# Patient Record
Sex: FEMALE | Race: WHITE | NOT HISPANIC OR LATINO | Employment: OTHER | URBAN - METROPOLITAN AREA
[De-identification: names, ages, dates, MRNs, and addresses within clinical notes are randomized per-mention and may not be internally consistent; named-entity substitution may affect disease eponyms.]

---

## 2017-04-22 DIAGNOSIS — K21.9 GASTROESOPHAGEAL REFLUX DISEASE WITHOUT ESOPHAGITIS: Primary | ICD-10-CM

## 2017-04-22 NOTE — TELEPHONE ENCOUNTER
Pending Prescriptions:                       Disp   Refills    omeprazole (PRILOSEC) 20 MG CR capsule    180 ca*3            Sig: Take 1 capsule (20 mg) by mouth 2 times daily          Last Written Prescription Date:  Historical  Last Fill Quantity: -,   # refills: -  Last Office Visit with [facility], [facility] or [facility] prescribing provider: 10/19/16 [facility]  Future Office visit:       Routing refill request to provider for review/approval because:  Medication is reported/historical    RT Valentin(R)

## 2017-05-30 DIAGNOSIS — I10 BENIGN ESSENTIAL HYPERTENSION: Primary | ICD-10-CM

## 2017-05-30 NOTE — TELEPHONE ENCOUNTER
Pending Prescriptions:                       Disp   Refills    diltiazem (TIAZAC) 180 MG 24 hr ER beaded*30 cap*             Sig: Take 1 capsule (180 mg) by mouth daily          Last Written Prescription Date:  Historical  Last Fill Quantity: -,   # refills: -  Last Office Visit with FMBRANDI, JOELLEP or [facility] prescribing provider: 10/19/16 [facility]  Future Office visit:       Routing refill request to provider for review/approval because:  Medication is reported/historical    RT Dee(R)

## 2017-05-31 NOTE — TELEPHONE ENCOUNTER
"Patient called from the pharmacy, they gave her a five day emergency supply to tide her over, patient is anxious to have this filled, states \"i cannot be without this medication.\"  "

## 2017-06-05 RX ORDER — DILTIAZEM HYDROCHLORIDE 180 MG/1
180 CAPSULE, EXTENDED RELEASE ORAL DAILY
Qty: 90 CAPSULE | Refills: 3 | Status: SHIPPED | OUTPATIENT
Start: 2017-06-05 | End: 2018-06-08

## 2017-06-13 ENCOUNTER — OFFICE VISIT (OUTPATIENT)
Dept: FAMILY MEDICINE | Facility: CLINIC | Age: 77
End: 2017-06-13
Payer: COMMERCIAL

## 2017-06-13 VITALS
BODY MASS INDEX: 19.49 KG/M2 | OXYGEN SATURATION: 93 % | HEIGHT: 62 IN | SYSTOLIC BLOOD PRESSURE: 117 MMHG | HEART RATE: 68 BPM | WEIGHT: 105.9 LBS | TEMPERATURE: 98.5 F | DIASTOLIC BLOOD PRESSURE: 66 MMHG

## 2017-06-13 DIAGNOSIS — E53.8 VITAMIN B12 DEFICIENCY (NON ANEMIC): ICD-10-CM

## 2017-06-13 DIAGNOSIS — K52.0 RADIATION COLITIS: ICD-10-CM

## 2017-06-13 DIAGNOSIS — M19.90 ARTHRITIS: ICD-10-CM

## 2017-06-13 DIAGNOSIS — K56.609 SBO (SMALL BOWEL OBSTRUCTION) (H): ICD-10-CM

## 2017-06-13 DIAGNOSIS — Z23 NEED FOR PROPHYLACTIC VACCINATION WITH TETANUS-DIPHTHERIA (TD): ICD-10-CM

## 2017-06-13 DIAGNOSIS — K56.7 ILEUS OF UNSPECIFIED TYPE (H): Primary | ICD-10-CM

## 2017-06-13 LAB
ERYTHROCYTE [DISTWIDTH] IN BLOOD BY AUTOMATED COUNT: 14.8 % (ref 10–15)
HCT VFR BLD AUTO: 38.3 % (ref 35–47)
HGB BLD-MCNC: 12.8 G/DL (ref 11.7–15.7)
MCH RBC QN AUTO: 31.3 PG (ref 26.5–33)
MCHC RBC AUTO-ENTMCNC: 33.4 G/DL (ref 31.5–36.5)
MCV RBC AUTO: 94 FL (ref 78–100)
PLATELET # BLD AUTO: 274 10E9/L (ref 150–450)
RBC # BLD AUTO: 4.09 10E12/L (ref 3.8–5.2)
WBC # BLD AUTO: 4.8 10E9/L (ref 4–11)

## 2017-06-13 PROCEDURE — 99214 OFFICE O/P EST MOD 30 MIN: CPT | Performed by: INTERNAL MEDICINE

## 2017-06-13 PROCEDURE — 36415 COLL VENOUS BLD VENIPUNCTURE: CPT | Performed by: INTERNAL MEDICINE

## 2017-06-13 PROCEDURE — 85027 COMPLETE CBC AUTOMATED: CPT | Performed by: INTERNAL MEDICINE

## 2017-06-13 RX ORDER — CALCIUM CARBONATE 500(1250)
1 TABLET ORAL 2 TIMES DAILY
COMMUNITY
End: 2022-09-07

## 2017-06-13 NOTE — MR AVS SNAPSHOT
After Visit Summary   6/13/2017    Georgette Alatorre    MRN: [id number]           Patient Information     Date Of Birth          1940        Visit Information        Provider Department      6/13/2017 5:30 PM Mario Valdes MD [facility]        Today's Diagnoses     Ileus of unspecified type (H)    -  1    SBO (small bowel obstruction) (H)        Radiation colitis        Vitamin B12 deficiency (non anemic)        Need for prophylactic vaccination with tetanus-diphtheria (TD)          Care Instructions    For arthritis, In aim of protecting further wear and tear damage to current affected joints, avoid over use.    You can take up to 4000 mg of Tylenol daily, which is 8 extra strength (500 mg) Tylenol. Take 2 tablets 500 mg four times daily and taper down to the lowest effective dose.    For colitis, begin 3 mL Peptobismol at night. If you do not find 3 mL effective, increase to 6 mL nightly. Keep a record of bowel movements to monitor improvement.           Follow-ups after your visit        Who to contact     If you have questions or need follow up information about today's clinic visit or your schedule please contact [facility] directly at [phone].  Normal or non-critical lab and imaging results will be communicated to you by [employer], letter or phone within 4 business days after the clinic has received the results. If you do not hear from us within 7 days, please contact the clinic through [employer] or phone. If you have a critical or abnormal lab result, we will notify you by phone as soon as possible.  Submit refill requests through [employer] or call your pharmacy and they will forward the refill request to us. Please allow 3 business days for your refill to be completed.          Additional Information About Your Visit        MyChart Information     [employer] gives you secure access to your electronic health record. If you see a primary care provider, you can also  "send messages to your care team and make appointments. If you have questions, please call your primary care clinic.  If you do not have a primary care provider, please call [phone] and they will assist you.        Care EveryWhere ID     This is your Care EveryWhere ID. This could be used by other organizations to access your [facility] medical records  [id number]        Your Vitals Were     Pulse Temperature Height Pulse Oximetry Breastfeeding? BMI (Body Mass Index)    68 98.5  F (36.9  C) (Oral) 5' 2\" (1.575 m) 93% No 19.37 kg/m2       Blood Pressure from Last 3 Encounters:   06/13/17 117/66   10/19/16 121/60   09/20/16 115/67    Weight from Last 3 Encounters:   06/13/17 105 lb 14.4 oz (48 kg)   10/19/16 102 lb 3.2 oz (46.4 kg)   09/20/16 102 lb (46.3 kg)              Today, you had the following     No orders found for display       Primary Care Provider Office Phone # Fax #    Mario Valdes [phone]       [facility] [address] [facility] [address]  [facility] [address]        Thank you!     Thank you for choosing [facility]  for your care. Our goal is always to provide you with excellent care. Hearing back from our patients is one way we can continue to improve our services. Please take a few minutes to complete the written survey that you may receive in the mail after your visit with us. Thank you!             Your Updated Medication List - Protect others around you: Learn how to safely use, store and throw away your medicines at www.disposemymeds.org.          This list is accurate as of: 6/13/17  6:18 PM.  Always use your most recent med list.                   Brand Name Dispense Instructions for use    calcium carbonate 1250 MG tablet    OS-ILYA 500 mg [place]. [place]     Take 1 tablet by mouth 2 times daily       cyanocobalamin 1000 MCG/ML injection    VITAMIN B12    1 mL    Inject 1 mL (1,000 mcg) into the muscle every 30 days       diltiazem 180 MG 24 hr ER beaded capsule "    TIAZAC    90 capsule    Take 1 capsule (180 mg) by mouth daily       omeprazole 20 MG CR capsule    priLOSEC    180 capsule    Take 1 capsule (20 mg) by mouth 2 times daily       potassium chloride SA 20 MEQ CR tablet    potassium chloride    90 tablet    Take 1 tablet (20 mEq) by mouth daily       VITAMIN C PO      Take 500 mg by mouth daily.       VITAMIN D3 PO      Take 2,000 Units by mouth daily.

## 2017-06-13 NOTE — PROGRESS NOTES
SUBJECTIVE:                                                    Georgette Alatorre is a 76 year old female who presents to clinic today for the following health issues:    Mrs. Alatorre presents to the clinic for follow up of arthritis. She notes constant soreness and pain with activity. She notes pain is most significant in the right 1st MCP, 2st finger PIP and 3rd PIP and left 1st MCP. Denies numbness and tingling of hands. Patient believes pain ws aggravated with gardening as she was digging with a shovel and lifting objects in the yard. Tylenol offers moderate help for this illness. Denies pain of the elbows shoulders knees hips and wrists.    Patient has colitis associated with radiation proctitis and notes continuation of diarrhea. Symptoms are improved Benefiber. She notes she limits travel and she will not eat prior to a social engagement due to this illness.     Patient also complains of loss of hearing bilaterally, left greater than right,  and wonders about impacted cerumen.    Chronic Pain Follow-Up       Type / Location of Pain: both hands  Analgesia/pain control:       Recent changes:  improved      Overall control: Tolerable with discomfort  Activity level/function:      Daily activities:  Able to do light housework, cooking and Able to do moderate activities    Work:  not applicable  Adverse effects:  No  Adherance    Taking medication as directed?  Yes    Participating in other treatments: no -   Risk Factors:    Sleep:  Fair    Mood/anxiety:  worsened    Recent family or social stressors:  none noted and Son    Other aggravating factors: prolonged sitting  No flowsheet data found.  No flowsheet data found.  Encounter-Level CSA:     There are no encounter-level csa.             Amount of exercise or physical activity: 2-3 days/week for an average of 30-45 minutes    Problems taking medications regularly: No    Medication side effects: none    Diet: regular (no restrictions)    Problem list and  "histories reviewed & adjusted, as indicated.  Additional history: as documented    Current Outpatient Prescriptions   Medication Sig Dispense Refill     calcium carbonate (OS-ILYA 500 MG [place]. [place]) 1250 MG tablet Take 1 tablet by mouth 2 times daily       diltiazem (TIAZAC) 180 MG 24 hr ER beaded capsule Take 1 capsule (180 mg) by mouth daily 90 capsule 3     omeprazole (PRILOSEC) 20 MG CR capsule Take 1 capsule (20 mg) by mouth 2 times daily 180 capsule 3     Cholecalciferol (VITAMIN D3 PO) Take 2,000 Units by mouth daily.       Ascorbic Acid (VITAMIN C PO) Take 500 mg by mouth daily.       cyanocobalamin (VITAMIN B12) 1000 MCG/ML injection Inject 1 mL (1,000 mcg) into the muscle every 30 days 1 mL 11     potassium chloride SA (POTASSIUM CHLORIDE) 20 MEQ tablet Take 1 tablet (20 mEq) by mouth daily (Patient not taking: Reported on 6/13/2017) 90 tablet 1     No Known Allergies    Reviewed and updated as needed this visit by clinical staff  Tobacco  Allergies  Meds  Soc Hx      Reviewed and updated as needed this visit by Provider       ROS:  Constitutional, HEENT, cardiovascular, pulmonary, gi and gu systems are negative, except as otherwise noted.    This document serves as a record of the services and decisions personally performed and made by Mario Valdes MD. It was created on his/her behalf by Darlene Lopez, a trained medical scribe. The creation of this document is based the provider's statements to the medical scribe.  Musa Lopez 5:49 PM, June 13, 2017     OBJECTIVE:                                                    /66 (BP Location: Right arm, Patient Position: Chair, Cuff Size: Adult Regular)  Pulse 68  Temp 98.5  F (36.9  C) (Oral)  Ht 1.575 m (5' 2\")  Wt 48 kg (105 lb 14.4 oz)  SpO2 93%  Breastfeeding? No  BMI 19.37 kg/m2  Body mass index is 19.37 kg/(m^2).   Right ear has excessive cerumen that was removed   Neck was supple without adenopathy or thyromegaly her carotids " were normal without bruits  Chest clear to auscultation and percussion  Cardiovascular S1 and S2 are physiologic without murmurs or gallops  Significant scoliosis of spine   Abdomen bowel sounds were normal.  There is no palpable mass or organomegaly  Extremities nontender without any edema  Joints: Wrist and elbows normal. Synovial thickening of her right 2nd and 3rd MCP. Significant Heberden's node of the 2nd, 3rd and 5th DIPs  Left hand has very minor Hebereden's node. MCPs tender without synovial thickening. So synovitis  Knees, ankle, and hips normal  Pulses pedal pulses are as described otherwise his pulses are bilaterally symmetrical throughout without bruits  Skin without significant abnormality      ASSESSMENT/PLAN:                                                    1. Ileus of unspecified type (H)    2. SBO (small bowel obstruction) (H)  - CBC with platelets    3. Radiation colitis  For colitis, begin 3 mL Peptobismol at night. If you do not find 3 mL effective, increase to 6 mL nightly. Keep a record of bowel movements to monitor improvement.     Inspite of previous discussions, the patient is not treating her chronic diarrhea. We encouraged her to try using peptobismol, avoiding Imodium. She is agreeable to trying something. Serial follow up will determine the effectivness.    4. Vitamin B12 deficiency (non anemic)      5. Need for prophylactic vaccination with tetanus-diphtheria (TD)  Further assessment at next visit.    6. Arthritis  For arthritis, in aim of protecting current affected joints from further wear and tear damage, avoid over use. Begin 2 tablets 500 mg four times daily and taper down to the lowest effective dose. Do not exceed 4000 mg of Tylenol daily, which is 8 extra strength (500 mg) Tylenol in 24 hours.  It is mostly likely that we are dealing with repetitive trauma with associated osteoarthritis. It is not likely that it is Lupus or rheumatoid arthritis or some other autoimmune  disorder.   - **ESR FUTURE anytime; Future  - JUST IN CASE    Follow up with la results     Mario Valdes MD  [facility]    The information in this document, created by the medical scribe for me, accurately reflects the services I personally performed and the decisions made by me. I have reviewed and approved this document for accuracy prior to leaving the patient care area.  Mario Valdes MD  5:48 PM, 06/13/17

## 2017-06-13 NOTE — NURSING NOTE
"Chief Complaint   Patient presents with     Follow Up For     Colitis & Arthritis       Initial /66 (BP Location: Right arm, Patient Position: Chair, Cuff Size: Adult Regular)  Pulse 68  Temp 98.5  F (36.9  C) (Oral)  Ht 5' 2\" (1.575 m)  Wt 105 lb 14.4 oz (48 kg)  SpO2 93%  Breastfeeding? No  BMI 19.37 kg/m2 Estimated body mass index is 19.37 kg/(m^2) as calculated from the following:    Height as of this encounter: 5' 2\" (1.575 m).    Weight as of this encounter: 105 lb 14.4 oz (48 kg).  Medication Reconciliation: complete     Kari Xavier MA   "

## 2017-06-13 NOTE — PATIENT INSTRUCTIONS
For arthritis, in aim of protecting current affected joint from further wear and tear damage, avoid over use.    Begin 2 tablets 500 mg four times daily and taper down to the lowest effective dose. Do not exceed 4000 mg of Tylenol daily, which is 8 extra strength (500 mg) Tylenol in 24 hours.    For colitis, begin 3 mL Peptobismol at night. If you do not find 3 mL effective, increase to 6 mL nightly. Keep a record of bowel movements to monitor improvement.

## 2017-07-10 ENCOUNTER — OFFICE VISIT (OUTPATIENT)
Dept: FAMILY MEDICINE | Facility: CLINIC | Age: 77
End: 2017-07-10
Payer: COMMERCIAL

## 2017-07-10 ENCOUNTER — TELEPHONE (OUTPATIENT)
Dept: FAMILY MEDICINE | Facility: CLINIC | Age: 77
End: 2017-07-10

## 2017-07-10 VITALS
WEIGHT: 103 LBS | OXYGEN SATURATION: 96 % | SYSTOLIC BLOOD PRESSURE: 120 MMHG | TEMPERATURE: 97.8 F | DIASTOLIC BLOOD PRESSURE: 71 MMHG | HEART RATE: 73 BPM | BODY MASS INDEX: 18.95 KG/M2 | HEIGHT: 62 IN

## 2017-07-10 DIAGNOSIS — K56.609 SBO (SMALL BOWEL OBSTRUCTION) (H): Primary | ICD-10-CM

## 2017-07-10 DIAGNOSIS — Z12.10 ENCOUNTER FOR SCREENING FOR MALIGNANT NEOPLASM OF INTESTINAL TRACT: ICD-10-CM

## 2017-07-10 DIAGNOSIS — E53.8 VITAMIN B12 DEFICIENCY (NON ANEMIC): ICD-10-CM

## 2017-07-10 DIAGNOSIS — K52.0 RADIATION COLITIS: ICD-10-CM

## 2017-07-10 LAB
ERYTHROCYTE [DISTWIDTH] IN BLOOD BY AUTOMATED COUNT: 14.3 % (ref 10–15)
HCT VFR BLD AUTO: 37.5 % (ref 35–47)
HGB BLD-MCNC: 12.5 G/DL (ref 11.7–15.7)
MCH RBC QN AUTO: 31.5 PG (ref 26.5–33)
MCHC RBC AUTO-ENTMCNC: 33.3 G/DL (ref 31.5–36.5)
MCV RBC AUTO: 95 FL (ref 78–100)
PLATELET # BLD AUTO: 287 10E9/L (ref 150–450)
RBC # BLD AUTO: 3.97 10E12/L (ref 3.8–5.2)
WBC # BLD AUTO: 4.9 10E9/L (ref 4–11)

## 2017-07-10 PROCEDURE — 36415 COLL VENOUS BLD VENIPUNCTURE: CPT | Performed by: INTERNAL MEDICINE

## 2017-07-10 PROCEDURE — 99214 OFFICE O/P EST MOD 30 MIN: CPT | Performed by: INTERNAL MEDICINE

## 2017-07-10 PROCEDURE — 82270 OCCULT BLOOD FECES: CPT | Performed by: INTERNAL MEDICINE

## 2017-07-10 PROCEDURE — 85027 COMPLETE CBC AUTOMATED: CPT | Performed by: INTERNAL MEDICINE

## 2017-07-10 RX ORDER — CYANOCOBALAMIN 1000 UG/ML
1 INJECTION, SOLUTION INTRAMUSCULAR; SUBCUTANEOUS
Qty: 1 ML | Refills: 3 | OUTPATIENT
Start: 2017-07-10 | End: 2018-01-24

## 2017-07-10 NOTE — NURSING NOTE
The following medication was given:     MEDICATION: Vitamin B12  1000mcg  ROUTE: IM  SITE: Deltoid - Left  DOSE: 1mL  LOT #: [id number]  :  [employer]  EXPIRATION DATE:  12/2018  NDC#: 4981-7903-36     Per orders of Dr. Valdes, injection of B12 given by Teresa Garsia. Patient instructed to remain in clinic for 15 minutes afterwards, and to report any adverse reaction to me immediately.    Prior to injection verified patient identity using patient's name and date of birth.

## 2017-07-10 NOTE — NURSING NOTE
"Chief Complaint   Patient presents with     Colitis       Initial /71  Pulse 73  Temp 97.8  F (36.6  C) (Oral)  Ht 5' 2\" (1.575 m)  Wt 103 lb (46.7 kg)  SpO2 96%  Breastfeeding? No  BMI 18.84 kg/m2 Estimated body mass index is 18.84 kg/(m^2) as calculated from the following:    Height as of this encounter: 5' 2\" (1.575 m).    Weight as of this encounter: 103 lb (46.7 kg).  Medication Reconciliation: complete   Tarsha LI CMA      "

## 2017-07-10 NOTE — MR AVS SNAPSHOT
"              After Visit Summary   7/10/2017    Georgette Alatorre    MRN: [id number]           Patient Information     Date Of Birth          1940        Visit Information        Provider Department      7/10/2017 2:30 PM Mario Valdes MD [facility]        Today's Diagnoses     SBO (small bowel obstruction) (H)    -  1    Radiation colitis        Vitamin B12 deficiency (non anemic)        Encounter for screening for malignant neoplasm of intestinal tract            Follow-ups after your visit        Who to contact     If you have questions or need follow up information about today's clinic visit or your schedule please contact [facility] directly at [phone].  Normal or non-critical lab and imaging results will be communicated to you by MyChart, letter or phone within 4 business days after the clinic has received the results. If you do not hear from us within 7 days, please contact the clinic through [employer] or phone. If you have a critical or abnormal lab result, we will notify you by phone as soon as possible.  Submit refill requests through [employer] or call your pharmacy and they will forward the refill request to us. Please allow 3 business days for your refill to be completed.          Additional Information About Your Visit        MyChart Information     [employer] gives you secure access to your electronic health record. If you see a primary care provider, you can also send messages to your care team and make appointments. If you have questions, please call your primary care clinic.  If you do not have a primary care provider, please call [phone] and they will assist you.        Care EveryWhere ID     This is your Care EveryWhere ID. This could be used by other organizations to access your [place] medical records  [id number]        Your Vitals Were     Pulse Temperature Height Pulse Oximetry Breastfeeding? BMI (Body Mass Index)    73 97.8  F (36.6  C) (Oral) 5' 2\" " (1.575 m) 96% No 18.84 kg/m2       Blood Pressure from Last 3 Encounters:   07/10/17 120/71   06/13/17 117/66   10/19/16 121/60    Weight from Last 3 Encounters:   07/10/17 103 lb (46.7 kg)   06/13/17 105 lb 14.4 oz (48 kg)   10/19/16 102 lb 3.2 oz (46.4 kg)              We Performed the Following     CBC with platelets     JUST IN CASE     Occult blood stool 1-3 spec     Occult blood stool          Today's Medication Changes          These changes are accurate as of: 7/10/17 11:59 PM.  If you have any questions, ask your nurse or doctor.               These medicines have changed or have updated prescriptions.        Dose/Directions    * cyanocobalamin 1000 MCG/ML injection   Commonly known as:  VITAMIN B12   This may have changed:  Another medication with the same name was added. Make sure you understand how and when to take each.        Dose:  1 mL   Inject 1 mL (1,000 mcg) into the muscle every 30 days   Quantity:  1 mL   Refills:  11       * cyanocobalamin 1000 MCG/ML injection   Commonly known as:  VITAMIN B12   This may have changed:  You were already taking a medication with the same name, and this prescription was added. Make sure you understand how and when to take each.   Used for:  Vitamin B12 deficiency (non anemic)   Changed by:  Mario Valdes MD        Dose:  1 mL   Inject 1 mL (1,000 mcg) into the muscle every 30 days   Quantity:  1 mL   Refills:  3       * Notice:  This list has 2 medication(s) that are the same as other medications prescribed for you. Read the directions carefully, and ask your doctor or other care provider to review them with you.         Where to get your medicines      Some of these will need a paper prescription and others can be bought over the counter.  Ask your nurse if you have questions.     You don't need a prescription for these medications     cyanocobalamin 1000 MCG/ML injection                Primary Care Provider Office Phone # Fax #    Mario Valdes MD  [phone]       [facility] [address] [facility] [address]  [facility] [address]        Equal Access to Services     JODI GONZALES : Hadii aad ku hadbarttree Betsyali, wacarolineda luqscooter, qaybta kaalmada jeff, chantel sandrain hayaaazul finneyvon lafleur maco john. So [facility] [phone].    ATENCIÓN: Si habla español, tiene a leon disposición servicios gratuitos de asistencia lingüística. Llame al [phone].    We comply with applicable federal civil rights laws and [place] laws. We do not discriminate on the basis of race, color, national origin, age, disability sex, sexual orientation or gender identity.            Thank you!     Thank you for choosing [facility]  for your care. Our goal is always to provide you with excellent care. Hearing back from our patients is one way we can continue to improve our services. Please take a few minutes to complete the written survey that you may receive in the mail after your visit with us. Thank you!             Your Updated Medication List - Protect others around you: Learn how to safely use, store and throw away your medicines at www.disposemymeds.org.          This list is accurate as of: 7/10/17 11:59 PM.  Always use your most recent med list.                   Brand Name Dispense Instructions for use Diagnosis    calcium carbonate 1250 MG tablet    OS-ILYA 500 mg [place]. Ca     Take 1 tablet by mouth 2 times daily        * cyanocobalamin 1000 MCG/ML injection    VITAMIN B12    1 mL    Inject 1 mL (1,000 mcg) into the muscle every 30 days        * cyanocobalamin 1000 MCG/ML injection    VITAMIN B12    1 mL    Inject 1 mL (1,000 mcg) into the muscle every 30 days    Vitamin B12 deficiency (non anemic)       diltiazem 180 MG 24 hr ER beaded capsule    TIAZAC    90 capsule    Take 1 capsule (180 mg) by mouth daily    Benign essential hypertension       omeprazole 20 MG CR capsule    priLOSEC    180 capsule    Take 1 capsule (20 mg) by mouth 2 times daily     Gastroesophageal reflux disease without esophagitis       potassium chloride SA 20 MEQ CR tablet    potassium chloride    90 tablet    Take 1 tablet (20 mEq) by mouth daily    Hypopotassemia       VITAMIN C PO      Take 500 mg by mouth daily.        VITAMIN D3 PO      Take 2,000 Units by mouth daily.        * Notice:  This list has 2 medication(s) that are the same as other medications prescribed for you. Read the directions carefully, and ask your doctor or other care provider to review them with you.

## 2017-07-10 NOTE — PROGRESS NOTES
SUBJECTIVE:                                                    Georgette Alatorre is a 76 year old female who presents to clinic today for the following health issues:    Was seen on June 13th and she states that she has been having increased diarrhea over the last six weeks. She states that she has lost 2 lbs and she is afraid to eat. Of interest, she states that things seemed to go down hill since she was eating corn on the cob regularly, she denies any fevers, chills, sweats, or any significant abdominal pain. She was having minor left lower quadrant discomfort with no other exacerbating issues.    She quit her vitamin B12 shots and wasn't taking any oral B12 supplementation. She gave no reason for discontinuing B12        Problem list and histories reviewed & adjusted, as indicated.  Additional history: as documented    Current Outpatient Prescriptions   Medication Sig Dispense Refill     calcium carbonate (OS-ILYA 500 MG [place]. [place]) 1250 MG tablet Take 1 tablet by mouth 2 times daily       diltiazem (TIAZAC) 180 MG 24 hr ER beaded capsule Take 1 capsule (180 mg) by mouth daily 90 capsule 3     Cholecalciferol (VITAMIN D3 PO) Take 2,000 Units by mouth daily.       Ascorbic Acid (VITAMIN C PO) Take 500 mg by mouth daily.       omeprazole (PRILOSEC) 20 MG CR capsule Take 1 capsule (20 mg) by mouth 2 times daily (Patient not taking: Reported on 7/10/2017) 180 capsule 3     cyanocobalamin (VITAMIN B12) 1000 MCG/ML injection Inject 1 mL (1,000 mcg) into the muscle every 30 days 1 mL 11     potassium chloride SA (POTASSIUM CHLORIDE) 20 MEQ tablet Take 1 tablet (20 mEq) by mouth daily (Patient not taking: Reported on 7/10/2017) 90 tablet 1     No Known Allergies    Reviewed and updated as needed this visit by clinical staff  Tobacco  Allergies  Meds  Problems  Med Hx  Surg Hx  Fam Hx  Soc Hx        Reviewed and updated as needed this visit by Provider         ROS:  Constitutional, HEENT, cardiovascular,  "pulmonary, gi and gu systems are negative, except as otherwise noted.    This document serves as a record of the services and decisions personally performed and made by Mario Valdes MD. It was created on his/her behalf by Moises Sanchez, a trained medical scribe. The creation of this document is based the provider's statements to the medical scribe.  Scribpricila Sanchez 3:21 PM, July 10, 2017    OBJECTIVE:     /71  Pulse 73  Temp 97.8  F (36.6  C) (Oral)  Ht 1.575 m (5' 2\")  Wt 46.7 kg (103 lb)  SpO2 96%  Breastfeeding? No  BMI 18.84 kg/m2  Body mass index is 18.84 kg/(m^2).    Neck was supple without adenopathy or thyromegaly her carotids were normal without bruits  Chest clear to auscultation and percussion  Cardiovascular S1 and S2 are physiologic without murmurs or gallops  Abdomen bowel sounds were normal, There is no palpable mass or organomegaly, no real abdominal discomfort  Anus was moderately erythematous, no hemorrhoids rectal exam showed no masses, stool was guaiac negative  Extremities nontender without any edema  Pulses pedal pulses are as described otherwise his pulses are bilaterally symmetrical throughout without bruits  Skin without significant abnormality    Diagnostic Test Results:  Results for orders placed or performed in visit on 07/10/17 (from the past 24 hour(s))   CBC with platelets   Result Value Ref Range    WBC 4.9 4.0 - 11.0 10e9/L    RBC Count 3.97 3.8 - 5.2 10e12/L    Hemoglobin 12.5 11.7 - 15.7 g/dL    Hematocrit 37.5 35.0 - 47.0 %    MCV 95 78 - 100 fl    MCH 31.5 26.5 - 33.0 pg    MCHC 33.3 31.5 - 36.5 g/dL    RDW 14.3 10.0 - 15.0 %    Platelet Count 287 150 - 450 10e9/L       ASSESSMENT/PLAN:     1. SBO (small bowel obstruction) (H)    2. Radiation colitis  - CBC with platelets  - JUST IN CASE  -stool guaiac  3. Vitamin B12 deficiency (non anemic)  - cyanocobalamin (VITAMIN B12) 1000 MCG/ML injection; Inject 1 mL (1,000 mcg) into the muscle every 30 days  " Dispense: 1 mL; Refill: 3    -Patient will be calling in a week with an update. She was put on a soft bland diet. Her Pepto bismol was increased to 60cc twice daily. She will be doing vitamin B12 weekly x 4 and is starting an oral supplement for B12 and will be rechecking in 3 months.    The information in this document, created by the medical scribe for me, accurately reflects the services I personally performed and the decisions made by me. I have reviewed and approved this document for accuracy prior to leaving the patient care area.  Mario Valdes MD  5:46 PM, 07/10/17    Mario Valdes MD  [facility]

## 2017-07-10 NOTE — TELEPHONE ENCOUNTER
Reason for Call:  Same Day apt FYI    Detailed comments: Pt is coming in today to see NP Thomas at 2:30  With issues with Colitis     Phone Number Patient can be reached at: Home number on file [phone] (home)    Best Time: anytime    Can we leave a detailed message on this number? YES    Call taken on 7/10/2017 at 7:38 AM by Conchita Leroy

## 2017-07-13 LAB
COLLECT DATE STL: NORMAL
HEMOCCULT SP1 STL QL: NEGATIVE

## 2017-07-17 NOTE — TELEPHONE ENCOUNTER
Reason for Call:  Other      Detailed comments:      Phone Number Patient can be reached at:    Best Time:      Can we leave a detailed message on this number?     Call taken on 7/17/2017 at 12:32 PM by Jessica Ramirez

## 2017-07-28 NOTE — TELEPHONE ENCOUNTER
The Patient called back and said she was Very upset because she has been waiting for Dr. Kidd call back   She called and asked if we had someone named Jessica answering the phones   She said she does not want to get anyone in trouble but it is un-acceptable to not have her message sent to the provider  She wants a call back to talk to Dr. Valdes about her Colitis  Patient was informed that Dr. Valdes is not in today so it would not be until next week

## 2017-07-28 NOTE — TELEPHONE ENCOUNTER
"Pt calling with ongoing colitis - what is next step?    Colitis symptoms still having flare ups \"I can have a day or two fine, but then I have episodes where I have to go immediately,, It's like pudding, it comes and goes\"  Pt taking pepto daily as instructed by PCP.  Last colonoscopy 2015.  Suggested GI referral as next option maybe?  Colitis going on >1 year, and pt thinks maybe GI specialist is next step to address this.    Would you like to refer Pt to [place] [facility] or other option?  If referral sent, pt requesting we send records we have to this point concerning this issue as well.    Pt very pleasant on phone and states understanding of below. Advised pt if ever not hearing back from clinic for triage, ok to call next day, and always call 911 with any emergent issues.  Mariposa Krishna RN  "

## 2017-08-02 ENCOUNTER — HOSPITAL ENCOUNTER (OUTPATIENT)
Dept: MAMMOGRAPHY | Facility: CLINIC | Age: 77
Discharge: HOME | End: 2017-08-02
Attending: OBSTETRICS & GYNECOLOGY | Admitting: OBSTETRICS & GYNECOLOGY
Payer: MEDICARE

## 2017-08-02 DIAGNOSIS — Z12.31 VISIT FOR SCREENING MAMMOGRAM: ICD-10-CM

## 2017-08-02 PROCEDURE — 77063 BREAST TOMOSYNTHESIS BI: CPT

## 2017-08-02 PROCEDURE — G0202 SCR MAMMO BI INCL CAD: HCPCS

## 2017-08-02 NOTE — TELEPHONE ENCOUNTER
Patient walked in today and asked if  could Please call her  (cell) [phone] (or) (Home) [phone]    Thank you

## 2017-08-03 NOTE — TELEPHONE ENCOUNTER
Printed screened this and set on Dr. Valdes's desk.   Patient would really like to speak with Dr. Valdes about possibly getting GI referral for her colitis.    Routing to PCP as well.    Lupis Loredo RN

## 2017-08-03 NOTE — TELEPHONE ENCOUNTER
Pt returning yadi's ph call. Pt is getting frustated with whole process of not getting a call back.

## 2017-10-02 ENCOUNTER — TRANSFERRED RECORDS (OUTPATIENT)
Dept: HEALTH INFORMATION MANAGEMENT | Facility: CLINIC | Age: 77
End: 2017-10-02

## 2017-10-06 ENCOUNTER — TELEPHONE (OUTPATIENT)
Dept: FAMILY MEDICINE | Facility: CLINIC | Age: 77
End: 2017-10-06

## 2017-10-06 NOTE — TELEPHONE ENCOUNTER
TO DOD:   Pt did a telephone visit with her OBGYN today for UTI because Dr. Valdes was not in clinic today   Rx was sent for Macrobid - pt did not do UA or UC    Reading side effects and was concerned - colitis is listed along with C. Diff under potential side effects, and pt has a history of colitis      Pt asking is it okay for her to still take Macrobid, even though she has colitis?     Please advise.     Key LI RN

## 2017-10-06 NOTE — TELEPHONE ENCOUNTER
Reason for Call:  call back    Detailed comments: pt wondering about medication from OB/GYN for a UTI (macrobid). Shes concerned about mixing this with her colitis condition. Please call to advise.     Phone Number Patient can be reached at: Home number on file [phone] (home)    Best Time: any    Can we leave a detailed message on this number? YES    Call taken on 10/6/2017 at 3:28 PM by Toya Wilder

## 2017-10-06 NOTE — TELEPHONE ENCOUNTER
Called Pt back at the number below. Line was busy after two attempts. Will need to recall.     Kari Gaxiola RN

## 2017-10-11 ENCOUNTER — TRANSFERRED RECORDS (OUTPATIENT)
Dept: HEALTH INFORMATION MANAGEMENT | Facility: CLINIC | Age: 77
End: 2017-10-11

## 2017-10-19 ENCOUNTER — TRANSFERRED RECORDS (OUTPATIENT)
Dept: HEALTH INFORMATION MANAGEMENT | Facility: CLINIC | Age: 77
End: 2017-10-19

## 2017-10-31 ENCOUNTER — TELEPHONE (OUTPATIENT)
Dept: FAMILY MEDICINE | Facility: CLINIC | Age: 77
End: 2017-10-31

## 2017-10-31 DIAGNOSIS — E53.8 VITAMIN B12 DEFICIENCY (NON ANEMIC): Primary | ICD-10-CM

## 2017-10-31 NOTE — TELEPHONE ENCOUNTER
I spoke to patient and she did not understand that she should be coming in for monthly injections of B12 per OV notes from 7/10/17.  She has been taking B12 1000 mcg daily Orally.  Do you want her to come in for labs again to see where her B12 is at, or do you want her to  the injections again?  Please advise.   Conchita Noriega MA

## 2017-10-31 NOTE — TELEPHONE ENCOUNTER
Reason for Call:  Other questions    Detailed comments: Pt is wondering if she is supposed to get a B12 shot  Or get B12 levels checked.    Phone Number Patient can be reached at:  Home number on file:  [phone]  If no answer, call cell number: [phone]    Best Time: Anytime    Can we leave a detailed message on this number? YES    Call taken on 10/31/2017 at 12:11 PM by Marie Lin

## 2017-10-31 NOTE — TELEPHONE ENCOUNTER
Also, please call pt to schedule lab if B12 needs to be checked. Pt has nurse only appointment scheduled for 11/2 for tetanus shot and B12 if B12 needed.

## 2017-11-02 ENCOUNTER — ALLIED HEALTH/NURSE VISIT (OUTPATIENT)
Dept: NURSING | Facility: CLINIC | Age: 77
End: 2017-11-02
Payer: COMMERCIAL

## 2017-11-02 DIAGNOSIS — M19.90 ARTHRITIS: ICD-10-CM

## 2017-11-02 DIAGNOSIS — E53.8 VITAMIN B12 DEFICIENCY (NON ANEMIC): ICD-10-CM

## 2017-11-02 DIAGNOSIS — Z23 NEED FOR VACCINATION: Primary | ICD-10-CM

## 2017-11-02 LAB
ERYTHROCYTE [SEDIMENTATION RATE] IN BLOOD BY WESTERGREN METHOD: 25 MM/H (ref 0–30)
VIT B12 SERPL-MCNC: 472 PG/ML (ref 193–986)

## 2017-11-02 PROCEDURE — 85652 RBC SED RATE AUTOMATED: CPT | Performed by: INTERNAL MEDICINE

## 2017-11-02 PROCEDURE — 36415 COLL VENOUS BLD VENIPUNCTURE: CPT | Performed by: INTERNAL MEDICINE

## 2017-11-02 PROCEDURE — 90715 TDAP VACCINE 7 YRS/> IM: CPT

## 2017-11-02 PROCEDURE — 82607 VITAMIN B-12: CPT | Performed by: INTERNAL MEDICINE

## 2017-11-02 PROCEDURE — 90471 IMMUNIZATION ADMIN: CPT

## 2017-11-02 NOTE — MR AVS SNAPSHOT
After Visit Summary   11/2/2017    Georgette Alatorre    MRN: [id number]           Patient Information     Date Of Birth          1940        Visit Information        Provider Department      11/2/2017 9:45 AM CS NURSE [place] Akhil Khoury        Today's Diagnoses     Need for vaccination    -  1       Follow-ups after your visit        Your next 10 appointments already scheduled     Nov 02, 2017 10:45 AM CDT   LAB with CS LAB   [facility] ([facility])    [address] [address] [phone]           Please do not eat 10-12 hours before your appointment if you are coming in fasting for labs on lipids, cholesterol, or glucose (sugar). This does not apply to pregnant women. Water, hot tea and black coffee (with nothing added) are okay. Do not drink other fluids, diet soda or chew gum.              Who to contact     If you have questions or need follow up information about today's clinic visit or your schedule please contact [facility] directly at [phone].  Normal or non-critical lab and imaging results will be communicated to you by [employer], letter or phone within 4 business days after the clinic has received the results. If you do not hear from us within 7 days, please contact the clinic through [employer] or phone. If you have a critical or abnormal lab result, we will notify you by phone as soon as possible.  Submit refill requests through [employer] or call your pharmacy and they will forward the refill request to us. Please allow 3 business days for your refill to be completed.          Additional Information About Your Visit        [employer] Information     [employer] gives you secure access to your electronic health record. If you see a primary care provider, you can also send messages to your care team and make appointments. If you have questions, please call your primary care clinic.  If you do not have a primary care  provider, please call [phone] and they will assist you.        Care EveryWhere ID     This is your Care EveryWhere ID. This could be used by other organizations to access your [place] medical records  [id number]         Blood Pressure from Last 3 Encounters:   07/10/17 120/71   06/13/17 117/66   10/19/16 121/60    Weight from Last 3 Encounters:   07/10/17 103 lb (46.7 kg)   06/13/17 105 lb 14.4 oz (48 kg)   10/19/16 102 lb 3.2 oz (46.4 kg)              We Performed the Following     TDAP VACCINE (ADACEL)        Primary Care Provider Office Phone # Fax #    Mario Valdes [phone] [address] [place] [address] [place] [address]        Equal Access to Services     [facility]: Hadii aad ku hadasho Soomaali, waaxda luqadaha, qaybta kaalmada adeegyada, chantel floresin hayscott dewey . So [facility] [phone].    ATENCIÓN: Si habla español, tiene a leon disposición servicios gratuitos de asistencia lingüística. Jerica al [phone].    We comply with applicable federal civil rights laws and [place] laws. We do not discriminate on the basis of race, color, national origin, age, disability, sex, sexual orientation, or gender identity.            Thank you!     Thank you for choosing [facility]  for your care. Our goal is always to provide you with excellent care. Hearing back from our patients is one way we can continue to improve our services. Please take a few minutes to complete the written survey that you may receive in the mail after your visit with us. Thank you!             Your Updated Medication List - Protect others around you: Learn how to safely use, store and throw away your medicines at www.disposemymeds.org.          This list is accurate as of: 11/2/17 10:09 AM.  Always use your most recent med list.                   Brand Name Dispense Instructions for use Diagnosis    calcium carbonate 1250 MG tablet    OS-ILYA 500 mg [place]. [place]     Take 1 tablet by  mouth 2 times daily        * cyanocobalamin 1000 MCG/ML injection    VITAMIN B12    1 mL    Inject 1 mL (1,000 mcg) into the muscle every 30 days        * cyanocobalamin 1000 MCG/ML injection    VITAMIN B12    1 mL    Inject 1 mL (1,000 mcg) into the muscle every 30 days    Vitamin B12 deficiency (non anemic)       diltiazem 180 MG 24 hr ER beaded capsule    TIAZAC    90 capsule    Take 1 capsule (180 mg) by mouth daily    Benign essential hypertension       omeprazole 20 MG CR capsule    priLOSEC    180 capsule    Take 1 capsule (20 mg) by mouth 2 times daily    Gastroesophageal reflux disease without esophagitis       potassium chloride SA 20 MEQ CR tablet    KLOR-CON    90 tablet    Take 1 tablet (20 mEq) by mouth daily    Hypopotassemia       VITAMIN C PO      Take 500 mg by mouth daily.        VITAMIN D3 PO      Take 2,000 Units by mouth daily.        * Notice:  This list has 2 medication(s) that are the same as other medications prescribed for you. Read the directions carefully, and ask your doctor or other care provider to review them with you.

## 2017-11-02 NOTE — NURSING NOTE

## 2017-11-27 ENCOUNTER — TRANSFERRED RECORDS (OUTPATIENT)
Dept: HEALTH INFORMATION MANAGEMENT | Facility: CLINIC | Age: 77
End: 2017-11-27

## 2017-12-11 ENCOUNTER — TRANSFERRED RECORDS (OUTPATIENT)
Dept: HEALTH INFORMATION MANAGEMENT | Facility: CLINIC | Age: 77
End: 2017-12-11

## 2018-01-24 ENCOUNTER — OFFICE VISIT (OUTPATIENT)
Dept: FAMILY MEDICINE | Facility: CLINIC | Age: 78
End: 2018-01-24
Payer: COMMERCIAL

## 2018-01-24 VITALS
BODY MASS INDEX: 17.48 KG/M2 | TEMPERATURE: 98.2 F | DIASTOLIC BLOOD PRESSURE: 67 MMHG | OXYGEN SATURATION: 97 % | HEART RATE: 68 BPM | SYSTOLIC BLOOD PRESSURE: 122 MMHG | HEIGHT: 62 IN | WEIGHT: 95 LBS

## 2018-01-24 DIAGNOSIS — G56.03 BILATERAL CARPAL TUNNEL SYNDROME: ICD-10-CM

## 2018-01-24 DIAGNOSIS — F43.9 STRESS: ICD-10-CM

## 2018-01-24 DIAGNOSIS — M25.532 PAIN IN BOTH WRISTS: Primary | ICD-10-CM

## 2018-01-24 DIAGNOSIS — B35.1 TOENAIL FUNGUS: ICD-10-CM

## 2018-01-24 DIAGNOSIS — M25.531 PAIN IN BOTH WRISTS: Primary | ICD-10-CM

## 2018-01-24 DIAGNOSIS — K52.9 CHRONIC DIARRHEA: ICD-10-CM

## 2018-01-24 PROCEDURE — 99214 OFFICE O/P EST MOD 30 MIN: CPT | Performed by: NURSE PRACTITIONER

## 2018-01-24 NOTE — MR AVS SNAPSHOT
After Visit Summary   1/24/2018    Georgette Alatorre    MRN: [id number]           Patient Information     Date Of Birth          1940        Visit Information        Provider Department      1/24/2018 11:30 AM Jerry Fontanez APRN [facility]        Today's Diagnoses     Pain in both wrists    -  1    Bilateral carpal tunnel syndrome        Chronic diarrhea        Stress           Follow-ups after your visit        Additional Services     LM PT, HAND, AND CHIROPRACTIC REFERRAL       **This order will print in the LM Scheduling Office**    Physical Therapy, Hand Therapy and Chiropractic Care are available through:    *[facility]  *[facility]  *[facility]    Call one number to schedule at any of the above locations: [phone].    Your provider has referred you to: Hand Therapy    Indication/Reason for Referral: Wrist Pain  Onset of Illness: 1 month  Therapy Orders: Evaluate and Treat  Special Programs: None  Special Request: None    Tyler Scott      Additional Comments for the Therapist or Chiropractor:     Please be aware that coverage of these services is subject to the terms and limitations of your health insurance plan.  Call member services at your health plan with any benefit or coverage questions.      Please bring the following to your appointment:    *Your personal calendar for scheduling future appointments  *Comfortable clothing            MENTAL HEALTH REFERRAL  - Adult; Outpatient Treatment; Individual/Couples/Family/Group Therapy/Health Psychology; G: [facility] [phone]; We will contact you to schedule the appointment or please call with any questions       All scheduling is subject to the client's specific insurance plan & benefits, provider/location availability, and provider clinical specialities.  Please arrive 15 minutes early for your first appointment and bring  your completed paperwork.    Please be aware that coverage of these services is subject to the terms and limitations of your health insurance plan.  Call member services at your health plan with any benefit or coverage questions.                      NUTRITION REFERRAL       Your provider has referred you to: AMAYA: [facility] Dario Khoury [phone]   [email or web address]    Please be aware that coverage of these services is subject to the terms and limitations of your health insurance plan.  Call member services at your health plan with any benefit or coverage questions.      Please bring the following with you to your appointment:    (1) This referral request  (2) Any documents given to you regarding this referral  (3) Any specific questions you have about diet and/or food choices                  Who to contact     If you have questions or need follow up information about today's clinic visit or your schedule please contact [facility] directly at [phone].  Normal or non-critical lab and imaging results will be communicated to you by MyChart, letter or phone within 4 business days after the clinic has received the results. If you do not hear from us within 7 days, please contact the clinic through [employer] or phone. If you have a critical or abnormal lab result, we will notify you by phone as soon as possible.  Submit refill requests through [employer] or call your pharmacy and they will forward the refill request to us. Please allow 3 business days for your refill to be completed.          Additional Information About Your Visit        [employer] Information     [employer] gives you secure access to your electronic health record. If you see a primary care provider, you can also send messages to your care team and make appointments. If you have questions, please call your primary care clinic.  If you do not have a primary care provider, please call [phone] and they will  "assist you.        Care EveryWhere ID     This is your Care EveryWhere ID. This could be used by other organizations to access your [place] medical records  [id number]        Your Vitals Were     Pulse Temperature Height Pulse Oximetry Breastfeeding? BMI (Body Mass Index)    68 98.2  F (36.8  C) (Oral) 5' 2\" (1.575 m) 97% No 17.38 kg/m2       Blood Pressure from Last 3 Encounters:   01/24/18 122/67   07/10/17 120/71   06/13/17 117/66    Weight from Last 3 Encounters:   01/24/18 95 lb (43.1 kg)   07/10/17 103 lb (46.7 kg)   06/13/17 105 lb 14.4 oz (48 kg)              We Performed the Following     LM PT, HAND, AND CHIROPRACTIC REFERRAL     MENTAL HEALTH REFERRAL  - Adult; Outpatient Treatment; Individual/Couples/Family/Group Therapy/Health Psychology; FMG: [facility] [phone]; We will contact you to schedule the appointment or please call with any questions     NUTRITION REFERRAL        Primary Care Provider Office Phone # Fax #    Mario Valdes [phone] [address] [address]        Equal Access to Services     JODI GONZALES : Hadii francisco ku hadasho Sobhumiali, waaxda luqadaha, qaybta kaalmada adeegyada, chantel dewey . So [facility] [place] [facility] [phone].    ATENCIÓN: Si habla español, tiene a leon disposición servicios gratuitos de asistencia lingüística. Llame al [phone].    We comply with applicable federal civil rights laws and [place] laws. We do not discriminate on the basis of race, color, national origin, age, disability, sex, sexual orientation, or gender identity.            Thank you!     Thank you for choosing [facility]  for your care. Our goal is always to provide you with excellent care. Hearing back from our patients is one way we can continue to improve our services. Please take a few minutes to complete the written survey that you may receive in the mail after your visit with us. Thank you!      "        Your Updated Medication List - Protect others around you: Learn how to safely use, store and throw away your medicines at www.disposemymeds.org.          This list is accurate as of 1/24/18 12:16 PM.  Always use your most recent med list.                   Brand Name Dispense Instructions for use Diagnosis    BENEFIBER PO      Take by mouth 2 times daily        calcium carbonate 1250 MG tablet    OS-ILYA 500 mg [place]. [place]     Take 1 tablet by mouth 2 times daily        cyanocobalamin 1000 MCG/ML injection    VITAMIN B12    1 mL    Inject 1 mL (1,000 mcg) into the muscle every 30 days        diltiazem 180 MG 24 hr ER beaded capsule    TIAZAC    90 capsule    Take 1 capsule (180 mg) by mouth daily    Benign essential hypertension       IMODIUM OR           omeprazole 20 MG CR capsule    priLOSEC    180 capsule    Take 1 capsule (20 mg) by mouth 2 times daily    Gastroesophageal reflux disease without esophagitis       potassium chloride SA 20 MEQ CR tablet    KLOR-CON    90 tablet    Take 1 tablet (20 mEq) by mouth daily    Hypopotassemia       probiotic Caps           TYLENOL PO      Take 500 mg by mouth every 4 hours as needed for mild pain        VITAMIN C PO      Take 500 mg by mouth daily.        VITAMIN D3 PO      Take 2,000 Units by mouth daily.

## 2018-01-24 NOTE — PROGRESS NOTES
HPI      SUBJECTIVE:   Georgette Alatorre is a 77 year old female who presents to clinic today for the following health issues:    Musculoskeletal problem/pain      Duration: 1 month    Description  Location: bilateral arm pain, left arm and wrist are worse. No known injury    Intensity:  8/10    Accompanying signs and symptoms: radiation of pain to left wrist, numbness, tingling and weakness of left hand and wrist. Also has had weight loss (unintentional) with chronic diarrhea. Has seen GI several times. Also has had occasional cold extremities.    History  Previous similar problem: no   Previous evaluation:  none    Precipitating or alleviating factors:  Trauma or overuse: no   Aggravating factors include: overuse and certain movements    Therapies tried and outcome: acetaminophen - effective temporarily.      Started with alternanting arm pain  Mainly left arm and wrist that is really sore   Wrist hurts more with movement   Fingers are often cold but never turn white   Tingling alternates hands   No CP, neck pain, fevers    This weekend had some HAs that come and go and are dull     Also has been struggling with chronic diarrhea and is interested in seeing dietitian. This has basically controlled her life and is unable to do things outside of the home. She has seen GI without many answers or improvement. Does deal with stress which could be playing a role in her diarrhea        Past Medical History:   Diagnosis Date     Arthritis      Endometrial ca (H)      Gastro-oesophageal reflux disease      History of blood transfusion     no adverse reactions     Hydronephrosis      Scoliosis      Small bowel obstruction      SVT (supraventricular tachycardia) (H)      Past Surgical History:   Procedure Laterality Date     APPENDECTOMY       CATARACT IOL, RT/LT  2009    Cataract IOL Bilateral     COLONOSCOPY  03/30/2006    Normal; repeat in 5 years     CYSTOSCOPY  11/14/2007    Cystoscopy, bilateral retrograde  pyelograms, dilatation of left ureteral stricture, left ureteroscopy and insertion of left double pigtail stent.      ENDOSCOPY  10/22/2008    Upper GI:  Normal     ENDOSCOPY  1/11/2007    Upper GI     EYE SURGERY  2010    retinal eye surgery     HYSTERECTOMY, PAP NO LONGER INDICATED  2000    due to endometrial cancer     LAPAROTOMY EXPLORATORY  08/22/2007    1.  Exploratory laparotomy. 2.  Extensive lysis of intra-abdominal adhesions. Performed by Dr Rad Pierce     LAPAROTOMY EXPLORATORY  04/20/2005    1.  Exploratory laparotomy.  2.  Lysis of adhesions.  Segmental small bowel resection Performed by Dr Rad Pierce.     Social History   Substance Use Topics     Smoking status: Former Smoker     Packs/day: 0.50     Years: 40.00     Smokeless tobacco: Never Used     Alcohol use 0.0 oz/week     0 Standard drinks or equivalent per week      Comment: 1 glass of wine with dinner     Current Outpatient Prescriptions   Medication Sig Dispense Refill     Acetaminophen (TYLENOL PO) Take 500 mg by mouth every 4 hours as needed for mild pain       Loperamide HCl (IMODIUM OR)        Wheat Dextrin (BENEFIBER PO) Take by mouth 2 times daily       probiotic CAPS        calcium carbonate (OS-ILYA 500 MG [place]. [place]) 1250 MG tablet Take 1 tablet by mouth 2 times daily       diltiazem (TIAZAC) 180 MG 24 hr ER beaded capsule Take 1 capsule (180 mg) by mouth daily 90 capsule 3     omeprazole (PRILOSEC) 20 MG CR capsule Take 1 capsule (20 mg) by mouth 2 times daily 180 capsule 3     cyanocobalamin (VITAMIN B12) 1000 MCG/ML injection Inject 1 mL (1,000 mcg) into the muscle every 30 days 1 mL 11     potassium chloride SA (POTASSIUM CHLORIDE) 20 MEQ tablet Take 1 tablet (20 mEq) by mouth daily 90 tablet 1     Cholecalciferol (VITAMIN D3 PO) Take 2,000 Units by mouth daily.       Ascorbic Acid (VITAMIN C PO) Take 500 mg by mouth daily.       No Known Allergies    Reviewed and updated as needed this visit by clinical staff and  "provider      ROS  Detailed as above       /67 (BP Location: Right arm, Cuff Size: Adult Small)  Pulse 68  Temp 98.2  F (36.8  C) (Oral)  Ht 5' 2\" (1.575 m)  Wt 95 lb (43.1 kg)  SpO2 97%  Breastfeeding? No  BMI 17.38 kg/m2    Physical Exam   Constitutional: She is well-developed, well-nourished, and in no distress.   HENT:   Head: Normocephalic.   Eyes: Conjunctivae are normal.   Cardiovascular: Intact distal pulses.    Pulmonary/Chest: Effort normal.   Musculoskeletal: Normal range of motion. She exhibits no edema or tenderness.   Neurological: She is alert.   Positive tinel's sign and phalen test   Skin: Skin is warm and dry. No erythema.   Mild toenail fungus right great toenail    Psychiatric: Mood and affect normal.   Vitals reviewed.        Assessment and Plan:       ICD-10-CM    1. Pain in both wrists M25.531 LM PT, HAND, AND CHIROPRACTIC REFERRAL    M25.532    2. Bilateral carpal tunnel syndrome G56.03 LM PT, HAND, AND CHIROPRACTIC REFERRAL   3. Chronic diarrhea K52.9 NUTRITION REFERRAL   4. Stress F43.9 MENTAL HEALTH REFERRAL  - Adult; Outpatient Treatment; Individual/Couples/Family/Group Therapy/Health Psychology; [facility] [place] [facility] [phone]; We will contact you to schedule the appointment or please call with any questions   5. Toenail fungus B35.1        Will trial hand therapy for suspected carpal tunnel     Chronic diarrhea: will send to dietitian as she has exacerbated most tests with GI.      Stressors are likely adding to the chronic diarrhea so recommended counseling to which she would consider     Recommend Vicks Vaporub for mild toenail fungus         Jerry Fontanez, APRN, CNP  [facility]    "

## 2018-01-24 NOTE — NURSING NOTE
"Chief Complaint   Patient presents with     Arm Pain     Wrist Pain       Initial /67 (BP Location: Right arm, Cuff Size: Adult Small)  Pulse 68  Temp 98.2  F (36.8  C) (Oral)  Ht 5' 2\" (1.575 m)  Wt 95 lb (43.1 kg)  SpO2 97%  Breastfeeding? No  BMI 17.38 kg/m2 Estimated body mass index is 17.38 kg/(m^2) as calculated from the following:    Height as of this encounter: 5' 2\" (1.575 m).    Weight as of this encounter: 95 lb (43.1 kg).  Medication Reconciliation: complete   Altagracia Acosta MA      "

## 2018-01-31 ENCOUNTER — THERAPY VISIT (OUTPATIENT)
Dept: OCCUPATIONAL THERAPY | Facility: CLINIC | Age: 78
End: 2018-01-31
Payer: MEDICARE

## 2018-01-31 DIAGNOSIS — M25.532 LEFT WRIST PAIN: Primary | ICD-10-CM

## 2018-01-31 PROCEDURE — G8984 CARRY CURRENT STATUS: HCPCS | Mod: GO | Performed by: OCCUPATIONAL THERAPIST

## 2018-01-31 PROCEDURE — 97165 OT EVAL LOW COMPLEX 30 MIN: CPT | Mod: GO | Performed by: OCCUPATIONAL THERAPIST

## 2018-01-31 PROCEDURE — 97110 THERAPEUTIC EXERCISES: CPT | Mod: GO | Performed by: OCCUPATIONAL THERAPIST

## 2018-01-31 PROCEDURE — G8985 CARRY GOAL STATUS: HCPCS | Mod: GO | Performed by: OCCUPATIONAL THERAPIST

## 2018-01-31 PROCEDURE — 97112 NEUROMUSCULAR REEDUCATION: CPT | Mod: GO | Performed by: OCCUPATIONAL THERAPIST

## 2018-01-31 NOTE — PROGRESS NOTES
Hand Therapy Initial Evaluation    Current Date:  1/31/2018    Diagnosis: B hand pain/CTS   DOI:  December 2017  Procedure:  none    Precautions: NA    Subjective:  Georgette Alatorre is a 77 year old right hand dominant female.    Patient reports symptoms of pain, stiffness/loss of motion, weakness/loss of strength, numbness and tingling  of the bilateral hands/wrist L>R which occurred due to not sure. Since onset symptoms are Gradually getting worse.  Special tests:  none.  Previous treatment: tylenol, OTC wrist splint on right.    General health as reported by patient is fair.  Pertinent medical history includes:osteoporosis, cancer, heart problems, unexplained weight loss, changes in bowel/bladder, numbness/tingling, pain at rest/night, weakness  Medical allergies:none.  Surgical history: cancer, other: small bowel obstructions.  Medication history: cardiac, tylenol, calcium, vitamin A &D, probiotic.    Occupational Profile Information:  Current occupation is retired  Currently retired  Job Tasks: other housekeeping  Prior functional level:  no limitations  Barriers include:none  Mobility: No difficulty  Transportation: drives  Leisure activities/hobbies: NA    Functional Outcome Measure:  Upper Extremity Functional Index Score:  SCORE:   Column Totals: /80: 65   (A lower score indicates greater disability.)    Objective:  Pain Level Report  VAS(0-10) 1/31/2018   At Rest: R: 0/10  L: 7/10   With Use: R:  5/10  L: 9/10     Report of Pain:  Location:      R:  Dorsal hand    L:  Ulnar wrist, radial wrist, dorsal hand, palmar hand  Pain Quality:  Aching, Burning, Throbbing and Tingling  Frequency: intermittent    Pain is worst:  daytime  Exacerbated by:  Movement, use, gripping  Relieved by:  otc medications and rest  Progression:  Getting worse  Edema:  MILD  Sensation: Pt. Reports numbness/tingling in dorsal forearm and elbow    ROM:  Pain Report:  - none    + mild    ++ moderate    +++ severe   Elbow 1/31/2018    AROM(PROM)    Extension R: 0   L: 0   Flexion R: 158  L: 156   Supination R: 80  L: 85   Pronation R: 85  L:  85++     Wrist 1/31/2018   AROM (PROM)    Extension R: 81+   L: 85+   Flexion R:  86+  L: 82++   RD R: 25  L: 18 +   UD R: 26  L: 30+     Special Tests:  Pain Report:  - none    + mild    ++ moderate    +++ severe   Date 1/31/2018   Side    Phalens R: +  L: +   Tinels at CT R: -  L: -   Tinels at Pronator R: -  L: -   Median Nerve ULTT R: 100%  L: 100%   Paresthesias R: -  L:  -     Resistance 1/31/2018   Elbow Ext R: -  L: 5/10   Elbow Flexion R: -  L: 6/10    Pronation R: -  L: 6/10   Supination R: -  L: 6/10   Elbow Ext, resisted wrist ext R: tightness  L:  pulling   Long Finger Test R: -  L:  6/10   Elbow ext, resisted sup R: -  L: achy     Strength:   (Measured in pounds)  Pain Report:  - none    + mild    ++ moderate    +++ severe     1/31/2018   Trials right left   1  2  3 29  26  32 26  25  30   Average: 29 27     Elbow Ext,   1/31/2018   Trials right left    28+ 25++     Assessment:  Patient presents with symptoms consistent with diagnosis of bilateral hand and wrist pain, with conservative intervention.     Patient's limitations or Problem List includes:  Pain, Decreased ROM/motion and Weakness of the bilateral wrists and hands, L>R which interferes with the patient's ability to perform Self Care Tasks (dressing, eating, bathing, hygiene/toileting), Work Tasks, Sleep Patterns, Recreational Activities, Household Chores and Driving  as compared to previous level of function.    Rehab Potential:  Good - Return to full activity, some limitations    Patient will benefit from skilled Occupational Therapy to increase ROM and overall strength and decrease pain and edema to return to previous activity level and resume normal daily tasks and to reach their rehab potential.    Barriers to Learning:  No barrier    Communication Issues:  Patient appears to be able to clearly communicate and understand  verbal and written communication and follow directions correctly.    Chart Review: Simple history review with patient    Identified Performance Deficits: bathing/showering, toileting, dressing, hygiene and grooming, health management and maintenance, home establishment and management, work, volunteer activities and leisure activities    Assessment of Occupational Performance:  5 or more Performance Deficits    Clinical Decision Making (Complexity): Low complexity    Treatment Explanation:  The following has been discussed with the patient:  RX ordered/plan of care  Anticipated outcomes  Possible risks and side effects    Plan:  Frequency:  1 X week, once daily  Duration:  for 6 weeks    Treatment Plan:    Modalities:  US, Fluidotherapy, Paraffin and Laser Light  Therapeutic Exercise:  AROM, AAROM, PROM, Tendon Gliding, Isotonics, Isometrics and Stabilization  Neuromuscular re-education:  Nerve Gliding and Kinesiotaping  Manual Techniques:  Myofascial release  Orthotic Fabrication:  Static orthosis  Self Care:  Self Care Tasks  Discharge Plan:  Achieve all LTG.  Independent in home treatment program.  Reach maximal therapeutic benefit.    Home Exercise Program:  Radial nerve gliding  t-towel exercise  Massage to extensors    Next Visit:  A/AA/PROM  MFR  Nerve gliding  Heat?

## 2018-01-31 NOTE — LETTER
DEPARTMENT OF HEALTH AND HUMAN SERVICES  CENTERS FOR MEDICARE & MEDICAID SERVICES    PLAN/UPDATED PLAN OF PROGRESS FOR OUTPATIENT REHABILITATION  PATIENTS NAME:  Georgette Alatorre   : 1940  PR[facility] NUMBER:  [id number]  [id number]  [facility]OVIDER NAME: [facility]  MEDICAL RECORD NUMBER: [id number]   START OF CARE DATE:    SOC Date: 18   TYPE:  OT  PRIMARY/TREATMENT DIAGNOSIS: (Pertinent Medical Diagnosis)  Left wrist pain  VISITS FROM START OF CARE:1   Rxs Used: 1   Hand Therapy Initial Evaluation  Current Date:  2018  Diagnosis: B hand pain/CTS   DOI:  2017  Procedure:  none  Precautions: NA  Subjective:  Georgette Alatorre is a 77 year old right hand dominant female.  Patient reports symptoms of pain, stiffness/loss of motion, weakness/loss of strength, numbness and tingling  of the bilateral hands/wrist L>R which occurred due to not sure. Since onset symptoms are Gradually getting worse.  Special tests:  none.  Previous treatment: tylenol, OTC wrist splint on right.    General health as reported by patient is fair.  Pertinent medical history includes:osteoporosis, cancer, heart problems, unexplained weight loss, changes in bowel/bladder, numbness/tingling, pain at rest/night, weakness  Medical allergies:none.  Surgical history: cancer, other: small bowel obstructions.  Medication history: cardiac, tylenol, calcium, vitamin A &D, probiotic.  Occupational Profile Information:  Current occupation is retired  Currently retired  Job Tasks: other housekeeping  Prior functional level:  no limitations  Barriers include:none  Mobility: No difficulty  Transportation: drives  Leisure activities/hobbies: NA  Functional Outcome Measure:  Upper Extremity Functional Index Score:  SCORE:   Column Totals: /80: 65   (A lower score indicates greater disability.)  Objective:  Pain Level Report  VAS(0-10) 2018   At Rest: R: 0/10  L: 10   With Use: R:  5/10  L: 10   Report of  Pain:    PATIENTS NAME:  Georgette Alatorre   : 1940    Location:      R:  Dorsal hand    L:  Ulnar wrist, radial wrist, dorsal hand, palmar hand  Pain Quality:  Aching, Burning, Throbbing and Tingling  Frequency: intermittent    Pain is worst:  daytime  Exacerbated by:  Movement, use, gripping  Relieved by:  otc medications and rest  Progression:  Getting worse  Edema:  MILD  Sensation: Pt. Reports numbness/tingling in dorsal forearm and elbow    ROM:  Pain Report:  - none    + mild    ++ moderate    +++ severe   Elbow 2018   AROM(PROM)    Extension R: 0   L: 0   Flexion R: 158  L: 156   Supination R: 80  L: 85   Pronation R: 85  L:  85++     Wrist 2018   AROM (PROM)    Extension R: 81+   L: 85+   Flexion R:  86+  L: 82++   RD R: 25  L: 18 +   UD R: 26  L: 30+   Special Tests:  Pain Report:  - none    + mild    ++ moderate    +++ severe   Date 2018   Side    Phalens R: +  L: +   Tinels at CT R: -  L: -   Tinels at Pronator R: -  L: -   Median Nerve ULTT R: 100%  L: 100%   Paresthesias R: -  L:  -     PATIENTS NAME:  Georgette Alatorre   : 1940    Resistance 2018   Elbow Ext R: -  L: 5/10   Elbow Flexion R: -  L: 6/10    Pronation R: -  L: 6/10   Supination R: -  L: 6/10   Elbow Ext, resisted wrist ext R: tightness  L:  pulling   Long Finger Test R: -  L:  6/10   Elbow ext, resisted sup R: -  L: achy     Strength:   (Measured in pounds)  Pain Report:  - none    + mild    ++ moderate    +++ severe     2018   Trials right left   1  2  3 29  26  32 26  25  30   Average: 29 27     Elbow Ext,   2018   Trials right left    28+ 25++   Assessment:  Patient presents with symptoms consistent with diagnosis of bilateral hand and wrist pain, with conservative intervention.   Patient's limitations or Problem List includes:  Pain, Decreased ROM/motion and Weakness of the bilateral wrists and hands, L>R which interferes with the patient's ability to perform Self Care Tasks  (dressing, eating, bathing, hygiene/toileting), Work Tasks, Sleep Patterns, Recreational Activities, Household Chores and Driving  as compared to previous level of function.  Rehab Potential:  Good - Return to full activity, some limitations  Patient will benefit from skilled Occupational Therapy to increase ROM and overall strength and decrease pain and edema to return to previous activity level and resume normal daily tasks and to reach their rehab potential.  Barriers to Learning:  No barrier  Communication Issues:  Patient appears to be able to clearly communicate and understand verbal and written communication and follow directions correctly.  Chart Review: Simple history review with patient  Identified Performance Deficits: bathing/showering, toileting, dressing, hygiene and grooming, health management and maintenance, home establishment and management, work, volunteer activities and leisure activities    Assessment of Occupational Performance:  5 or more Performance Deficits  Clinical Decision Making (Complexity): Low complexity  Treatment Explanation:  The following has been discussed with the patient:  RX   PATIENTS NAME:  Georgette Alatorre   : 1940    ordered/plan of care  Anticipated outcomes  Possible risks and side effects  Plan:  Frequency:  1 X week, once daily  Duration:  for 6 weeks  Treatment Plan:    Modalities:  US, Fluidotherapy, Paraffin and Laser Light  Therapeutic Exercise:  AROM, AAROM, PROM, Tendon Gliding, Isotonics, Isometrics and Stabilization  Neuromuscular re-education:  Nerve Gliding and Kinesiotaping  Manual Techniques:  Myofascial release  Orthotic Fabrication:  Static orthosis  Self Care:  Self Care Tasks  Discharge Plan:  Achieve all LTG.  Independent in home treatment program.  Reach maximal therapeutic benefit.  Home Exercise Program:  Radial nerve gliding  t-towel exercise  Massage to extensors  Next Visit:  A/AA/PROM  MFR  Nerve gliding  Heat?    Caregiver  "Signature/Credentials ______________________________ Date ________       Treating Provider: PAWAN Frost CHT    I have reviewed and certified the need for these services and plan of treatment while under my care.        PHYSICIAN'S SIGNATURE:   ______________________________________Date___________     Jerry Fontanez APRN CNP    Certification period: Beginning of Cert date period: 01/31/18 End of Cert period date: 04/30/18     Functional Level Progress Report: Please see attached \"Goal Flow sheet for Functional level.\"    ___X_____ Continue Services or       ________ DC Services                Service dates: SOC Date: 01/31/18  to present                                                                     "

## 2018-02-05 ENCOUNTER — THERAPY VISIT (OUTPATIENT)
Dept: OCCUPATIONAL THERAPY | Facility: CLINIC | Age: 78
End: 2018-02-05
Payer: MEDICARE

## 2018-02-05 DIAGNOSIS — M25.532 LEFT WRIST PAIN: ICD-10-CM

## 2018-02-05 PROCEDURE — 97110 THERAPEUTIC EXERCISES: CPT | Mod: GO | Performed by: OCCUPATIONAL THERAPIST

## 2018-02-05 PROCEDURE — 97140 MANUAL THERAPY 1/> REGIONS: CPT | Mod: GO | Performed by: OCCUPATIONAL THERAPIST

## 2018-02-12 ENCOUNTER — THERAPY VISIT (OUTPATIENT)
Dept: OCCUPATIONAL THERAPY | Facility: CLINIC | Age: 78
End: 2018-02-12
Payer: MEDICARE

## 2018-02-12 DIAGNOSIS — M25.532 LEFT WRIST PAIN: ICD-10-CM

## 2018-02-12 PROCEDURE — 97112 NEUROMUSCULAR REEDUCATION: CPT | Mod: GO | Performed by: OCCUPATIONAL THERAPIST

## 2018-02-12 PROCEDURE — 97140 MANUAL THERAPY 1/> REGIONS: CPT | Mod: GO | Performed by: OCCUPATIONAL THERAPIST

## 2018-02-12 PROCEDURE — 97760 ORTHOTIC MGMT&TRAING 1ST ENC: CPT | Mod: GO | Performed by: OCCUPATIONAL THERAPIST

## 2018-02-12 PROCEDURE — 97110 THERAPEUTIC EXERCISES: CPT | Mod: GO | Performed by: OCCUPATIONAL THERAPIST

## 2018-03-12 ENCOUNTER — THERAPY VISIT (OUTPATIENT)
Dept: OCCUPATIONAL THERAPY | Facility: CLINIC | Age: 78
End: 2018-03-12
Payer: MEDICARE

## 2018-03-12 DIAGNOSIS — M25.532 LEFT WRIST PAIN: ICD-10-CM

## 2018-03-12 PROCEDURE — 97110 THERAPEUTIC EXERCISES: CPT | Mod: GO | Performed by: OCCUPATIONAL THERAPIST

## 2018-03-12 PROCEDURE — 97140 MANUAL THERAPY 1/> REGIONS: CPT | Mod: GO | Performed by: OCCUPATIONAL THERAPIST

## 2018-03-12 NOTE — MR AVS SNAPSHOT
After Visit Summary   3/12/2018    Georgette Alatorre    MRN: [id number]           Patient Information     Date Of Birth          1940        Visit Information        Provider Department      3/12/2018 1:00 PM Eloisa Winter OT [facility]        Today's Diagnoses     Left wrist pain           Follow-ups after your visit        Your next 10 appointments already scheduled     Mar 26, 2018  2:30 PM CDT   LM Hand with Eloisa Winter OT   [facility] ([facility]    [address] [place] [id number] [phone]              Who to contact     If you have questions or need follow up information about today's clinic visit or your schedule please contact [facility] directly at [phone].  Normal or non-critical lab and imaging results will be communicated to you by BitMethodhart, letter or phone within 4 business days after the clinic has received the results. If you do not hear from us within 7 days, please contact the clinic through BitMethodhart or phone. If you have a critical or abnormal lab result, we will notify you by phone as soon as possible.  Submit refill requests through Testive or call your pharmacy and they will forward the refill request to us. Please allow 3 business days for your refill to be completed.          Additional Information About Your Visit        MyChart Information     Testive gives you secure access to your electronic health record. If you see a primary care provider, you can also send messages to your care team and make appointments. If you have questions, please call your primary care clinic.  If you do not have a primary care provider, please call [phone] and they will assist you.        Care EveryWhere ID     This is your Care EveryWhere ID. This could be used by other organizations to access your [facility] medical records  [id number]         Blood Pressure from Last 3 Encounters:   01/24/18 122/67    07/10/17 120/71   06/13/17 117/66    Weight from Last 3 Encounters:   01/24/18 43.1 kg (95 lb)   07/10/17 46.7 kg (103 lb)   06/13/17 48 kg (105 lb 14.4 oz)              We Performed the Following     MANUAL THER TECH,1+REGIONS,EA 15 MIN     THERAPEUTIC EXERCISES        Primary Care Provider Office Phone # Fax #    Mario Valdes [phone] [address] [facility] [address]  [facility] [address]        Equal Access to Services     [facility]: Hadii aad ku hadasho Soomaali, waaxda luqadaha, qaybta kaalmada adeegyafreya, chantel dewey . So [facility] [phone].    ATENCIÓN: Si habla español, tiene a leon disposición servicios gratuitos de asistencia lingüística. [facility] [phone].    We comply with applicable federal civil rights laws and [place] laws. We do not discriminate on the basis of race, color, national origin, age, disability, sex, sexual orientation, or gender identity.            Thank you!     Thank you for choosing [facility]  for your care. Our goal is always to provide you with excellent care. Hearing back from our patients is one way we can continue to improve our services. Please take a few minutes to complete the written survey that you may receive in the mail after your visit with us. Thank you!             Your Updated Medication List - Protect others around you: Learn how to safely use, store and throw away your medicines at www.disposemymeds.org.          This list is accurate as of 3/12/18 11:59 PM.  Always use your most recent med list.                   Brand Name Dispense Instructions for use Diagnosis    BENEFIBER PO      Take by mouth 2 times daily        calcium carbonate 1250 MG tablet    OS-ILYA 500 mg [place]. [place]     Take 1 tablet by mouth 2 times daily        cyanocobalamin 1000 MCG/ML injection    VITAMIN B12    1 mL    Inject 1 mL (1,000 mcg) into the muscle every 30 days        diltiazem 180 MG 24 hr ER beaded capsule    TIAZAC    90  capsule    Take 1 capsule (180 mg) by mouth daily    Benign essential hypertension       IMODIUM OR           omeprazole 20 MG CR capsule    priLOSEC    180 capsule    Take 1 capsule (20 mg) by mouth 2 times daily    Gastroesophageal reflux disease without esophagitis       potassium chloride SA 20 MEQ CR tablet    KLOR-CON    90 tablet    Take 1 tablet (20 mEq) by mouth daily    Hypopotassemia       probiotic Caps           TYLENOL PO      Take 500 mg by mouth every 4 hours as needed for mild pain        VITAMIN C PO      Take 500 mg by mouth daily.        VITAMIN D3 PO      Take 2,000 Units by mouth daily.

## 2018-03-12 NOTE — PROGRESS NOTES
"Hand Therapy Progress Note    Current Date:  3/12/2018    Diagnosis: B hand pain/CTS   DOI:  December 2017  Procedure:  none    Precautions: NA    Reporting period is 1/31/18 to 3/12/2018    Subjective:   Subjective changes noted by patient:  \"I feel like things are a little bit better, but it does still bother me.\"  Functional changes noted by patient:  Improvement in Self Care Tasks (dressing, eating, bathing, hygiene/toileting)  Patient has noted adverse reaction to:  None    Functional Outcome Measure:  Upper Extremity Functional Index Score:  SCORE:   Column Totals: /80: 66   (A lower score indicates greater disability.)    Objective:  Pain Level Report  VAS(0-10) 1/31/2018 3/12/18   At Rest: R: 0/10  L: 7/10 0/10  0/10   With Use: R:  5/10  L: 9/10 0/10  7/10     Report of Pain:  Location:  L:  Ulnar wrist/hand, dorsal hand  Pain Quality:  sharp  Frequency: intermittent    Pain is worst:  daytime  Exacerbated by:  Movement, use, gripping, twisting  Relieved by:  otc medications and rest  Progression:  improvement  Edema:  MILD  Sensation: WNL    ROM:  Pain Report:  - none    + mild    ++ moderate    +++ severe   Elbow 1/31/2018 3/12/18   AROM(PROM)     Extension R: 0   L: 0    Flexion R: 158  L: 156    Supination R: 80  L: 85    Pronation R: 85  L:  85++   -     Wrist 1/31/2018 3/12/18   AROM (PROM)     Extension R: 81+   L: 85+ Pulling  pulling   Flexion R:  86+  L: 82++ -  +   RD R: 25  L: 18 +   ++   UD R: 26  L: 30+   +     Special Tests:  Pain Report:  - none    + mild    ++ moderate    +++ severe   Date 1/31/2018   Side    Phalens R: +  L: +   Tinels at CT R: -  L: -   Tinels at Pronator R: -  L: -   Median Nerve ULTT R: 100%  L: 100%   Paresthesias R: -  L:  -     Resistance 1/31/2018 3/12/18   Elbow Ext R: -  L: 5/10   -   Elbow Flexion R: -  L: 6/10   -    Pronation R: -  L: 6/10   -   Supination R: -  L: 6/10   5/10   Elbow Ext, resisted wrist ext R: tightness  L:  pulling    Long Finger Test R: " -  L:  6/10   -   Elbow ext, resisted sup R: -  L: achy      Strength:   (Measured in pounds)  Pain Report:  - none    + mild    ++ moderate    +++ severe     1/31/2018 3/12/18    Trials right left R L   1  2  3 29  26  32 26  25  30 32  31  28 28+  31+  29   Average: 29 27 30 29     Elbow Ext,   1/31/2018 3/12/18   Trials right left     28+ 25++ R:32  L: 26++     Please refer to the daily flowsheet for treatment provided today.     Assessment:  Response to therapy has been improvement to:  Strength:   and pinch  Pain:  frequency is less, intensity of pain is decreased, duration of pain is decreased and less tender over affected area    Overall Assessment:  Patient's symptoms are resolving.  Patient is progressing well and is ready to decrease frequency of treatment in the clinic.  STG/LTG:  STGoals have been reviewed and progress or achievement has occurred;  see goal sheet for details and updates.    Plan:  Frequency/Duration:  Recommend continuing to see patient  2 X a month, once daily  for 1 months  Appropriateness of Rx I have re-evaluated this patient and find that the nature, scope, duration and intensity of the therapy is appropriate for the medical condition of the patient.  Recommendations for Continued Therapy  Continue with current POC.    Home Exercise Program:  Radial nerve gliding  t-towel exercise  Massage to extensors    Next Visit:  A/AA/PROM  MFR  Nerve gliding  Heat?

## 2018-03-26 ENCOUNTER — THERAPY VISIT (OUTPATIENT)
Dept: OCCUPATIONAL THERAPY | Facility: CLINIC | Age: 78
End: 2018-03-26
Payer: MEDICARE

## 2018-03-26 DIAGNOSIS — M25.532 LEFT WRIST PAIN: ICD-10-CM

## 2018-03-26 PROCEDURE — 97110 THERAPEUTIC EXERCISES: CPT | Mod: GO | Performed by: OCCUPATIONAL THERAPIST

## 2018-03-26 PROCEDURE — 97140 MANUAL THERAPY 1/> REGIONS: CPT | Mod: GO | Performed by: OCCUPATIONAL THERAPIST

## 2018-03-26 NOTE — PROGRESS NOTES
SOAP note objective information for 3/26/2018.  Wrist 1/31/2018 3/12/18 3/26/18   AROM (PROM)      Extension R: 81+   L: 85+ Pulling  pulling   86   Flexion R:  86+  L: 82++ -  +   85++   RD R: 25  L: 18 +   ++   27 pulling   UD R: 26  L: 30+   +   34 pulling   Please refer to the daily flowsheet for treatment today, total treatment time and time spent performing 1:1 timed codes.       Home Exercise Program:  Radial nerve gliding  t-towel exercise  Massage to extensors    Next Visit:  A/AA/PROM  MFR  Nerve gliding  Heat?

## 2018-04-18 ENCOUNTER — THERAPY VISIT (OUTPATIENT)
Dept: OCCUPATIONAL THERAPY | Facility: CLINIC | Age: 78
End: 2018-04-18
Payer: MEDICARE

## 2018-04-18 DIAGNOSIS — M25.532 LEFT WRIST PAIN: ICD-10-CM

## 2018-04-18 PROCEDURE — G8985 CARRY GOAL STATUS: HCPCS | Mod: GO | Performed by: OCCUPATIONAL THERAPIST

## 2018-04-18 PROCEDURE — 97110 THERAPEUTIC EXERCISES: CPT | Mod: GO | Performed by: OCCUPATIONAL THERAPIST

## 2018-04-18 PROCEDURE — G8986 CARRY D/C STATUS: HCPCS | Mod: GO | Performed by: OCCUPATIONAL THERAPIST

## 2018-04-18 PROCEDURE — 97140 MANUAL THERAPY 1/> REGIONS: CPT | Mod: GO | Performed by: OCCUPATIONAL THERAPIST

## 2018-04-18 NOTE — PROGRESS NOTES
"Hand Therapy Discharge Note    Current Date:  4/18/2018    Diagnosis: B hand pain/CTS   DOI:  December 2017  Procedure:  none    Precautions: NA    Reporting period is 3/12/18 to 4/18/2018    Subjective:   Subjective changes noted by patient:  \"I don't feel like there has been much of a change.  Sometimes it's better, but then I have to use it.\"  Functional changes noted by patient:  Improvement in Self Care Tasks (dressing, eating, bathing, hygiene/toileting)  Patient has noted adverse reaction to:  None    Functional Outcome Measure:  Upper Extremity Functional Index Score:  SCORE:   Column Totals: /80: 55   (A lower score indicates greater disability.)    Objective:  Pain Level Report  VAS(0-10) 1/31/2018 3/12/18 4/18/18   At Rest: R: 0/10  L: 7/10 0/10  0/10   0/10   With Use: R:  5/10  L: 9/10 0/10  7/10   5/10     Report of Pain:  Location:  L:  Ulnar wrist/hand, radial wrist  Pain Quality:  [place], sharp  Frequency: intermittent    Pain is worst:  daytime  Exacerbated by:  Movement, use, gripping, twisting  Relieved by:  Rest  Progression:  improvement  Edema:  MILD  Sensation: WNL    ROM:  Pain Report:  - none    + mild    ++ moderate    +++ severe   Elbow 1/31/2018   AROM(PROM)    Extension R: 0   L: 0   Flexion R: 158  L: 156   Supination R: 80  L: 85   Pronation R: 85  L:  85++     Wrist 1/31/2018 3/12/18 4/18/18   AROM (PROM)      Extension R: 81+   L: 85+ Pulling  pulling   -   Flexion R:  86+  L: 82++ -  +    RD R: 25  L: 18 +   ++   ++   UD R: 26  L: 30+   +   +     Special Tests:  Pain Report:  - none    + mild    ++ moderate    +++ severe   Date 1/31/2018 4/18/18   Side     Phalens R: +  L: + -  -   Tinels at CT R: -  L: -    Tinels at Pronator R: -  L: -    Median Nerve ULTT R: 100%  L: 100%    Paresthesias R: -  L:  -      Resistance 1/31/2018 3/12/18 4/18/18   Elbow Ext R: -  L: 5/10   -    Elbow Flexion R: -  L: 6/10   -     Pronation R: -  L: 6/10   -    Supination R: -  L: 6/10   5/10   - "   Elbow Ext, resisted wrist ext R: tightness  L:  pulling     Long Finger Test R: -  L:  6/10   -    Elbow ext, resisted sup R: -  L: achy       Strength:   (Measured in pounds)  Pain Report:  - none    + mild    ++ moderate    +++ severe     1/31/2018 3/12/18  4/18/18   Trials right left R L    1  2  3 29  26  32 26  25  30 32  31  28 28+  31+  29 21  24  22   Average: 29 27 30 29 22     Elbow Ext,   1/31/2018 3/12/18 4/18/18   Trials right left      28+ 25++ R:32  L: 26++   23 pulling     Please refer to the daily flowsheet for treatment provided today.     Assessment:  Response to therapy has been improvement to:  Pain:  frequency is less, intensity of pain is decreased, duration of pain is decreased and less tender over affected area  Response to therapy has been lack of progress in:  Strength:      Overall Assessment:  Patient's symptoms are resolving.  STG/LTG:  STGoals have been reviewed and progress or achievement has occurred;  see goal sheet for details and updates.    Plan:  Frequency/Duration:  DC to I HEP.  Appropriateness of Rx I have re-evaluated this patient and find that the nature, scope, duration and intensity of the therapy is appropriate for the medical condition of the patient.  Recommendations for Continued Therapy  DC to I HEP.    Home Exercise Program:  Radial nerve gliding  t-towel exercise  Massage to extensors

## 2018-04-30 ENCOUNTER — TRANSFERRED RECORDS (OUTPATIENT)
Dept: HEALTH INFORMATION MANAGEMENT | Facility: CLINIC | Age: 78
End: 2018-04-30

## 2018-05-03 ENCOUNTER — TRANSFERRED RECORDS (OUTPATIENT)
Dept: HEALTH INFORMATION MANAGEMENT | Facility: CLINIC | Age: 78
End: 2018-05-03

## 2018-05-31 ENCOUNTER — THERAPY VISIT (OUTPATIENT)
Dept: PHYSICAL THERAPY | Facility: CLINIC | Age: 78
End: 2018-05-31
Payer: MEDICARE

## 2018-05-31 DIAGNOSIS — M99.05 PELVIC SOMATIC DYSFUNCTION: Primary | ICD-10-CM

## 2018-05-31 DIAGNOSIS — R15.9 FECAL INCONTINENCE: ICD-10-CM

## 2018-05-31 PROCEDURE — 97161 PT EVAL LOW COMPLEX 20 MIN: CPT | Mod: GP | Performed by: PHYSICAL THERAPIST

## 2018-05-31 PROCEDURE — 97112 NEUROMUSCULAR REEDUCATION: CPT | Mod: GP | Performed by: PHYSICAL THERAPIST

## 2018-05-31 PROCEDURE — G8988 SELF CARE GOAL STATUS: HCPCS | Mod: GP | Performed by: PHYSICAL THERAPIST

## 2018-05-31 PROCEDURE — 97110 THERAPEUTIC EXERCISES: CPT | Mod: GP | Performed by: PHYSICAL THERAPIST

## 2018-05-31 PROCEDURE — G8987 SELF CARE CURRENT STATUS: HCPCS | Mod: GP | Performed by: PHYSICAL THERAPIST

## 2018-05-31 PROCEDURE — 97530 THERAPEUTIC ACTIVITIES: CPT | Mod: GP | Performed by: PHYSICAL THERAPIST

## 2018-05-31 NOTE — PROGRESS NOTES
[facility] Initial Evaluation  Subjective:  HPI  SUBJECTIVE:  Patient reports onset of symptoms of fecal incontinence last 3-4 years for unknown reasons.  Pt reports history of endometrial cancer in 2000 with hysterectomy and radiation.  Pt has also history of small bowel obstruction needing surgery in 2005, 2007 and several hospital stays due to obstruction but was able to resolve on its own.  Symptoms include fecal incontinence and sometimes incomplete emptying.  Worse when stools are loose.  Has tried immodium but does not want to keep taking medication.  She has started taking Benefiber 2 times daily.  She has had to quit [employer] practice due to fear of being incontinent and embarrassment of fecal incontinence.  She reports that she has tried to keep a journal to see if there are food triggers but unsure if she can pin point causes.  Has been to the pelvic floor center in Nov 2017.  Referred to PT with MD orders dated 5/7/2018.  Since onset symptoms have been getting better, worse or staying the same? Same to worse.  Pt does report unexplained weight loss in the last year down about 15-20 lbs.  She attributes it to having loose stools and frequency of BMs as she can not keep anything in her.  She was advised to bring this up with her primary care doctor and OB at her followup for cancer.   Urination:  Do you leak on the way to the bathroom or with a strong urge to void? sometimes    Do you leak with cough,sneeze, jumping, running?sometimes   Any other activities that cause leaking? no  Do you have triggers that make you feel you can't wait to go to the bathroom? Certain foods can cause Fecal incontinence.  Type of pad and number used per day? 1-2 kotex pantiliner or large pad  When you leak what is the amount? varies    How long can you delay the need to urinate? Not answered.   How many times do you get up to urinate at night? 0-2  Can you stop the flow of urine when on the toilet? Reports  that she can maybe slow it down  Is the volume of urine passed usually: medium. (8sec rule=  250ml with average bladder storing  400-600ml)    Do you strain to pass urine? No  Do you have a slow or hesitant urinary stream? No  Do you have difficulty initiating the urine stream? No    How many bladder infections have you had in last 12 months?0    Fluid intake(one glass is 8oz or one cup) reports drinks minimal water, intakes of juice and gatorade/day, 1caffinated glasses/day  1 alcohol glasses/day.    Bowel habits:  Frequency of bowel movements?5-6 times a day  Consistancy of stool? soft, soft formed, [place] Stool Scale Type 4,5,6,7  Do you ignore the urge to defecate? No  Do you strain to pass stool? No    Pelvic Pain:  When do you have pelvic pain? none  Given birth? Yes Any complications?tearing and had to have blood transfusions, # of vaginal delieveries?2,# of episiotomies?yes.  Are you sexually active?No  Have you ever been worried for your physical safety? No  Any abdominal or pelvic surgeries? Hysterectomy/small bowel resection,radiation  Are you having any regular exercise?not answered  Have you practiced the PF(kegel) exercises for 4 or more weeks?no    Objective:  System                                 Pelvic Dysfunction Evaluation:            Pelvic Clock Exam:  Pelvic clock exam: internal anal palpation pt denies pain to palpation.        Levator ANI:  +/-        External Assessment:    Skin Condition:  Normal  Scars:  Well healed      Introitus:  Normal  Muscle Contraction/Perineal Mobility:  Slight lift, no urogential triangle descent and substitution  Internal Assessment:  Internal assessment pelvic: P = 2/5, E = 5 sec.  EAS 3/5 strength.    Contraction/Grade:  Weak squeeze, 2 second hold (2)      Accessory Muscle use-Adductors:  Yes    SEMG Biofeedback:    Equipment:  MRRecroup electrode placement--Perianal:  Yes  Baseline EMG PM:  1.7 uV     Peak pelvic muscle contraction:  Average  endurance 5.9 uV, with max 17.5 uV, W-R 3.84 uV,  quick flicks 7.1 uV average, max 15.7 uV, W-R 2.8 uV    EMG interpretation to fatigue:  3-5 seconds  Position:  Supine                     General     ROS    Assessment/Plan:    Patient is a 77 year old female with pelvic complaints.    Patient has the following significant findings with corresponding treatment plan.                Diagnosis 1:  Fecal incontinence  Decreased strength - therapeutic exercise, therapeutic activities and home program  Impaired muscle performance - biofeedback, neuro re-education and home program  Decreased function - therapeutic activities and home program    Therapy Evaluation Codes:   1) History comprised of:   Personal factors that impact the plan of care:      Age and Social history/culture.    Comorbidity factors that impact the plan of care are:      Bowel/bladder changes, Cancer, Weakness and weight loss, osteoporosis.     Medications impacting care: None.  2) Examination of Body Systems comprised of:   Body structures and functions that impact the plan of care:      Pelvis.   Activity limitations that impact the plan of care are:      Fecal incontinence.  3) Clinical presentation characteristics are:   Stable/Uncomplicated.  4) Decision-Making    Low complexity using standardized patient assessment instrument and/or measureable assessment of functional outcome.  Cumulative Therapy Evaluation is: Low complexity.    Previous and current functional limitations:  (See Goal Flow Sheet for this information)    Short term and Long term goals: (See Goal Flow Sheet for this information)     Communication ability:  Patient appears to be able to clearly communicate and understand verbal and written communication and follow directions correctly.  Treatment Explanation - The following has been discussed with the patient:   RX ordered/plan of care  Anticipated outcomes  Possible risks and side effects  This patient would benefit from PT  intervention to resume normal activities.   Rehab potential is good.    Frequency:  1 X week, once daily  Duration:  for 4 weeks tapering to 2 X a month over 2 months  Discharge Plan:  Achieve all LTG.  Independent in home treatment program.  Reach maximal therapeutic benefit.    Please refer to the daily flowsheet for treatment today, total treatment time and time spent performing 1:1 timed codes.

## 2018-05-31 NOTE — LETTER
DEPARTMENT OF HEALTH AND HUMAN SERVICES  CENTERS FOR MEDICARE & MEDICAID SERVICES    PLAN/UPDATED PLAN OF PROGRESS FOR OUTPATIENT REHABILITATION  PATIENTS NAME:  Georgette Alatorre   : 1940  PROVIDER NUMBER:    [id number]  HICN:  [id number]  PROVIDER NAME: [facility]  MEDICAL RECORD NUMBER: [id number]   START OF CARE DATE:  SOC Date: 18   TYPE:  PT  PRIMARY/TREATMENT DIAGNOSIS: (Pertinent Medical Diagnosis)   Pelvic somatic dysfunction  Fecal incontinence  VISITS FROM START OF CARE: 1  Rxs Used: 1   [facility] Initial Evaluation  Subjective:  HPI  SUBJECTIVE:  Patient reports onset of symptoms of fecal incontinence last 3-4 years for unknown reasons.  Pt reports history of endometrial cancer in  with hysterectomy and radiation.  Md orders 2018. Pt has also history of small bowel obstruction needing surgery in ,  and several hospital stays due to obstruction but was able to resolve on its own.  Symptoms include fecal incontinence and sometimes incomplete emptying.  Worse when stools are loose.  Has tried immodium but does not want to keep taking medication.  She has started taking Benefiber 2 times daily.  She has had to quit [employer] practice due to fear of being incontinent and embarrassment of fecal incontinence.  She reports that she has tried to keep a journal to see if there are food triggers but unsure if she can pin point causes.  Has been to the pelvic floor center in 2017.  Referred to PT with MD orders dated 2018.  Since onset symptoms have been getting better, worse or staying the same? Same to worse.  Pt does report unexplained weight loss in the last year down about 15-20 lbs.  She attributes it to having loose stools and frequency of BMs as she can not keep anything in her.  She was advised to bring this up with her primary care doctor and OB at her followup for cancer.    Pertinent medical history  includes:  Osteoporosis and cancer.  Medical allergies: no.  Other surgeries include:  Cancer surgery and other.  Current medications:  Other.  Current occupation is Retired .    Barriers include:  None as reported by patient.  Red flags:  Changes in bowel/bladder and significant weakness.  Urination:  Do you leak on the way to the bathroom or with a strong urge to void? sometimes    Do you leak with cough,sneeze, jumping, running?sometimes   Any other activities that cause leaking? no  Do you have triggers that make you feel you can't wait to go to the bathroom? Certain foods can cause Fecal incontinence.  Type of pad and number used per day? 1-2 kotex pantiliner or large pad  When you leak what is the amount? varies  How long can you delay the need to urinate? Not answered.   How many times do you get up to urinate at night? 0-2  Can you stop the flow of urine when on the toilet? Reports that she can maybe slow it down    PATIENTS NAME:  CeliGeorgette   : 1940    Is the volume of urine passed usually: medium. (8sec rule=  250ml with average bladder storing  400-600ml)  Do you strain to pass urine? No  Do you have a slow or hesitant urinary stream? No  Do you have difficulty initiating the urine stream? No  How many bladder infections have you had in last 12 months?0  Fluid intake(one glass is 8oz or one cup) reports drinks minimal water, intakes of juice and gatorade/day, 1caffinated glasses/day  1 alcohol glasses/day.  Bowel habits:  Frequency of bowel movements?5-6 times a day  Consistancy of stool? soft, soft formed, [place] Stool Scale Type 4,5,6,7  Do you ignore the urge to defecate? No  Do you strain to pass stool? No  Pelvic Pain:  When do you have pelvic pain? none  Given birth? Yes Any complications?tearing and had to have blood transfusions, # of vaginal delieveries?2,# of episiotomies?yes.  Are you sexually active?No  Have you ever been worried for your physical safety? No  Any abdominal or  pelvic surgeries? Hysterectomy/small bowel resection,radiation  Are you having any regular exercise?not answered  Have you practiced the PF(kegel) exercises for 4 or more weeks?no  Objective:  System  Pelvic Dysfunction Evaluation:    Pelvic Clock Exam:  Pelvic clock exam: internal anal palpation pt denies pain to palpation.  Levator ANI:  +/-  External Assessment:    Skin Condition:  Normal  Scars:  Well healed  Introitus:  Normal  Muscle Contraction/Perineal Mobility:  Slight lift, no urogential triangle descent and substitution  Internal Assessment:  Internal assessment pelvic: P = 2/5, E = 5 sec.  EAS 3/5 strength.  Contraction/Grade:  Weak squeeze, 2 second hold (2)  Accessory Muscle use-Adductors:  Yes  SEMG Biofeedback:    Equipment:  MR20  Suraface electrode placement--Perianal:  Yes  Baseline EMG PM:  1.7 uV   Peak pelvic muscle contraction:  Average endurance 5.9 uV, with max 17.5 uV, W-R 3.84 uV,  quick flicks 7.1 uV average, max 15.7 uV, W-R 2.8 uV  EMG interpretation to fatigue:  3-5 seconds  Position:  Supine     Assessment/Plan:    Patient is a 77 year old female with pelvic complaints.    PATIENTS NAME:  Georgette Alatorre   : 1940    Patient has the following significant findings with corresponding treatment plan.                Diagnosis 1:  Fecal incontinence  Decreased strength - therapeutic exercise, therapeutic activities and home program  Impaired muscle performance - biofeedback, neuro re-education and home program  Decreased function - therapeutic activities and home program  Therapy Evaluation Codes:   1) History comprised of:   Personal factors that impact the plan of care:      Age and Social history/culture.    Comorbidity factors that impact the plan of care are:      Bowel/bladder changes, Cancer, Weakness and weight loss, osteoporosis.     Medications impacting care: None.  2) Examination of Body Systems comprised of:   Body structures and functions that impact the plan of  "care:      Pelvis.   Activity limitations that impact the plan of care are:      Fecal incontinence.  3) Clinical presentation characteristics are:   Stable/Uncomplicated.  4) Decision-Making    Low complexity using standardized patient assessment instrument and/or measureable assessment of functional outcome.  Cumulative Therapy Evaluation is: Low complexity.  Previous and current functional limitations:  (See Goal Flow Sheet for this information)    Short term and Long term goals: (See Goal Flow Sheet for this information)   Communication ability:  Patient appears to be able to clearly communicate and understand verbal and written communication and follow directions correctly.  Treatment Explanation - The following has been discussed with the patient:   RX ordered/plan of care  Anticipated outcomes  Possible risks and side effects  This patient would benefit from PT intervention to resume normal activities.   Rehab potential is good.  Frequency:  1 X week, once daily  Duration:  for 4 weeks tapering to 2 X a month over 2 months  Discharge Plan:  Achieve all LTG.  Independent in home treatment program.  Reach maximal therapeutic benefit.    Caregiver Signature/Credentials _____________________________ Date ________       Treating Provider: Fabián Frausto, PT   I have reviewed and certified the need for these services and plan of treatment while under my care.        PHYSICIAN'S SIGNATURE:   ____________________________________  Date___________    Johanna Euceda PA-C    Certification period:  Beginning of Cert date period: 18 to  End of Cert period date: 18   PATIENTS NAME:  Georgette Alatorre   : 1940    Functional Level Progress Report: Please see attached \"Goal Flow sheet for Functional level.\"    ____X____ Continue Services or       ________ DC Services                Service dates: From  SOC Date: 18 date to present                         "

## 2018-06-01 NOTE — PROGRESS NOTES
[facility] Initial Evaluation  Subjective:  Patient is a 77 year old female presenting with rehab left ankle/foot hpi.                                      Pertinent medical history includes:  Osteoporosis and cancer.  Medical allergies: no.  Other surgeries include:  Cancer surgery and other.  Current medications:  Other.  Current occupation is Retired .        Barriers include:  None as reported by patient.    Red flags:  Changes in bowel/bladder and significant weakness.                        Objective:  System    Physical Exam    General     ROS    Assessment/Plan:

## 2018-06-07 ENCOUNTER — THERAPY VISIT (OUTPATIENT)
Dept: PHYSICAL THERAPY | Facility: CLINIC | Age: 78
End: 2018-06-07
Payer: MEDICARE

## 2018-06-07 DIAGNOSIS — R15.9 FECAL INCONTINENCE: ICD-10-CM

## 2018-06-07 DIAGNOSIS — M99.05 PELVIC SOMATIC DYSFUNCTION: ICD-10-CM

## 2018-06-07 PROCEDURE — 97110 THERAPEUTIC EXERCISES: CPT | Mod: GP | Performed by: PHYSICAL THERAPIST

## 2018-06-07 PROCEDURE — 97530 THERAPEUTIC ACTIVITIES: CPT | Mod: GP | Performed by: PHYSICAL THERAPIST

## 2018-06-08 DIAGNOSIS — I10 BENIGN ESSENTIAL HYPERTENSION: ICD-10-CM

## 2018-06-08 NOTE — TELEPHONE ENCOUNTER
"diltiazem (TIAZAC) 180 MG 24 hr ER beaded capsule  Last Written Prescription Date:  6/5/17  Last Fill Quantity: 90,  # refills: 3   Last office visit: 7/10/17 with prescribing provider:  Lucio   Future Office Visit:    Requested Prescriptions   Pending Prescriptions Disp Refills     DILT- MG 24 hr capsule [Pharmacy Med Name: DILT-XR 180MG/24    CAP] 90 capsule 3     Sig: TAKE ONE CAPSULE BY MOUTH ONCE DAILY    Calcium Channel Blockers Protocol  Failed    6/8/2018  1:00 PM       Failed - Normal ALT in past 12 months    Recent Labs   Lab Test  10/27/16   0947   ALT  17            Failed - Normal serum creatinine on file in past 12 months    Recent Labs   Lab Test  10/27/16   0947   CR  0.86            Passed - Blood pressure under 140/90 in past 12 months    BP Readings from Last 3 Encounters:   01/24/18 122/67   07/10/17 120/71   06/13/17 117/66                Passed - Recent (12 mo) or future (30 days) visit within the authorizing provider's specialty    Patient had office visit in the last 12 months or has a visit in the next 30 days with authorizing provider or within the authorizing provider's specialty.  See \"Patient Info\" tab in inbasket, or \"Choose Columns\" in Meds & Orders section of the refill encounter.           Passed - Patient is age 18 or older       Passed - No active pregnancy on record       Passed - No positive pregnancy test in past 12 months          "

## 2018-06-08 NOTE — TELEPHONE ENCOUNTER
"Requested Prescriptions   Pending Prescriptions Disp Refills     DILT- MG 24 hr capsule [Pharmacy Med Name: DILT-XR 180MG/24    CAP] 90 capsule 3     Sig: TAKE ONE CAPSULE BY MOUTH ONCE DAILY    Calcium Channel Blockers Protocol  Failed    6/8/2018 11:24 AM       Failed - Normal ALT in past 12 months    Recent Labs   Lab Test  10/27/16   0947   ALT  17            Failed - Normal serum creatinine on file in past 12 months    Recent Labs   Lab Test  10/27/16   0947   CR  0.86            Passed - Blood pressure under 140/90 in past 12 months    BP Readings from Last 3 Encounters:   01/24/18 122/67   07/10/17 120/71   06/13/17 117/66                Passed - Recent (12 mo) or future (30 days) visit within the authorizing provider's specialty    Patient had office visit in the last 12 months or has a visit in the next 30 days with authorizing provider or within the authorizing provider's specialty.  See \"Patient Info\" tab in inbasket, or \"Choose Columns\" in Meds & Orders section of the refill encounter.           Passed - Patient is age 18 or older       Passed - No active pregnancy on record       Passed - No positive pregnancy test in past 12 months        Last Written Prescription Date:  06/05/17  Last Fill Quantity: 90,  # refills: 3   Last office visit: 1/24/2018 with prescribing provider:   01/24/18  Future Office Visit:        Conchita Noriega MA    "

## 2018-06-08 NOTE — TELEPHONE ENCOUNTER
Routing to TCs to contact patient to inform due for appointment. Please route back to refill pool once scheduled.   Key LI RN

## 2018-06-11 RX ORDER — DILTIAZEM HYDROCHLORIDE 180 MG/1
CAPSULE, EXTENDED RELEASE ORAL
Qty: 30 CAPSULE | Refills: 0 | Status: SHIPPED | OUTPATIENT
Start: 2018-06-11 | End: 2018-06-19

## 2018-06-11 NOTE — TELEPHONE ENCOUNTER
Medication is being filled for 1 time refill only due to:  Patient needs to be seen because it has been more than one year since last visit.  Key LI RN

## 2018-06-14 ENCOUNTER — THERAPY VISIT (OUTPATIENT)
Dept: PHYSICAL THERAPY | Facility: CLINIC | Age: 78
End: 2018-06-14
Payer: MEDICARE

## 2018-06-14 DIAGNOSIS — M99.05 PELVIC SOMATIC DYSFUNCTION: ICD-10-CM

## 2018-06-14 DIAGNOSIS — R15.9 FECAL INCONTINENCE: ICD-10-CM

## 2018-06-14 PROCEDURE — 97140 MANUAL THERAPY 1/> REGIONS: CPT | Mod: GP | Performed by: PHYSICAL THERAPIST

## 2018-06-14 PROCEDURE — 97530 THERAPEUTIC ACTIVITIES: CPT | Mod: GP | Performed by: PHYSICAL THERAPIST

## 2018-06-19 ENCOUNTER — OFFICE VISIT (OUTPATIENT)
Dept: FAMILY MEDICINE | Facility: CLINIC | Age: 78
End: 2018-06-19
Payer: COMMERCIAL

## 2018-06-19 VITALS
DIASTOLIC BLOOD PRESSURE: 65 MMHG | HEART RATE: 67 BPM | TEMPERATURE: 97.8 F | OXYGEN SATURATION: 99 % | HEIGHT: 62 IN | WEIGHT: 94 LBS | SYSTOLIC BLOOD PRESSURE: 123 MMHG | BODY MASS INDEX: 17.3 KG/M2

## 2018-06-19 DIAGNOSIS — E53.8 VITAMIN B12 DEFICIENCY (NON ANEMIC): Primary | ICD-10-CM

## 2018-06-19 DIAGNOSIS — M99.05 PELVIC SOMATIC DYSFUNCTION: ICD-10-CM

## 2018-06-19 DIAGNOSIS — R15.2 INCONTINENCE OF FECES WITH FECAL URGENCY: ICD-10-CM

## 2018-06-19 DIAGNOSIS — I10 BENIGN ESSENTIAL HYPERTENSION: ICD-10-CM

## 2018-06-19 DIAGNOSIS — R15.9 INCONTINENCE OF FECES WITH FECAL URGENCY: ICD-10-CM

## 2018-06-19 DIAGNOSIS — K56.609 SBO (SMALL BOWEL OBSTRUCTION) (H): ICD-10-CM

## 2018-06-19 DIAGNOSIS — I10 BENIGN HYPERTENSION: Primary | ICD-10-CM

## 2018-06-19 PROCEDURE — 99214 OFFICE O/P EST MOD 30 MIN: CPT | Performed by: INTERNAL MEDICINE

## 2018-06-19 RX ORDER — DILTIAZEM HYDROCHLORIDE 180 MG/1
CAPSULE, EXTENDED RELEASE ORAL
Qty: 90 CAPSULE | Refills: 3 | Status: SHIPPED | OUTPATIENT
Start: 2018-06-19 | End: 2019-06-19

## 2018-06-19 RX ORDER — DILTIAZEM HYDROCHLORIDE 180 MG/1
CAPSULE, EXTENDED RELEASE ORAL
Qty: 90 CAPSULE | Refills: 3 | Status: SHIPPED | OUTPATIENT
Start: 2018-06-19 | End: 2018-06-19

## 2018-06-19 NOTE — MR AVS SNAPSHOT
After Visit Summary   6/19/2018    Georgette Alatorre    MRN: [id number]           Patient Information     Date Of Birth          1940        Visit Information        Provider Department      6/19/2018 2:30 PM Mario Valdes MD [facility]        Today's Diagnoses     Vitamin B12 deficiency (non anemic)    -  1    Pelvic somatic dysfunction        Incontinence of feces with fecal urgency        SBO (small bowel obstruction)        Benign essential hypertension           Follow-ups after your visit        Your next 10 appointments already scheduled     Jun 22, 2018 12:05 PM CDT   [facility] with Fabián Can PT   [facility] ([facility]  )    [address]  [facility] [address] [phone]              Future tests that were ordered for you today     Open Future Orders        Priority Expected Expires Ordered    Albumin level Routine  6/19/2019 6/19/2018    **Basic metabolic panel FUTURE anytime Routine 6/19/2018 6/19/2019 6/19/2018            Who to contact     If you have questions or need follow up information about today's clinic visit or your schedule please contact [facility] directly at [phone].  Normal or non-critical lab and imaging results will be communicated to you by MyChart, letter or phone within 4 business days after the clinic has received the results. If you do not hear from us within 7 days, please contact the clinic through [facility]St[facility] or phone. If you have a critical or abnormal lab result, we will notify you by phone as soon as possible.  Submit refill requests through Deanslist or call your pharmacy and they will forward the refill request to us. Please allow 3 business days for your refill to be completed.          Additional Information About Your Visit        MyChart Information     Deanslist gives you secure access to your electronic health record. If you see a primary care  "provider, you can also send messages to your care team and make appointments. If you have questions, please call your primary care clinic.  If you do not have a primary care provider, please call [phone] and they will assist you.        Care EveryWhere ID     This is your Care EveryWhere ID. This could be used by other organizations to access your [facility] medical records  [id number]        Your Vitals Were     Pulse Temperature Height Pulse Oximetry Breastfeeding? BMI (Body Mass Index)    67 97.8  F (36.6  C) (Oral) 5' 2\" (1.575 m) 99% No 17.19 kg/m2       Blood Pressure from Last 3 Encounters:   06/19/18 123/65   01/24/18 122/67   07/10/17 120/71    Weight from Last 3 Encounters:   06/19/18 94 lb (42.6 kg)   01/24/18 95 lb (43.1 kg)   07/10/17 103 lb (46.7 kg)                 Today's Medication Changes          These changes are accurate as of 6/19/18 11:59 PM.  If you have any questions, ask your nurse or doctor.               Start taking these medicines.        Dose/Directions    diltiazem 180 MG 24 hr capsule   Commonly known as:  DILT-XR   Used for:  Benign essential hypertension   Started by:  Mario Valdes MD        TAKE ONE CAPSULE BY MOUTH ONCE DAILY   Quantity:  90 capsule   Refills:  3         Stop taking these medicines if you haven't already. Please contact your care team if you have questions.     cyanocobalamin 1000 MCG/ML injection   Commonly known as:  VITAMIN B12   Stopped by:  Mario Valdes MD           omeprazole 20 MG CR capsule   Commonly known as:  priLOSEC   Stopped by:  Mario Valdes MD           potassium chloride SA 20 MEQ CR tablet   Commonly known as:  KLOR-CON   Stopped by:  Mario Valdes MD                Where to get your medicines      These medications were sent to [facility] - [address]  [address] [place] [address]     Phone:  [phone]     diltiazem 180 MG 24 hr capsule                Primary Care " Provider Office Phone # Fax #    Mario Valdes [phone] [address] [facility] [address]  [facility] [address]        Equal Access to Services     JODI GONZALES : Hadderek francisco colindres joeo Sobhumiali, waaxda luqadaha, qaybta kaalmada adedewey, chantel lujan regvon lafleur laRadhascott john. So [facility] [phone].    ATENCIÓN: Si habla español, tiene a leon disposición servicios gratuitos de asistencia lingüística. Llame al [phone].    We comply with applicable federal civil rights laws and [place] laws. We do not discriminate on the basis of race, color, national origin, age, disability, sex, sexual orientation, or gender identity.            Thank you!     Thank you for choosing [facility]  for your care. Our goal is always to provide you with excellent care. Hearing back from our patients is one way we can continue to improve our services. Please take a few minutes to complete the written survey that you may receive in the mail after your visit with us. Thank you!             Your Updated Medication List - Protect others around you: Learn how to safely use, store and throw away your medicines at www.disposemymeds.org.          This list is accurate as of 6/19/18 11:59 PM.  Always use your most recent med list.                   Brand Name Dispense Instructions for use Diagnosis    BENEFIBER PO      Take by mouth 2 times daily        calcium carbonate 500 MG tablet    OS-ILYA 500 mg [place]. [place]     Take 1 tablet by mouth 2 times daily        diltiazem 180 MG 24 hr capsule    DILT-XR    90 capsule    TAKE ONE CAPSULE BY MOUTH ONCE DAILY    Benign essential hypertension       IMODIUM OR           probiotic Caps           TYLENOL PO      Take 500 mg by mouth every 4 hours as needed for mild pain        VITAMIN C PO      Take 500 mg by mouth daily.        VITAMIN D3 PO      Take 2,000 Units by mouth daily.

## 2018-06-19 NOTE — TELEPHONE ENCOUNTER
Script was sent to wrong pharmacy please resend. Correct pharmacy pended.   Angie Taylor CMA  Pending Prescriptions:                       Disp   Refills    diltiazem (DILT-XR) 180 MG 24 hr capsule  90 cap*3            Sig: TAKE ONE CAPSULE BY MOUTH ONCE DAILY  Last Written Prescription Date:  06/19/2018  Last Fill Quantity: 90,  # refills: 3   Last office visit: No previous visit found with prescribing provider:  06/19/2018   Future Office Visit:

## 2018-06-19 NOTE — PROGRESS NOTES
SUBJECTIVE:   Georgette Alatorre is a 77 year old female who presents to clinic today for the following health issues:    Medication Followup of Diltiazem    Taking Medication as prescribed: yes    Side Effects:  None    Medication Helping Symptoms:  yes     The patient has a history of pelvic somatic dysfunction with fecal incontinence. She has been evaluated at a pelvic floor clinic, colorectal surgery, and physical therapy. She is currently working on various Kegel exercises with PT. The patient notes that she continues to lose weight and has unintentionally lost 10 pounds in the past year. She notes that when she starts eating, her bowel become very active. She reports having partially digested food in her stool and occasionally pills. She notes having increased symptoms when eating certain foods such as high sugar foods. She is taking Benefiber twice daily. Her last small bowel obstruction was in 2007.      Problem list and histories reviewed & adjusted, as indicated.  Additional history: as documented    Current Outpatient Prescriptions   Medication Sig Dispense Refill     Ascorbic Acid (VITAMIN C PO) Take 500 mg by mouth daily.       calcium carbonate (OS-ILYA 500 MG [place]. [place]) 1250 MG tablet Take 1 tablet by mouth 2 times daily       Cholecalciferol (VITAMIN D3 PO) Take 2,000 Units by mouth daily.       DILT- MG 24 hr capsule TAKE ONE CAPSULE BY MOUTH ONCE DAILY 30 capsule 0     probiotic CAPS        Wheat Dextrin (BENEFIBER PO) Take by mouth 2 times daily       Acetaminophen (TYLENOL PO) Take 500 mg by mouth every 4 hours as needed for mild pain       Loperamide HCl (IMODIUM OR)          Reviewed and updated as needed this visit by clinical staff  Tobacco  Allergies  Soc Hx      Reviewed and updated as needed this visit by Provider         ROS:  Constitutional, HEENT, cardiovascular, pulmonary, GI, , musculoskeletal, neuro, skin, endocrine and psych systems are negative, except as otherwise  "noted.    This document serves as a record of the services and decisions personally performed and made by Mario Valdes MD. It was created on his behalf by Johanna Turner, a trained medical scribe. The creation of this document is based the provider's statements to the medical scribe. 6/19/2018  OBJECTIVE:   /65  Pulse 67  Temp 97.8  F (36.6  C) (Oral)  Ht 1.575 m (5' 2\")  Wt 42.6 kg (94 lb)  SpO2 99%  Breastfeeding? No  BMI 17.19 kg/m2  Body mass index is 17.19 kg/(m^2).  Neck was supple without adenopathy or thyromegaly her carotids were normal without bruits  Chest clear to auscultation and percussion  Cardiovascular S1 and S2 are physiologic without murmurs or gallops  Abdomen bowel sounds were normal.  There is no palpable mass or organomegaly  Extremities nontender without any edema  Pulses pedal pulses are as described otherwise his pulses are bilaterally symmetrical throughout without bruits  Skin without significant abnormality    Diagnostic Test Results:  none   ASSESSMENT/PLAN:   1. Vitamin B12 deficiency (non anemic)    2. Pelvic somatic dysfunction  Continue with PT and Kegel exercises.     3. Incontinence of feces with fecal urgency  Continue with PT and Kegel exercises.     4. SBO (small bowel obstruction)    5. Benign essential hypertension  Controlled. Continue medication.   - **Basic metabolic panel FUTURE anytime; Future  - Albumin level  - diltiazem (DILT-XR) 180 MG 24 hr capsule; TAKE ONE CAPSULE BY MOUTH ONCE DAILY  Dispense: 90 capsule; Refill: 3    Recommended that the pt begin a high protein diet with a goal of weight gain.       Followup in 1 month for wellness exam     Mario Valdes MD  [facility]    The information in this document, created by the medical scribe for me, accurately reflects the services I personally performed and the decisions made by me. I have reviewed and approved this document for accuracy prior to leaving the patient care area.  Mario " RUBEN Valdes MD  3:14 PM, 06/19/18

## 2018-06-19 NOTE — TELEPHONE ENCOUNTER
Reason for Call:  Medication or medication refill:    Do you use a [facility]?  Name of the pharmacy and phone number for the current request:     [facility]  [place], [place] - [address]      Name of the medication requested: Medications sent to the wrong pharmacy today.  She needs her medications sent to "[facility]" [facility]    Other request:     Can we leave a detailed message on this number? YES    Phone number patient can be reached at: Home number on file [phone] (home)    Best Time: any    Call taken on 6/19/2018 at 4:52 PM by Ashly Helm

## 2018-06-22 ENCOUNTER — THERAPY VISIT (OUTPATIENT)
Dept: PHYSICAL THERAPY | Facility: CLINIC | Age: 78
End: 2018-06-22
Payer: MEDICARE

## 2018-06-22 DIAGNOSIS — R15.9 INCONTINENCE OF FECES WITH FECAL URGENCY: ICD-10-CM

## 2018-06-22 DIAGNOSIS — R15.2 INCONTINENCE OF FECES WITH FECAL URGENCY: ICD-10-CM

## 2018-06-22 DIAGNOSIS — M99.05 PELVIC SOMATIC DYSFUNCTION: ICD-10-CM

## 2018-06-22 PROCEDURE — G8989 SELF CARE D/C STATUS: HCPCS | Mod: GP | Performed by: PHYSICAL THERAPIST

## 2018-06-22 PROCEDURE — G8987 SELF CARE CURRENT STATUS: HCPCS | Mod: GP | Performed by: PHYSICAL THERAPIST

## 2018-06-22 PROCEDURE — 97530 THERAPEUTIC ACTIVITIES: CPT | Mod: GP | Performed by: PHYSICAL THERAPIST

## 2018-06-22 PROCEDURE — G8988 SELF CARE GOAL STATUS: HCPCS | Mod: GP | Performed by: PHYSICAL THERAPIST

## 2018-06-22 NOTE — LETTER
[facility]  [address]  [facility] [address]  [phone]    2018    Re: Georgette Alatorre   :   1940  MRN:  [id number]   REFERRING PHYSICIAN:   Johanna Euceda    [facility] PH[facility]ICAL [facility]  Date of Initial Evaluation:  2018  Visits:  4 Rxs Used: 4  Reason for Referral:     Pelvic somatic dysfunction  Incontinence of feces with fecal urgency  DISCHARGE REPORT  Progress reporting period is from 2018 to 2018.     SUBJECTIVE  Subjective changes noted by patient:  .  Subjective: Pt has been seen x 4 visits.  Continues to work on pelvic floor strength exercises and working on food trigger.  Was suggested by MD to eat more protein, carnation for calories due to weight loss.  Pt continues to complain of needing to have frequent BM.  reports that after eating anything, it goes right through her.  Averages about 5-7 BMs per day with stool ranging from type 2 to type 5.      Current pain level is   .     Previous pain level was   Initial Pain level: 0/10.   Changes in function:  Yes (See Goal flowsheet attached for changes in current functional level)  Adverse reaction to treatment or activity: None  OBJECTIVE  Changes noted in objective findings:    Objective: spent 25 min review of self management with food/diet. Increase to small meals through out the day, try meals high in protein/snacks.  Given examples.  at this time, she has not made a lot of changes or progress with PT.  She will continue to manage on her own.  Will seek colorectal or gastroenterology for further examination. She will continue with pelvic floor strengthening at this time.  She would like to end PT.     ASSESSMENT/PLAN  Updated problem list and treatment plan: Diagnosis 1:  Fecal incontinence  Decreased strength - therapeutic exercise and therapeutic activities  Decreased function - therapeutic activities  STG/LTGs  have been met or progress has been made towards goals:  Yes (See Goal flow sheet completed today.)  Assessment of Progress: The patient's progress has plateaued.  The patient's condition is unchanged.  Self Management Plans:  Patient has been instructed in a home treatment program.  Patient  has been instructed in self management of symptoms.  I have re-evaluated this patient and find that the nature, scope, duration and intensity of the therapy is appropriate for the medical condition of the patient.  Georgette continues to require the following intervention to meet STG and LTG's:  PT intervention is no longer required to meet STG/LTG.    Re: Georgette Alatorre   :   1940    Recommendations:  This patient is ready to be discharged from therapy and continue their home treatment program.    Thank you for your referral.    INQUIRIES  Therapist: Fabián Frausto DPNISH  [facility]  [address] [place], [place] [id number]  [facility] [address]  Phone: [phone]  Fax: [phone]

## 2018-06-26 PROBLEM — M99.05 PELVIC SOMATIC DYSFUNCTION: Status: RESOLVED | Noted: 2018-05-31 | Resolved: 2018-06-26

## 2018-06-26 PROBLEM — R15.9 FECAL INCONTINENCE: Status: RESOLVED | Noted: 2018-05-31 | Resolved: 2018-06-26

## 2018-06-26 NOTE — PROGRESS NOTES
Subjective:  HPI                    Objective:  System    Physical Exam    General     ROS    Assessment/Plan:    DISCHARGE REPORT    Progress reporting period is from 5/31/2018 to 6/25/2018.       SUBJECTIVE  Subjective changes noted by patient:  .  Subjective: Pt has been seen x 4 visits.  Continues to work on pelvic floor strength exercises and working on food trigger.  Was suggested by MD to eat more protein, carnation for calories due to weight loss.  Pt continues to complain of needing to have frequent BM.  reports that after eating anything, it goes right through her.  Averages about 5-7 BMs per day with stool ranging from type 2 to type 5.      Current pain level is   .     Previous pain level was   Initial Pain level: 0/10.   Changes in function:  Yes (See Goal flowsheet attached for changes in current functional level)  Adverse reaction to treatment or activity: None    OBJECTIVE  Changes noted in objective findings:    Objective: spent 25 min review of self management with food/diet. Increase to small meals through out the day, try meals high in protein/snacks.  Given examples.  at this time, she has not made a lot of changes or progress with PT.  She will continue to manage on her own.  Will seek colorectal or gastroenterology for further examination. She will continue with pelvic floor strengthening at this time.  She would like to end PT.       ASSESSMENT/PLAN  Updated problem list and treatment plan: Diagnosis 1:  Fecal incontinence  Decreased strength - therapeutic exercise and therapeutic activities  Decreased function - therapeutic activities  STG/LTGs have been met or progress has been made towards goals:  Yes (See Goal flow sheet completed today.)  Assessment of Progress: The patient's progress has plateaued.  The patient's condition is unchanged.  Self Management Plans:  Patient has been instructed in a home treatment program.  Patient  has been instructed in self management of symptoms.  I have  re-evaluated this patient and find that the nature, scope, duration and intensity of the therapy is appropriate for the medical condition of the patient.  Georgette continues to require the following intervention to meet STG and LTG's:  PT intervention is no longer required to meet STG/LTG.    Recommendations:  This patient is ready to be discharged from therapy and continue their home treatment program.    Please refer to the daily flowsheet for treatment today, total treatment time and time spent performing 1:1 timed codes.

## 2018-07-13 NOTE — PROGRESS NOTES
"  SUBJECTIVE:   Georgette Alatorre is a 77 year old female who presents to clinic today for the following health issues:  {Provider please address medication reconciliation discrepancies--rooming staff please delete if no med/rec issues}    {Superlists:210101}    {additional problems for provider to add:868510}    Problem list and histories reviewed & adjusted, as indicated.  Additional history: {NONE - AS DOCUMENTED:647043::\"as documented\"}    {HIST REVIEW/ LINKS 2:986402}    Reviewed and updated as needed this visit by clinical staff       Reviewed and updated as needed this visit by Provider         {PROVIDER CHARTING PREFERENCE:151972}    "

## 2018-07-16 ENCOUNTER — OFFICE VISIT (OUTPATIENT)
Dept: FAMILY MEDICINE | Facility: CLINIC | Age: 78
End: 2018-07-16
Payer: COMMERCIAL

## 2018-07-16 VITALS
BODY MASS INDEX: 17.66 KG/M2 | SYSTOLIC BLOOD PRESSURE: 127 MMHG | TEMPERATURE: 97.7 F | WEIGHT: 96 LBS | OXYGEN SATURATION: 96 % | DIASTOLIC BLOOD PRESSURE: 71 MMHG | HEIGHT: 62 IN | HEART RATE: 70 BPM

## 2018-07-16 DIAGNOSIS — Z13.6 CARDIOVASCULAR SCREENING; LDL GOAL LESS THAN 130: ICD-10-CM

## 2018-07-16 DIAGNOSIS — K52.0 RADIATION COLITIS: Primary | ICD-10-CM

## 2018-07-16 DIAGNOSIS — I47.10 PAROXYSMAL SUPRAVENTRICULAR TACHYCARDIA (H): ICD-10-CM

## 2018-07-16 DIAGNOSIS — M81.0 AGE-RELATED OSTEOPOROSIS WITHOUT CURRENT PATHOLOGICAL FRACTURE: ICD-10-CM

## 2018-07-16 DIAGNOSIS — E53.8 VITAMIN B12 DEFICIENCY (NON ANEMIC): ICD-10-CM

## 2018-07-16 DIAGNOSIS — Z36.3 ENCOUNTER FOR ROUTINE SCREENING FOR MALFORMATION USING ULTRASONICS: ICD-10-CM

## 2018-07-16 DIAGNOSIS — R53.83 FATIGUE, UNSPECIFIED TYPE: ICD-10-CM

## 2018-07-16 DIAGNOSIS — E55.9 VITAMIN D DEFICIENCY: ICD-10-CM

## 2018-07-16 DIAGNOSIS — Z00.00 ROUTINE GENERAL MEDICAL EXAMINATION AT A HEALTH CARE FACILITY: ICD-10-CM

## 2018-07-16 DIAGNOSIS — Z12.31 ENCOUNTER FOR SCREENING MAMMOGRAM FOR BREAST CANCER: ICD-10-CM

## 2018-07-16 LAB
ALBUMIN SERPL-MCNC: 4 G/DL (ref 3.4–5)
ALBUMIN UR-MCNC: NEGATIVE MG/DL
ALP SERPL-CCNC: 78 U/L (ref 40–150)
ALT SERPL W P-5'-P-CCNC: 24 U/L (ref 0–50)
ANION GAP SERPL CALCULATED.3IONS-SCNC: 7 MMOL/L (ref 3–14)
APPEARANCE UR: CLEAR
AST SERPL W P-5'-P-CCNC: 24 U/L (ref 0–45)
BILIRUB SERPL-MCNC: 0.6 MG/DL (ref 0.2–1.3)
BILIRUB UR QL STRIP: NEGATIVE
BUN SERPL-MCNC: 20 MG/DL (ref 7–30)
CALCIUM SERPL-MCNC: 9.2 MG/DL (ref 8.5–10.1)
CHLORIDE SERPL-SCNC: 104 MMOL/L (ref 94–109)
CHOLEST SERPL-MCNC: 182 MG/DL
CO2 SERPL-SCNC: 31 MMOL/L (ref 20–32)
COLOR UR AUTO: YELLOW
CREAT SERPL-MCNC: 0.79 MG/DL (ref 0.52–1.04)
ERYTHROCYTE [DISTWIDTH] IN BLOOD BY AUTOMATED COUNT: 15 % (ref 10–15)
GFR SERPL CREATININE-BSD FRML MDRD: 70 ML/MIN/1.7M2
GLUCOSE SERPL-MCNC: 97 MG/DL (ref 70–99)
GLUCOSE UR STRIP-MCNC: NEGATIVE MG/DL
HCT VFR BLD AUTO: 38.2 % (ref 35–47)
HDLC SERPL-MCNC: 94 MG/DL
HGB BLD-MCNC: 12.9 G/DL (ref 11.7–15.7)
HGB UR QL STRIP: ABNORMAL
KETONES UR STRIP-MCNC: NEGATIVE MG/DL
LDLC SERPL CALC-MCNC: 75 MG/DL
LEUKOCYTE ESTERASE UR QL STRIP: ABNORMAL
MCH RBC QN AUTO: 32.3 PG (ref 26.5–33)
MCHC RBC AUTO-ENTMCNC: 33.8 G/DL (ref 31.5–36.5)
MCV RBC AUTO: 96 FL (ref 78–100)
NITRATE UR QL: NEGATIVE
NONHDLC SERPL-MCNC: 88 MG/DL
PH UR STRIP: 7 PH (ref 5–7)
PLATELET # BLD AUTO: 239 10E9/L (ref 150–450)
POTASSIUM SERPL-SCNC: 3.7 MMOL/L (ref 3.4–5.3)
PROT SERPL-MCNC: 7.9 G/DL (ref 6.8–8.8)
RBC # BLD AUTO: 3.99 10E12/L (ref 3.8–5.2)
RBC #/AREA URNS AUTO: NORMAL /HPF
SODIUM SERPL-SCNC: 142 MMOL/L (ref 133–144)
SOURCE: ABNORMAL
SP GR UR STRIP: 1.01 (ref 1–1.03)
TRIGL SERPL-MCNC: 65 MG/DL
TSH SERPL DL<=0.005 MIU/L-ACNC: 3.8 MU/L (ref 0.4–4)
UROBILINOGEN UR STRIP-ACNC: 0.2 EU/DL (ref 0.2–1)
VIT B12 SERPL-MCNC: 192 PG/ML (ref 193–986)
WBC # BLD AUTO: 4.3 10E9/L (ref 4–11)
WBC #/AREA URNS AUTO: NORMAL /HPF

## 2018-07-16 PROCEDURE — 84443 ASSAY THYROID STIM HORMONE: CPT | Performed by: INTERNAL MEDICINE

## 2018-07-16 PROCEDURE — 36415 COLL VENOUS BLD VENIPUNCTURE: CPT | Performed by: INTERNAL MEDICINE

## 2018-07-16 PROCEDURE — 81001 URINALYSIS AUTO W/SCOPE: CPT | Performed by: INTERNAL MEDICINE

## 2018-07-16 PROCEDURE — 82607 VITAMIN B-12: CPT | Performed by: INTERNAL MEDICINE

## 2018-07-16 PROCEDURE — 80053 COMPREHEN METABOLIC PANEL: CPT | Performed by: INTERNAL MEDICINE

## 2018-07-16 PROCEDURE — 85027 COMPLETE CBC AUTOMATED: CPT | Performed by: INTERNAL MEDICINE

## 2018-07-16 PROCEDURE — 80061 LIPID PANEL: CPT | Performed by: INTERNAL MEDICINE

## 2018-07-16 PROCEDURE — 82306 VITAMIN D 25 HYDROXY: CPT | Performed by: INTERNAL MEDICINE

## 2018-07-16 PROCEDURE — 99214 OFFICE O/P EST MOD 30 MIN: CPT | Performed by: INTERNAL MEDICINE

## 2018-07-16 NOTE — PROGRESS NOTES
SUBJECTIVE:   Georgette Alatorre is a 77 year old female who presents to clinic today for the following health issues:      Follow up:  Dorsal left foot pain without known trauma or swelling    -Ongoing fatigue, seemingly both physical and emotional related to chronic diarrhea and relative malnutrition    -Diarrhea  Reviewed ongoing treatment modalities and recent consults          Problem list and histories reviewed & adjusted, as indicated.  Additional history: as documented    Current Outpatient Prescriptions   Medication Sig Dispense Refill     Acetaminophen (TYLENOL PO) Take 500 mg by mouth every 4 hours as needed for mild pain       Ascorbic Acid (VITAMIN C PO) Take 500 mg by mouth daily.       calcium carbonate (OS-ILYA 500 MG [place]. [place]) 1250 MG tablet Take 1 tablet by mouth 2 times daily       cephALEXin (KEFLEX) 500 MG capsule Take 1 capsule (500 mg) by mouth 3 times daily (Patient not taking: Reported on 9/10/2018) 30 capsule 0     cephALEXin (KEFLEX) 500 MG capsule Take 1 capsule (500 mg) by mouth 3 times daily 30 capsule 0     Cholecalciferol (VITAMIN D3 PO) Take 2,000 Units by mouth daily.       denosumab (PROLIA) 60 MG/ML SOLN injection Inject 1 mL (60 mg) Subcutaneous every 6 months 1 mL 1     diltiazem (DILT-XR) 180 MG 24 hr capsule TAKE ONE CAPSULE BY MOUTH ONCE DAILY 90 capsule 3     HYDROcodone-acetaminophen (NORCO) 5-325 MG per tablet Take 1 tablet by mouth every 6 hours as needed for severe pain 10 tablet 0     ibuprofen (ADVIL/MOTRIN) 600 MG tablet Take 1 tablet (600 mg) by mouth every 8 hours as needed for moderate pain 30 tablet 0     Loperamide HCl (IMODIUM OR)        Nutritional Supplements (CARNATION INSTANT BREAKFAST PO)        probiotic CAPS        Protein POWD        valACYclovir (VALTREX) 500 MG tablet Take 1 tablet (500 mg) by mouth 2 times daily 6 tablet 3     Wheat Dextrin (BENEFIBER PO) Take by mouth 2 times daily         Reviewed and updated as needed this visit by clinical  "staff  Tobacco  Allergies  Meds  Problems  Med Hx  Surg Hx  Fam Hx  Soc Hx        Reviewed and updated as needed this visit by Provider         ROS:  CONSTITUTIONAL: NEGATIVE for fever, chills, change in weight  INTEGUMENTARY/SKIN: NEGATIVE for worrisome rashes, moles or lesions  EYES: NEGATIVE for vision changes or irritation  ENT/MOUTH: NEGATIVE for ear, mouth and throat problems  RESP: NEGATIVE for significant cough or SOB  BREAST: NEGATIVE for masses, tenderness or discharge  CV: NEGATIVE for chest pain, palpitations or peripheral edema  Having occasional palpitations with one episode lasting for 30 minutes without associated chest pain or shortness of breath  GI: NEGATIVE for nausea, abdominal pain, heartburn, or change in bowel habits  : NEGATIVE for frequency, dysuria, or hematuria  Occasional stress incontinence  MUSCULOSKELETAL: NEGATIVE for significant arthralgias or myalgia  Left wrist pain  NEURO: NEGATIVE for weakness, dizziness or paresthesias  Left lower back pain without radiculopathy  ENDOCRINE: NEGATIVE for temperature intolerance, skin/hair changes  HEME: NEGATIVE for bleeding problems  PSYCHIATRIC: NEGATIVE for changes in mood or affect    OBJECTIVE:     /71  Pulse 70  Temp 97.7  F (36.5  C) (Oral)  Ht 5' 2\" (1.575 m)  Wt 96 lb (43.5 kg)  SpO2 96%  Breastfeeding? No  BMI 17.56 kg/m2  Body mass index is 17.56 kg/(m^2).  GENERAL: healthy, alert and no distress  NECK: no adenopathy, no asymmetry, masses, or scars and thyroid normal to palpation  RESP: lungs clear to auscultation - no rales, rhonchi or wheezes  CV: regular rate and rhythm, normal S1 S2, no S3 or S4, no murmur, click or rub, no peripheral edema and peripheral pulses strong  ABDOMEN: soft, nontender, no hepatosplenomegaly, no masses and bowel sounds normal  MS: no gross musculoskeletal defects noted, no edema        ASSESSMENT/PLAN:             1. Radiation colitis    - Urine Microscopic    2. Vitamin B12 " deficiency (non anemic)    - Vitamin B12    3. Paroxysmal supraventricular tachycardia (H)      4. Vitamin D deficiency    - Vitamin D Deficiency    5. Fatigue, unspecified type    - TSH with free T4 reflex    6. CARDIOVASCULAR SCREENING; LDL GOAL LESS THAN 130    - Lipid panel reflex to direct LDL Fasting    7. Encounter for screening mammogram for breast cancer    - UA reflex to Microscopic and Culture    8. Encounter for routine screening for malformation using ultrasonics      9. Routine general medical examination at a health care facility    - UA reflex to Microscopic and Culture  - CBC with platelets  - Comprehensive metabolic panel    10. Age-related osteoporosis without current pathological fracture    - DX Hip/Pelvis/Spine; Future        Mario Valdes MD  [facility]

## 2018-07-16 NOTE — MR AVS SNAPSHOT
After Visit Summary   7/16/2018    Georgette Alatorre    MRN: [id number]           Patient Information     Date Of Birth          1940        Visit Information        Provider Department      7/16/2018 9:30 AM Mario Valdes MD [facility]        Today's Diagnoses     Radiation colitis    -  1    Vitamin B12 deficiency (non anemic)        Paroxysmal supraventricular tachycardia (H)        Vitamin D deficiency        Fatigue, unspecified type        CARDIOVASCULAR SCREENING; LDL GOAL LESS THAN 130        Encounter for screening mammogram for breast cancer        Encounter for routine screening for malformation using ultrasonics        Routine general medical examination at a health care facility        Age-related osteoporosis without current pathological fracture          Care Instructions      Preventive Health Recommendations    Female Ages 65 +    Yearly exam:     See your health care provider every year in order to  o Review health changes.   o Discuss preventive care.    o Review your medicines if your doctor has prescribed any.      You no longer need a yearly Pap test unless you've had an abnormal Pap test in the past 10 years. If you have vaginal symptoms, such as bleeding or discharge, be sure to talk with your provider about a Pap test.      Every 1 to 2 years, have a mammogram.  If you are over 69, talk with your health care provider about whether or not you want to continue having screening mammograms.      Every 10 years, have a colonoscopy. Or, have a yearly FIT test (stool test). These exams will check for colon cancer.       Have a cholesterol test every 5 years, or more often if your doctor advises it.       Have a diabetes test (fasting glucose) every three years. If you are at risk for diabetes, you should have this test more often.       At age 65, have a bone density scan (DEXA) to check for osteoporosis (brittle bone disease).    Shots:    Get a flu shot each  year.    Get a tetanus shot every 10 years.    Talk to your doctor about your pneumonia vaccines. There are now two you should receive - Pneumovax (PPSV 23) and Prevnar (PCV 13).    Talk to your pharmacist about the shingles vaccine.    Talk to your doctor about the hepatitis B vaccine.    Nutrition:     Eat at least 5 servings of fruits and vegetables each day.      Eat whole-grain bread, whole-wheat pasta and brown rice instead of white grains and rice.      Get adequate Calcium and Vitamin D.     Lifestyle    Exercise at least 150 minutes a week (30 minutes a day, 5 days a week). This will help you control your weight and prevent disease.      Limit alcohol to one drink per day.      No smoking.       Wear sunscreen to prevent skin cancer.       See your dentist twice a year for an exam and cleaning.      See your eye doctor every 1 to 2 years to screen for conditions such as glaucoma, macular degeneration and cataracts.          Follow-ups after your visit        Who to contact     If you have questions or need follow up information about today's clinic visit or your schedule please contact [facility] directly at [phone].  Normal or non-critical lab and imaging results will be communicated to you by Huddle[employer], letter or phone within 4 business days after the clinic has received the results. If you do not hear from us within 7 days, please contact the clinic through [employer] or phone. If you have a critical or abnormal lab result, we will notify you by phone as soon as possible.  Submit refill requests through [employer] or call your pharmacy and they will forward the refill request to us. Please allow 3 business days for your refill to be completed.          Additional Information About Your Visit        [employer] Information     [employer] gives you secure access to your electronic health record. If you see a primary care provider, you can also send messages to your care team and make appointments. If you  "have questions, please call your primary care clinic.  If you do not have a primary care provider, please call [phone] and they will assist you.        Care EveryWhere ID     This is your Care EveryWhere ID. This could be used by other organizations to access your [place] medical records  [id number]        Your Vitals Were     Pulse Temperature Height Pulse Oximetry Breastfeeding? BMI (Body Mass Index)    70 97.7  F (36.5  C) (Oral) 5' 2\" (1.575 m) 96% No 17.56 kg/m2       Blood Pressure from Last 3 Encounters:   09/10/18 114/58   09/04/18 118/64   08/22/18 127/68    Weight from Last 3 Encounters:   09/10/18 95 lb (43.1 kg)   09/04/18 97 lb 1.6 oz (44 kg)   08/22/18 95 lb (43.1 kg)              We Performed the Following     CBC with platelets     Comprehensive metabolic panel     Lipid panel reflex to direct LDL Fasting     TSH with free T4 reflex     UA reflex to Microscopic and Culture     Urine Microscopic     Vitamin B12     Vitamin D Deficiency        Primary Care Provider Office Phone # Fax #    Mario Valdes [phone] [address]  [facility] [address]        Equal Access to Services     JODI GONZALES : Hadii aad ku hadasho Soomaali, waaxda luqadaha, qaybta kaalmada adeegyada, waxay sandrain haylinon michael dewey . So [facility] [phone].    ATENCIÓN: Si habla español, tiene a leon disposición servicios gratuitos de asistencia lingüística. Llame al [phone].    We comply with applicable federal civil rights laws and [place] laws. We do not discriminate on the basis of race, color, national origin, age, disability, sex, sexual orientation, or gender identity.            Thank you!     Thank you for choosing [facility]  for your care. Our goal is always to provide you with excellent care. Hearing back from our patients is one way we can continue to improve our services. Please take a few minutes to complete the written survey that you may receive " in the mail after your visit with us. Thank you!             Your Updated Medication List - Protect others around you: Learn how to safely use, store and throw away your medicines at www.disposemymeds.org.          This list is accurate as of 7/16/18 11:59 PM.  Always use your most recent med list.                   Brand Name Dispense Instructions for use Diagnosis    BENEFIBER PO      Take by mouth 2 times daily        calcium carbonate 500 mg (elemental) 500 MG tablet    OS-ILYA     Take 1 tablet by mouth 2 times daily        diltiazem 180 MG 24 hr capsule    DILT-XR    90 capsule    TAKE ONE CAPSULE BY MOUTH ONCE DAILY    Benign essential hypertension       IMODIUM OR           probiotic Caps           TYLENOL PO      Take 500 mg by mouth every 4 hours as needed for mild pain        VITAMIN C PO      Take 500 mg by mouth daily.        VITAMIN D3 PO      Take 2,000 Units by mouth daily.

## 2018-07-16 NOTE — PROGRESS NOTES
"  SUBJECTIVE:   Georgette Alatorre is a 77 year old female who presents for Preventive Visit.  {PVP to remind patient that this is not necessarily a physical exam; physical exam may or may not be done:974079::\"click delete button to remove this line now\"}  {PVP to inform patient that additional E&M charge may apply, if additional problems addressed:011767::\"click delete button to remove this line now\"}  Are you in the first 12 months of your Medicare Part B coverage?  {No Yes:369702::\"No\"}    Healthy Habits:    Do you get at least three servings of calcium containing foods daily (dairy, green leafy vegetables, etc.)? {YES/NO, DAIRY INTAKE:537304::\"yes\"}    Amount of exercise or daily activities, outside of work: {AMOUNT EXERCISE:147969}    Problems taking medications regularly {Yes /No default:595847::\"No\"}    Medication side effects: {Yes /No default.:606085::\"No\"}    Have you had an eye exam in the past two years? {YESNOBLANK:386408}    Do you see a dentist twice per year? {YESNOBLANK:023047}    Do you have sleep apnea, excessive snoring or daytime drowsiness?{YESNOBLANK:510236}      Ability to successfully perform activities of daily living: {YES/NO (MEDICARE):191727::\"Yes, no assistance needed\"}    Home safety:  {IPPE SAFETY CONCERNS:437480::\"none identified\"}     Hearing impairment: {NO/YES:389398}    Fall risk:  {Document Fall Risk in the Assessments Section of the Navigator:064180}    {If any of the above assessments are answered yes, consider ordering appropriate referrals (Optional):649129::\"click delete button to remove this line now\"}    {AWV Cognitive Screenin}    {Outside tests to abstract? :062058}    {additional problems to add (Optional):971387}    Reviewed and updated as needed this visit by clinical staff         Reviewed and updated as needed this visit by Provider        Social History   Substance Use Topics     Smoking status: Former Smoker     Packs/day: 0.50     Years: 40.00     " "Smokeless tobacco: Never Used     Alcohol use 0.0 oz/week     0 Standard drinks or equivalent per week      Comment: 1 glass of wine with dinner       If you drink alcohol do you typically have >3 drinks per day or >7 drinks per week? {ETOH :490171}                        Today's PHQ-2 Score:   PHQ-2 (  [employer]) 2018 7/10/2017   Q1: Little interest or pleasure in doing things 0 0   Q2: Feeling down, depressed or hopeless 0 0   PHQ-2 Score 0 0     {PHQ-2 LOOK IN ASSESSMENTS (Optional) :830854}  Do you feel safe in your environment - {YES/NO/NA:684276}    Do you have a Health Care Directive?: {HEALTHCARE DIRECTIVE STATUS:488729}    Current providers sharing in care for this patient include:   Patient Care Team:  Mario Valdes MD as PCP - General (Internal Medicine)    The following health maintenance items are reviewed in Epic and correct as of today:  Health Maintenance   Topic Date Due     ADVANCE DIRECTIVE PLANNING Q5 YRS  1994     INFLUENZA VACCINE (1) 2018     FALL RISK ASSESSMENT  2019     PHQ-2 Q1 YR  2019     LIPID SCREEN Q5 YR FEMALE (SYSTEM ASSIGNED)  10/27/2021     TETANUS IMMUNIZATION (SYSTEM ASSIGNED)  2027     DEXA SCAN SCREENING (SYSTEM ASSIGNED)  Completed     PNEUMOCOCCAL  Completed     {Chronicprobdata (Optional):692515}    {Decision Support (Optional):909343}    ROS:  {ROS COMP:688545}    OBJECTIVE:   There were no vitals taken for this visit. Estimated body mass index is 17.19 kg/(m^2) as calculated from the following:    Height as of 18: 5' 2\" (1.575 m).    Weight as of 18: 94 lb (42.6 kg).  EXAM:   {Exam :135849}    {Diagnostic Test Results (Optional):847232::\"Diagnostic Test Results:\",\"none \"}    ASSESSMENT / PLAN:   {Diag Picklist:635380}    End of Life Planning:  Patient currently has an advanced directive: { :032634}    COUNSELING:  {Medicare Counselin}    BP Readings from Last 1 Encounters:   18 123/65     Estimated body mass " "index is 17.19 kg/(m^2) as calculated from the following:    Height as of 6/19/18: 5' 2\" (1.575 m).    Weight as of 6/19/18: 94 lb (42.6 kg).    {BP Counseling- Complete if BP >= 120/80  (Optional):466575}  {Weight Management Plan (ACO) Complete if BMI is abnormal-  Ages 18-64  BMI >24.9.  Age 65+ with BMI <23 or >30 (Optional):004759}     reports that she has quit smoking. She has a 20.00 pack-year smoking history. She has never used smokeless tobacco.  {Tobacco Cessation -- Complete if patient is a smoker (Optional):789239}    Appropriate preventive services were discussed with this patient, including applicable screening as appropriate for cardiovascular disease, diabetes, osteopenia/osteoporosis, and glaucoma.  As appropriate for age/gender, discussed screening for colorectal cancer, prostate cancer, breast cancer, and cervical cancer. Checklist reviewing preventive services available has been given to the patient.    Reviewed patients plan of care and provided an AVS. The {CarePlan:673804} for Georgette meets the Care Plan requirement. This Care Plan has been established and reviewed with the {PATIENT, FAMILY MEMBER, CAREGIVER:521243}.    Counseling Resources:  ATP IV Guidelines  Pooled Cohorts Equation Calculator  Breast Cancer Risk Calculator  FRAX Risk Assessment  ICSI Preventive Guidelines  Dietary Guidelines for Americans, 2010  USDA's MyPlate  ASA Prophylaxis  Lung CA Screening    Mario Valdes MD  [facility]  "

## 2018-07-17 LAB — DEPRECATED CALCIDIOL+CALCIFEROL SERPL-MC: 33 UG/L (ref 20–75)

## 2018-07-22 ENCOUNTER — APPOINTMENT (OUTPATIENT)
Dept: CT IMAGING | Facility: CLINIC | Age: 78
End: 2018-07-22
Attending: EMERGENCY MEDICINE
Payer: MEDICARE

## 2018-07-22 ENCOUNTER — HOSPITAL ENCOUNTER (EMERGENCY)
Facility: CLINIC | Age: 78
Discharge: HOME | End: 2018-07-22
Attending: EMERGENCY MEDICINE | Admitting: EMERGENCY MEDICINE
Payer: MEDICARE

## 2018-07-22 VITALS
HEART RATE: 76 BPM | BODY MASS INDEX: 17.55 KG/M2 | TEMPERATURE: 98.1 F | DIASTOLIC BLOOD PRESSURE: 70 MMHG | OXYGEN SATURATION: 94 % | WEIGHT: 95.4 LBS | SYSTOLIC BLOOD PRESSURE: 129 MMHG | HEIGHT: 62 IN | RESPIRATION RATE: 18 BRPM

## 2018-07-22 DIAGNOSIS — N13.30 BILATERAL HYDRONEPHROSIS: ICD-10-CM

## 2018-07-22 DIAGNOSIS — R10.9 LEFT FLANK PAIN: ICD-10-CM

## 2018-07-22 LAB
ALBUMIN SERPL-MCNC: 4.2 G/DL (ref 3.4–5)
ALBUMIN UR-MCNC: NEGATIVE MG/DL
ALP SERPL-CCNC: 82 U/L (ref 40–150)
ALT SERPL W P-5'-P-CCNC: 26 U/L (ref 0–50)
ANION GAP SERPL CALCULATED.3IONS-SCNC: 6 MMOL/L (ref 3–14)
APPEARANCE UR: CLEAR
AST SERPL W P-5'-P-CCNC: 29 U/L (ref 0–45)
BASOPHILS # BLD AUTO: 0 10E9/L (ref 0–0.2)
BASOPHILS NFR BLD AUTO: 0.1 %
BILIRUB SERPL-MCNC: 0.6 MG/DL (ref 0.2–1.3)
BILIRUB UR QL STRIP: NEGATIVE
BUN SERPL-MCNC: 22 MG/DL (ref 7–30)
CALCIUM SERPL-MCNC: 9.3 MG/DL (ref 8.5–10.1)
CHLORIDE SERPL-SCNC: 103 MMOL/L (ref 94–109)
CO2 SERPL-SCNC: 30 MMOL/L (ref 20–32)
COLOR UR AUTO: ABNORMAL
CREAT SERPL-MCNC: 0.81 MG/DL (ref 0.52–1.04)
DIFFERENTIAL METHOD BLD: NORMAL
EOSINOPHIL # BLD AUTO: 0 10E9/L (ref 0–0.7)
EOSINOPHIL NFR BLD AUTO: 0.1 %
ERYTHROCYTE [DISTWIDTH] IN BLOOD BY AUTOMATED COUNT: 14.7 % (ref 10–15)
GFR SERPL CREATININE-BSD FRML MDRD: 68 ML/MIN/1.7M2
GLUCOSE SERPL-MCNC: 116 MG/DL (ref 70–99)
GLUCOSE UR STRIP-MCNC: NEGATIVE MG/DL
HCT VFR BLD AUTO: 38.6 % (ref 35–47)
HGB BLD-MCNC: 13.1 G/DL (ref 11.7–15.7)
HGB UR QL STRIP: ABNORMAL
IMM GRANULOCYTES # BLD: 0 10E9/L (ref 0–0.4)
IMM GRANULOCYTES NFR BLD: 0.1 %
KETONES UR STRIP-MCNC: NEGATIVE MG/DL
LEUKOCYTE ESTERASE UR QL STRIP: ABNORMAL
LIPASE SERPL-CCNC: 200 U/L (ref 73–393)
LYMPHOCYTES # BLD AUTO: 1.2 10E9/L (ref 0.8–5.3)
LYMPHOCYTES NFR BLD AUTO: 16.5 %
MCH RBC QN AUTO: 32 PG (ref 26.5–33)
MCHC RBC AUTO-ENTMCNC: 33.9 G/DL (ref 31.5–36.5)
MCV RBC AUTO: 94 FL (ref 78–100)
MONOCYTES # BLD AUTO: 0.4 10E9/L (ref 0–1.3)
MONOCYTES NFR BLD AUTO: 5.2 %
MUCOUS THREADS #/AREA URNS LPF: PRESENT /LPF
NEUTROPHILS # BLD AUTO: 5.8 10E9/L (ref 1.6–8.3)
NEUTROPHILS NFR BLD AUTO: 78 %
NITRATE UR QL: NEGATIVE
NRBC # BLD AUTO: 0 10*3/UL
NRBC BLD AUTO-RTO: 0 /100
PH UR STRIP: 7 PH (ref 5–7)
PLATELET # BLD AUTO: 245 10E9/L (ref 150–450)
POTASSIUM SERPL-SCNC: 3.7 MMOL/L (ref 3.4–5.3)
PROT SERPL-MCNC: 8.7 G/DL (ref 6.8–8.8)
RBC # BLD AUTO: 4.09 10E12/L (ref 3.8–5.2)
RBC #/AREA URNS AUTO: 3 /HPF (ref 0–2)
SODIUM SERPL-SCNC: 139 MMOL/L (ref 133–144)
SOURCE: ABNORMAL
SP GR UR STRIP: 1.01 (ref 1–1.03)
UROBILINOGEN UR STRIP-MCNC: NORMAL MG/DL (ref 0–2)
WBC # BLD AUTO: 7.5 10E9/L (ref 4–11)
WBC #/AREA URNS AUTO: 1 /HPF (ref 0–5)

## 2018-07-22 PROCEDURE — 81001 URINALYSIS AUTO W/SCOPE: CPT | Performed by: EMERGENCY MEDICINE

## 2018-07-22 PROCEDURE — 51798 US URINE CAPACITY MEASURE: CPT

## 2018-07-22 PROCEDURE — 96361 HYDRATE IV INFUSION ADD-ON: CPT

## 2018-07-22 PROCEDURE — 83690 ASSAY OF LIPASE: CPT | Performed by: EMERGENCY MEDICINE

## 2018-07-22 PROCEDURE — 96375 TX/PRO/DX INJ NEW DRUG ADDON: CPT

## 2018-07-22 PROCEDURE — 25000128 H RX IP 250 OP 636: Performed by: EMERGENCY MEDICINE

## 2018-07-22 PROCEDURE — 85025 COMPLETE CBC W/AUTO DIFF WBC: CPT | Performed by: EMERGENCY MEDICINE

## 2018-07-22 PROCEDURE — 74177 CT ABD & PELVIS W/CONTRAST: CPT

## 2018-07-22 PROCEDURE — 25000125 ZZHC RX 250: Performed by: EMERGENCY MEDICINE

## 2018-07-22 PROCEDURE — 80053 COMPREHEN METABOLIC PANEL: CPT | Performed by: EMERGENCY MEDICINE

## 2018-07-22 PROCEDURE — 96374 THER/PROPH/DIAG INJ IV PUSH: CPT

## 2018-07-22 PROCEDURE — 99285 EMERGENCY DEPT VISIT HI MDM: CPT | Mod: 25

## 2018-07-22 RX ORDER — KETOROLAC TROMETHAMINE 15 MG/ML
15 INJECTION, SOLUTION INTRAMUSCULAR; INTRAVENOUS ONCE
Status: COMPLETED | OUTPATIENT
Start: 2018-07-22 | End: 2018-07-22

## 2018-07-22 RX ORDER — IBUPROFEN 600 MG/1
600 TABLET, FILM COATED ORAL EVERY 8 HOURS PRN
Qty: 30 TABLET | Refills: 0 | Status: SHIPPED | OUTPATIENT
Start: 2018-07-22 | End: 2018-12-13

## 2018-07-22 RX ORDER — HYDROCODONE BITARTRATE AND ACETAMINOPHEN 5; 325 MG/1; MG/1
1 TABLET ORAL EVERY 6 HOURS PRN
Qty: 10 TABLET | Refills: 0 | Status: SHIPPED | OUTPATIENT
Start: 2018-07-22 | End: 2018-12-13

## 2018-07-22 RX ORDER — ONDANSETRON 2 MG/ML
4 INJECTION INTRAMUSCULAR; INTRAVENOUS EVERY 30 MIN PRN
Status: DISCONTINUED | OUTPATIENT
Start: 2018-07-22 | End: 2018-07-23 | Stop reason: HOSPADM

## 2018-07-22 RX ORDER — IOPAMIDOL 755 MG/ML
49 INJECTION, SOLUTION INTRAVASCULAR ONCE
Status: COMPLETED | OUTPATIENT
Start: 2018-07-22 | End: 2018-07-22

## 2018-07-22 RX ORDER — MORPHINE SULFATE 4 MG/ML
4 INJECTION, SOLUTION INTRAMUSCULAR; INTRAVENOUS
Status: COMPLETED | OUTPATIENT
Start: 2018-07-22 | End: 2018-07-22

## 2018-07-22 RX ORDER — ONDANSETRON 4 MG/1
4 TABLET, ORALLY DISINTEGRATING ORAL EVERY 8 HOURS PRN
Qty: 15 TABLET | Refills: 0 | Status: SHIPPED | OUTPATIENT
Start: 2018-07-22 | End: 2018-07-25

## 2018-07-22 RX ADMIN — KETOROLAC TROMETHAMINE 15 MG: 15 INJECTION, SOLUTION INTRAMUSCULAR; INTRAVENOUS at 20:52

## 2018-07-22 RX ADMIN — SODIUM CHLORIDE 1000 ML: 9 INJECTION, SOLUTION INTRAVENOUS at 17:42

## 2018-07-22 RX ADMIN — IOPAMIDOL 49 ML: 755 INJECTION, SOLUTION INTRAVENOUS at 20:00

## 2018-07-22 RX ADMIN — MORPHINE SULFATE 4 MG: 4 INJECTION INTRAVENOUS at 19:43

## 2018-07-22 RX ADMIN — ONDANSETRON 4 MG: 2 INJECTION, SOLUTION INTRAMUSCULAR; INTRAVENOUS at 19:43

## 2018-07-22 RX ADMIN — SODIUM CHLORIDE 64 ML: 900 INJECTION, SOLUTION INTRAVENOUS at 20:00

## 2018-07-22 ASSESSMENT — ENCOUNTER SYMPTOMS
DYSURIA: 0
ABDOMINAL PAIN: 1
BLOOD IN STOOL: 0
VOMITING: 0
NAUSEA: 0

## 2018-07-22 NOTE — ED PROVIDER NOTES
"  History     Chief Complaint:    Abdominal Pain     HPI   Georgette Alatorre is a 77 year old female with a history of SVT, GERD, endometrial cancer, scoliosis, and small bowel obstruction, who presents to the ED today with abdominal pain. The patient states that she started to have abdominal pain for about six days now. The patient reports that the pain was off and on, but was worse today, which is why she decided to come in to the ED. She states that the pain is mostly localized to the left side of her abdomen, but also seems to radiate to her back. She denies any alleviating or exacerbating factors and denies any nausea, vomiting, or dysuria. She reports that her last bowel movement was this morning and that it was a little loose but did not see any blood in her stool. Of note, she states that she had a small bowel obstruction in 2015, and her pain today is similar to the pain she had back in 2015. She notes mild urinary incontinence but no difficulty emptying her bladder.     Allergies:  No known drug allergies.     Medications:    Calcium carbonate   Diltiazem   Imodium      Past Medical History:    Arthritis   Endometrial cancer   GERD   Hydronephrosis   Scoliosis   Small bowel obstruction   SVT     Past Surgical History:    Appendectomy   Cataract surgery   Eye surgery   Hysterectomy   Exploratory laparotomy     Family History:    History reviewed. No pertinent family history.     Social History:  Marital Status: single   Presents to the ED with friend   Tobacco Use: former smoker   Alcohol Use: yes   PCP: Mario Valdes     Review of Systems   Gastrointestinal: Positive for abdominal pain. Negative for blood in stool, nausea and vomiting.   Genitourinary: Negative for dysuria.   All other systems reviewed and are negative.      Physical Exam   First Vitals:  BP: 145/56  Pulse: 76  Heart Rate: 76  Temp: 98.1  F (36.7  C)  Resp: 18  Height: 157.5 cm (5' 2\")  Weight: 43.3 kg (95 lb 6.4 oz)  SpO2: 95 " %      Physical Exam  General: Well appearing, nontoxic.  Resting comfortably  Head:  Scalp, face, and head appear normal  Eyes:  Pupils are equal, round, and reactive to light    Conjunctivae non-injected and sclerae white  ENT:    The external nose is normal    Pinnae are normal    The oropharynx is normal, mucous membranes moist    Posterior pharynx clear without swelling, exudates or erythema     Uvula is in the midline  Neck:  Normal range of motion    There is no rigidity noted    Trachea is in the midline  CV:  Regular rate and rhythm     Normal S1/S2, no S3/S4    No murmur or rub  Resp:  Lungs are clear and equal bilaterally    There is no tachypnea    No increased work of breathing    No rales, wheezing, or rhonchi  GI:  Abdomen is soft, no rigidity or guarding    No distension, or mass    Diffuse tenderness greatest in LLQ and left flank. No rebound tenderness   MS:  Normal muscular tone    Symmetric motor strength    No lower extremity edema  Skin:  No rash or acute skin lesions noted  Neuro: Awake and alert    Speech is normal and fluent    Moves all extremities spontaneously  Psych:  Normal affect.  Appropriate interactions.     Emergency Department Course   Imaging:  CT abdomen pelvis w contrast   1. Development of bilateral hydronephrosis, moderate to advanced on  the right and moderate on the left. No ureteral dilatation or cause of  obstruction is seen.  2. Development of prominent consolidation or collapse of the  visualized posterior right lung base.  Report per radiology.   Radiographic findings were communicated with the patient who voiced understanding of the findings.    Laboratory:  CBC:  WBC 7.5, HGB 13.1, , otherwise WNL   CMP: glucose 116 (H), otherwise WNL (Creatinine 0.81)   Lipase: 200   UA: Clear straw colored urine, blood trace, leukocyte esterase trace, RBC 3 (H), mucous present, otherwise WNL     Interventions:  (1742) Normal Saline, 1 liter, IV bolus  (1943) Zofran 4 mg, IV    (1943) Morphine 4 mg, IV   (2052) Toradol 15 mg, IV      Emergency Department Course:  Nursing notes and vitals reviewed.  (1804) I performed an exam of the patient as documented above.    A peripheral IV was established. Blood was drawn from the patient. This was sent for laboratory testing, findings above.    Urine sample was obtained and sent for laboratory analysis, findings above.   The patient was sent for an abdominal/pelvic CT while in the emergency department, findings above.    (2101) I consulted with Dr. Lee of urology regarding the patient.   (2016) I rechecked on the patient and updated them on results.    Findings and plan explained to the patient. Patient discharged home with instructions regarding supportive care, medications, and reasons to return. The importance of close follow-up was reviewed. The patient was prescribed Norco, Ibuprofen, and Zofran.   Impression & Plan    Medical Decision Making:  Georgette Alatorre is a 77 year old female with a history of small bowel obstruction who presents today with abdominal pain which she is concerned may represent the same. On my examination the patient has predominantly left flank tenderness and mild left sided abdominal tenderness without evidence of acute surgical emergency. She has no vomiting and has continued to have bowel movements which makes bowel obstruction less likely. She denies any urinary symptoms and is clinically well appearing, afebrile, and no evidence of sepsis. Lab studies were otherwise unremarkable. CT scan of the abdomen and pelvis was obtained and revealed bilateral hydronephrosis though no definite source for the obstruction is seen. There is no kidney stone per radiology report and there do not appear to be any extrinsic compression. Given this, I discussed the case with the on call urologist who recommended a pre and post void residual be taken. This was done and there was no evidence to suggest bladder obstruction or  incomplete bladder emptying which may be causing reflux. Given zero post void residual, elaine catheter is not indicated. Patient will need close follow up with urology. Given her creatinine is normal there is no evidence of renal failure, infection, or hematuria and patient's symptoms are improved, the patient will be discharged with instructions to follow up closely with PCP and urology. Patient was agreeable with this plan and discharged home in stable condition. Return precautions were discussed with patient. The patient's questions were answered and the patient was agreeable with discharge.       Diagnosis:    ICD-10-CM    1. Bilateral hydronephrosis N13.30    2. Left flank pain R10.9        Disposition:  discharged to home    Discharge Medications:  New Prescriptions    HYDROCODONE-ACETAMINOPHEN (NORCO) 5-325 MG PER TABLET    Take 1 tablet by mouth every 6 hours as needed for severe pain    IBUPROFEN (ADVIL/MOTRIN) 600 MG TABLET    Take 1 tablet (600 mg) by mouth every 8 hours as needed for moderate pain    ONDANSETRON (ZOFRAN ODT) 4 MG ODT TAB    Take 1 tablet (4 mg) by mouth every 8 hours as needed for nausea         Magali BUSH, gm serving as a scribe on 7/22/2018 at 6:04 PM to personally document services performed by Dr. Matta based on my observations and the provider's statements to me.    7/22/2018    EMERGENCY DEPARTMENT       Kailash Matta MD  07/23/18 [id number]

## 2018-07-22 NOTE — ED AVS SNAPSHOT
Emergency Department    [address] [facility] [address]    Phone:  [phone]    Fax:  [phone]                                       Georgette Alatorre   MRN: [id number]    Department:   Emergency Department   Date of Visit:  7/22/2018           After Visit Summary Signature Page     I have received my discharge instructions, and my questions have been answered. I have discussed any challenges I see with this plan with the nurse or doctor.    ..........................................................................................................................................  Patient/Patient Representative Signature      ..........................................................................................................................................  Patient Representative Print Name and Relationship to Patient    ..................................................               ................................................  Date                                            Time    ..........................................................................................................................................  Reviewed by Signature/Title    ...................................................              ..............................................  Date                                                            Time

## 2018-07-22 NOTE — ED AVS SNAPSHOT
Emergency Department    [address] [facility] [address]    Phone:  [phone]    Fax:  [phone]                                       Georgette Alatorre   MRN: [id number]    Department:   Emergency Department   Date of Visit:  7/22/2018           Patient Information     Date Of Birth          1940        Your diagnoses for this visit were:     Bilateral hydronephrosis     Left flank pain        You were seen by Kailash Matta MD.      Follow-up Information     Follow up with Shabnam Lee MD. Schedule an appointment as soon as possible for a visit in 3 days.    Specialty:  Urology    Why:  For close follow up in 2-3 days.    Contact information:    [facility]  [address]  [facility] [address] [phone]          Go to  Emergency Department.    Specialty:  EMERGENCY MEDICINE    Why:  If symptoms worsen    Contact information:    [address] [facility] [address] [phone]        Discharge Instructions       Your CT scan shows hydronephrosis on both kidneys. This is when urine backs up into the kidney and the collecting tubes (renal pelvis and ureters) and distends them. This causes pain. It is not clear at this time why this is occurring. You did not have any residual urine in the bladder after peeing.     It is important to follow up closely with Urology in 2-3 days for further workup and evaluation of this. If the kidneys stay blocked for a long time this can cause kidney failure. Thankfully you have no sign of kidney failure today. There was no sign of bowel obstruction or infection either.    Your next 10 appointments already scheduled     Aug 08, 2018 10:45 AM CDT   DX HIP/PELVIS/SPINE with SHMADX1   [facility] ([facility])    [address]  [facility] [address] [phone]           Please do not take any of the following 24 hours prior to the day of your exam:  "vitamins, calcium tablets, antacids.  If possible, please wear clothes without metal (snaps, zippers). A sweatsuit works well.            Aug 08, 2018 11:15 AM CDT   MA SCREENING DIGITAL BILATERAL with SHBCMA2   [facility] ([facility])    [address] [place] [address], [address]  [facility] [id number] [phone]           Do not use any powder, lotion or deodorant under your arms or on your breast. If you do, we will ask you to remove it before your exam.  Wear comfortable, two-piece clothing.  If you have any allergies, tell your care team.  Bring any previous mammograms from other facilities or have them mailed to the breast center. Three-dimensional (3D) mammograms are available at [facility] locations in [facility], [facility], [facility], and [place]. [facility] locations include [place] and [facility] in [place]. Benefits of 3D mammograms include: - Improved rate of cancer detection - Decreases your chance of having to go back for more tests, which means fewer: - \"False-positive\" results (This means that there is an abnormal area but it isn't cancer.) - Invasive testing procedures, such as a biopsy or surgery - Can provide clearer images of the breast if you have dense breast tissue. 3D mammography is an optional exam that anyone can have with a 2D mammogram. It doesn't replace or take the place of a 2D mammogram. 2D mammograms remain an effective screening test for all women.  Not all insurance companies cover the cost of a 3D mammogram. Check with your insurance.              24 Hour Appointment Hotline       To make an appointment at any [facility] clinic, call [phone] ([phone]). If you don't have a family doctor or clinic, we will help you find one. [facility] clinics are conveniently located to serve the needs of you and your family.             Review of your medicines      START taking        Dose / " Directions Last dose taken    HYDROcodone-acetaminophen 5-325 MG per tablet   Commonly known as:  NORCO   Dose:  1 tablet   Quantity:  10 tablet        Take 1 tablet by mouth every 6 hours as needed for severe pain   Refills:  0        ibuprofen 600 MG tablet   Commonly known as:  ADVIL/MOTRIN   Dose:  600 mg   Quantity:  30 tablet        Take 1 tablet (600 mg) by mouth every 8 hours as needed for moderate pain   Refills:  0        ondansetron 4 MG ODT tab   Commonly known as:  ZOFRAN ODT   Dose:  4 mg   Quantity:  15 tablet        Take 1 tablet (4 mg) by mouth every 8 hours as needed for nausea   Refills:  0          Our records show that you are taking the medicines listed below. If these are incorrect, please call your family doctor or clinic.        Dose / Directions Last dose taken    BENEFIBER PO        Take by mouth 2 times daily   Refills:  0        calcium carbonate 500 MG tablet   Commonly known as:  OS-ILYA 500 mg [place]. Ca   Dose:  1 tablet        Take 1 tablet by mouth 2 times daily   Refills:  0        diltiazem 180 MG 24 hr capsule   Commonly known as:  DILT-XR   Quantity:  90 capsule        TAKE ONE CAPSULE BY MOUTH ONCE DAILY   Refills:  3        IMODIUM OR        Refills:  0        probiotic Caps        Refills:  0        TYLENOL PO   Dose:  500 mg   Indication:  Pain        Take 500 mg by mouth every 4 hours as needed for mild pain   Refills:  0        VITAMIN C PO   Dose:  500 mg        Take 500 mg by mouth daily.   Refills:  0        VITAMIN D3 PO   Dose:  2000 Units        Take 2,000 Units by mouth daily.   Refills:  0                Information about OPIOIDS     PRESCRIPTION OPIOIDS: WHAT YOU NEED TO KNOW   We gave you an opioid (narcotic) pain medicine. It is important to manage your pain, but opioids are not always the best choice. You should first try all the other options your care team gave you. Take this medicine for as short a time (and as few doses) as possible.     These medicines have  risks:    DO NOT drive when on new or higher doses of pain medicine. These medicines can affect your alertness and reaction times, and you could be arrested for driving under the influence (DUI). If you need to use opioids long-term, talk to your care team about driving.    DO NOT operate heave machinery    DO NOT do any other dangerous activities while taking these medicines.     DO NOT drink any alcohol while taking these medicines.      If the opioid prescribed includes acetaminophen, DO NOT take with any other medicines that contain acetaminophen. Read all labels carefully. Look for the word  acetaminophen  or  Tylenol.  Ask your pharmacist if you have questions or are unsure.    You can get addicted to pain medicines, especially if you have a history of addiction (chemical, alcohol or substance dependence). Talk to your care team about ways to reduce this risk.    Store your pills in a secure place, locked if possible. We will not replace any lost or stolen medicine. If you don t finish your medicine, please throw away (dispose) as directed by your pharmacist. The [place] [employer]ution [employer] has more information about safe disposal: https://www.pca.[facility].[place].us/living-green/managing-unwanted-medications.     All opioids tend to cause constipation. Drink plenty of water and eat foods that have a lot of fiber, such as fruits, vegetables, prune juice, apple juice and high-fiber cereal. Take a laxative (Miralax, milk of magnesia, Colace, Senna) if you don t move your bowels at least every other day.         Prescriptions were sent or printed at these locations (3 Prescriptions)                   Other Prescriptions                Printed at Department/Unit printer (3 of 3)         HYDROcodone-acetaminophen (NORCO) 5-325 MG per tablet               ibuprofen (ADVIL/MOTRIN) 600 MG tablet               ondansetron (ZOFRAN ODT) 4 MG ODT tab                Procedures and tests performed during your visit     CBC  with platelets + differential    CT Abdomen Pelvis w Contrast    Comprehensive metabolic panel    Lipase    Peripheral IV catheter    UA reflex to Microscopic and Culture      Orders Needing Specimen Collection     None      Pending Results     No orders found from 7/20/2018 to 7/23/2018.            Pending Culture Results     No orders found from 7/20/2018 to 7/23/2018.            Pending Results Instructions     If you had any lab results that were not finalized at the time of your Discharge, you can call the ED Lab Result RN at [phone]. You will be contacted by this team for any positive Lab results or changes in treatment. The nurses are available 7 days a week from 10A to 6:30P.  You can leave a message 24 hours per day and they will return your call.        Test Results From Your Hospital Stay        7/22/2018  5:58 PM      Component Results     Component Value Ref Range & Units Status    WBC 7.5 4.0 - 11.0 10e9/L Final    RBC Count 4.09 3.8 - 5.2 10e12/L Final    Hemoglobin 13.1 11.7 - 15.7 g/dL Final    Hematocrit 38.6 35.0 - 47.0 % Final    MCV 94 78 - 100 fl Final    MCH 32.0 26.5 - 33.0 pg Final    MCHC 33.9 31.5 - 36.5 g/dL Final    RDW 14.7 10.0 - 15.0 % Final    Platelet Count 245 150 - 450 10e9/L Final    Diff Method Automated Method  Final    % Neutrophils 78.0 % Final    % Lymphocytes 16.5 % Final    % Monocytes 5.2 % Final    % Eosinophils 0.1 % Final    % Basophils 0.1 % Final    % Immature Granulocytes 0.1 % Final    Nucleated RBCs 0 0 /100 Final    Absolute Neutrophil 5.8 1.6 - 8.3 10e9/L Final    Absolute Lymphocytes 1.2 0.8 - 5.3 10e9/L Final    Absolute Monocytes 0.4 0.0 - 1.3 10e9/L Final    Absolute Eosinophils 0.0 0.0 - 0.7 10e9/L Final    Absolute Basophils 0.0 0.0 - 0.2 10e9/L Final    Abs Immature Granulocytes 0.0 0 - 0.4 10e9/L Final    Absolute Nucleated RBC 0.0  Final         7/22/2018  6:19 PM      Component Results     Component Value Ref Range & Units Status    Sodium 139 133  - 144 mmol/L Final    Potassium 3.7 3.4 - 5.3 mmol/L Final    Chloride 103 94 - 109 mmol/L Final    Carbon Dioxide 30 20 - 32 mmol/L Final    Anion Gap 6 3 - 14 mmol/L Final    Glucose 116 (H) 70 - 99 mg/dL Final    Urea Nitrogen 22 7 - 30 mg/dL Final    Creatinine 0.81 0.52 - 1.04 mg/dL Final    GFR Estimate 68 >60 mL/min/1.7m2 Final    Non  GFR Calc    GFR Estimate If Black 83 >60 mL/min/1.7m2 Final    African American GFR Calc    Calcium 9.3 8.5 - 10.1 mg/dL Final    Bilirubin Total 0.6 0.2 - 1.3 mg/dL Final    Albumin 4.2 3.4 - 5.0 g/dL Final    Protein Total 8.7 6.8 - 8.8 g/dL Final    Alkaline Phosphatase 82 40 - 150 U/L Final    ALT 26 0 - 50 U/L Final    AST 29 0 - 45 U/L Final         7/22/2018  6:17 PM      Component Results     Component Value Ref Range & Units Status    Lipase 200 73 - 393 U/L Final         7/22/2018  6:59 PM      Component Results     Component Value Ref Range & Units Status    Color Urine Straw  Final    Appearance Urine Clear  Final    Glucose Urine Negative NEG^Negative mg/dL Final    Bilirubin Urine Negative NEG^Negative Final    Ketones Urine Negative NEG^Negative mg/dL Final    Specific Gravity Urine 1.006 1.003 - 1.035 Final    Blood Urine Trace (A) NEG^Negative Final    pH Urine 7.0 5.0 - 7.0 pH Final    Protein Albumin Urine Negative NEG^Negative mg/dL Final    Urobilinogen mg/dL Normal 0.0 - 2.0 mg/dL Final    Nitrite Urine Negative NEG^Negative Final    Leukocyte Esterase Urine Trace (A) NEG^Negative Final    Source Midstream Urine  Final    RBC Urine 3 (H) 0 - 2 /HPF Final    WBC Urine 1 0 - 5 /HPF Final    Mucous Urine Present (A) NEG^Negative /LPF Final         7/22/2018  8:37 PM      Narrative     CT ABDOMEN AND PELVIS WITH CONTRAST  7/22/2018 8:04 PM    HISTORY: Abdominal and left flank pain, history of SBO.     COMPARISON: A CT on 5/13/2013.    TECHNIQUE: Routine transverse CT imaging of the abdomen and pelvis was  performed following the uneventful  administration of intravenous  contrast. Radiation dose for this scan was reduced using automated  exposure control, adjustment of the mA and/or kV according to patient  size, or iterative reconstruction technique.    FINDINGS: There has been development of prominent consolidation or  collapse of the visualized posterior aspect of the right lung base.  The liver, spleen, and pancreas are normal. No gallbladder pathology  is demonstrated. There has been development of bilateral  hydronephrosis, moderate to advanced on the right and moderate on the  left. No significant ureteral dilatation is identified. A cause of  obstruction is not identified. There is a 6.7 cm simple appearing cyst  within the inferior pole of the left kidney, increased since the  previous 5.7 cm. No enlarged lymph node or other abnormal mass is  demonstrated. No free fluid is seen. No free intraperitoneal gas is  identified. The gastrointestinal tract is unremarkable. The appendix  is not identified. There is no additional evidence of appendicitis. No  vascular abnormality is seen. There are degenerative changes in the  spine. There appears to be prominent right convexity curvature of the  thoracic spine with a decrease in size of the right hemithorax. This  is not entirely included on this examination. There is a compensatory  left convexity curvature of the lumbar spine. There are degenerative  changes elsewhere in the spine with no other osseous abnormality  demonstrated. No abdominal or pelvic wall pathology is demonstrated.         Impression     IMPRESSION:   1. Development of bilateral hydronephrosis, moderate to advanced on  the right and moderate on the left. No ureteral dilatation or cause of  obstruction is seen.  2. Development of prominent consolidation or collapse of the  visualized posterior right lung base.    CASTILLO BREEN MD                Clinical Quality Measure: Blood Pressure Screening     Your blood pressure was  checked while you were in the emergency department today. The last reading we obtained was  BP: 129/70 . Please read the guidelines below about what these numbers mean and what you should do about them.  If your systolic blood pressure (the top number) is less than 120 and your diastolic blood pressure (the bottom number) is less than 80, then your blood pressure is normal. There is nothing more that you need to do about it.  If your systolic blood pressure (the top number) is 120-139 or your diastolic blood pressure (the bottom number) is 80-89, your blood pressure may be higher than it should be. You should have your blood pressure rechecked within a year by a primary care provider.  If your systolic blood pressure (the top number) is 140 or greater or your diastolic blood pressure (the bottom number) is 90 or greater, you may have high blood pressure. High blood pressure is treatable, but if left untreated over time it can put you at risk for heart attack, stroke, or kidney failure. You should have your blood pressure rechecked by a primary care provider within the next 4 weeks.  If your provider in the emergency department today gave you specific instructions to follow-up with your doctor or provider even sooner than that, you should follow that instruction and not wait for up to 4 weeks for your follow-up visit.        Thank you for choosing [facility]       Thank you for choosing [facility] for your care. Our goal is always to provide you with excellent care. Hearing back from our patients is one way we can continue to improve our services. Please take a few minutes to complete the written survey that you may receive in the mail after you visit with us. Thank you!        [employer] Information     [employer] gives you secure access to your electronic health record. If you see a primary care provider, you can also send messages to your care team and make appointments. If you have questions, please call your primary care  clinic.  If you do not have a primary care provider, please call [phone] and they will assist you.        Care EveryWhere ID     This is your Care EveryWhere ID. This could be used by other organizations to access your [facility] medical records  [id number]        Equal Access to Services     JODI GONZALES : Aida Guidry, warabia strauss, isiah perryalhouston aguilar, chantel john. So [facility] [phone].    ATENCIÓN: Si habla español, tiene a leon disposición servicios gratuitos de asistencia lingüística. Llame al [phone].    We comply with applicable federal civil rights laws and [place] laws. We do not discriminate on the basis of race, color, national origin, age, disability, sex, sexual orientation, or gender identity.            After Visit Summary       This is your record. Keep this with you and show to your community pharmacist(s) and doctor(s) at your next visit.

## 2018-07-23 NOTE — DISCHARGE INSTRUCTIONS
Your CT scan shows hydronephrosis on both kidneys. This is when urine backs up into the kidney and the collecting tubes (renal pelvis and ureters) and distends them. This causes pain. It is not clear at this time why this is occurring. You did not have any residual urine in the bladder after peeing.     It is important to follow up closely with Urology in 2-3 days for further workup and evaluation of this. If the kidneys stay blocked for a long time this can cause kidney failure. Thankfully you have no sign of kidney failure today. There was no sign of bowel obstruction or infection either.    TO DO AT HOME:  1. Take 600mg ibuprofen every 6 hours as needed for pain (take with food). This can be combined or alternated with 1000mg of Tylenol every 8 hours.   2. Call the urologist first thing in the morning and tell them you were seen in the Emergency Department and need close follow up of hydronephrosis in 2-3 days.

## 2018-07-23 NOTE — ED NOTES
Pt was bladder scanned and had 245ml of urine.  Pt then voided and was 0ml post void.  MD advised.

## 2018-07-23 NOTE — ED NOTES
Assumed care at this time.  Pt states that she is experiencing increased pain (8/10) and would like some pain medicine. Pt advised on wait time for CT.  B/P: 154/77, T: 98.1, P: 76, R: 18  Missy Singh RN,.......................................... 7/22/2018   7:31 PM

## 2018-07-24 ENCOUNTER — TELEPHONE (OUTPATIENT)
Dept: FAMILY MEDICINE | Facility: CLINIC | Age: 78
End: 2018-07-24

## 2018-07-25 ENCOUNTER — OFFICE VISIT (OUTPATIENT)
Dept: FAMILY MEDICINE | Facility: CLINIC | Age: 78
End: 2018-07-25
Payer: COMMERCIAL

## 2018-07-25 VITALS
SYSTOLIC BLOOD PRESSURE: 124 MMHG | TEMPERATURE: 98.8 F | HEART RATE: 68 BPM | BODY MASS INDEX: 17.72 KG/M2 | WEIGHT: 96.3 LBS | HEIGHT: 62 IN | DIASTOLIC BLOOD PRESSURE: 66 MMHG | OXYGEN SATURATION: 96 %

## 2018-07-25 DIAGNOSIS — K62.5 RECTAL BLEEDING: ICD-10-CM

## 2018-07-25 DIAGNOSIS — K52.9 COLITIS: ICD-10-CM

## 2018-07-25 DIAGNOSIS — E53.8 VITAMIN B12 DEFICIENCY (NON ANEMIC): Primary | ICD-10-CM

## 2018-07-25 PROCEDURE — 99214 OFFICE O/P EST MOD 30 MIN: CPT | Mod: 25 | Performed by: INTERNAL MEDICINE

## 2018-07-25 PROCEDURE — 96372 THER/PROPH/DIAG INJ SC/IM: CPT | Mod: 59 | Performed by: INTERNAL MEDICINE

## 2018-07-25 PROCEDURE — 46600 DIAGNOSTIC ANOSCOPY SPX: CPT | Performed by: INTERNAL MEDICINE

## 2018-07-25 RX ORDER — HYDROCORTISONE ACETATE 25 MG/1
25 SUPPOSITORY RECTAL 2 TIMES DAILY
Qty: 28 SUPPOSITORY | Refills: 1 | Status: SHIPPED | OUTPATIENT
Start: 2018-07-25 | End: 2018-09-04

## 2018-07-25 RX ORDER — CYANOCOBALAMIN 1000 UG/ML
1 INJECTION, SOLUTION INTRAMUSCULAR; SUBCUTANEOUS
Qty: 1 ML | Refills: 3 | COMMUNITY
Start: 2018-07-25 | End: 2018-09-04

## 2018-07-25 NOTE — MR AVS SNAPSHOT
After Visit Summary   7/25/2018    Georgette Alatorre    MRN: [id number]           Patient Information     Date Of Birth          1940        Visit Information        Provider Department      7/25/2018 3:30 PM Mario Valdes MD [facility]        Today's Diagnoses     Vitamin B12 deficiency (non anemic)    -  1    Colitis        Rectal bleeding           Follow-ups after your visit        Your next 10 appointments already scheduled     Jul 31, 2018 11:45 AM CDT   Nurse Only with CS NURSE   [facility]    [address] [facility] [address]   [phone]            Aug 06, 2018  9:45 AM CDT   Nurse Only with CS NURSE   [facility]    [address]  [place] [address]   [phone]            Aug 08, 2018 10:45 AM CDT   DX HIP/PELVIS/SPINE with SHMADX1   [facility] ([facility])    [address] [place] [address]  [address]  [facility] [address] [phone]           Please do not take any of the following 24 hours prior to the day of your exam: vitamins, calcium tablets, antacids.  If possible, please wear clothes without metal (snaps, zippers). A sweatsuit works well.            Aug 08, 2018 11:15 AM CDT   MA SCREENING DIGITAL BILATERAL with SHBCMA2   [facility] ([facility])    [address] [place] [address], [address]  [facility] [address] [phone]           Do not use any powder, lotion or deodorant under your arms or on your breast. If you do, we will ask you to remove it before your exam.  Wear comfortable, two-piece clothing.  If you have any allergies, tell your care team.  Bring any previous mammograms from other facilities or have them mailed to the breast center. Three-dimensional (3D) mammograms are available at [place] locations in [facility], [place], [place], [facility], [facility], and [place]. [facility]  "locations include [facility] in [place]. Benefits of 3D mammograms include: - Improved rate of cancer detection - Decreases your chance of having to go back for more tests, which means fewer: - \"False-positive\" results (This means that there is an abnormal area but it isn't cancer.) - Invasive testing procedures, such as a biopsy or surgery - Can provide clearer images of the breast if you have dense breast tissue. 3D mammography is an optional exam that anyone can have with a 2D mammogram. It doesn't replace or take the place of a 2D mammogram. 2D mammograms remain an effective screening test for all women.  Not all insurance companies cover the cost of a 3D mammogram. Check with your insurance.            Aug 15, 2018 11:45 AM CDT   Nurse Only with CS CHICO NURSE   [facility] ([facility]    [address]  [place] [address] [phone]              Who to contact     If you have questions or need follow up information about today's clinic visit or your schedule please contact [facility] directly at [phone].  Normal or non-critical lab and imaging results will be communicated to you by [employer], letter or phone within 4 business days after the clinic has received the results. If you do not hear from us within 7 days, please contact the clinic through [employer] or phone. If you have a critical or abnormal lab result, we will notify you by phone as soon as possible.  Submit refill requests through [employer] or call your pharmacy and they will forward the refill request to us. Please allow 3 business days for your refill to be completed.          Additional Information About Your Visit        [employer] Information     [employer] gives you secure access to your electronic health record. If you see a primary care provider, you can also send messages to your care team and make appointments. If you have questions, please call your primary care clinic. " " If you do not have a primary care provider, please call [phone] and they will assist you.        Care EveryWhere ID     This is your Care EveryWhere ID. This could be used by other organizations to access your [place] medical records  [id number]        Your Vitals Were     Pulse Temperature Height Pulse Oximetry BMI (Body Mass Index)       68 98.8  F (37.1  C) (Oral) 5' 2\" (1.575 m) 96% 17.61 kg/m2        Blood Pressure from Last 3 Encounters:   07/25/18 124/66   07/22/18 129/70   07/16/18 127/71    Weight from Last 3 Encounters:   07/25/18 96 lb 4.8 oz (43.7 kg)   07/22/18 95 lb 6.4 oz (43.3 kg)   07/16/18 96 lb (43.5 kg)              We Performed the Following     ANOSCOPY W/WO BRUSH/WASH     INJECTION INTRAMUSCULAR OR SUB-Q     VITAMIN B12 INJ /1000MCG          Today's Medication Changes          These changes are accurate as of 7/25/18 11:59 PM.  If you have any questions, ask your nurse or doctor.               Start taking these medicines.        Dose/Directions    hydrocortisone 25 MG Suppository   Commonly known as:  ANUSOL-HC   Used for:  Vitamin B12 deficiency (non anemic)   Started by:  Mario Valdes MD        Dose:  25 mg   Place 1 suppository (25 mg) rectally 2 times daily   Quantity:  28 suppository   Refills:  1            Where to get your medicines      These medications were sent to [facility] [facility] - [address]  [address] [place] [address]     Phone:  [phone]     hydrocortisone 25 MG Suppository                Primary Care Provider Office Phone # Fax #    Mario Valdes [phone] [address] [facility] [address]        Equal Access to Services     [facility] JANET : Aida bates Sostewart, waaxda luqadaha, qaybta kaalmada regegyafreya, chantel dewey . [facility] [phone].    ATENCIÓN: Si habla español, tiene a leon disposición servicios gratuitos de asistencia " lingüística. Jerica al [phone].    We comply with applicable federal civil rights laws and [place] laws. We do not discriminate on the basis of race, color, national origin, age, disability, sex, sexual orientation, or gender identity.            Thank you!     Thank you for choosing [facility]  for your care. Our goal is always to provide you with excellent care. Hearing back from our patients is one way we can continue to improve our services. Please take a few minutes to complete the written survey that you may receive in the mail after your visit with us. Thank you!             Your Updated Medication List - Protect others around you: Learn how to safely use, store and throw away your medicines at www.disposemymeds.org.          This list is accurate as of 7/25/18 11:59 PM.  Always use your most recent med list.                   Brand Name Dispense Instructions for use Diagnosis    BENEFIBER PO      Take by mouth 2 times daily        calcium carbonate 500 MG tablet    OS-ILYA 500 mg [place]. [place]     Take 1 tablet by mouth 2 times daily        CARNATION INSTANT BREAKFAST PO       Vitamin B12 deficiency (non anemic)       cyanocobalamin 1000 MCG/ML injection    VITAMIN B12    1 mL    Inject 1 mL (1,000 mcg) into the muscle every 7 days For total of 4 injections    Vitamin B12 deficiency (non anemic)       diltiazem 180 MG 24 hr capsule    DILT-XR    90 capsule    TAKE ONE CAPSULE BY MOUTH ONCE DAILY    Benign essential hypertension       HYDROcodone-acetaminophen 5-325 MG per tablet    NORCO    10 tablet    Take 1 tablet by mouth every 6 hours as needed for severe pain        hydrocortisone 25 MG Suppository    ANUSOL-HC    28 suppository    Place 1 suppository (25 mg) rectally 2 times daily    Vitamin B12 deficiency (non anemic)       ibuprofen 600 MG tablet    ADVIL/MOTRIN    30 tablet    Take 1 tablet (600 mg) by mouth every 8 hours as needed for moderate pain        IMODIUM OR            ondansetron 4 MG ODT tab    ZOFRAN ODT    15 tablet    Take 1 tablet (4 mg) by mouth every 8 hours as needed for nausea        probiotic Caps           Protein Powd       Vitamin B12 deficiency (non anemic)       TYLENOL PO      Take 500 mg by mouth every 4 hours as needed for mild pain        VITAMIN C PO      Take 500 mg by mouth daily.        VITAMIN D3 PO      Take 2,000 Units by mouth daily.

## 2018-07-25 NOTE — PROGRESS NOTES
"  SUBJECTIVE:   Georgette Alatorre is a 77 year old female who presents to clinic today for the following health issues:    States she mentioned blood in stool in the past   Was concerned this morning  Had colonoscopy in October - was okay  History of cancerous tumor - rectal area in 2000  Has been losing weight   Saw PCP - in waiting room for OV last week, side got really bad so left   Has history of small bowel obstruction and kidney stones - pain resolved but came back on Sunday Sunday was in ER - CT scan. Negative for stones/obstruction. Ultrasound also. Morphine for pain   Was not admitted overnight   ER provider told patient she should see urology within 2-3 days- saw urology yesterday (Dr. Lee)   Has procedure tomorrow - \"lasik venogram??\"   Has been feeling constipated which is abnormal for her, usually has loose stools   Last normal BM Sunday - small ones since   No tarry black stools   Denies lightheadedness/dizziness  Denies N/V or abdominal pain   Yesterday had another episode of side pain - took Ibuprofen - advised APAP instead   Saw blood on stool this morning - red blood   Did not fill bowl with blood, just in stool  At times has wiped and seen blood on tissue (is unsure if from hemorrhoids)   Last episode prior to this was July 13, also on June 28 - loose stool with blood     Abdominal pain  On 7/16/18 had extreme left side/abdominal pain when she decided to come into see Dr. Valdes. As she was waiting for her labs she experienced her first episode of acute back pain that caused her to attend the ER. Then on  7/22/18 she attended the ER due to the [place] extreme side and abdominal pain, where there preformed a CT abdomen pelvis with contrast to see if there was any blockages, there was not, or any let over urine in bladder, there was not. Kidney function tests were normal at hospital at well. The other day she began experiencing pain again and took Advil which relieved the pain.     Blood in " stool   The other day she experienced bright red blood in her stool that was clotted associated with blood streaking . She has had several BM's in the last few days, as well as today, and there has not been any additional blood.       Problem list and histories reviewed & adjusted, as indicated.  Additional history: as documented    Current Outpatient Prescriptions   Medication Sig Dispense Refill     Acetaminophen (TYLENOL PO) Take 500 mg by mouth every 4 hours as needed for mild pain       Ascorbic Acid (VITAMIN C PO) Take 500 mg by mouth daily.       calcium carbonate (OS-ILYA 500 MG [place]. [place]) 1250 MG tablet Take 1 tablet by mouth 2 times daily       Cholecalciferol (VITAMIN D3 PO) Take 2,000 Units by mouth daily.       diltiazem (DILT-XR) 180 MG 24 hr capsule TAKE ONE CAPSULE BY MOUTH ONCE DAILY 90 capsule 3     HYDROcodone-acetaminophen (NORCO) 5-325 MG per tablet Take 1 tablet by mouth every 6 hours as needed for severe pain 10 tablet 0     ibuprofen (ADVIL/MOTRIN) 600 MG tablet Take 1 tablet (600 mg) by mouth every 8 hours as needed for moderate pain 30 tablet 0     Loperamide HCl (IMODIUM OR)        Nutritional Supplements (CARNATION INSTANT BREAKFAST PO)        ondansetron (ZOFRAN ODT) 4 MG ODT tab Take 1 tablet (4 mg) by mouth every 8 hours as needed for nausea 15 tablet 0     probiotic CAPS        Protein POWD        Wheat Dextrin (BENEFIBER PO) Take by mouth 2 times daily       No Known Allergies    Reviewed and updated as needed this visit by clinical staff       Reviewed and updated as needed this visit by Provider         ROS:  CONSTITUTIONAL: NEGATIVE for fever, chills, change in weight  INTEGUMENTARY/SKIN: NEGATIVE for worrisome rashes, moles or lesions  RESP: NEGATIVE for significant cough or SOB  CV: NEGATIVE for chest pain, palpitations or peripheral edema  GI: NEGATIVE for nausea, abdominal pain, heartburn  : NEGATIVE for frequency, dysuria, or hematuria  MUSCULOSKELETAL: NEGATIVE for  "significant arthralgias or myalgia  NEURO: NEGATIVE for weakness, dizziness or paresthesias  HEME: POSITIVE for blood in stool  PSYCHIATRIC: NEGATIVE for changes in mood or affect    This document serves as a record of the services and decisions personally performed and made by Mario Valdes MD. It was created on her behalf by Terry Moreland, a trained medical scribe. The creation of this document is based on the provider's statements to the medical scribe.   Terry Moreland, 4:30 PM, June 18, 2018    OBJECTIVE:     /66 (BP Location: Left arm, Patient Position: Sitting, Cuff Size: Adult Regular)  Pulse 68  Temp 98.8  F (37.1  C) (Oral)  Ht 1.575 m (5' 2\")  Wt 43.7 kg (96 lb 4.8 oz)  SpO2 96%  BMI 17.61 kg/m2  Body mass index is 17.61 kg/(m^2).  GENERAL: healthy, alert and no distress  NECK: no adenopathy, no asymmetry, masses, or scars and thyroid normal to palpation  RESP: lungs clear to auscultation - no rales, rhonchi or wheezes  CV: regular rate and rhythm, normal S1 S2, no S3 or S4, no murmur, click or rub, no peripheral edema and peripheral pulses strong  ABDOMEN: soft, nontender, no hepatosplenomegaly, no masses and bowel sounds normal  GI: Patient brought in stool sample that had clotted as well as slightly streaky blood in it.   MS: no gross musculoskeletal defects noted, no edema  PSYCH: mentation appears normal, affect normal/bright      Procedure: anoscopy - anus was clean and dry, endoscope was inserted without difficulty. At 8:00 there was an area of friable that was oozing, and not no jayesh friable internal Hemorids or ulcerations or abnormalities     Diagnostic Test Results:  No results found for this or any previous visit (from the past 24 hour(s)).    ASSESSMENT/PLAN:   1. Vitamin B12 deficiency (non anemic)  With nutritional supplements will continue for nutrition stabilization. Gave anal HC suppository for anal tissue. Gave Vit B12 supplements, advised Vit B12 injections for 1 months and " then continue on Vid B12 therapy.   - Nutritional Supplements (CARNATION INSTANT BREAKFAST PO);   - Protein POWD;   - hydrocortisone (ANUSOL-HC) 25 MG Suppository; Place 1 suppository (25 mg) rectally 2 times daily  Dispense: 28 suppository; Refill: 1  - VITAMIN B12 INJ /1000MCG  - INJECTION INTRAMUSCULAR OR SUB-Q  - cyanocobalamin (VITAMIN B12) 1000 MCG/ML injection; Inject 1 mL (1,000 mcg) into the muscle every 7 days For total of 4 injections  Dispense: 1 mL; Refill: 3        The information in this document, created by a medical scribe for me, accurately reflects the services I personally performed and the decisions made by me. I have reviewed and approved this document for accuracy.  Dr. Mario Valdes, 4:50 PM, June 18, 2018

## 2018-07-25 NOTE — NURSING NOTE
b12 given, see med notes for details. Patient will get 3 more injections over the next 3 weeks.    Devon Almazan, CMA

## 2018-07-26 ENCOUNTER — TELEPHONE (OUTPATIENT)
Dept: FAMILY MEDICINE | Facility: CLINIC | Age: 78
End: 2018-07-26

## 2018-07-26 ENCOUNTER — TRANSFERRED RECORDS (OUTPATIENT)
Dept: HEALTH INFORMATION MANAGEMENT | Facility: CLINIC | Age: 78
End: 2018-07-26

## 2018-07-26 NOTE — TELEPHONE ENCOUNTER
hydrocortisone (ANUSOL-HC) 25 MG Suppository not covered by patient's insurance and is $228 out of pocket. Pharmacy looking for alternative.    Natali HAYES(R)

## 2018-07-26 NOTE — TELEPHONE ENCOUNTER
Pt is returning call from Dr. Valdes, himself.  Pt will be home all afternoon.  Please return call.    Georgette: [phone]  Ok to leave .

## 2018-07-31 ENCOUNTER — ALLIED HEALTH/NURSE VISIT (OUTPATIENT)
Dept: NURSING | Facility: CLINIC | Age: 78
End: 2018-07-31
Payer: COMMERCIAL

## 2018-07-31 DIAGNOSIS — E53.8 VITAMIN B12 DEFICIENCY (NON ANEMIC): Primary | ICD-10-CM

## 2018-07-31 PROCEDURE — 99207 ZZC NO CHARGE NURSE ONLY: CPT

## 2018-07-31 PROCEDURE — 96372 THER/PROPH/DIAG INJ SC/IM: CPT

## 2018-08-06 ENCOUNTER — ALLIED HEALTH/NURSE VISIT (OUTPATIENT)
Dept: NURSING | Facility: CLINIC | Age: 78
End: 2018-08-06
Payer: COMMERCIAL

## 2018-08-06 DIAGNOSIS — E53.8 VITAMIN B12 DEFICIENCY (NON ANEMIC): Primary | ICD-10-CM

## 2018-08-06 PROCEDURE — 96372 THER/PROPH/DIAG INJ SC/IM: CPT

## 2018-08-06 PROCEDURE — 99207 ZZC NO CHARGE NURSE ONLY: CPT

## 2018-08-06 NOTE — MR AVS SNAPSHOT
"              After Visit Summary   8/6/2018    Georgette Alatorre    MRN: [id number]           Patient Information     Date Of Birth          1940        Visit Information        Provider Department      8/6/2018 9:45 AM CS NURSE [facility]        Today's Diagnoses     Vitamin B12 deficiency (non anemic)    -  1       Follow-ups after your visit        Your next 10 appointments already scheduled     Aug 08, 2018 10:45 AM CDT   DX HIP/PELVIS/SPINE with SHMADX1   [facility] ([facility])    [address] [place] [address]  [address]  [facility] [address]   [phone]           Please do not take any of the following 24 hours prior to the day of your exam: vitamins, calcium tablets, antacids.  If possible, please wear clothes without metal (snaps, zippers). A sweatsuit works well.            Aug 08, 2018 11:15 AM CDT   MA SCREENING DIGITAL BILATERAL with SHBCMA2   [facility] ([facility])    [address] [place] [address], [address]  [facility] [address]   [phone]           Do not use any powder, lotion or deodorant under your arms or on your breast. If you do, we will ask you to remove it before your exam.  Wear comfortable, two-piece clothing.  If you have any allergies, tell your care team.  Bring any previous mammograms from other facilities or have them mailed to the breast center. Three-dimensional (3D) mammograms are available at [place] locations in [facility], [place], [employer], [facility], [place], [place], and [place]. -Health locations include [facility] in [place]. Benefits of 3D mammograms include: - Improved rate of cancer detection - Decreases your chance of having to go back for more tests, which means fewer: - \"False-positive\" results (This means that there is an abnormal area but it isn't cancer.) - Invasive testing procedures, such as a biopsy or surgery - Can provide " clearer images of the breast if you have dense breast tissue. 3D mammography is an optional exam that anyone can have with a 2D mammogram. It doesn't replace or take the place of a 2D mammogram. 2D mammograms remain an effective screening test for all women.  Not all insurance companies cover the cost of a 3D mammogram. Check with your insurance.            Aug 15, 2018 11:45 AM CDT   Nurse Only with CS CHICO NURSE   [facility]    [address]  [place] [address] [phone]              Who to contact     If you have questions or need follow up information about today's clinic visit or your schedule please contact [facility] directly at [phone].  Normal or non-critical lab and imaging results will be communicated to you by [employer], letter or phone within 4 business days after the clinic has received the results. If you do not hear from us within 7 days, please contact the clinic through [employer] or phone. If you have a critical or abnormal lab result, we will notify you by phone as soon as possible.  Submit refill requests through [employer] or call your pharmacy and they will forward the refill request to us. Please allow 3 business days for your refill to be completed.          Additional Information About Your Visit        [employer] Information     [employer] gives you secure access to your electronic health record. If you see a primary care provider, you can also send messages to your care team and make appointments. If you have questions, please call your primary care clinic.  If you do not have a primary care provider, please call [phone] and they will assist you.        Care EveryWhere ID     This is your Care EveryWhere ID. This could be used by other organizations to access your [place] medical records  [id number]         Blood Pressure from Last 3 Encounters:   07/25/18 124/66   07/22/18 129/70   07/16/18 127/71    Weight from Last 3 Encounters:    07/25/18 96 lb 4.8 oz (43.7 kg)   07/22/18 95 lb 6.4 oz (43.3 kg)   07/16/18 96 lb (43.5 kg)              We Performed the Following     B12 - 1000 MCG     INJECTION INTRAMUSCULAR OR SUB-Q        Primary Care Provider Office Phone # Fax #    Mario DEBBI MD Lucio [phone] [address]  [facility] [address]        Equal Access to Services     JODI GONZALES : Hadii aad ku hadasho Soomaali, waaxda luqadaha, qaybta kaalmada adeegyada, waxay idiin hayaan adeeg kharamaddie lakayla . So [facility] [phone].    ATENCIÓN: Si habla español, tiene a leon disposición servicios gratuitos de asistencia lingüística. [facility] [phone].    We comply with applicable federal civil rights laws and [place] laws. We do not discriminate on the basis of race, color, national origin, age, disability, sex, sexual orientation, or gender identity.            Thank you!     Thank you for choosing [facility]  for your care. Our goal is always to provide you with excellent care. Hearing back from our patients is one way we can continue to improve our services. Please take a few minutes to complete the written survey that you may receive in the mail after your visit with us. Thank you!             Your Updated Medication List - Protect others around you: Learn how to safely use, store and throw away your medicines at www.disposemymeds.org.          This list is accurate as of 8/6/18 10:14 AM.  Always use your most recent med list.                   Brand Name Dispense Instructions for use Diagnosis    BENEFIBER PO      Take by mouth 2 times daily        calcium carbonate 500 MG tablet    OS-ILYA 500 mg [place]. Ca     Take 1 tablet by mouth 2 times daily        CARNATION INSTANT BREAKFAST PO       Vitamin B12 deficiency (non anemic)       cyanocobalamin 1000 MCG/ML injection    VITAMIN B12    1 mL    Inject 1 mL (1,000 mcg) into the muscle every 7 days For total of 4 injections    Vitamin B12 deficiency (non  anemic)       diltiazem 180 MG 24 hr capsule    DILT-XR    90 capsule    TAKE ONE CAPSULE BY MOUTH ONCE DAILY    Benign essential hypertension       HYDROcodone-acetaminophen 5-325 MG per tablet    NORCO    10 tablet    Take 1 tablet by mouth every 6 hours as needed for severe pain        hydrocortisone 25 MG Suppository    ANUSOL-HC    28 suppository    Place 1 suppository (25 mg) rectally 2 times daily    Vitamin B12 deficiency (non anemic)       ibuprofen 600 MG tablet    ADVIL/MOTRIN    30 tablet    Take 1 tablet (600 mg) by mouth every 8 hours as needed for moderate pain        IMODIUM OR           probiotic Caps           Protein Powd       Vitamin B12 deficiency (non anemic)       TYLENOL PO      Take 500 mg by mouth every 4 hours as needed for mild pain        VITAMIN C PO      Take 500 mg by mouth daily.        VITAMIN D3 PO      Take 2,000 Units by mouth daily.

## 2018-08-06 NOTE — NURSING NOTE
The following medication was given:     MEDICATION: Vitamin B12  1000 mcg  ROUTE: IM  SITE: Deltoid - Left  DOSE: 1 mL  LOT #: [id number]  :  [employer]  EXPIRATION DATE:  03/2020  NDC#: 5500-7397-86     Prior to injection verified patient identity using patient's name and date of birth.  Due to injection administration, patient instructed to remain in clinic for 15 minutes  afterwards, and to report any adverse reaction to me immediately.

## 2018-08-08 ENCOUNTER — HOSPITAL ENCOUNTER (OUTPATIENT)
Dept: MAMMOGRAPHY | Facility: CLINIC | Age: 78
End: 2018-08-08
Attending: INTERNAL MEDICINE
Payer: MEDICARE

## 2018-08-08 ENCOUNTER — HOSPITAL ENCOUNTER (OUTPATIENT)
Dept: BONE DENSITY | Facility: CLINIC | Age: 78
Discharge: HOME | End: 2018-08-08
Attending: INTERNAL MEDICINE | Admitting: INTERNAL MEDICINE
Payer: MEDICARE

## 2018-08-08 DIAGNOSIS — M81.0 AGE-RELATED OSTEOPOROSIS WITHOUT CURRENT PATHOLOGICAL FRACTURE: ICD-10-CM

## 2018-08-08 DIAGNOSIS — Z12.31 ENCOUNTER FOR SCREENING MAMMOGRAM FOR BREAST CANCER: ICD-10-CM

## 2018-08-08 PROCEDURE — 77080 DXA BONE DENSITY AXIAL: CPT

## 2018-08-08 PROCEDURE — 77067 SCR MAMMO BI INCL CAD: CPT

## 2018-08-13 ENCOUNTER — ALLIED HEALTH/NURSE VISIT (OUTPATIENT)
Dept: NURSING | Facility: CLINIC | Age: 78
End: 2018-08-13
Payer: COMMERCIAL

## 2018-08-13 DIAGNOSIS — E53.8 VITAMIN B12 DEFICIENCY (NON ANEMIC): Primary | ICD-10-CM

## 2018-08-13 PROCEDURE — 96372 THER/PROPH/DIAG INJ SC/IM: CPT

## 2018-08-13 NOTE — NURSING NOTE
The following medication was given:     MEDICATION: Vitamin B12  1000 mcg  ROUTE: IM  SITE: Deltoid - Right  DOSE: 1 mL  LOT #: [id number]  :  [employer]  EXPIRATION DATE:  03/2020  NDC#: 61071-474-80

## 2018-08-13 NOTE — MR AVS SNAPSHOT
After Visit Summary   8/13/2018    Georgette Alatorre    MRN: [id number]           Patient Information     Date Of Birth          1940        Visit Information        Provider Department      8/13/2018 11:45 AM CS NURSE [facility]        Today's Diagnoses     Vitamin B12 deficiency (non anemic)    -  1       Follow-ups after your visit        Your next 10 appointments already scheduled     Aug 22, 2018  1:00 PM CDT   Office Visit with Mario Valdes MD   [facility] ([facility]    [address] [facility] [address] [phone]           Bring a current list of meds and any records pertaining to this visit. For Physicals, please bring immunization records and any forms needing to be filled out. Please arrive 10 minutes early to complete paperwork.              Who to contact     If you have questions or need follow up information about today's clinic visit or your schedule please contact [facility] directly at [phone].  Normal or non-critical lab and imaging results will be communicated to you by TellMihart, letter or phone within 4 business days after the clinic has received the results. If you do not hear from us within 7 days, please contact the clinic through Neoconix or phone. If you have a critical or abnormal lab result, we will notify you by phone as soon as possible.  Submit refill requests through Neoconix or call your pharmacy and they will forward the refill request to us. Please allow 3 business days for your refill to be completed.          Additional Information About Your Visit        TellMihart Information     [employer] gives you secure access to your electronic health record. If you see a primary care provider, you can also send messages to your care team and make appointments. If you have questions, please call your primary care clinic.  If you do not have a primary care provider, please call [phone] and they will  assist you.        Care EveryWhere ID     This is your Care EveryWhere ID. This could be used by other organizations to access your [facility] medical records  [id number]         Blood Pressure from Last 3 Encounters:   07/25/18 124/66   07/22/18 129/70   07/16/18 127/71    Weight from Last 3 Encounters:   07/25/18 96 lb 4.8 oz (43.7 kg)   07/22/18 95 lb 6.4 oz (43.3 kg)   07/16/18 96 lb (43.5 kg)              We Performed the Following     B12 - 1000 MCG     INJECTION INTRAMUSCULAR OR SUB-Q        Primary Care Provider Office Phone # Fax #    Mario Valdes [phone] [address] [address]        Equal Access to Services     [facility]: Hadii aad bernadine hadasho Sostewart, waaxda luqadaha, qaybta kaalmada adeegyada, chantel dewey . So [facility] [phone].    ATENCIÓN: Si habla español, tiene a leon disposición servicios gratuitos de asistencia lingüística. Jerica al [phone].    We comply with applicable federal civil rights laws and [place] laws. We do not discriminate on the basis of race, color, national origin, age, disability, sex, sexual orientation, or gender identity.            Thank you!     Thank you for choosing [facility]  for your care. Our goal is always to provide you with excellent care. Hearing back from our patients is one way we can continue to improve our services. Please take a few minutes to complete the written survey that you may receive in the mail after your visit with us. Thank you!             Your Updated Medication List - Protect others around you: Learn how to safely use, store and throw away your medicines at www.disposemymeds.org.          This list is accurate as of 8/13/18 11:59 PM.  Always use your most recent med list.                   Brand Name Dispense Instructions for use Diagnosis    BENEFIBER PO      Take by mouth 2 times daily        calcium carbonate 500 MG tablet    OS-ILYA 500 mg  [place]. [place]     Take 1 tablet by mouth 2 times daily        CARNATION INSTANT BREAKFAST PO       Vitamin B12 deficiency (non anemic)       cyanocobalamin 1000 MCG/ML injection    VITAMIN B12    1 mL    Inject 1 mL (1,000 mcg) into the muscle every 7 days For total of 4 injections    Vitamin B12 deficiency (non anemic)       diltiazem 180 MG 24 hr capsule    DILT-XR    90 capsule    TAKE ONE CAPSULE BY MOUTH ONCE DAILY    Benign essential hypertension       HYDROcodone-acetaminophen 5-325 MG per tablet    NORCO    10 tablet    Take 1 tablet by mouth every 6 hours as needed for severe pain        hydrocortisone 25 MG Suppository    ANUSOL-HC    28 suppository    Place 1 suppository (25 mg) rectally 2 times daily    Vitamin B12 deficiency (non anemic)       ibuprofen 600 MG tablet    ADVIL/MOTRIN    30 tablet    Take 1 tablet (600 mg) by mouth every 8 hours as needed for moderate pain        IMODIUM OR           probiotic Caps           Protein Powd       Vitamin B12 deficiency (non anemic)       TYLENOL PO      Take 500 mg by mouth every 4 hours as needed for mild pain        VITAMIN C PO      Take 500 mg by mouth daily.        VITAMIN D3 PO      Take 2,000 Units by mouth daily.

## 2018-08-22 ENCOUNTER — TRANSFERRED RECORDS (OUTPATIENT)
Dept: HEALTH INFORMATION MANAGEMENT | Facility: CLINIC | Age: 78
End: 2018-08-22

## 2018-08-22 ENCOUNTER — OFFICE VISIT (OUTPATIENT)
Dept: FAMILY MEDICINE | Facility: CLINIC | Age: 78
End: 2018-08-22
Payer: COMMERCIAL

## 2018-08-22 ENCOUNTER — TELEPHONE (OUTPATIENT)
Dept: FAMILY MEDICINE | Facility: CLINIC | Age: 78
End: 2018-08-22

## 2018-08-22 VITALS
TEMPERATURE: 97.9 F | DIASTOLIC BLOOD PRESSURE: 68 MMHG | SYSTOLIC BLOOD PRESSURE: 127 MMHG | WEIGHT: 95 LBS | OXYGEN SATURATION: 96 % | HEART RATE: 68 BPM | HEIGHT: 62 IN | BODY MASS INDEX: 17.48 KG/M2

## 2018-08-22 DIAGNOSIS — K52.0 RADIATION COLITIS: ICD-10-CM

## 2018-08-22 DIAGNOSIS — I47.10 PAROXYSMAL SUPRAVENTRICULAR TACHYCARDIA (H): ICD-10-CM

## 2018-08-22 DIAGNOSIS — M81.0 AGE-RELATED OSTEOPOROSIS WITHOUT CURRENT PATHOLOGICAL FRACTURE: ICD-10-CM

## 2018-08-22 DIAGNOSIS — E53.8 VITAMIN B12 DEFICIENCY (NON ANEMIC): Primary | ICD-10-CM

## 2018-08-22 PROBLEM — M80.00XA AGE-RELATED OSTEOPOROSIS WITH CURRENT PATHOLOGICAL FRACTURE, INITIAL ENCOUNTER: Status: ACTIVE | Noted: 2018-08-22

## 2018-08-22 PROBLEM — M80.00XA AGE-RELATED OSTEOPOROSIS WITH CURRENT PATHOLOGICAL FRACTURE, INITIAL ENCOUNTER: Status: RESOLVED | Noted: 2018-08-22 | Resolved: 2018-08-22

## 2018-08-22 PROCEDURE — 99214 OFFICE O/P EST MOD 30 MIN: CPT | Performed by: INTERNAL MEDICINE

## 2018-08-22 NOTE — TELEPHONE ENCOUNTER
TO MTM/prior auth:     PCP put in orders for Prolia today     Informed him we will first need to do a PA for this medication and we will call patient to schedule her after this is done    Routing encounter so PA can be done on this medication     Thank you,   Key LI RN

## 2018-08-22 NOTE — PATIENT INSTRUCTIONS
PROLIA  Risk Evaluation and Mitigation Strategy Best Practice Advisory    In May 2015, the FDA required an update to the PROLIA  (denosumab) REMS (Risk Evaluation and Mitigation Strategy) to inform both providers and patients about potential serious risks related to PROLIA .    These potential serious risks include:    Hypocalcemia    Osteonecrosis of the jaw    Atypical femoral fractures    Serious Infections    Dermatologic reactions    To ensure compliance with these requirements [facility] has developed a workflow for those patients who will receive PROLIA  at clinic.  This workflow includes insurance verification, patient scheduling, patient education, and documentation. Registered Nurses in the clinic should have completed eLearning related to the REMS and the clinic workflow.  Refer to them to initiate this process.  This workflow does not need to be executed if the patient is to receive PROLIA  injection at [facility].       The  has put together a Patient Education Toolkit.  Copies of these handouts may be available your clinic. Patients must receive the Patient Brochure and Medication Guide when receiving injection at clinic.  These will be attached to the injection ordered from [facility] [employer] to the clinic. Links to handouts:  Patient Counseling Chart for Healthcare Providers  Patient Brochure  Prescribing Information  Medication Guide    If you are having trouble accessing the above links, these documents can be found at: [email or web address]    The role of healthcare provider includes reviewing patient education materials with the patient, advising patients to seek medical attention if they have signs or symptoms of one of the serious risks, and provide patients a copy of the Patient Brochure and Medication Guide when receiving their injection.      Due to the risk of hypocalcemia the Prescribing  Information for PROLIA  recommends that calcium and mineral levels (magnesium and phosphorus) be checked within 14 days of injection for those predisposed to hypocalcemia.

## 2018-08-22 NOTE — PROGRESS NOTES
SUBJECTIVE:   Georgette Alatorre is a 78 year old female who presents to clinic today for the following health issues:    Reports regarding osteopetrosis reviewed. On fosamax for 10 years which has been discontinued. Bone density today shows 16 % progression of her osteoporosis since her last bone density. Bcause of her ongoing GI issues including ongoing heartburn and reflux complicated by her diarrhea, I recommend prolea       3 episodes of left flank pain in the last month and was in the ER with one of the acute episodes and was noted to have hydropedesis. Being evaluated by urologist for the application of stints.    Problem list and histories reviewed & adjusted, as indicated.  Additional history: as documented    Current Outpatient Prescriptions   Medication Sig Dispense Refill     Acetaminophen (TYLENOL PO) Take 500 mg by mouth every 4 hours as needed for mild pain       Ascorbic Acid (VITAMIN C PO) Take 500 mg by mouth daily.       calcium carbonate (OS-ILYA 500 MG [place]. [place]) 1250 MG tablet Take 1 tablet by mouth 2 times daily       Cholecalciferol (VITAMIN D3 PO) Take 2,000 Units by mouth daily.       cyanocobalamin (VITAMIN B12) 1000 MCG/ML injection Inject 1 mL (1,000 mcg) into the muscle every 7 days For total of 4 injections 1 mL 3     diltiazem (DILT-XR) 180 MG 24 hr capsule TAKE ONE CAPSULE BY MOUTH ONCE DAILY 90 capsule 3     HYDROcodone-acetaminophen (NORCO) 5-325 MG per tablet Take 1 tablet by mouth every 6 hours as needed for severe pain 10 tablet 0     hydrocortisone (ANUSOL-HC) 25 MG Suppository Place 1 suppository (25 mg) rectally 2 times daily 28 suppository 1     ibuprofen (ADVIL/MOTRIN) 600 MG tablet Take 1 tablet (600 mg) by mouth every 8 hours as needed for moderate pain 30 tablet 0     Loperamide HCl (IMODIUM OR)        Nutritional Supplements (CARNATION INSTANT BREAKFAST PO)        probiotic CAPS        Protein POWD        Wheat Dextrin (BENEFIBER PO) Take by mouth 2 times daily    "    No Known Allergies    Reviewed and updated as needed this visit by clinical staff  Tobacco  Allergies  Meds  Problems       Reviewed and updated as needed this visit by Provider         ROS:  CONSTITUTIONAL: NEGATIVE for fever, chills, change in weight  INTEGUMENTARY/SKIN: NEGATIVE for worrisome rashes, moles or lesions  EYES: NEGATIVE for vision changes or irritation  ENT/MOUTH: NEGATIVE for ear, mouth and throat problems  RESP: NEGATIVE for significant cough or SOB  CV: NEGATIVE for chest pain, palpitations or peripheral edema  GI: NEGATIVE for nausea, abdominal pain, heartburn, or change in bowel habits  : NEGATIVE for frequency, dysuria, or hematuria  MUSCULOSKELETAL: NEGATIVE for significant arthralgias or myalgia  NEURO: NEGATIVE for weakness, dizziness or paresthesias  ENDOCRINE: NEGATIVE for temperature intolerance, skin/hair changes  HEME: NEGATIVE for bleeding problems  PSYCHIATRIC: NEGATIVE for changes in mood or affect    This document serves as a record of the services and decisions personally performed and made by Mario Valdes MD. It was created on his behalf by Terry Moreland, a trained medical scribe.  The creation of this document is based on the scribe's personal observations and the provider's statements to the medical scribe.  Terry Moreland, August 22, 2018 1:18 PM  OBJECTIVE:     /68 (BP Location: Right arm, Cuff Size: Adult Regular)  Pulse 68  Temp 97.9  F (36.6  C) (Tympanic)  Ht 1.575 m (5' 2\")  Wt 43.1 kg (95 lb)  SpO2 96%  BMI 17.38 kg/m2  Body mass index is 17.38 kg/(m^2).  GENERAL: healthy, alert and no distress  EYES: Eyes grossly normal to inspection, PERRL and conjunctivae and sclerae normal  HENT: ear canals and TM's normal, nose and mouth without ulcers or lesions  NECK: no adenopathy, no asymmetry, masses, or scars and thyroid normal to palpation  RESP: lungs clear to auscultation - no rales, rhonchi or wheezes  BREAST: normal without masses, tenderness or nipple " discharge and no palpable axillary masses or adenopathy  CV: regular rate and rhythm, normal S1 S2, no S3 or S4, no murmur, click or rub, no peripheral edema and peripheral pulses strong  ABDOMEN: soft, nontender, no hepatosplenomegaly, no masses and bowel sounds normal  MS: no gross musculoskeletal defects noted, no edema  SKIN: no suspicious lesions or rashes  NEURO: Normal strength and tone, mentation intact and speech normal  PSYCH: mentation appears normal, affect normal/bright    Diagnostic Test Results:  No results found for this or any previous visit (from the past 24 hour(s)).    ASSESSMENT/PLAN:   1. Vitamin B12 deficiency (non anemic)  Continuing oral B12    2. Paroxysmal supraventricular tachycardia (H)  Quiescent     3. 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      The information in this document, created by a medical scribe for me, accurately reflects the services I personally performed and the decisions made by me. I have reviewed and approved this document for accuracy.  Dr. Mario Valdes, August 22, 2018 1:18 PM     Mario Valdes MD  [facility]

## 2018-08-22 NOTE — MR AVS SNAPSHOT
After Visit Summary   8/22/2018    Georgette Alatorre    MRN: [id number]           Patient Information     Date Of Birth          1940        Visit Information        Provider Department      8/22/2018 1:00 PM Mario Valdes MD [facility]        Today's Diagnoses     Vitamin B12 deficiency (non anemic)    -  1    Paroxysmal supraventricular tachycardia (H)        Age-related osteoporosis without current pathological fracture        Radiation colitis          Care Instructions      PROLIA  Risk Evaluation and Mitigation Strategy Best Practice Advisory    In May 2015, the FDA required an update to the PROLIA  (denosumab) REMS (Risk Evaluation and Mitigation Strategy) to inform both providers and patients about potential serious risks related to PROLIA .    These potential serious risks include:    Hypocalcemia    Osteonecrosis of the jaw    Atypical femoral fractures    Serious Infections    Dermatologic reactions    To ensure compliance with these requirements [facility] has developed a workflow for those patients who will receive PROLIA  at clinic.  This workflow includes insurance verification, patient scheduling, patient education, and documentation. Registered Nurses in the clinic should have completed eLearning related to the REMS and the clinic workflow.  Refer to them to initiate this process.  This workflow does not need to be executed if the patient is to receive PROLIA  injection at [facility].       The  has put together a Patient Education Toolkit.  Copies of these handouts may be available your clinic. Patients must receive the Patient Brochure and Medication Guide when receiving injection at clinic.  These will be attached to the injection ordered from [facility] [employer] to the clinic. Links to handouts:  Patient Counseling Chart for Healthcare Providers  Patient Brochure  Prescribing Information  Medication Guide    If you  are having trouble accessing the above links, these documents can be found at: [email or web address]    The role of healthcare provider includes reviewing patient education materials with the patient, advising patients to seek medical attention if they have signs or symptoms of one of the serious risks, and provide patients a copy of the Patient Brochure and Medication Guide when receiving their injection.      Due to the risk of hypocalcemia the Prescribing Information for PROLIA  recommends that calcium and mineral levels (magnesium and phosphorus) be checked within 14 days of injection for those predisposed to hypocalcemia.              Follow-ups after your visit        Future tests that were ordered for you today     Open Future Orders        Priority Expected Expires Ordered    Calcium Routine 9/5/2018 2/18/2019 8/22/2018    Magnesium Routine 9/5/2018 2/18/2019 8/22/2018    Phosphorus Routine 9/5/2018 2/18/2019 8/22/2018            Who to contact     If you have questions or need follow up information about today's clinic visit or your schedule please contact [facility] directly at [phone].  Normal or non-critical lab and imaging results will be communicated to you by Xenith Bankhart, letter or phone within 4 business days after the clinic has received the results. If you do not hear from us within 7 days, please contact the clinic through Xenith Bankhart or phone. If you have a critical or abnormal lab result, we will notify you by phone as soon as possible.  Submit refill requests through [facility] or call your pharmacy and they will forward the refill request to us. Please allow 3 business days for your refill to be completed.          Additional Information About Your Visit        Xenith BankharLotame Information     [facility] gives you secure access to your electronic health record. If you see a primary care provider, you can also send messages to your care team and make  "appointments. If you have questions, please call your primary care clinic.  If you do not have a primary care provider, please call [phone] and they will assist you.        Care EveryWhere ID     This is your Care EveryWhere ID. This could be used by other organizations to access your [facility] medical records  [id number]        Your Vitals Were     Pulse Temperature Height Pulse Oximetry BMI (Body Mass Index)       68 97.9  F (36.6  C) (Tympanic) 5' 2\" (1.575 m) 96% 17.38 kg/m2        Blood Pressure from Last 3 Encounters:   08/22/18 127/68   07/25/18 124/66   07/22/18 129/70    Weight from Last 3 Encounters:   08/22/18 95 lb (43.1 kg)   07/25/18 96 lb 4.8 oz (43.7 kg)   07/22/18 95 lb 6.4 oz (43.3 kg)                 Today's Medication Changes          These changes are accurate as of 8/22/18 11:59 PM.  If you have any questions, ask your nurse or doctor.               Start taking these medicines.        Dose/Directions    denosumab 60 MG/ML Soln injection   Commonly known as:  PROLIA   Used for:  Age-related osteoporosis without current pathological fracture   Started by:  Mario Valdes MD        Dose:  60 mg   Inject 1 mL (60 mg) Subcutaneous every 6 months   Quantity:  1 mL   Refills:  1            Where to get your medicines      Some of these will need a paper prescription and others can be bought over the counter.  Ask your nurse if you have questions.     You don't need a prescription for these medications     denosumab 60 MG/ML Soln injection                Primary Care Provider Office Phone # Fax #    Mario Valdes [phone] [address]  [facility] [address]        Equal Access to Services     JODI GONZALES AH: Aida Guidry, bubba strauss, isiah kaalmafreya aguilar, chantel john. So [facility] [phone].    ATENCIÓN: Si habla español, tiene a leon disposición servicios gratuitos de asistencia lingüística. Llame al " [phone].    We comply with applicable federal civil rights laws and [place] laws. We do not discriminate on the basis of race, color, national origin, age, disability, sex, sexual orientation, or gender identity.            Thank you!     Thank you for choosing [facility]  for your care. Our goal is always to provide you with excellent care. Hearing back from our patients is one way we can continue to improve our services. Please take a few minutes to complete the written survey that you may receive in the mail after your visit with us. Thank you!             Your Updated Medication List - Protect others around you: Learn how to safely use, store and throw away your medicines at www.disposemymeds.org.          This list is accurate as of 8/22/18 11:59 PM.  Always use your most recent med list.                   Brand Name Dispense Instructions for use Diagnosis    BENEFIBER PO      Take by mouth 2 times daily        calcium carbonate 500 MG tablet    OS-ILYA 500 mg [place]. [place]     Take 1 tablet by mouth 2 times daily        CARNATION INSTANT BREAKFAST PO       Vitamin B12 deficiency (non anemic)       cyanocobalamin 1000 MCG/ML injection    VITAMIN B12    1 mL    Inject 1 mL (1,000 mcg) into the muscle every 7 days For total of 4 injections    Vitamin B12 deficiency (non anemic)       denosumab 60 MG/ML Soln injection    PROLIA    1 mL    Inject 1 mL (60 mg) Subcutaneous every 6 months    Age-related osteoporosis without current pathological fracture       diltiazem 180 MG 24 hr capsule    DILT-XR    90 capsule    TAKE ONE CAPSULE BY MOUTH ONCE DAILY    Benign essential hypertension       HYDROcodone-acetaminophen 5-325 MG per tablet    NORCO    10 tablet    Take 1 tablet by mouth every 6 hours as needed for severe pain        hydrocortisone 25 MG Suppository    ANUSOL-HC    28 suppository    Place 1 suppository (25 mg) rectally 2 times daily    Vitamin B12 deficiency (non anemic)       ibuprofen 600  MG tablet    ADVIL/MOTRIN    30 tablet    Take 1 tablet (600 mg) by mouth every 8 hours as needed for moderate pain        IMODIUM OR           probiotic Caps           Protein Powd       Vitamin B12 deficiency (non anemic)       TYLENOL PO      Take 500 mg by mouth every 4 hours as needed for mild pain        VITAMIN C PO      Take 500 mg by mouth daily.        VITAMIN D3 PO      Take 2,000 Units by mouth daily.

## 2018-08-22 NOTE — TELEPHONE ENCOUNTER
Insurance verification has been submitted through [employer] for Medicare B and BCBS supplement.    Devon Almazan, CMA

## 2018-08-22 NOTE — LETTER
[place] [address] [address] [facility] [address]  Phone: [phone]        October 2, 2018      Georgette Alatorre                                                                                                                                [address] [facility] [address]            Dear Ms. Alatorre,    We have tried to contact you regarding your Polia injection. We received notification that you will be subject to a $15 copay.    Please contact us if you would like to schedule for the injection at [phone]         Thank you,      Mario Valdes MD/ PRINCESS

## 2018-08-27 NOTE — TELEPHONE ENCOUNTER
Received notification that pt will be subject to $15 copay for administration/cost of Prolia.  Routing to team coordinators to call pt to schedule if she's interested.    Joanne Gonzalez, PharmD, [facility]  Medication Therapy Management Provider  Pager: [phone]

## 2018-09-04 ENCOUNTER — OFFICE VISIT (OUTPATIENT)
Dept: FAMILY MEDICINE | Facility: CLINIC | Age: 78
End: 2018-09-04
Payer: COMMERCIAL

## 2018-09-04 VITALS
SYSTOLIC BLOOD PRESSURE: 118 MMHG | WEIGHT: 97.1 LBS | HEART RATE: 67 BPM | BODY MASS INDEX: 17.76 KG/M2 | TEMPERATURE: 97.3 F | OXYGEN SATURATION: 94 % | DIASTOLIC BLOOD PRESSURE: 64 MMHG

## 2018-09-04 DIAGNOSIS — I10 BENIGN HYPERTENSION: ICD-10-CM

## 2018-09-04 DIAGNOSIS — M81.0 AGE-RELATED OSTEOPOROSIS WITHOUT CURRENT PATHOLOGICAL FRACTURE: ICD-10-CM
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
## 2018-09-04 DIAGNOSIS — K52.0 RADIATION COLITIS: ICD-10-CM

## 2018-09-04 DIAGNOSIS — I10 BENIGN ESSENTIAL HYPERTENSION: ICD-10-CM

## 2018-09-04 PROCEDURE — 83735 ASSAY OF MAGNESIUM: CPT | Performed by: INTERNAL MEDICINE

## 2018-09-04 PROCEDURE — 80069 RENAL FUNCTION PANEL: CPT | Performed by: INTERNAL MEDICINE

## 2018-09-04 PROCEDURE — 99213 OFFICE O/P EST LOW 20 MIN: CPT | Performed by: NURSE PRACTITIONER

## 2018-09-04 PROCEDURE — 36415 COLL VENOUS BLD VENIPUNCTURE: CPT | Performed by: INTERNAL MEDICINE

## 2018-09-04 RX ORDER — CEPHALEXIN 500 MG/1
500 CAPSULE ORAL 3 TIMES DAILY
Qty: 30 CAPSULE | Refills: 0 | Status: SHIPPED | OUTPATIENT
Start: 2018-09-04 | End: 2018-09-05

## 2018-09-04 NOTE — PATIENT INSTRUCTIONS
Begin soaking the infection in hot water for 15minutes 3xday  Take the antibiotic for 10 days  Double the pro biotic while on the antibiotic and the following week

## 2018-09-04 NOTE — MR AVS SNAPSHOT
After Visit Summary   9/4/2018    Georgette Alatorre    MRN: [id number]           Patient Information     Date Of Birth          1940        Visit Information        Provider Department      9/4/2018 5:30 PM Roseanna Guzman APRN CNP [facility]        Today's Diagnoses     Cat bite, initial encounter    -  1    Radiation colitis          Care Instructions    Begin soaking the infection in hot water for 15minutes 3xday  Take the antibiotic for 10 days  Double the pro biotic while on the antibiotic and the following week             Follow-ups after your visit        Who to contact     If you have questions or need follow up information about today's clinic visit or your schedule please contact [facility] directly at [phone].  Normal or non-critical lab and imaging results will be communicated to you by [employer], letter or phone within 4 business days after the clinic has received the results. If you do not hear from us within 7 days, please contact the clinic through [employer] or phone. If you have a critical or abnormal lab result, we will notify you by phone as soon as possible.  Submit refill requests through [employer] or call your pharmacy and they will forward the refill request to us. Please allow 3 business days for your refill to be completed.          Additional Information About Your Visit        MyChart Information     [employer] gives you secure access to your electronic health record. If you see a primary care provider, you can also send messages to your care team and make appointments. If you have questions, please call your primary care clinic.  If you do not have a primary care provider, please call [phone] and they will assist you.        Care EveryWhere ID     This is your Care EveryWhere ID. This could be used by other organizations to access your [place] medical records  [id number]        Your Vitals Were     Pulse Temperature Pulse Oximetry  Breastfeeding? BMI (Body Mass Index)       67 97.3  F (36.3  C) (Oral) 94% No 17.76 kg/m2        Blood Pressure from Last 3 Encounters:   09/04/18 118/64   08/22/18 127/68   07/25/18 124/66    Weight from Last 3 Encounters:   09/04/18 97 lb 1.6 oz (44 kg)   08/22/18 95 lb (43.1 kg)   07/25/18 96 lb 4.8 oz (43.7 kg)              We Performed the Following     DIETITIAN FOLLOW-UP          Today's Medication Changes          These changes are accurate as of 9/4/18  6:06 PM.  If you have any questions, ask your nurse or doctor.               Start taking these medicines.        Dose/Directions    cephALEXin 500 MG capsule   Commonly known as:  KEFLEX   Used for:  Cat bite, initial encounter   Started by:  Roseanna Guzman APRN CNP        Dose:  500 mg   Take 1 capsule (500 mg) by mouth 3 times daily   Quantity:  30 capsule   Refills:  0         Stop taking these medicines if you haven't already. Please contact your care team if you have questions.     cyanocobalamin 1000 MCG/ML injection   Commonly known as:  VITAMIN B12   Stopped by:  Roseanna Guzman APRN CNP           hydrocortisone 25 MG Suppository   Commonly known as:  ANUSOL-HC   Stopped by:  Roseanna Guzman APRN CNP                Where to get your medicines      These medications were sent to [facility], [place] - [address], [address]  [address], [address], [facility] [address]     Phone:  [phone]     cephALEXin 500 MG capsule                Primary Care Provider Office Phone # Fax #    Mario Valdes [phone] [address]  [facility] [address]        Equal Access to Services     JODI GONZALES : Aida Guidry, bubba strauss, isiah aguilar, chantel john. So [facility] [phone].    ATENCIÓN: Si habla español, tiene a leon disposición servicios gratuitos de asistencia lingüística. Llame al [phone].    We comply with  applicable federal civil rights laws and [place] laws. We do not discriminate on the basis of race, color, national origin, age, disability, sex, sexual orientation, or gender identity.            Thank you!     Thank you for choosing [facility]  for your care. Our goal is always to provide you with excellent care. Hearing back from our patients is one way we can continue to improve our services. Please take a few minutes to complete the written survey that you may receive in the mail after your visit with us. Thank you!             Your Updated Medication List - Protect others around you: Learn how to safely use, store and throw away your medicines at www.disposemymeds.org.          This list is accurate as of 9/4/18  6:06 PM.  Always use your most recent med list.                   Brand Name Dispense Instructions for use Diagnosis    BENEFIBER PO      Take by mouth 2 times daily        calcium carbonate 500 mg {elemental} 500 MG tablet    OS-ILYA     Take 1 tablet by mouth 2 times daily        CARNATION INSTANT BREAKFAST PO       Vitamin B12 deficiency (non anemic)       cephALEXin 500 MG capsule    KEFLEX    30 capsule    Take 1 capsule (500 mg) by mouth 3 times daily    Cat bite, initial encounter       denosumab 60 MG/ML Soln injection    PROLIA    1 mL    Inject 1 mL (60 mg) Subcutaneous every 6 months    Age-related osteoporosis without current pathological fracture       diltiazem 180 MG 24 hr capsule    DILT-XR    90 capsule    TAKE ONE CAPSULE BY MOUTH ONCE DAILY    Benign essential hypertension       HYDROcodone-acetaminophen 5-325 MG per tablet    NORCO    10 tablet    Take 1 tablet by mouth every 6 hours as needed for severe pain        ibuprofen 600 MG tablet    ADVIL/MOTRIN    30 tablet    Take 1 tablet (600 mg) by mouth every 8 hours as needed for moderate pain        IMODIUM OR           probiotic Caps           Protein Powd       Vitamin B12 deficiency (non anemic)       TYLENOL PO       Take 500 mg by mouth every 4 hours as needed for mild pain        VITAMIN C PO      Take 500 mg by mouth daily.        VITAMIN D3 PO      Take 2,000 Units by mouth daily.

## 2018-09-04 NOTE — PROGRESS NOTES
SUBJECTIVE:   Georgette Alatorre is a 78 year old female who presents to clinic today for the following health issues:      Chief Complaint   Patient presents with     Cat Bite     lateral aspect of R wrist, redness and irritation around site   Cat UTD on vaccinations  Worried about taking antibiotics because of her colitis, does take probiotica  Also wants referral to dietitian because of her colitis and inability to gain weight   Neglected to have some bloodwork done that was ordered by PCP and would like that done at this time  Problem list and histories reviewed & adjusted, as indicated.  Additional history: as documented    Patient Active Problem List   Diagnosis     Obstruction of bowel     SBO (small bowel obstruction)     Radiation colitis     Vitamin B12 deficiency (non anemic)     Paroxysmal supraventricular tachycardia (H)     Age-related osteoporosis without current pathological fracture     Past Surgical History:   Procedure Laterality Date     APPENDECTOMY       CATARACT IOL, RT/LT  2009    Cataract IOL Bilateral     COLONOSCOPY  03/30/2006    Normal; repeat in 5 years     CYSTOSCOPY  11/14/2007    Cystoscopy, bilateral retrograde pyelograms, dilatation of left ureteral stricture, left ureteroscopy and insertion of left double pigtail stent.      ENDOSCOPY  10/22/2008    Upper GI:  Normal     ENDOSCOPY  1/11/2007    Upper GI     EYE SURGERY  2010    retinal eye surgery     HYSTERECTOMY, PAP NO LONGER INDICATED  2000    due to endometrial cancer     LAPAROTOMY EXPLORATORY  08/22/2007    1.  Exploratory laparotomy. 2.  Extensive lysis of intra-abdominal adhesions. Performed by Dr Rad Pierce     LAPAROTOMY EXPLORATORY  04/20/2005    1.  Exploratory laparotomy.  2.  Lysis of adhesions.  Segmental small bowel resection Performed by Dr Rad Pierce.       Social History   Substance Use Topics     Smoking status: Former Smoker     Packs/day: 0.50     Years: 40.00     Smokeless tobacco: Never Used      Alcohol use 0.0 oz/week     0 Standard drinks or equivalent per week      Comment: 1 glass of wine with dinner     No family history on file.      Current Outpatient Prescriptions   Medication Sig Dispense Refill     Acetaminophen (TYLENOL PO) Take 500 mg by mouth every 4 hours as needed for mild pain       Ascorbic Acid (VITAMIN C PO) Take 500 mg by mouth daily.       calcium carbonate (OS-ILYA 500 MG [employer] [place]. [place]) 1250 MG tablet Take 1 tablet by mouth 2 times daily       Cholecalciferol (VITAMIN D3 PO) Take 2,000 Units by mouth daily.       denosumab (PROLIA) 60 MG/ML SOLN injection Inject 1 mL (60 mg) Subcutaneous every 6 months 1 mL 1     diltiazem (DILT-XR) 180 MG 24 hr capsule TAKE ONE CAPSULE BY MOUTH ONCE DAILY 90 capsule 3     HYDROcodone-acetaminophen (NORCO) 5-325 MG per tablet Take 1 tablet by mouth every 6 hours as needed for severe pain 10 tablet 0     ibuprofen (ADVIL/MOTRIN) 600 MG tablet Take 1 tablet (600 mg) by mouth every 8 hours as needed for moderate pain 30 tablet 0     Loperamide HCl (IMODIUM OR)        Nutritional Supplements (CARNATION INSTANT BREAKFAST PO)        probiotic CAPS        Protein POWD        Wheat Dextrin (BENEFIBER PO) Take by mouth 2 times daily       cephALEXin (KEFLEX) 500 MG capsule Take 1 capsule (500 mg) by mouth 3 times daily 30 capsule 0     No Known Allergies    Reviewed and updated as needed this visit by clinical staff       Reviewed and updated as needed this visit by Provider         ROS:  Constitutional, HEENT, cardiovascular, pulmonary, gi and gu systems are negative, except as otherwise noted.  CONSTI: no fever or chills and very little pain  OBJECTIVE:     /64  Pulse 67  Temp 97.3  F (36.3  C) (Oral)  Wt 97 lb 1.6 oz (44 kg)  SpO2 94%  Breastfeeding? No  BMI 17.76 kg/m2  Body mass index is 17.76 kg/(m^2).  GENERAL: healthy, alert and no distress  MS: no gross musculoskeletal defects noted, no edema  SKIN: 1cm raised erythematous slightly fluctuant  lesion of right lateral wrist no surrounding edema, slightly tneder, no angiitis, no associated antecubital or brachial lymphadenopathy, no angiits  Diagnostic Test Results:  none     ASSESSMENT/PLAN:       ICD-10-CM    1. Cat bite, initial encounter W55.01XA cephALEXin (KEFLEX) 500 MG capsule        2. Radiation colitis K52.0 DIETITIAN FOLLOW-UP   3. Benign essential hypertension I10 **Basic metabolic panel FUTURE anytime   4. Benign hypertension I10 Albumin level   5. Age-related osteoporosis without current pathological fracture M81.0 Magnesium     Phosphorus            Patient Instructions   Begin soaking the infection in hot water for 15minutes 3xday  Take the antibiotic for 10 days  Double the pro biotic while on the antibiotic and the following week         JAYJAY Flowers CNP  [facility]

## 2018-09-05 ENCOUNTER — TELEPHONE (OUTPATIENT)
Dept: FAMILY MEDICINE | Facility: CLINIC | Age: 78
End: 2018-09-05
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
## 2018-09-05 LAB
ALBUMIN SERPL-MCNC: 3.6 G/DL (ref 3.4–5)
ANION GAP SERPL CALCULATED.3IONS-SCNC: 5 MMOL/L (ref 3–14)
BUN SERPL-MCNC: 18 MG/DL (ref 7–30)
CALCIUM SERPL-MCNC: 8.9 MG/DL (ref 8.5–10.1)
CHLORIDE SERPL-SCNC: 107 MMOL/L (ref 94–109)
CO2 SERPL-SCNC: 31 MMOL/L (ref 20–32)
CREAT SERPL-MCNC: 0.8 MG/DL (ref 0.52–1.04)
GFR SERPL CREATININE-BSD FRML MDRD: 69 ML/MIN/1.7M2
GLUCOSE SERPL-MCNC: 95 MG/DL (ref 70–99)
MAGNESIUM SERPL-MCNC: 2 MG/DL (ref 1.6–2.3)
PHOSPHATE SERPL-MCNC: 3.4 MG/DL (ref 2.5–4.5)
POTASSIUM SERPL-SCNC: 3.9 MMOL/L (ref 3.4–5.3)
SODIUM SERPL-SCNC: 143 MMOL/L (ref 133–144)

## 2018-09-05 RX ORDER — CEPHALEXIN 500 MG/1
500 CAPSULE ORAL 3 TIMES DAILY
Qty: 30 CAPSULE | Refills: 0 | Status: SHIPPED | OUTPATIENT
Start: 2018-09-05 | End: 2018-12-13

## 2018-09-05 NOTE — TELEPHONE ENCOUNTER
Reason for Call:  Medication or medication refill:Keflex 500mg    Do you use a [facility]?  Name of the pharmacy and phone number for the current request  [id number] [address] AT [facility] OF [facility] access [facility] [address]      Name of the medication requested:     Other request: pt wants meds sent to [facility] instead     Can we leave a detailed message on this number? YES    Phone number patient can be reached at: Home number on file [phone] (home)    Best Time: any    Call taken on 9/5/2018 at 10:18 AM by Anushka Gonsales

## 2018-09-06 ENCOUNTER — TELEPHONE (OUTPATIENT)
Dept: FAMILY MEDICINE | Facility: CLINIC | Age: 78
End: 2018-09-06

## 2018-09-06 NOTE — TELEPHONE ENCOUNTER
Reason for call:  Patient reporting a symptom    Symptom or request: pt concerned with potential side effects from her antibiotic she is taking for a cat bite.  She has a sore on the inside of her mouth that she though was a canker sore, but now is not so sure.  She is also having diarrhea     Duration (how long have symptoms been present): one day      Have you been treated for this before? No    Additional comments:     Phone Number patient can be reached at:  Home number on file [phone] (home)    Best Time:  any    Can we leave a detailed message on this number:  YES    Call taken on 9/6/2018 at 3:27 PM by Ashly Helm

## 2018-09-06 NOTE — TELEPHONE ENCOUNTER
PCP,    Pt requesting your opinion.  Was dispensed cipro for cat bite at [facility] on 4th.    Has taken 4 doses, 3pm, 1030 pm yesterday and 9am 3pm today.    Question 1, pt diarrhea has increased overnight from regular diarrhea issues to incontinent diarrhea, cannot leave house, etc.      Pt has been dealing with cold sores, and this morning woke up with puffy bottom lip, swelling, and on outside looks like a blister to pt.  PT does report has has very rare cold sores in history.  Denies any sob, difficulty breathing or other swelling that might indicate allergic reaction.    Advised increased fluids including electrolyte fluids.  Would imodium be advisable for this pt?  Would a different rx be advisable?

## 2018-09-10 ENCOUNTER — OFFICE VISIT (OUTPATIENT)
Dept: FAMILY MEDICINE | Facility: CLINIC | Age: 78
End: 2018-09-10
Payer: COMMERCIAL

## 2018-09-10 VITALS
SYSTOLIC BLOOD PRESSURE: 114 MMHG | TEMPERATURE: 98.8 F | BODY MASS INDEX: 17.38 KG/M2 | WEIGHT: 95 LBS | OXYGEN SATURATION: 97 % | DIASTOLIC BLOOD PRESSURE: 58 MMHG | HEART RATE: 61 BPM

## 2018-09-10 DIAGNOSIS — B00.9 HSV (HERPES SIMPLEX VIRUS) INFECTION: Primary | ICD-10-CM

## 2018-09-10 PROCEDURE — 99213 OFFICE O/P EST LOW 20 MIN: CPT | Performed by: NURSE PRACTITIONER

## 2018-09-10 RX ORDER — VALACYCLOVIR HYDROCHLORIDE 500 MG/1
500 TABLET, FILM COATED ORAL 2 TIMES DAILY
Qty: 6 TABLET | Refills: 3 | Status: SHIPPED | OUTPATIENT
Start: 2018-09-10 | End: 2018-12-13

## 2018-09-10 RX ORDER — CEPHALEXIN 500 MG/1
500 CAPSULE ORAL 3 TIMES DAILY
Qty: 30 CAPSULE | Refills: 0 | Status: SHIPPED | OUTPATIENT
Start: 2018-09-10 | End: 2018-12-13

## 2018-09-10 NOTE — MR AVS SNAPSHOT
After Visit Summary   9/10/2018    Georgette Alatorre    MRN: [id number]           Patient Information     Date Of Birth          1940        Visit Information        Provider Department      9/10/2018 3:00 PM Roseanna Guzman APRN CNP [facility]        Today's Diagnoses     HSV (herpes simplex virus) infection    -  1      Care Instructions    Resume using the keflex antibiotic twice a day for a week for the staph infection of the cold sore   Begin taking imodium as instructed on the packaging while on the antibiotic  Take the benefiber and increase dose by 1/2 while you are on the antibiotic  Push fluids  And also take some gatorade with electrolytes    I did also prescribe valtrex for the cold sore which you would take twice a day for 3 days; the hope would be that it would shorten the duration of the cold sore eruption              Follow-ups after your visit        Who to contact     If you have questions or need follow up information about today's clinic visit or your schedule please contact [facility] directly at [phone].  Normal or non-critical lab and imaging results will be communicated to you by [employer], letter or phone within 4 business days after the clinic has received the results. If you do not hear from us within 7 days, please contact the clinic through [employer] or phone. If you have a critical or abnormal lab result, we will notify you by phone as soon as possible.  Submit refill requests through [employer] or call your pharmacy and they will forward the refill request to us. Please allow 3 business days for your refill to be completed.          Additional Information About Your Visit        [employer] Information     [employer] gives you secure access to your electronic health record. If you see a primary care provider, you can also send messages to your care team and make appointments. If you have questions, please call your primary care clinic.  If you  do not have a primary care provider, please call [phone] and they will assist you.        Care EveryWhere ID     This is your Care EveryWhere ID. This could be used by other organizations to access your [place] medical records  [id number]        Your Vitals Were     Pulse Temperature Pulse Oximetry BMI (Body Mass Index)          61 98.8  F (37.1  C) (Oral) 97% 17.38 kg/m2         Blood Pressure from Last 3 Encounters:   09/10/18 114/58   09/04/18 118/64   08/22/18 127/68    Weight from Last 3 Encounters:   09/10/18 95 lb (43.1 kg)   09/04/18 97 lb 1.6 oz (44 kg)   08/22/18 95 lb (43.1 kg)              Today, you had the following     No orders found for display       Primary Care Provider Office Phone # Fax #    Mario Valdes [phone] [address] [facility] [address]  [facility] [address]        Equal Access to Services     [facility]: Hadii aad ku hadasho Soomaali, waaxda luqadaha, qaybta kaalmada adeegyada, waxay idiin hayscott dewey . So [facility] [phone].    ATENCIÓN: Si habla español, tiene a leon disposición servicios gratuitos de asistencia lingüística. Llame al [phone].    We comply with applicable federal civil rights laws and [place] laws. We do not discriminate on the basis of race, color, national origin, age, disability, sex, sexual orientation, or gender identity.            Thank you!     Thank you for choosing [facility]  for your care. Our goal is always to provide you with excellent care. Hearing back from our patients is one way we can continue to improve our services. Please take a few minutes to complete the written survey that you may receive in the mail after your visit with us. Thank you!             Your Updated Medication List - Protect others around you: Learn how to safely use, store and throw away your medicines at www.disposemymeds.org.          This list is accurate as of 9/10/18  3:27 PM.  Always use your most recent med  list.                   Brand Name Dispense Instructions for use Diagnosis    BENEFIBER PO      Take by mouth 2 times daily        calcium carbonate 500 mg {elemental} 500 MG tablet    OS-ILYA     Take 1 tablet by mouth 2 times daily        CARNATION INSTANT BREAKFAST PO       Vitamin B12 deficiency (non anemic)       * cephALEXin 500 MG capsule    KEFLEX    30 capsule    Take 1 capsule (500 mg) by mouth 3 times daily    Cat bite, initial encounter       * cephALEXin 500 MG capsule    KEFLEX    30 capsule    Take 1 capsule (500 mg) by mouth 3 times daily    Cat bite, initial encounter       denosumab 60 MG/ML Soln injection    PROLIA    1 mL    Inject 1 mL (60 mg) Subcutaneous every 6 months    Age-related osteoporosis without current pathological fracture       diltiazem 180 MG 24 hr capsule    DILT-XR    90 capsule    TAKE ONE CAPSULE BY MOUTH ONCE DAILY    Benign essential hypertension       HYDROcodone-acetaminophen 5-325 MG per tablet    NORCO    10 tablet    Take 1 tablet by mouth every 6 hours as needed for severe pain        ibuprofen 600 MG tablet    ADVIL/MOTRIN    30 tablet    Take 1 tablet (600 mg) by mouth every 8 hours as needed for moderate pain        IMODIUM OR           probiotic Caps           Protein Powd       Vitamin B12 deficiency (non anemic)       TYLENOL PO      Take 500 mg by mouth every 4 hours as needed for mild pain        VITAMIN C PO      Take 500 mg by mouth daily.        VITAMIN D3 PO      Take 2,000 Units by mouth daily.        * Notice:  This list has 2 medication(s) that are the same as other medications prescribed for you. Read the directions carefully, and ask your doctor or other care provider to review them with you.

## 2018-09-10 NOTE — PROGRESS NOTES
SUBJECTIVE:   Georgette Alatorre is a 78 year old female who presents to clinic today for the following health issues:    Possible Allergic Reaction    Has taken 4 doses, 3pm, 1030 pm yesterday and 9am 3pm today.     Question 1, pt diarrhea has increased overnight from regular diarrhea issues to incontinent diarrhea, cannot leave house, etc.       Pt has been dealing with cold sores, and this morning woke up with puffy bottom lip, swelling, and on outside looks like a blister to pt.  PT does report has has very rare cold sores in history.      Problem list and histories reviewed & adjusted, as indicated.  Additional history: as documented    Patient Active Problem List   Diagnosis     Obstruction of bowel     SBO (small bowel obstruction)     Radiation colitis     Vitamin B12 deficiency (non anemic)     Paroxysmal supraventricular tachycardia (H)     Age-related osteoporosis without current pathological fracture     Past Surgical History:   Procedure Laterality Date     APPENDECTOMY       CATARACT IOL, RT/LT  2009    Cataract IOL Bilateral     COLONOSCOPY  03/30/2006    Normal; repeat in 5 years     CYSTOSCOPY  11/14/2007    Cystoscopy, bilateral retrograde pyelograms, dilatation of left ureteral stricture, left ureteroscopy and insertion of left double pigtail stent.      ENDOSCOPY  10/22/2008    Upper GI:  Normal     ENDOSCOPY  1/11/2007    Upper GI     EYE SURGERY  2010    retinal eye surgery     HYSTERECTOMY, PAP NO LONGER INDICATED  2000    due to endometrial cancer     LAPAROTOMY EXPLORATORY  08/22/2007    1.  Exploratory laparotomy. 2.  Extensive lysis of intra-abdominal adhesions. Performed by Dr Rad Pierce     LAPAROTOMY EXPLORATORY  04/20/2005    1.  Exploratory laparotomy.  2.  Lysis of adhesions.  Segmental small bowel resection Performed by Dr Rad Pierce.       Social History   Substance Use Topics     Smoking status: Former Smoker     Packs/day: 0.50     Years: 40.00     Smokeless tobacco:  Never Used     Alcohol use 0.0 oz/week     0 Standard drinks or equivalent per week      Comment: 1 glass of wine with dinner     No family history on file.      Current Outpatient Prescriptions   Medication Sig Dispense Refill     Acetaminophen (TYLENOL PO) Take 500 mg by mouth every 4 hours as needed for mild pain       Ascorbic Acid (VITAMIN C PO) Take 500 mg by mouth daily.       calcium carbonate (OS-ILYA 500 MG [place]. [place]) 1250 MG tablet Take 1 tablet by mouth 2 times daily       cephALEXin (KEFLEX) 500 MG capsule Take 1 capsule (500 mg) by mouth 3 times daily 30 capsule 0     Cholecalciferol (VITAMIN D3 PO) Take 2,000 Units by mouth daily.       denosumab (PROLIA) 60 MG/ML SOLN injection Inject 1 mL (60 mg) Subcutaneous every 6 months 1 mL 1     diltiazem (DILT-XR) 180 MG 24 hr capsule TAKE ONE CAPSULE BY MOUTH ONCE DAILY 90 capsule 3     HYDROcodone-acetaminophen (NORCO) 5-325 MG per tablet Take 1 tablet by mouth every 6 hours as needed for severe pain 10 tablet 0     ibuprofen (ADVIL/MOTRIN) 600 MG tablet Take 1 tablet (600 mg) by mouth every 8 hours as needed for moderate pain 30 tablet 0     Loperamide HCl (IMODIUM OR)        Nutritional Supplements (CARNATION INSTANT BREAKFAST PO)        probiotic CAPS        Protein POWD        valACYclovir (VALTREX) 500 MG tablet Take 1 tablet (500 mg) by mouth 2 times daily 6 tablet 3     Wheat Dextrin (BENEFIBER PO) Take by mouth 2 times daily       cephALEXin (KEFLEX) 500 MG capsule Take 1 capsule (500 mg) by mouth 3 times daily (Patient not taking: Reported on 9/10/2018) 30 capsule 0     No Known Allergies    Reviewed and updated as needed this visit by clinical staff       Reviewed and updated as needed this visit by Provider         ROS:  Constitutional, HEENT, cardiovascular, pulmonary, gi and gu systems are negative, except as otherwise noted.    OBJECTIVE:     /58 (BP Location: Left arm, Patient Position: Sitting, Cuff Size: Adult Regular)  Pulse 61  Temp  98.8  F (37.1  C) (Oral)  Wt 95 lb (43.1 kg)  SpO2 97%  BMI 17.38 kg/m2  Body mass index is 17.38 kg/(m^2).  GENERAL: healthy, alert and no distress  HENT: ear canals and TM's normal, nose  without ulcers or lesions; left lower lip has large herpetic lesion with exudative appearance  MS: no gross musculoskeletal defects noted, no edema  SKIN: right lateral wrist has raised closed healing cat bite lesion without angiitis, tenderness, or edema   PSYCH: mentation appears normal, affect normal frustrated  Diagnostic Test Results:  none     ASSESSMENT/PLAN:       ICD-10-CM    1. HSV (herpes simplex virus) infection B00.9 valACYclovir (VALTREX) 500 MG tablet       Patient Instructions   Resume using the keflex antibiotic twice a day only,  for a week for the staph infection of the cold sore   Begin taking imodium as instructed on the packaging while on the antibiotic  Take the benefiber and increase dose by 1/2 while you are on the antibiotic  Push fluids  And also take some gatorade with electrolytes    I did also prescribe valtrex for the cold sore which you would take twice a day for 3 days; the hope would be that it would shorten the duration of the cold sore eruption      If diarrhea persists after antibiotic use please notify us - we will need c dif culture     JAYJAY Flowers [facility]

## 2018-09-10 NOTE — PATIENT INSTRUCTIONS
Resume using the keflex antibiotic twice a day for a week for the staph infection of the cold sore   Begin taking imodium as instructed on the packaging while on the antibiotic  Take the benefiber and increase dose by 1/2 while you are on the antibiotic  Push fluids  And also take some gatorade with electrolytes    I did also prescribe valtrex for the cold sore which you would take twice a day for 3 days; the hope would be that it would shorten the duration of the cold sore eruption

## 2018-10-11 ENCOUNTER — ALLIED HEALTH/NURSE VISIT (OUTPATIENT)
Dept: NURSING | Facility: CLINIC | Age: 78
End: 2018-10-11
Payer: COMMERCIAL

## 2018-10-11 DIAGNOSIS — Z53.9 ERRONEOUS ENCOUNTER--DISREGARD: Primary | ICD-10-CM

## 2018-10-11 NOTE — MR AVS SNAPSHOT
After Visit Summary   10/11/2018    Georgette Alatorre    MRN: [id number]           Patient Information     Date Of Birth          1940        Visit Information        Provider Department      10/11/2018 11:45 AM ALYSON GOLDEN NURSE [place] [facility]il Khour[facility]        Today's Diagnoses     ERRONEOUS ENCOUNTER--DISREGARD    -  1       Follow-ups after your visit        Follow-up notes from your care team     Return in about 2 months (around 12/11/2018).      Who to contact     If you have questions or need follow up information about today's clinic visit or your schedule please contact [facility] directly at [phone].  Normal or non-critical lab and imaging results will be communicated to you by FastDuehart, letter or phone within 4 business days after the clinic has received the results. If you do not hear from us within 7 days, please contact the clinic through FastDuehart or phone. If you have a critical or abnormal lab result, we will notify you by phone as soon as possible.  Submit refill requests through Ref[employer] Systems or call your pharmacy and they will forward the refill request to us. Please allow 3 business days for your refill to be completed.          Additional Information About Your Visit        MyChart Information     [employer] gives you secure access to your electronic health record. If you see a primary care provider, you can also send messages to your care team and make appointments. If you have questions, please call your primary care clinic.  If you do not have a primary care provider, please call [phone] and they will assist you.        Care EveryWhere ID     This is your Care EveryWhere ID. This could be used by other organizations to access your [place] medical records  [id number]         Blood Pressure from Last 3 Encounters:   09/10/18 114/58   09/04/18 118/64   08/22/18 127/68    Weight from Last 3 Encounters:   09/10/18 95 lb (43.1 kg)   09/04/18 97 lb 1.6 oz (44 kg)    08/22/18 95 lb (43.1 kg)              Today, you had the following     No orders found for display       Primary Care Provider Office Phone # Fax #    Mario Valdes [phone] [address] [place] [address]        Equal Access to Services     [facility] Center[facility] : Hadii aad ku hadasho Soomaali, waaxda luqadaha, qaybta kaalmada adeegyada, waxay idiin hayaan adevon khsekoumaddie laemilian . So [facility] [phone].    ATENCIÓN: Si habla español, tiene a leon disposición servicios gratuitos de asistencia lingüística. Jerica al [phone].    We comply with applicable federal civil rights laws and [place] laws. We do not discriminate on the basis of race, color, national origin, age, disability, sex, sexual orientation, or gender identity.            Thank you!     Thank you for choosing [facility]  for your care. Our goal is always to provide you with excellent care. Hearing back from our patients is one way we can continue to improve our services. Please take a few minutes to complete the written survey that you may receive in the mail after your visit with us. Thank you!             Your Updated Medication List - Protect others around you: Learn how to safely use, store and throw away your medicines at www.disposemymeds.org.          This list is accurate as of 10/11/18 11:59 PM.  Always use your most recent med list.                   Brand Name Dispense Instructions for use Diagnosis    BENEFIBER PO      Take by mouth 2 times daily        calcium carbonate 500 mg (elemental) 500 MG tablet    OS-ILYA     Take 1 tablet by mouth 2 times daily        CARNATION INSTANT BREAKFAST PO       Vitamin B12 deficiency (non anemic)       * cephALEXin 500 MG capsule    KEFLEX    30 capsule    Take 1 capsule (500 mg) by mouth 3 times daily    Cat bite, initial encounter       * cephALEXin 500 MG capsule    KEFLEX    30 capsule    Take 1 capsule (500 mg) by mouth 3 times daily    Cat bite,  initial encounter       denosumab 60 MG/ML Soln injection    PROLIA    1 mL    Inject 1 mL (60 mg) Subcutaneous every 6 months    Age-related osteoporosis without current pathological fracture       diltiazem 180 MG 24 hr capsule    DILT-XR    90 capsule    TAKE ONE CAPSULE BY MOUTH ONCE DAILY    Benign essential hypertension       HYDROcodone-acetaminophen 5-325 MG per tablet    NORCO    10 tablet    Take 1 tablet by mouth every 6 hours as needed for severe pain        ibuprofen 600 MG tablet    ADVIL/MOTRIN    30 tablet    Take 1 tablet (600 mg) by mouth every 8 hours as needed for moderate pain        IMODIUM OR           probiotic Caps           Protein Powd       Vitamin B12 deficiency (non anemic)       TYLENOL PO      Take 500 mg by mouth every 4 hours as needed for mild pain        valACYclovir 500 MG tablet    VALTREX    6 tablet    Take 1 tablet (500 mg) by mouth 2 times daily    HSV (herpes simplex virus) infection       VITAMIN C PO      Take 500 mg by mouth daily.        VITAMIN D3 PO      Take 2,000 Units by mouth daily.        * Notice:  This list has 2 medication(s) that are the same as other medications prescribed for you. Read the directions carefully, and ask your doctor or other care provider to review them with you.

## 2018-10-11 NOTE — TELEPHONE ENCOUNTER
"Pt was in for Prolia Injection (which was declined) and discussed the following symptom:     Blood noted in stool   Onset: October 8th- normal stools in between- blood noted again on 10/10. Normal stool on 10/11.   Description: Dark red in stool \"almost looked like a clot\", bright red on toilet paper.     Denies lightheaded/dizziness  Denies abdominal pain    Only notable symptom is a sense of urgency when she has to have a BM.     HX: Radiation colitis (?)  Hemorrhoids (?)    R: Offered appt with PCP, pt declined. She has seen GI and Colorectal for her Radiation Colitis relatively recently. Advised she call GI to discuss symptoms. Since she had a normal stool this morning with no blood. Advised pt to CALL back or seek care if she notices an increase in amount of blood, lightheaded/dizziness,   shortness of breath, chest pains.     Pt agreed with plan.     Alem FREEMAN RN      "

## 2018-10-12 NOTE — NURSING NOTE
Pt was in for Prolia Shot today (1st dose)- As we were discussing s/e, she mentioned she had a recent tooth extraction (could not recall date).      After discussion that this was likely less than 6 months ago, we decided pt would go home and find out when her extraction date was, and reschedule Prolia based on this.      Pt called clinic later in afternoon--- Extraction was 6/8/18- less than 6 months ago.     Prolia was NOT given. Will post-pone until December, unless otherwise advised by PCP (see message to PCP in alternative encounter)

## 2018-12-13 ENCOUNTER — OFFICE VISIT (OUTPATIENT)
Dept: FAMILY MEDICINE | Facility: CLINIC | Age: 78
End: 2018-12-13
Payer: COMMERCIAL

## 2018-12-13 ENCOUNTER — ANCILLARY PROCEDURE (OUTPATIENT)
Dept: GENERAL RADIOLOGY | Facility: CLINIC | Age: 78
End: 2018-12-13
Attending: NURSE PRACTITIONER
Payer: COMMERCIAL

## 2018-12-13 VITALS
WEIGHT: 93.8 LBS | DIASTOLIC BLOOD PRESSURE: 59 MMHG | TEMPERATURE: 98.1 F | BODY MASS INDEX: 17.26 KG/M2 | HEIGHT: 62 IN | SYSTOLIC BLOOD PRESSURE: 104 MMHG | HEART RATE: 65 BPM | OXYGEN SATURATION: 95 %

## 2018-12-13 DIAGNOSIS — M79.672 LEFT FOOT PAIN: ICD-10-CM

## 2018-12-13 DIAGNOSIS — M79.672 LEFT FOOT PAIN: Primary | ICD-10-CM

## 2018-12-13 DIAGNOSIS — R93.89 ABNORMAL CT OF THE CHEST: ICD-10-CM

## 2018-12-13 DIAGNOSIS — R19.7 DIARRHEA, UNSPECIFIED TYPE: ICD-10-CM

## 2018-12-13 PROCEDURE — 71046 X-RAY EXAM CHEST 2 VIEWS: CPT

## 2018-12-13 PROCEDURE — 99214 OFFICE O/P EST MOD 30 MIN: CPT | Performed by: NURSE PRACTITIONER

## 2018-12-13 PROCEDURE — 73630 X-RAY EXAM OF FOOT: CPT | Mod: LT

## 2018-12-13 ASSESSMENT — MIFFLIN-ST. JEOR: SCORE: 853.72

## 2018-12-13 NOTE — PATIENT INSTRUCTIONS
Think about getting a probiotic. I suggest the multiple organism kind that you keep in the fridge.  I recommend lactobacillus acidophilus (helps with reducing stomach acid) with bifido (helps with bowels)    Stay away from dairy and gluten/simple sugars. (pasta, white bread, sweets)     Keep a food diary to see if you can keep track of what causes you symptoms

## 2018-12-13 NOTE — PROGRESS NOTES
HPI      SUBJECTIVE:   Georgette Alatorre is a 78 year old female who presents to clinic today for the following health issues:      Musculoskeletal problem/pain      Duration: 6 months    Description  Location: left leg, left foot    Intensity:  moderate    Accompanying signs and symptoms: tingling    History  Previous similar problem: no   Previous evaluation:  none    Precipitating or alleviating factors:  Trauma or overuse: no   Aggravating factors include: walking    Therapies tried and outcome: nothing      Left foot pain that wakes her up in the middle of the night   Painful to walk on   Pain is mainly of the arch/ proximal to 1st mtp   Gets occasional tingling   She has a pin in the MTP for previous bunion surgery and she can feel the pin   She has to be careful with shoes because anything hitting the area hurts    Has had diarrhea for a couple years   She is worried because she is so thin and has been losing weight       Past Medical History:   Diagnosis Date     Arthritis      Endometrial ca (H)      Gastro-oesophageal reflux disease      History of blood transfusion     no adverse reactions     Hydronephrosis      Scoliosis      Small bowel obstruction (H)      SVT (supraventricular tachycardia) (H)      No family history on file.  Past Surgical History:   Procedure Laterality Date     APPENDECTOMY       CATARACT IOL, RT/LT  2009    Cataract IOL Bilateral     COLONOSCOPY  03/30/2006    Normal; repeat in 5 years     CYSTOSCOPY  11/14/2007    Cystoscopy, bilateral retrograde pyelograms, dilatation of left ureteral stricture, left ureteroscopy and insertion of left double pigtail stent.      ENDOSCOPY  10/22/2008    Upper GI:  Normal     ENDOSCOPY  1/11/2007    Upper GI     EYE SURGERY  2010    retinal eye surgery     HYSTERECTOMY, PAP NO LONGER INDICATED  2000    due to endometrial cancer     LAPAROTOMY EXPLORATORY  08/22/2007    1.  Exploratory laparotomy. 2.  Extensive lysis of intra-abdominal  "adhesions. Performed by Dr Rad Pierce     LAPAROTOMY EXPLORATORY  04/20/2005    1.  Exploratory laparotomy.  2.  Lysis of adhesions.  Segmental small bowel resection Performed by Dr Rad Pierce.     Social History     Tobacco Use     Smoking status: Former Smoker     Packs/day: 0.50     Years: 40.00     Pack years: 20.00     Smokeless tobacco: Never Used   Substance Use Topics     Alcohol use: Yes     Alcohol/week: 0.0 oz     Comment: 1 glass of wine with dinner     Current Outpatient Medications   Medication Sig Dispense Refill     Cholecalciferol (VITAMIN D3 PO) Take 2,000 Units by mouth daily.       diltiazem (DILT-XR) 180 MG 24 hr capsule TAKE ONE CAPSULE BY MOUTH ONCE DAILY 90 capsule 3     Nutritional Supplements (CARNATION INSTANT BREAKFAST PO) Take 1 Can by mouth daily        probiotic CAPS Take 1 capsule by mouth daily        Protein POWD Take 1 Can by mouth daily        Wheat Dextrin (BENEFIBER PO) Take by mouth 2 times daily       Acetaminophen (TYLENOL PO) Take 500 mg by mouth every 4 hours as needed for mild pain       Ascorbic Acid (VITAMIN C PO) Take 500 mg by mouth daily.       calcium carbonate (OS-ILYA 500 MG [place]. [place]) 1250 MG tablet Take 1 tablet by mouth 2 times daily       denosumab (PROLIA) 60 MG/ML SOLN injection Inject 1 mL (60 mg) Subcutaneous every 6 months (Patient not taking: Reported on 12/13/2018) 1 mL 1     Allergies   Allergen Reactions     No Known Allergies        Reviewed and updated as needed this visit by clinical staff and provider     ROS  Detailed as above      /59 (BP Location: Right arm, Cuff Size: Adult Regular)   Pulse 65   Temp 98.1  F (36.7  C) (Oral)   Ht 1.575 m (5' 2\")   Wt 42.5 kg (93 lb 12.8 oz)   SpO2 95%   BMI 17.16 kg/m        Physical Exam   Constitutional: She appears well-developed.   HENT:   Head: Normocephalic.   Eyes: Conjunctivae are normal.   Cardiovascular: Intact distal pulses.   Pulmonary/Chest: Effort normal.   Musculoskeletal: " Normal range of motion. She exhibits no edema.   Mild point tenderness just proximal of left 1st MTP at site of pin that is palpable   Neurological: She is alert.   Skin: Skin is warm and dry. No erythema.   Psychiatric: She has a normal mood and affect. Judgment normal.       Assessment and Plan:       ICD-10-CM    1. Left foot pain M79.672 PODIATRY/FOOT & ANKLE SURGERY REFERRAL     XR Foot Left G/E 3 Views   2. Abnormal CT of the chest R93.89 XR Chest 2 Views   3. Diarrhea, unspecified type R19.7      Persistent L foot pain for months. Area of pain is the site of a pin from a previous surgery.   Xray completed without acute findings. Will send to podiatry.    Also completed CXR d/t abnormality found on CT scan in July 2018. This is without significant findings.     We discussed her diet and starting a food diary to see if that helps with the diarrhea.   She will f/u with PCP after the new year         JAYJAY Chavis, CNP  [facility]

## 2018-12-18 ENCOUNTER — OFFICE VISIT (OUTPATIENT)
Dept: PODIATRY | Facility: CLINIC | Age: 78
End: 2018-12-18
Payer: COMMERCIAL

## 2018-12-18 VITALS
WEIGHT: 93 LBS | BODY MASS INDEX: 17.11 KG/M2 | SYSTOLIC BLOOD PRESSURE: 128 MMHG | DIASTOLIC BLOOD PRESSURE: 65 MMHG | HEIGHT: 62 IN

## 2018-12-18 DIAGNOSIS — Z96.9 PRESENCE OF RETAINED HARDWARE: Primary | ICD-10-CM

## 2018-12-18 DIAGNOSIS — M21.40 PES PLANUS, UNSPECIFIED LATERALITY: ICD-10-CM

## 2018-12-18 PROCEDURE — 99203 OFFICE O/P NEW LOW 30 MIN: CPT | Performed by: PODIATRIST

## 2018-12-18 ASSESSMENT — MIFFLIN-ST. JEOR: SCORE: 850.1

## 2018-12-18 NOTE — LETTER
"    12/18/2018         RE: Georgette Alatorre  [address] [facility] [address]        Dear Colleague,    Thank you for referring your patient, Georgette Alatorre, to the [facility]. Please see a copy of my visit note below.    PATIENT HISTORY:  Georgette Alatorre is a 78 year old female who presents to clinic for left foot pain.  Reports a pin is in her foot from a bunion surgery in the 80s.  No new injury.  Became painful this past summer.  I was requested to see this patient for this issue by Jerry Fontanez CNP.  No treatments.  She gets some nonspecific \"radiating pain\" up her leg from her foot, but none today.      Review of Systems:  Patient reports limping, calf pain when walking, fatigue.  Rest of 10 pt ROS neg except for HPI.       PAST MEDICAL HISTORY:   Past Medical History:   Diagnosis Date     Arthritis      Endometrial ca (H)      Gastro-oesophageal reflux disease      History of blood transfusion     no adverse reactions     Hydronephrosis      Scoliosis      Small bowel obstruction (H)      SVT (supraventricular tachycardia) (H)         PAST SURGICAL HISTORY:   Past Surgical History:   Procedure Laterality Date     APPENDECTOMY       CATARACT IOL, RT/LT  2009    Cataract IOL Bilateral     COLONOSCOPY  03/30/2006    Normal; repeat in 5 years     CYSTOSCOPY  11/14/2007    Cystoscopy, bilateral retrograde pyelograms, dilatation of left ureteral stricture, left ureteroscopy and insertion of left double pigtail stent.      ENDOSCOPY  10/22/2008    Upper GI:  Normal     ENDOSCOPY  1/11/2007    Upper GI     EYE SURGERY  2010    retinal eye surgery     HYSTERECTOMY, PAP NO LONGER INDICATED  2000    due to endometrial cancer     LAPAROTOMY EXPLORATORY  08/22/2007    1.  Exploratory laparotomy. 2.  Extensive lysis of intra-abdominal adhesions. Performed by Dr Rad Pierce     LAPAROTOMY EXPLORATORY  04/20/2005    1.  Exploratory laparotomy.  2.  Lysis of adhesions.  Segmental " small bowel resection Performed by Dr Rad Pierce.        MEDICATIONS:   Current Outpatient Medications:      Acetaminophen (TYLENOL PO), Take 500 mg by mouth every 4 hours as needed for mild pain, Disp: , Rfl:      Ascorbic Acid (VITAMIN C PO), Take 500 mg by mouth daily., Disp: , Rfl:      calcium carbonate (OS-ILYA 500 MG [place]. [place]) 1250 MG tablet, Take 1 tablet by mouth 2 times daily, Disp: , Rfl:      Cholecalciferol (VITAMIN D3 PO), Take 2,000 Units by mouth daily., Disp: , Rfl:      diltiazem (DILT-XR) 180 MG 24 hr capsule, TAKE ONE CAPSULE BY MOUTH ONCE DAILY, Disp: 90 capsule, Rfl: 3     Nutritional Supplements (CARNATION INSTANT BREAKFAST PO), Take 1 Can by mouth daily , Disp: , Rfl:      probiotic CAPS, Take 1 capsule by mouth daily , Disp: , Rfl:      Protein POWD, Take 1 Can by mouth daily , Disp: , Rfl:      Wheat Dextrin (BENEFIBER PO), Take by mouth 2 times daily, Disp: , Rfl:      denosumab (PROLIA) 60 MG/ML SOLN injection, Inject 1 mL (60 mg) Subcutaneous every 6 months (Patient not taking: Reported on 12/13/2018), Disp: 1 mL, Rfl: 1     ALLERGIES:    Allergies   Allergen Reactions     No Known Allergies         SOCIAL HISTORY:   Social History     Socioeconomic History     Marital status: Single     Spouse name: Not on file     Number of children: Not on file     Years of education: Not on file     Highest education level: Not on file   Social Needs     Financial resource strain: Not on file     Food insecurity - worry: Not on file     Food insecurity - inability: Not on file     Transportation needs - medical: Not on file     Transportation needs - non-medical: Not on file   Occupational History     Not on file   Tobacco Use     Smoking status: Former Smoker     Packs/day: 0.50     Years: 40.00     Pack years: 20.00     Smokeless tobacco: Never Used   Substance and Sexual Activity     Alcohol use: Yes     Alcohol/week: 0.0 oz     Comment: 1 glass of wine with dinner     Drug use: No     Sexual  "activity: Not Currently     Partners: Male   Other Topics Concern     Parent/sibling w/ CABG, MI or angioplasty before 65F 55M? Not Asked   Social History Narrative     Not on file        FAMILY HISTORY: No family history on file.     EXAM:Vitals: /65   Ht 1.575 m (5' 2\")   Wt 42.2 kg (93 lb)   BMI 17.01 kg/m     BMI= Body mass index is 17.01 kg/m .    General appearance: Patient is alert and fully cooperative with history & exam.  No sign of distress is noted during the visit.     Psychiatric: Affect is pleasant & appropriate.  Patient appears motivated to improve health.     Respiratory: Breathing is regular & unlabored while sitting.     HEENT: Hearing is intact to spoken word.  Speech is clear.  No gross evidence of visual impairment that would impact ambulation.     Dermatologic: Skin is intact to L foot without significant lesions, rash or abrasion.  No paronychia or evidence of soft tissue infection is noted.     Vascular: DP & PT pulses are intact & regular on the L.  No significant edema or varicosities noted.  CFT and skin temperature are normal.     Neurologic: Lower extremity sensation is intact to light touch.  No evidence of weakness or contracture in the lower extremities.  No evidence of neuropathy.     Musculoskeletal: L flatfoot noted.  Palpable pin noted at medial 1st metatarsal neck area.  this seems to be primary site of pain.  No other foot/leg pain could be localized.  Patient is ambulatory without assistive device or brace.  No gross ankle deformity noted.  No foot or ankle joint effusion is noted.    XRs of L foot reviewed with pt.  k-wire noted in 1st metatarsal.     ASSESSMENT: L foot pain, retained hardware     PLAN:  Reviewed patient's chart in epic.  Discussed condition and treatment options including pros and cons.    Pain seems to correlate with pin location.  Discussed options including offloading/shoe accommodation, removal.  Removal was offered, pt deferred for now.  F/u " with PCP for any leg pain -- none today.  Discussed otc orthotics for her flatfoot, which may help her leg pain.  Discussed proper shoe gear.  F/u prn.    Gerard Hand DPM, FACFAS    Weight management plan Patient was referred to their PCP to discuss a diet and exercise plan.      Again, thank you for allowing me to participate in the care of your patient.        Sincerely,        Gerard Hand DPM

## 2018-12-18 NOTE — PATIENT INSTRUCTIONS
Thank you for choosing [facility]!    Follow up  As needed    DR. MATIAS'S CLINIC LOCATIONS     MONDAY  [place] TUESDAY & FRIDAY AM  [place]   [address]   [address]   [place] [address] [place]houry [address]   [phone]  [phone]  [phone]       WEDNESDAY  [place] SCHEDULE SURGERY: [phone]   [address] [place] [address] APPOINTMENTS: [phone]   [place] [address] BILLING QUESTIONS: [phone]   [phone]         FLAT FEET   Flatfoot is often a complex disorder, with diverse symptoms and varying degrees of deformity and disability. There are several types of flatfoot, all of which have one characteristic in common: partial or total collapse (loss) of the arch.  Other characteristics shared by most types of flatfoot include:   Toe drift,  in which the toes and front part of the foot point outward   The heel tilts toward the outside and the ankle appears to turn in   A tight Achilles tendon, which causes the heel to lift off the ground earlier when walking and may make the problem worse   Bunions and hammertoes may develop as a result of a flatfoot.   Flexible Flatfoot  Flexible flatfoot is one of the most common types of flatfoot. It typically begins in childhood or adolescence and continues into adulthood. It usually occurs in both feet and progresses in severity throughout the adult years. As the deformity worsens, the soft tissues (tendons and ligaments) of the arch may stretch or tear and can become inflamed.  The term  flexible  means that while the foot is flat when standing (weight-bearing), the arch returns when not standing.  SYMPTOMS  Pain in the heel, arch, ankle, or along the outside of the foot    Rolled-in  ankle (over-pronation)   Pain along the shin bone (shin splint)   General aching or fatigue in the foot or leg   Low back, hip or knee pain.   DIAGNOSIS  In diagnosing flatfoot,  the foot and ankle surgeon examines the foot and observes how it looks when you stand and sit. X-rays are usually taken to determine the severity of the disorder. If you are diagnosed with flexible flatfoot but you don t have any symptoms, your surgeon will explain what you might expect in the future.  NON-SURGICAL TREATMENT  If you experience symptoms with flexible flatfoot, the surgeon may recommend non-surgical treatment options, including:  Activity modifications. Cut down on activities that bring you pain and avoid prolonged walking and standing to give your arches a rest.   Weight loss. If you are overweight, try to lose weight. Putting too much weight on your arches may aggravate your symptoms.   Orthotic devices. Your foot and ankle surgeon can provide you with custom orthotic devices for your shoes to give more support to the arches.   Immobilization. In some cases, it may be necessary to use a walking cast or to completely avoid weight-bearing.   Medications. Nonsteroidal anti-inflammatory drugs (NSAIDs), such as ibuprofen, help reduce pain and inflammation.   Physical therapy. Ultrasound therapy or other physical therapy modalities may be used to provide temporary relief.   Shoe modifications. Wearing shoes that support the arches is important for anyone who has flatfoot.   SURGICAL TREATMENT  In some patients whose pain is not adequately relieved by other treatments, surgery may be considered. A variety of surgical techniques is available to correct flexible flatfoot, and one or a combination of procedures may be required to relieve the symptoms and improve foot function.  In selecting the procedure or combination of procedures for your particular case, the foot and ankle surgeon will take into consideration the extent of your deformity based on the x-ray findings, your age, your activity level, and other factors. The length of the recovery period will vary, depending on the procedure or procedures  performed.    OVER THE COUNTER INSERTS    SuperFeet   Sofsole Fit Spenco   Power Step   Walk-Fit Arch Cradles     Most of these can be found at your local [employer], [employer], [employer], sporting [employer], or online:    1.  [email or web address] [email or web address]/en-us/  2.  Https://[email or web address]  3.  [email or web address]    **A good high quality over the counter insert should cost around $40-$50                Body Mass Index (BMI)  Follow up with primary care regarding low body weight.

## 2018-12-18 NOTE — PROGRESS NOTES
"PATIENT HISTORY:  Georgette Alatorre is a 78 year old female who presents to clinic for left foot pain.  Reports a pin is in her foot from a bunion surgery in the 80s.  No new injury.  Became painful this past summer.  I was requested to see this patient for this issue by Jerry Fontanez CNP.  No treatments.  She gets some nonspecific \"radiating pain\" up her leg from her foot, but none today.      Review of Systems:  Patient reports limping, calf pain when walking, fatigue.  Rest of 10 pt ROS neg except for HPI.       PAST MEDICAL HISTORY:   Past Medical History:   Diagnosis Date     Arthritis      Endometrial ca (H)      Gastro-oesophageal reflux disease      History of blood transfusion     no adverse reactions     Hydronephrosis      Scoliosis      Small bowel obstruction (H)      SVT (supraventricular tachycardia) (H)         PAST SURGICAL HISTORY:   Past Surgical History:   Procedure Laterality Date     APPENDECTOMY       CATARACT IOL, RT/LT  2009    Cataract IOL Bilateral     COLONOSCOPY  03/30/2006    Normal; repeat in 5 years     CYSTOSCOPY  11/14/2007    Cystoscopy, bilateral retrograde pyelograms, dilatation of left ureteral stricture, left ureteroscopy and insertion of left double pigtail stent.      ENDOSCOPY  10/22/2008    Upper GI:  Normal     ENDOSCOPY  1/11/2007    Upper GI     EYE SURGERY  2010    retinal eye surgery     HYSTERECTOMY, PAP NO LONGER INDICATED  2000    due to endometrial cancer     LAPAROTOMY EXPLORATORY  08/22/2007    1.  Exploratory laparotomy. 2.  Extensive lysis of intra-abdominal adhesions. Performed by Dr Rad Pierce     LAPAROTOMY EXPLORATORY  04/20/2005    1.  Exploratory laparotomy.  2.  Lysis of adhesions.  Segmental small bowel resection Performed by Dr aRd Pierce.        MEDICATIONS:   Current Outpatient Medications:      Acetaminophen (TYLENOL PO), Take 500 mg by mouth every 4 hours as needed for mild pain, Disp: , Rfl:      Ascorbic Acid (VITAMIN C PO), Take " 500 mg by mouth daily., Disp: , Rfl:      calcium carbonate (OS-ILYA 500 MG [place]. CA) 1250 MG tablet, Take 1 tablet by mouth 2 times daily, Disp: , Rfl:      Cholecalciferol (VITAMIN D3 PO), Take 2,000 Units by mouth daily., Disp: , Rfl:      diltiazem (DILT-XR) 180 MG 24 hr capsule, TAKE ONE CAPSULE BY MOUTH ONCE DAILY, Disp: 90 capsule, Rfl: 3     Nutritional Supplements (CARNATION INSTANT BREAKFAST PO), Take 1 Can by mouth daily , Disp: , Rfl:      probiotic CAPS, Take 1 capsule by mouth daily , Disp: , Rfl:      Protein POWD, Take 1 Can by mouth daily , Disp: , Rfl:      Wheat Dextrin (BENEFIBER PO), Take by mouth 2 times daily, Disp: , Rfl:      denosumab (PROLIA) 60 MG/ML SOLN injection, Inject 1 mL (60 mg) Subcutaneous every 6 months (Patient not taking: Reported on 12/13/2018), Disp: 1 mL, Rfl: 1     ALLERGIES:    Allergies   Allergen Reactions     No Known Allergies         SOCIAL HISTORY:   Social History     Socioeconomic History     Marital status: Single     Spouse name: Not on file     Number of children: Not on file     Years of education: Not on file     Highest education level: Not on file   Social Needs     Financial resource strain: Not on file     Food insecurity - worry: Not on file     Food insecurity - inability: Not on file     Transportation needs - medical: Not on file     Transportation needs - non-medical: Not on file   Occupational History     Not on file   Tobacco Use     Smoking status: Former Smoker     Packs/day: 0.50     Years: 40.00     Pack years: 20.00     Smokeless tobacco: Never Used   Substance and Sexual Activity     Alcohol use: Yes     Alcohol/week: 0.0 oz     Comment: 1 glass of wine with dinner     Drug use: No     Sexual activity: Not Currently     Partners: Male   Other Topics Concern     Parent/sibling w/ CABG, MI or angioplasty before 65F 55M? Not Asked   Social History Narrative     Not on file        FAMILY HISTORY: No family history on file.     EXAM:Vitals: BP  "128/65   Ht 1.575 m (5' 2\")   Wt 42.2 kg (93 lb)   BMI 17.01 kg/m    BMI= Body mass index is 17.01 kg/m .    General appearance: Patient is alert and fully cooperative with history & exam.  No sign of distress is noted during the visit.     Psychiatric: Affect is pleasant & appropriate.  Patient appears motivated to improve health.     Respiratory: Breathing is regular & unlabored while sitting.     HEENT: Hearing is intact to spoken word.  Speech is clear.  No gross evidence of visual impairment that would impact ambulation.     Dermatologic: Skin is intact to L foot without significant lesions, rash or abrasion.  No paronychia or evidence of soft tissue infection is noted.     Vascular: DP & PT pulses are intact & regular on the L.  No significant edema or varicosities noted.  CFT and skin temperature are normal.     Neurologic: Lower extremity sensation is intact to light touch.  No evidence of weakness or contracture in the lower extremities.  No evidence of neuropathy.     Musculoskeletal: L flatfoot noted.  Palpable pin noted at medial 1st metatarsal neck area.  this seems to be primary site of pain.  No other foot/leg pain could be localized.  Patient is ambulatory without assistive device or brace.  No gross ankle deformity noted.  No foot or ankle joint effusion is noted.    XRs of L foot reviewed with pt.  k-wire noted in 1st metatarsal.     ASSESSMENT: L foot pain, retained hardware     PLAN:  Reviewed patient's chart in epic.  Discussed condition and treatment options including pros and cons.    Pain seems to correlate with pin location.  Discussed options including offloading/shoe accommodation, removal.  Removal was offered, pt deferred for now.  F/u with PCP for any leg pain -- none today.  Discussed otc orthotics for her flatfoot, which may help her leg pain.  Discussed proper shoe gear.  F/u prn.    Gerard Hand DPM, FACFAS    Weight management plan Patient was referred to their PCP to " discuss a diet and exercise plan.

## 2019-01-03 ENCOUNTER — OFFICE VISIT (OUTPATIENT)
Dept: FAMILY MEDICINE | Facility: CLINIC | Age: 79
End: 2019-01-03
Payer: MEDICARE

## 2019-01-03 VITALS
OXYGEN SATURATION: 94 % | SYSTOLIC BLOOD PRESSURE: 117 MMHG | BODY MASS INDEX: 17.44 KG/M2 | WEIGHT: 94.8 LBS | HEART RATE: 65 BPM | DIASTOLIC BLOOD PRESSURE: 60 MMHG | HEIGHT: 62 IN | TEMPERATURE: 97.3 F

## 2019-01-03 DIAGNOSIS — K90.41 GLUTEN ENTEROPATHY: ICD-10-CM

## 2019-01-03 DIAGNOSIS — I47.10 PAROXYSMAL SUPRAVENTRICULAR TACHYCARDIA (H): ICD-10-CM

## 2019-01-03 DIAGNOSIS — E53.8 VITAMIN B12 DEFICIENCY (NON ANEMIC): ICD-10-CM

## 2019-01-03 DIAGNOSIS — R19.7 DIARRHEA, UNSPECIFIED TYPE: Primary | ICD-10-CM

## 2019-01-03 DIAGNOSIS — D50.0 IRON DEFICIENCY ANEMIA DUE TO CHRONIC BLOOD LOSS: ICD-10-CM

## 2019-01-03 DIAGNOSIS — I10 BENIGN ESSENTIAL HYPERTENSION: ICD-10-CM

## 2019-01-03 DIAGNOSIS — M81.0 AGE-RELATED OSTEOPOROSIS WITHOUT CURRENT PATHOLOGICAL FRACTURE: ICD-10-CM

## 2019-01-03 LAB
ERYTHROCYTE [DISTWIDTH] IN BLOOD BY AUTOMATED COUNT: 15.2 % (ref 10–15)
HCT VFR BLD AUTO: 36.2 % (ref 35–47)
HGB BLD-MCNC: 12 G/DL (ref 11.7–15.7)
MCH RBC QN AUTO: 31.9 PG (ref 26.5–33)
MCHC RBC AUTO-ENTMCNC: 33.1 G/DL (ref 31.5–36.5)
MCV RBC AUTO: 96 FL (ref 78–100)
PLATELET # BLD AUTO: 235 10E9/L (ref 150–450)
RBC # BLD AUTO: 3.76 10E12/L (ref 3.8–5.2)
VIT B12 SERPL-MCNC: 606 PG/ML (ref 193–986)
WBC # BLD AUTO: 4.4 10E9/L (ref 4–11)

## 2019-01-03 PROCEDURE — 83516 IMMUNOASSAY NONANTIBODY: CPT | Performed by: INTERNAL MEDICINE

## 2019-01-03 PROCEDURE — 85027 COMPLETE CBC AUTOMATED: CPT | Performed by: INTERNAL MEDICINE

## 2019-01-03 PROCEDURE — 83540 ASSAY OF IRON: CPT | Performed by: INTERNAL MEDICINE

## 2019-01-03 PROCEDURE — 82607 VITAMIN B-12: CPT | Performed by: INTERNAL MEDICINE

## 2019-01-03 PROCEDURE — 99214 OFFICE O/P EST MOD 30 MIN: CPT | Performed by: INTERNAL MEDICINE

## 2019-01-03 PROCEDURE — 82784 ASSAY IGA/IGD/IGG/IGM EACH: CPT | Performed by: INTERNAL MEDICINE

## 2019-01-03 PROCEDURE — 83550 IRON BINDING TEST: CPT | Performed by: INTERNAL MEDICINE

## 2019-01-03 PROCEDURE — 80053 COMPREHEN METABOLIC PANEL: CPT | Performed by: INTERNAL MEDICINE

## 2019-01-03 PROCEDURE — 36415 COLL VENOUS BLD VENIPUNCTURE: CPT | Performed by: INTERNAL MEDICINE

## 2019-01-03 PROCEDURE — 83516 IMMUNOASSAY NONANTIBODY: CPT | Mod: 59 | Performed by: INTERNAL MEDICINE

## 2019-01-03 ASSESSMENT — MIFFLIN-ST. JEOR: SCORE: 858.26

## 2019-01-03 NOTE — LETTER
[facility]  [address] [facility]  [address]  [place]  [address]  Tel: [phone]    January 7, 2019    Georgette Alatorre  [address] [place] [id number]        Dear Ms. Alatorre,    Enclosed your laboratory reports from your recent office exam.  Your minerals and electrolytes were all normal except for your potassium which was low.  By the time you receive this letter I will of discuss this with you already.  We had checked several tests looking to see if you had celiac disease.  Fortunately these tests are all normal however, it does not mean that you are not sensitive to gluten-containing foods.  Your iron stores are back to normal and your vitamin B-12 level is normal as well.  Your blood cell studies show that there is no sign of low blood or anemia.    As we continue to work hard on improving your nutrition and hopefully allowing you to gain some weight it will be an ongoing issue.  I will look forward to seeing you back in the office in approximately 1 month.    Sincerely,    Mario Valdes MD/HILDA          Enclosure: Lab Results  Results for orders placed or performed in visit on 01/03/19   Comprehensive metabolic panel   Result Value Ref Range    Sodium 143 133 - 144 mmol/L    Potassium 2.8 (L) 3.4 - 5.3 mmol/L    Chloride 105 94 - 109 mmol/L    Carbon Dioxide 30 20 - 32 mmol/L    Anion Gap 8 3 - 14 mmol/L    Glucose 108 (H) 70 - 99 mg/dL    Urea Nitrogen 16 7 - 30 mg/dL    Creatinine 0.72 0.52 - 1.04 mg/dL    GFR Estimate 79 >60 mL/min/[1.73_m2]    GFR Estimate If Black >90 >60 mL/min/[1.73_m2]    Calcium 9.3 8.5 - 10.1 mg/dL    Bilirubin Total 0.3 0.2 - 1.3 mg/dL    Albumin 3.6 3.4 - 5.0 g/dL    Protein Total 7.3 6.8 - 8.8 g/dL    Alkaline Phosphatase 58 40 - 150 U/L    ALT 22 0 - 50 U/L    AST 29 0 - 45 U/L   Vitamin B12   Result Value Ref Range    Vitamin B12 606 193 - 986 pg/mL   CBC with platelets   Result Value Ref Range    WBC 4.4 4.0 - 11.0 10e9/L    RBC Count 3.76 (L)  3.8 - 5.2 10e12/L    Hemoglobin 12.0 11.7 - 15.7 g/dL    Hematocrit 36.2 35.0 - 47.0 %    MCV 96 78 - 100 fl    MCH 31.9 26.5 - 33.0 pg    MCHC 33.1 31.5 - 36.5 g/dL    RDW 15.2 (H) 10.0 - 15.0 %    Platelet Count 235 150 - 450 10e9/L   Iron and iron binding capacity   Result Value Ref Range    Iron 72 35 - 180 ug/dL    Iron Binding Cap 344 240 - 430 ug/dL    Iron Saturation Index 21 15 - 46 %   Tissue transglutaminase john IgA and IgG   Result Value Ref Range    Tissue Transglutaminase Antibody IgA 1 <7 U/mL    Tissue Transglutaminase Jhon IgG 1 <7 U/mL   IgA   Result Value Ref Range     70 - 380 mg/dL

## 2019-01-03 NOTE — PROGRESS NOTES
SUBJECTIVE:   Georgette Alatorre is a 78 year old female who presents to clinic today for the following health issues:      Recheck foot/leg pain    Patient continues to have diarrhea although her  stools are more firm but she is having approximately 4-5 bowel movements per day.  Her weight is stable, gaining 1 pound since her last visit.  She continues to take [place] instant breakfast and [place] milk on a daily basis.  In reviewing her diet and review of her past medical history she has not been previously evaluated for gluten enteropathy.      Problem list and histories reviewed & adjusted, as indicated.  Additional history: as documented    Current Outpatient Medications   Medication Sig Dispense Refill     Acetaminophen (TYLENOL PO) Take 500 mg by mouth every 4 hours as needed for mild pain       Ascorbic Acid (VITAMIN C PO) Take 500 mg by mouth daily.       calcium carbonate (OS-ILYA 500 MG [place]. [place]) 1250 MG tablet Take 1 tablet by mouth 2 times daily       Cholecalciferol (VITAMIN D3 PO) Take 2,000 Units by mouth daily.       diltiazem (DILT-XR) 180 MG 24 hr capsule TAKE ONE CAPSULE BY MOUTH ONCE DAILY 90 capsule 3     Nutritional Supplements (CARNATION INSTANT BREAKFAST PO) Take 1 Can by mouth daily        probiotic CAPS Take 1 capsule by mouth daily        Protein POWD Take 1 Can by mouth daily        Wheat Dextrin (BENEFIBER PO) Take by mouth 2 times daily       denosumab (PROLIA) 60 MG/ML SOLN injection Inject 1 mL (60 mg) Subcutaneous every 6 months (Patient not taking: Reported on 1/3/2019) 1 mL 1     potassium chloride (KLOR-CON) 20 MEQ packet Take 20 mEq by mouth 2 times daily 180 packet 3       Reviewed and updated as needed this visit by clinical staff       Reviewed and updated as needed this visit by Provider         ROS:  Constitutional, HEENT, cardiovascular, pulmonary, gi and gu systems are negative, except as otherwise noted.    OBJECTIVE:     /60 (BP Location: Left arm, Cuff Size:  "Adult Regular)   Pulse 65   Temp 97.3  F (36.3  C) (Tympanic)   Ht 1.575 m (5' 2\")   Wt 43 kg (94 lb 12.8 oz)   SpO2 94%   BMI 17.34 kg/m    Body mass index is 17.34 kg/m .  GENERAL: healthy, alert and no distress  NECK: no adenopathy, no asymmetry, masses, or scars and thyroid normal to palpation  RESP: lungs clear to auscultation - no rales, rhonchi or wheezes  CV: regular rate and rhythm, normal S1 S2, no S3 or S4, no murmur, click or rub, no peripheral edema and peripheral pulses strong  ABDOMEN: soft, nontender, no hepatosplenomegaly, no masses and bowel sounds normal  MS: no gross musculoskeletal defects noted, no edema  Left foot no peripheral edema ankle is normal her foot is flat and she has palpable tenderness over the first MTP with associated palpable pin from her previous surgery      ASSESSMENT/PLAN:             1. Diarrhea, unspecified type  Reevaluate her chemistries including a screen for celiac disease.  I recommended increasing her fiber and depending on her laboratory studies she may be a candidate for Pepto-Bismol.  - Comprehensive metabolic panel  - Vitamin B12  - CBC with platelets  - Iron and iron binding capacity  - Tissue transglutaminase john IgA and IgG  - IgA    2. Paroxysmal supraventricular tachycardia (H)      3. Benign essential hypertension  [-Well-controlled with current therapy  - Comprehensive metabolic panel  - CBC with platelets    4. Age-related osteoporosis without current pathological fracture  Continue to improve her nutrition, expand activities and hopefully increase her weight    5. Vitamin B12 deficiency (non anemic)    - Vitamin B12    6. Iron deficiency anemia due to chronic blood loss    - CBC with platelets  - Iron and iron binding capacity    7. Gluten enteropathy    - Tissue transglutaminase john IgA and IgG  - IgA        Mario Valdes MD  [facility]  "

## 2019-01-04 LAB
ALBUMIN SERPL-MCNC: 3.6 G/DL (ref 3.4–5)
ALP SERPL-CCNC: 58 U/L (ref 40–150)
ALT SERPL W P-5'-P-CCNC: 22 U/L (ref 0–50)
ANION GAP SERPL CALCULATED.3IONS-SCNC: 8 MMOL/L (ref 3–14)
AST SERPL W P-5'-P-CCNC: 29 U/L (ref 0–45)
BILIRUB SERPL-MCNC: 0.3 MG/DL (ref 0.2–1.3)
BUN SERPL-MCNC: 16 MG/DL (ref 7–30)
CALCIUM SERPL-MCNC: 9.3 MG/DL (ref 8.5–10.1)
CHLORIDE SERPL-SCNC: 105 MMOL/L (ref 94–109)
CO2 SERPL-SCNC: 30 MMOL/L (ref 20–32)
CREAT SERPL-MCNC: 0.72 MG/DL (ref 0.52–1.04)
GFR SERPL CREATININE-BSD FRML MDRD: 79 ML/MIN/{1.73_M2}
GLUCOSE SERPL-MCNC: 108 MG/DL (ref 70–99)
IGA SERPL-MCNC: 322 MG/DL (ref 70–380)
IRON SATN MFR SERPL: 21 % (ref 15–46)
IRON SERPL-MCNC: 72 UG/DL (ref 35–180)
POTASSIUM SERPL-SCNC: 2.8 MMOL/L (ref 3.4–5.3)
PROT SERPL-MCNC: 7.3 G/DL (ref 6.8–8.8)
SODIUM SERPL-SCNC: 143 MMOL/L (ref 133–144)
TIBC SERPL-MCNC: 344 UG/DL (ref 240–430)

## 2019-01-05 LAB
TTG IGA SER-ACNC: 1 U/ML
TTG IGG SER-ACNC: 1 U/ML

## 2019-01-09 ENCOUNTER — TELEPHONE (OUTPATIENT)
Dept: FAMILY MEDICINE | Facility: CLINIC | Age: 79
End: 2019-01-09

## 2019-01-09 DIAGNOSIS — E87.6 HYPOKALEMIA: Primary | ICD-10-CM

## 2019-01-09 NOTE — TELEPHONE ENCOUNTER
Reason for Call:  Medication or medication refill:    Do you use a [facility]?  Name of the pharmacy and phone number for the current request:       [facility] [address] [facility] [place] [place] [facility] [address]      Name of the medication requested: POTASSIUM    Other request: PT STATES SHE SPOKE WITH DR RYAN WHO SAID HE WOULD CALL IN A RX FOR POTASSIUM, BUT THE RX IS NOT AT EITHER OF THE PHARMACIES THAT SHE USES,. PLEASE SEND TO THE ABOVE PHARMACY    Can we leave a detailed message on this number? YES    Phone number patient can be reached at: Home number on file [phone] (home)    Best Time: ANYTIME    Call taken on 1/9/2019 at 4:08 PM by Layne Kamara

## 2019-01-10 RX ORDER — POTASSIUM CHLORIDE 1.5 G/1.58G
20 POWDER, FOR SOLUTION ORAL 2 TIMES DAILY
Qty: 180 PACKET | Refills: 3 | Status: SHIPPED | OUTPATIENT
Start: 2019-01-10 | End: 2019-03-28

## 2019-01-10 NOTE — TELEPHONE ENCOUNTER
Reason for Call:  Other     Detailed comments: patient was checking on rx for potassium that she discussed with Dr Valdes, the pharmacy that she wants it to be sent to [facility] [place], [place] - [address] AT [facility] [address]    Phone Number Patient can be reached at: Home number on file [phone] (home)    Best Time:     Can we leave a detailed message on this number? yes    Call taken on 1/10/2019 at 1:10 PM by Pura Ward

## 2019-01-10 NOTE — TELEPHONE ENCOUNTER
Patient is requesting prescription for Potassium.  It is not on her current med list and the office visit from 1/2/19 is not completed, so not sure if this is something discussed or not.     Conchita Noriega MA

## 2019-01-11 ENCOUNTER — TELEPHONE (OUTPATIENT)
Dept: FAMILY MEDICINE | Facility: CLINIC | Age: 79
End: 2019-01-11

## 2019-01-11 NOTE — TELEPHONE ENCOUNTER
Fax from pharmacy  Klor-Con 20meq Powder/Packets are very expensive (over $650) even with patient's insurance.    KDur 20meq tabs will dissolve in liquid.    OK to switch?  If yes, please send new Rx.    RT [place] (R)

## 2019-01-11 NOTE — TELEPHONE ENCOUNTER
In the infinite wisdom of epic K Dur is not an allowable choice.  Will phone in K-Dur 20 milliequivalents daily to the drugstore

## 2019-01-11 NOTE — TELEPHONE ENCOUNTER
Routing to provider for review.  Please see message below.    KIERSTEN JamesN, RN  [employer] [facility]

## 2019-01-21 ENCOUNTER — TELEPHONE (OUTPATIENT)
Dept: FAMILY MEDICINE | Facility: CLINIC | Age: 79
End: 2019-01-21

## 2019-01-21 NOTE — TELEPHONE ENCOUNTER
Placed call to patient in regards to prolia re-verification list received from [facility]. Patient never received previous Prolia injection due to having dental work done and is in the process of having a tooth extracted coming up due to the tooth breaking. Patient will hold off on Prolia for now and will potentially discuss starting it once she is able to do so.    René Sal, FAUSTINO on 1/21/2019 at 5:15 PM

## 2019-02-27 ENCOUNTER — OFFICE VISIT (OUTPATIENT)
Dept: FAMILY MEDICINE | Facility: CLINIC | Age: 79
End: 2019-02-27
Payer: MEDICARE

## 2019-02-27 VITALS
WEIGHT: 92.3 LBS | BODY MASS INDEX: 16.99 KG/M2 | HEART RATE: 68 BPM | TEMPERATURE: 96.8 F | HEIGHT: 62 IN | OXYGEN SATURATION: 95 % | SYSTOLIC BLOOD PRESSURE: 106 MMHG | DIASTOLIC BLOOD PRESSURE: 67 MMHG

## 2019-02-27 DIAGNOSIS — R19.7 DIARRHEA, UNSPECIFIED TYPE: ICD-10-CM

## 2019-02-27 DIAGNOSIS — M81.0 AGE-RELATED OSTEOPOROSIS WITHOUT CURRENT PATHOLOGICAL FRACTURE: ICD-10-CM

## 2019-02-27 DIAGNOSIS — I10 BENIGN ESSENTIAL HYPERTENSION: Primary | ICD-10-CM

## 2019-02-27 DIAGNOSIS — D50.0 IRON DEFICIENCY ANEMIA DUE TO CHRONIC BLOOD LOSS: ICD-10-CM

## 2019-02-27 DIAGNOSIS — K90.41 GLUTEN ENTEROPATHY: ICD-10-CM

## 2019-02-27 DIAGNOSIS — E53.8 VITAMIN B12 DEFICIENCY (NON ANEMIC): ICD-10-CM

## 2019-02-27 DIAGNOSIS — I47.10 PAROXYSMAL SUPRAVENTRICULAR TACHYCARDIA (H): ICD-10-CM

## 2019-02-27 LAB
ERYTHROCYTE [DISTWIDTH] IN BLOOD BY AUTOMATED COUNT: 15.3 % (ref 10–15)
HCT VFR BLD AUTO: 37.8 % (ref 35–47)
HGB BLD-MCNC: 12.3 G/DL (ref 11.7–15.7)
MCH RBC QN AUTO: 32.2 PG (ref 26.5–33)
MCHC RBC AUTO-ENTMCNC: 32.5 G/DL (ref 31.5–36.5)
MCV RBC AUTO: 99 FL (ref 78–100)
PLATELET # BLD AUTO: 226 10E9/L (ref 150–450)
RBC # BLD AUTO: 3.82 10E12/L (ref 3.8–5.2)
WBC # BLD AUTO: 5.4 10E9/L (ref 4–11)

## 2019-02-27 PROCEDURE — 80048 BASIC METABOLIC PNL TOTAL CA: CPT | Performed by: INTERNAL MEDICINE

## 2019-02-27 PROCEDURE — 85027 COMPLETE CBC AUTOMATED: CPT | Performed by: INTERNAL MEDICINE

## 2019-02-27 PROCEDURE — 82040 ASSAY OF SERUM ALBUMIN: CPT | Performed by: INTERNAL MEDICINE

## 2019-02-27 PROCEDURE — 36415 COLL VENOUS BLD VENIPUNCTURE: CPT | Performed by: INTERNAL MEDICINE

## 2019-02-27 PROCEDURE — 99214 OFFICE O/P EST MOD 30 MIN: CPT | Performed by: INTERNAL MEDICINE

## 2019-02-27 ASSESSMENT — MIFFLIN-ST. JEOR: SCORE: 846.92

## 2019-02-27 NOTE — PROGRESS NOTES
SUBJECTIVE:   Georgette Alatorre is a 78 year old female who presents to clinic today for the following health issues:    Follow Up     Ear concerns    Duration: x couple weeks    Description (location/character/radiation): both ears mostly the right    Intensity:  mild    Accompanying signs and symptoms: crackling, when she moves the jaw she hears popping    History (similar episodes/previous evaluation): None    Precipitating or alleviating factors: None    Therapies tried and outcome: None     Chronic diarrhea  No weight loss.  She continues to have to manage her activities because of her post prandial likelihood for diarrhea.  The Benefiber does seem to help.  Using [place] instant breakfast associated with her protein powder which does seem to exacerbate her diarrhea.  No blood or mucus or associated abdominal pain    Scoliosis    Ongoing chronic back pain which aggravates her sleep and does limit her activities.  Ongoing discomfort without radiating pain.  Problem list and histories reviewed & adjusted, as indicated.  Additional history: as documented    Patient Active Problem List   Diagnosis     Obstruction of bowel (H)     SBO (small bowel obstruction) (H)     Radiation colitis     Vitamin B12 deficiency (non anemic)     Paroxysmal supraventricular tachycardia (H)     Age-related osteoporosis without current pathological fracture     Past Surgical History:   Procedure Laterality Date     APPENDECTOMY       CATARACT IOL, RT/LT  2009    Cataract IOL Bilateral     COLONOSCOPY  03/30/2006    Normal; repeat in 5 years     CYSTOSCOPY  11/14/2007    Cystoscopy, bilateral retrograde pyelograms, dilatation of left ureteral stricture, left ureteroscopy and insertion of left double pigtail stent.      ENDOSCOPY  10/22/2008    Upper GI:  Normal     ENDOSCOPY  1/11/2007    Upper GI     EYE SURGERY  2010    retinal eye surgery     HYSTERECTOMY, PAP NO LONGER INDICATED  2000    due to endometrial cancer      LAPAROTOMY EXPLORATORY  08/22/2007    1.  Exploratory laparotomy. 2.  Extensive lysis of intra-abdominal adhesions. Performed by Dr Rad Pierce     LAPAROTOMY EXPLORATORY  04/20/2005    1.  Exploratory laparotomy.  2.  Lysis of adhesions.  Segmental small bowel resection Performed by Dr Rad Pierce.       Social History     Tobacco Use     Smoking status: Former Smoker     Packs/day: 0.50     Years: 40.00     Pack years: 20.00     Smokeless tobacco: Never Used   Substance Use Topics     Alcohol use: Yes     Alcohol/week: 0.0 oz     Comment: 1 glass of wine with dinner     History reviewed. No pertinent family history.      Current Outpatient Medications   Medication Sig Dispense Refill     Acetaminophen (TYLENOL PO) Take 500 mg by mouth every 4 hours as needed for mild pain       Ascorbic Acid (VITAMIN C PO) Take 500 mg by mouth daily.       calcium carbonate (OS-ILYA 500 MG [place]. [place]) 1250 MG tablet Take 1 tablet by mouth 2 times daily       Cholecalciferol (VITAMIN D3 PO) Take 2,000 Units by mouth daily.       diltiazem (DILT-XR) 180 MG 24 hr capsule TAKE ONE CAPSULE BY MOUTH ONCE DAILY 90 capsule 3     Nutritional Supplements (CARNATION INSTANT BREAKFAST PO) Take 1 Can by mouth daily        potassium chloride (KLOR-CON) 20 MEQ packet Take 20 mEq by mouth 2 times daily 180 packet 3     probiotic CAPS Take 1 capsule by mouth daily        Protein POWD Take 1 Can by mouth daily        Wheat Dextrin (BENEFIBER PO) Take by mouth 2 times daily       Allergies   Allergen Reactions     No Known Allergies      Labs reviewed in EPIC    Reviewed and updated as needed this visit by clinical staff       Reviewed and updated as needed this visit by Provider         ROS:  CONSTITUTIONAL: NEGATIVE for fever, chills, change in weight  INTEGUMENTARY/SKIN: NEGATIVE for worrisome rashes, moles or lesions  EYES: NEGATIVE for vision changes or irritation  ENT/MOUTH: NEGATIVE for ear, mouth and throat problems  RESP: NEGATIVE for  "significant cough or SOB  BREAST: NEGATIVE for masses, tenderness or discharge  CV: NEGATIVE for chest pain, palpitations or peripheral edema  GI: NEGATIVE for nausea, abdominal pain, heartburn, or change in bowel habits  : NEGATIVE for frequency, dysuria, or hematuria  MUSCULOSKELETAL: NEGATIVE for significant arthralgias or myalgia  NEURO: NEGATIVE for weakness, dizziness or paresthesias  ENDOCRINE: NEGATIVE for temperature intolerance, skin/hair changes  HEME: NEGATIVE for bleeding problems  PSYCHIATRIC: NEGATIVE for changes in mood or affect    OBJECTIVE:     /67 (BP Location: Left arm, Patient Position: Sitting, Cuff Size: Adult Small)   Pulse 68   Temp 96.8  F (36  C) (Oral)   Ht 1.575 m (5' 2\")   Wt 41.9 kg (92 lb 4.8 oz)   SpO2 95%   BMI 16.88 kg/m    Body mass index is 16.88 kg/m .   HEENT  Her ears were normal bilaterally nose and throat were clear  Neck was supple without adenopathy or thyromegaly her carotids were normal without bruits  Chest clear to auscultation and percussion  Cardiovascular S1 and S2 are physiologic without murmurs or gallops  Abdomen bowel sounds were normal.  There is no palpable mass or organomegaly  Extremities nontender without any edema  No significant scoliosis without tenderness  Pulses pedal pulses are as described otherwise his pulses are bilaterally symmetrical throughout without bruits  Skin without significant abnormality    ASSESSMENT/PLAN:   Georgette was seen today for recheck and ear problem.    Diagnoses and all orders for this visit:    Benign essential hypertension  -     CBC with platelets  -     Basic metabolic panel  -     Albumin level    Iron deficiency anemia due to chronic blood loss  -     CBC with platelets    Age-related osteoporosis without current pathological fracture    Diarrhea, unspecified type    Gluten enteropathy    Paroxysmal supraventricular tachycardia (H)    Vitamin B12 deficiency (non anemic)    Recommended decreasing the " protein/instant breakfast supplement, using a Pepto-Bismol as needed to determine whether that would help with her chronic diarrhea.    Attempts at controlling her chronic back pain associated with her scoliosis with Tylenol regularly or as needed    Return to clinic in 1 month    Mario Valdes MD  [facility]

## 2019-02-28 LAB
ALBUMIN SERPL-MCNC: 4 G/DL (ref 3.4–5)
ANION GAP SERPL CALCULATED.3IONS-SCNC: 8 MMOL/L (ref 3–14)
BUN SERPL-MCNC: 24 MG/DL (ref 7–30)
CALCIUM SERPL-MCNC: 9.6 MG/DL (ref 8.5–10.1)
CHLORIDE SERPL-SCNC: 108 MMOL/L (ref 94–109)
CO2 SERPL-SCNC: 23 MMOL/L (ref 20–32)
CREAT SERPL-MCNC: 0.79 MG/DL (ref 0.52–1.04)
GFR SERPL CREATININE-BSD FRML MDRD: 71 ML/MIN/{1.73_M2}
GLUCOSE SERPL-MCNC: 95 MG/DL (ref 70–99)
POTASSIUM SERPL-SCNC: 4.4 MMOL/L (ref 3.4–5.3)
SODIUM SERPL-SCNC: 139 MMOL/L (ref 133–144)

## 2019-03-28 ENCOUNTER — OFFICE VISIT (OUTPATIENT)
Dept: FAMILY MEDICINE | Facility: CLINIC | Age: 79
End: 2019-03-28
Payer: COMMERCIAL

## 2019-03-28 VITALS
HEART RATE: 60 BPM | WEIGHT: 92 LBS | DIASTOLIC BLOOD PRESSURE: 68 MMHG | SYSTOLIC BLOOD PRESSURE: 135 MMHG | TEMPERATURE: 97.1 F | OXYGEN SATURATION: 95 % | HEIGHT: 62 IN | BODY MASS INDEX: 16.93 KG/M2

## 2019-03-28 DIAGNOSIS — K21.9 GASTROESOPHAGEAL REFLUX DISEASE WITHOUT ESOPHAGITIS: ICD-10-CM

## 2019-03-28 DIAGNOSIS — I47.10 PAROXYSMAL SUPRAVENTRICULAR TACHYCARDIA (H): ICD-10-CM

## 2019-03-28 DIAGNOSIS — K56.609 SBO (SMALL BOWEL OBSTRUCTION) (H): ICD-10-CM

## 2019-03-28 DIAGNOSIS — E53.8 VITAMIN B12 DEFICIENCY (NON ANEMIC): ICD-10-CM

## 2019-03-28 DIAGNOSIS — K52.0 RADIATION COLITIS: Primary | ICD-10-CM

## 2019-03-28 DIAGNOSIS — M81.0 AGE-RELATED OSTEOPOROSIS WITHOUT CURRENT PATHOLOGICAL FRACTURE: ICD-10-CM

## 2019-03-28 DIAGNOSIS — M41.25 OTHER IDIOPATHIC SCOLIOSIS, THORACOLUMBAR REGION: ICD-10-CM

## 2019-03-28 PROCEDURE — 99214 OFFICE O/P EST MOD 30 MIN: CPT | Performed by: INTERNAL MEDICINE

## 2019-03-28 ASSESSMENT — MIFFLIN-ST. JEOR: SCORE: 845.56

## 2019-03-28 NOTE — PROGRESS NOTES
SUBJECTIVE:   Georgette Alatorre is a 78 year old female who presents to clinic today for the following health issues:      F/up weight loss  Patient states that she is managing her diet relatively well however she continues to struggle with activities because of the inconvenience and the worry about her bowel habits with activity.  No melena or bright red blood associated with stooling    Her activities are also somewhat limited by her chronic back pain associated with her kyphoscoliosis.  She has no radicular symptoms.    Denies any recent episodes of PSVT    She denies any problems with GERD including dysphasia or water brash or early satiety.    Problem list and histories reviewed & adjusted, as indicated.  Additional history: as documented    Current Outpatient Medications   Medication Sig Dispense Refill     Acetaminophen (TYLENOL PO) Take 500 mg by mouth every 4 hours as needed for mild pain       Ascorbic Acid (VITAMIN C PO) Take 500 mg by mouth daily.       bismuth subsalicylate (PEPTO BISMOL) 262 MG/15ML suspension Take 15 mLs by mouth every 6 hours as needed for indigestion       calcium carbonate (OS-ILYA 500 MG [place]. [place]) 1250 MG tablet Take 1 tablet by mouth 2 times daily       Cholecalciferol (VITAMIN D3 PO) Take 2,000 Units by mouth daily.       diltiazem (DILT-XR) 180 MG 24 hr capsule TAKE ONE CAPSULE BY MOUTH ONCE DAILY 90 capsule 3     Nutritional Supplements (CARNATION INSTANT BREAKFAST PO) Take 1 Can by mouth daily        probiotic CAPS Take 1 capsule by mouth daily        Protein POWD Take 1 Can by mouth daily        Wheat Dextrin (BENEFIBER PO) Take by mouth 2 times daily       Allergies   Allergen Reactions     No Known Allergies        Reviewed and updated as needed this visit by clinical staff       Reviewed and updated as needed this visit by Provider         ROS:  Constitutional, HEENT, cardiovascular, pulmonary, gi and gu systems are negative, except as otherwise noted.    OBJECTIVE:  "    /68 (BP Location: Left arm, Cuff Size: Adult Regular)   Pulse 60   Temp 97.1  F (36.2  C) (Oral)   Ht 1.575 m (5' 2\")   Wt 41.7 kg (92 lb)   SpO2 95%   BMI 16.83 kg/m    Body mass index is 16.83 kg/m .  GENERAL: healthy, alert and no distress  NECK: no adenopathy, no asymmetry, masses, or scars and thyroid normal to palpation  RESP: lungs clear to auscultation - no rales, rhonchi or wheezes  CV: regular rate and rhythm, normal S1 S2, no S3 or S4, no murmur, click or rub, no peripheral edema and peripheral pulses strong  ABDOMEN: soft, nontender, no hepatosplenomegaly, no masses and bowel sounds normal  MS: no gross musculoskeletal defects noted, no edema  Back moderately severe scoliosis without paraspinous muscle tenderness  Extremities nontender without any edema pulses were 2/4+ bilaterally symmetrical and intact      ASSESSMENT/PLAN:             1. Radiation colitis  Recommended to increase her Pepto-Bismol to 2 tablespoons twice daily    2. SBO (small bowel obstruction) (H)  No recent symptoms with cautious use of antidiarrheals.    3. Vitamin B12 deficiency (non anemic)      4. Age-related osteoporosis without current pathological fracture  Follow-up planned    5. Paroxysmal supraventricular tachycardia (H)  Quiesced sent with current therapy    6. Gastroesophageal reflux disease without esophagitis  No recent symptoms to suggest need for additional medications    7. Other idiopathic scoliosis, thoracolumbar region  Managing with activity adjustments          Mario Valdes MD  [facility]    "

## 2019-06-03 ENCOUNTER — TRANSFERRED RECORDS (OUTPATIENT)
Dept: HEALTH INFORMATION MANAGEMENT | Facility: CLINIC | Age: 79
End: 2019-06-03

## 2019-06-19 ENCOUNTER — OFFICE VISIT (OUTPATIENT)
Dept: FAMILY MEDICINE | Facility: CLINIC | Age: 79
End: 2019-06-19
Payer: COMMERCIAL

## 2019-06-19 VITALS
OXYGEN SATURATION: 96 % | DIASTOLIC BLOOD PRESSURE: 76 MMHG | HEART RATE: 54 BPM | HEIGHT: 62 IN | TEMPERATURE: 97.4 F | WEIGHT: 92 LBS | SYSTOLIC BLOOD PRESSURE: 114 MMHG | BODY MASS INDEX: 16.93 KG/M2

## 2019-06-19 DIAGNOSIS — K52.9 CHRONIC DIARRHEA: ICD-10-CM

## 2019-06-19 DIAGNOSIS — I47.10 PAROXYSMAL SUPRAVENTRICULAR TACHYCARDIA (H): ICD-10-CM

## 2019-06-19 DIAGNOSIS — E55.9 VITAMIN D DEFICIENCY: ICD-10-CM

## 2019-06-19 DIAGNOSIS — E53.8 VITAMIN B12 DEFICIENCY (NON ANEMIC): ICD-10-CM

## 2019-06-19 DIAGNOSIS — D50.0 IRON DEFICIENCY ANEMIA DUE TO CHRONIC BLOOD LOSS: ICD-10-CM

## 2019-06-19 DIAGNOSIS — M41.25 OTHER IDIOPATHIC SCOLIOSIS, THORACOLUMBAR REGION: ICD-10-CM

## 2019-06-19 DIAGNOSIS — M81.0 AGE-RELATED OSTEOPOROSIS WITHOUT CURRENT PATHOLOGICAL FRACTURE: ICD-10-CM

## 2019-06-19 DIAGNOSIS — I10 BENIGN ESSENTIAL HYPERTENSION: Primary | ICD-10-CM

## 2019-06-19 DIAGNOSIS — K52.0 RADIATION COLITIS: ICD-10-CM

## 2019-06-19 DIAGNOSIS — K90.41 GLUTEN ENTEROPATHY: ICD-10-CM

## 2019-06-19 DIAGNOSIS — K21.9 GASTROESOPHAGEAL REFLUX DISEASE WITHOUT ESOPHAGITIS: ICD-10-CM

## 2019-06-19 LAB
ERYTHROCYTE [DISTWIDTH] IN BLOOD BY AUTOMATED COUNT: 14 % (ref 10–15)
HCT VFR BLD AUTO: 39.5 % (ref 35–47)
HGB BLD-MCNC: 13.3 G/DL (ref 11.7–15.7)
MCH RBC QN AUTO: 33.1 PG (ref 26.5–33)
MCHC RBC AUTO-ENTMCNC: 33.7 G/DL (ref 31.5–36.5)
MCV RBC AUTO: 98 FL (ref 78–100)
PLATELET # BLD AUTO: 226 10E9/L (ref 150–450)
RBC # BLD AUTO: 4.02 10E12/L (ref 3.8–5.2)
WBC # BLD AUTO: 5.4 10E9/L (ref 4–11)

## 2019-06-19 PROCEDURE — 80048 BASIC METABOLIC PNL TOTAL CA: CPT | Performed by: INTERNAL MEDICINE

## 2019-06-19 PROCEDURE — 82040 ASSAY OF SERUM ALBUMIN: CPT | Performed by: INTERNAL MEDICINE

## 2019-06-19 PROCEDURE — 99214 OFFICE O/P EST MOD 30 MIN: CPT | Performed by: INTERNAL MEDICINE

## 2019-06-19 PROCEDURE — 85027 COMPLETE CBC AUTOMATED: CPT | Performed by: INTERNAL MEDICINE

## 2019-06-19 PROCEDURE — 36415 COLL VENOUS BLD VENIPUNCTURE: CPT | Performed by: INTERNAL MEDICINE

## 2019-06-19 RX ORDER — DILTIAZEM HYDROCHLORIDE 180 MG/1
CAPSULE, EXTENDED RELEASE ORAL
Qty: 90 CAPSULE | Refills: 3 | Status: SHIPPED | OUTPATIENT
Start: 2019-06-19 | End: 2020-04-14

## 2019-06-19 ASSESSMENT — MIFFLIN-ST. JEOR: SCORE: 845.56

## 2019-06-19 NOTE — PROGRESS NOTES
Subjective     Georgette Alatorre is a 78 year old female who presents to clinic today for the following health issues:    HPI   Colitis - pt is having increased problems with uncontrollable stools      Current Outpatient Medications   Medication Sig Dispense Refill     Acetaminophen (TYLENOL PO) Take 500 mg by mouth every 4 hours as needed for mild pain       Ascorbic Acid (VITAMIN C PO) Take 500 mg by mouth daily.       bismuth subsalicylate (PEPTO BISMOL) 262 MG/15ML suspension Take 15 mLs by mouth every 6 hours as needed for indigestion       calcium carbonate (OS-ILYA 500 MG [place]. [place]) 1250 MG tablet Take 1 tablet by mouth 2 times daily       Cholecalciferol (VITAMIN D3 PO) Take 2,000 Units by mouth daily.       diltiazem ER (DILT-XR) 180 MG 24 hr capsule TAKE ONE CAPSULE BY MOUTH ONCE DAILY 90 capsule 3     Nutritional Supplements (CARNATION INSTANT BREAKFAST PO) Take 1 Can by mouth daily        probiotic CAPS Take 1 capsule by mouth daily        Protein POWD Take 1 Can by mouth daily        Wheat Dextrin (BENEFIBER PO) Take by mouth 2 times daily       Allergies   Allergen Reactions     No Known Allergies        Reviewed and updated as needed this visit by Provider         Review of Systems   ROS COMP: Constitutional, HEENT, cardiovascular, pulmonary, gi and gu systems are negative, except as otherwise noted.      Objective    There were no vitals taken for this visit.  There is no height or weight on file to calculate BMI.  Physical Exam   GENERAL: healthy, alert and no distress  NECK: no adenopathy, no asymmetry, masses, or scars and thyroid normal to palpation  RESP: lungs clear to auscultation - no rales, rhonchi or wheezes  CV: regular rate and rhythm, normal S1 S2, no S3 or S4, no murmur, click or rub, no peripheral edema and peripheral pulses strong  ABDOMEN: soft, nontender, no hepatosplenomegaly, no masses and bowel sounds normal  MS: no gross musculoskeletal defects noted, no edema             Assessment & Plan     1. Benign essential hypertension    - CBC with platelets  - Basic metabolic panel  - Albumin level  - diltiazem ER (DILT-XR) 180 MG 24 hr capsule; TAKE ONE CAPSULE BY MOUTH ONCE DAILY  Dispense: 90 capsule; Refill: 3    2. Radiation colitis      3. Chronic diarrhea    - CBC with platelets  - Basic metabolic panel  - Albumin level    4. Gluten enteropathy      5. Paroxysmal supraventricular tachycardia (H)      6. Vitamin B12 deficiency (non anemic)      7. Gastroesophageal reflux disease without esophagitis      8. Iron deficiency anemia due to chronic blood loss      9. Vitamin D deficiency      10. Age-related osteoporosis without current pathological fracture      11. Other idiopathic scoliosis, thoracolumbar region               RTC 2 wks    Mario Valdes MD  [facility]

## 2019-06-20 LAB
ALBUMIN SERPL-MCNC: 3.7 G/DL (ref 3.4–5)
ANION GAP SERPL CALCULATED.3IONS-SCNC: 8 MMOL/L (ref 3–14)
BUN SERPL-MCNC: 16 MG/DL (ref 7–30)
CALCIUM SERPL-MCNC: 8.9 MG/DL (ref 8.5–10.1)
CHLORIDE SERPL-SCNC: 110 MMOL/L (ref 94–109)
CO2 SERPL-SCNC: 27 MMOL/L (ref 20–32)
CREAT SERPL-MCNC: 0.73 MG/DL (ref 0.52–1.04)
GFR SERPL CREATININE-BSD FRML MDRD: 78 ML/MIN/{1.73_M2}
GLUCOSE SERPL-MCNC: 88 MG/DL (ref 70–99)
POTASSIUM SERPL-SCNC: 3.7 MMOL/L (ref 3.4–5.3)
SODIUM SERPL-SCNC: 145 MMOL/L (ref 133–144)

## 2019-09-17 ENCOUNTER — TRANSFERRED RECORDS (OUTPATIENT)
Dept: HEALTH INFORMATION MANAGEMENT | Facility: CLINIC | Age: 79
End: 2019-09-17

## 2019-09-18 ENCOUNTER — OFFICE VISIT (OUTPATIENT)
Dept: FAMILY MEDICINE | Facility: CLINIC | Age: 79
End: 2019-09-18
Payer: COMMERCIAL

## 2019-09-18 VITALS
DIASTOLIC BLOOD PRESSURE: 86 MMHG | SYSTOLIC BLOOD PRESSURE: 136 MMHG | HEART RATE: 62 BPM | TEMPERATURE: 98 F | OXYGEN SATURATION: 97 % | WEIGHT: 94.7 LBS | BODY MASS INDEX: 17.32 KG/M2

## 2019-09-18 DIAGNOSIS — I47.10 PAROXYSMAL SUPRAVENTRICULAR TACHYCARDIA (H): ICD-10-CM

## 2019-09-18 DIAGNOSIS — I10 BENIGN ESSENTIAL HYPERTENSION: ICD-10-CM

## 2019-09-18 DIAGNOSIS — R30.0 DYSURIA: ICD-10-CM

## 2019-09-18 DIAGNOSIS — E53.8 VITAMIN B12 DEFICIENCY (NON ANEMIC): ICD-10-CM

## 2019-09-18 DIAGNOSIS — D50.0 IRON DEFICIENCY ANEMIA DUE TO CHRONIC BLOOD LOSS: ICD-10-CM

## 2019-09-18 DIAGNOSIS — M81.0 AGE-RELATED OSTEOPOROSIS WITHOUT CURRENT PATHOLOGICAL FRACTURE: ICD-10-CM

## 2019-09-18 DIAGNOSIS — R19.7 DIARRHEA, UNSPECIFIED TYPE: Primary | ICD-10-CM

## 2019-09-18 DIAGNOSIS — K90.41 GLUTEN ENTEROPATHY: ICD-10-CM

## 2019-09-18 DIAGNOSIS — K52.0 RADIATION COLITIS: ICD-10-CM

## 2019-09-18 DIAGNOSIS — K21.9 GASTROESOPHAGEAL REFLUX DISEASE WITHOUT ESOPHAGITIS: ICD-10-CM

## 2019-09-18 DIAGNOSIS — N13.30 HYDRONEPHROSIS, UNSPECIFIED HYDRONEPHROSIS TYPE: ICD-10-CM

## 2019-09-18 DIAGNOSIS — E55.9 VITAMIN D DEFICIENCY: ICD-10-CM

## 2019-09-18 LAB
ALBUMIN UR-MCNC: NEGATIVE MG/DL
APPEARANCE UR: CLEAR
BILIRUB UR QL STRIP: NEGATIVE
COLOR UR AUTO: YELLOW
ERYTHROCYTE [DISTWIDTH] IN BLOOD BY AUTOMATED COUNT: 14.2 % (ref 10–15)
GLUCOSE UR STRIP-MCNC: NEGATIVE MG/DL
HCT VFR BLD AUTO: 37.3 % (ref 35–47)
HGB BLD-MCNC: 12.6 G/DL (ref 11.7–15.7)
HGB UR QL STRIP: ABNORMAL
KETONES UR STRIP-MCNC: NEGATIVE MG/DL
LEUKOCYTE ESTERASE UR QL STRIP: NEGATIVE
MCH RBC QN AUTO: 32.6 PG (ref 26.5–33)
MCHC RBC AUTO-ENTMCNC: 33.8 G/DL (ref 31.5–36.5)
MCV RBC AUTO: 97 FL (ref 78–100)
NITRATE UR QL: NEGATIVE
PH UR STRIP: 5.5 PH (ref 5–7)
PLATELET # BLD AUTO: 238 10E9/L (ref 150–450)
RBC # BLD AUTO: 3.86 10E12/L (ref 3.8–5.2)
RBC #/AREA URNS AUTO: NORMAL /HPF
SOURCE: ABNORMAL
SP GR UR STRIP: <=1.005 (ref 1–1.03)
UROBILINOGEN UR STRIP-ACNC: 0.2 EU/DL (ref 0.2–1)
WBC # BLD AUTO: 3.8 10E9/L (ref 4–11)
WBC #/AREA URNS AUTO: NORMAL /HPF

## 2019-09-18 PROCEDURE — 80053 COMPREHEN METABOLIC PANEL: CPT | Performed by: INTERNAL MEDICINE

## 2019-09-18 PROCEDURE — 36415 COLL VENOUS BLD VENIPUNCTURE: CPT | Performed by: INTERNAL MEDICINE

## 2019-09-18 PROCEDURE — 85027 COMPLETE CBC AUTOMATED: CPT | Performed by: INTERNAL MEDICINE

## 2019-09-18 PROCEDURE — 99214 OFFICE O/P EST MOD 30 MIN: CPT | Performed by: INTERNAL MEDICINE

## 2019-09-18 PROCEDURE — 81001 URINALYSIS AUTO W/SCOPE: CPT | Performed by: INTERNAL MEDICINE

## 2019-09-18 NOTE — PROGRESS NOTES
Subjective     Georgette Alatorre is a 79 year old female who presents to clinic today for the following health issues:    HPI   Follow up Rectal Issues  Patient reports that she has been having some vaginal and suprapubic pain for the last 2 months which she feels is associated with defecation.  It seems to be periodic and is oftentimes relieved with urination.  Her bowel movements are variable and on the average has 4-6 loose but formed stools daily however, she does have days when she has 2 or 3 bowel movements per day without predictability.  She has no blood or mucus in her stools and has no other abdominal discomfort.    Weight seems to be stable and she feels that the weight gain noted today may be related to fluid retention.    Low back pain at the beginning of the summer evaluated by [facility]      Patient Active Problem List   Diagnosis     Obstruction of bowel (H)     SBO (small bowel obstruction) (H)     Radiation colitis     Vitamin B12 deficiency (non anemic)     SVT (supraventricular tachycardia) (H)     Age-related osteoporosis without current pathological fracture     Gastroesophageal reflux disease without esophagitis     Other idiopathic scoliosis, thoracolumbar region     Past Surgical History:   Procedure Laterality Date     APPENDECTOMY       CATARACT IOL, RT/LT  2009    Cataract IOL Bilateral     COLONOSCOPY  03/30/2006    Normal; repeat in 5 years     CYSTOSCOPY  11/14/2007    Cystoscopy, bilateral retrograde pyelograms, dilatation of left ureteral stricture, left ureteroscopy and insertion of left double pigtail stent.      ENDOSCOPY  10/22/2008    Upper GI:  Normal     ENDOSCOPY  1/11/2007    Upper GI     EYE SURGERY  2010    retinal eye surgery     HYSTERECTOMY, PAP NO LONGER INDICATED  2000    due to endometrial cancer     LAPAROTOMY EXPLORATORY  08/22/2007    1.  Exploratory laparotomy. 2.  Extensive lysis of intra-abdominal adhesions. Performed by Dr Rad Pierce     LAPAROTOMY  EXPLORATORY  04/20/2005    1.  Exploratory laparotomy.  2.  Lysis of adhesions.  Segmental small bowel resection Performed by Dr Rad Pierce.       Social History     Tobacco Use     Smoking status: Former Smoker     Packs/day: 0.50     Years: 40.00     Pack years: 20.00     Smokeless tobacco: Never Used   Substance Use Topics     Alcohol use: Yes     Alcohol/week: 0.0 oz     Comment: 1 glass of wine with dinner     History reviewed. No pertinent family history.      Current Outpatient Medications   Medication Sig Dispense Refill     Acetaminophen (TYLENOL PO) Take 500 mg by mouth every 4 hours as needed for mild pain       Ascorbic Acid (VITAMIN C PO) Take 500 mg by mouth daily.       bismuth subsalicylate (PEPTO BISMOL) 262 MG/15ML suspension Take 15 mLs by mouth every 6 hours as needed for indigestion       calcium carbonate (OS-ILYA 500 MG [place]. [place]) 1250 MG tablet Take 1 tablet by mouth 2 times daily       Cholecalciferol (VITAMIN D3 PO) Take 2,000 Units by mouth daily.       diltiazem ER (DILT-XR) 180 MG 24 hr capsule TAKE ONE CAPSULE BY MOUTH ONCE DAILY 90 capsule 3     Nutritional Supplements (CARNATION INSTANT BREAKFAST PO) Take 1 Can by mouth daily        probiotic CAPS Take 1 capsule by mouth daily        Protein POWD Take by mouth daily        Wheat Dextrin (BENEFIBER PO) Take by mouth 2 times daily       Allergies   Allergen Reactions     No Known Allergies        Reviewed and updated as needed this visit by Provider         Review of Systems   ROS COMP: Constitutional, HEENT, cardiovascular, pulmonary, gi and gu systems are negative, except as otherwise noted.    This document serves as a record of the services and decisions personally performed and made by Mario Valdes MD.         Objective    BP (!) 142/70 (BP Location: Left arm, Cuff Size: Adult Regular)   Pulse 62   Temp 98  F (36.7  C) (Tympanic)   Wt 43 kg (94 lb 11.2 oz)   SpO2 97%   BMI 17.32 kg/m    Body mass index is 17.32 kg/m .      Physical Exam   Neck was supple without adenopathy or thyromegaly her carotids were normal without bruits  Chest clear to auscultation and percussion  Cardiovascular S1 and S2 are physiologic without murmurs or gallops  Abdomen bowel sounds were normal.  There is no palpable mass or organomegaly, noted suprapubic pain was not reproducible with pressure or palpation.  Extremities nontender without 1+ pretibial edema  Pulses pedal pulses are as described otherwise her pulses are bilaterally symmetrical throughout without bruits  Skin without significant abnormality      Diagnostic Test Results:  Labs reviewed in Epic  No results found for this or any previous visit (from the past 24 hour(s)).        Assessment & Plan     Diarrhea, unspecified type  Reviewing strategies to help to enable her to increase her activities and not be quarantined.  Recommended using Tums regularly and using a low dose of Imodium right ear prior to activities and may be q. OD.    Radiation colitis      Gluten enteropathy  Patient's frequent stooling has slightly improved with avoiding gluten    Paroxysmal supraventricular tachycardia (H)  Patient has had no significant palpitations or racing heartbeats.    Benign essential hypertension  Well-controlled with current therapy    Vitamin D deficiency      Vitamin B12 deficiency (non anemic)    Age-related osteoporosis without current pathological fracture  Continue with same therapy and follow bone density on a scheduled basis.    Iron deficiency anemia due to chronic blood loss  Iron stores were normal on last review.  Continue to watch CBC serially.    Gastroesophageal reflux disease without esophagitis      Dysuria    - *UA reflex to Microscopic and Culture ([facility] [facility] (except [place] and [place])    Hydronephrosis, unspecified hydronephrosis type  Patient scheduled with urology follow-up.  - Comprehensive metabolic panel  - CBC with platelets           FUTURE  APPOINTMENTS:       - Follow-up visit in 1 mo    No follow-ups on file.     30 minutes were spent with this encounter with greater than 50% of the time spent in counseling and coordinating care.  Mario Valdes MD  [facility]

## 2019-09-19 LAB
ALBUMIN SERPL-MCNC: 3.7 G/DL (ref 3.4–5)
ALP SERPL-CCNC: 68 U/L (ref 40–150)
ALT SERPL W P-5'-P-CCNC: 21 U/L (ref 0–50)
ANION GAP SERPL CALCULATED.3IONS-SCNC: 6 MMOL/L (ref 3–14)
AST SERPL W P-5'-P-CCNC: 22 U/L (ref 0–45)
BILIRUB SERPL-MCNC: 0.5 MG/DL (ref 0.2–1.3)
BUN SERPL-MCNC: 12 MG/DL (ref 7–30)
CALCIUM SERPL-MCNC: 8.9 MG/DL (ref 8.5–10.1)
CHLORIDE SERPL-SCNC: 108 MMOL/L (ref 94–109)
CO2 SERPL-SCNC: 28 MMOL/L (ref 20–32)
CREAT SERPL-MCNC: 0.72 MG/DL (ref 0.52–1.04)
GFR SERPL CREATININE-BSD FRML MDRD: 79 ML/MIN/{1.73_M2}
GLUCOSE SERPL-MCNC: 90 MG/DL (ref 70–99)
POTASSIUM SERPL-SCNC: 3.4 MMOL/L (ref 3.4–5.3)
PROT SERPL-MCNC: 7.6 G/DL (ref 6.8–8.8)
SODIUM SERPL-SCNC: 142 MMOL/L (ref 133–144)

## 2019-09-24 ENCOUNTER — MYC MEDICAL ADVICE (OUTPATIENT)
Dept: FAMILY MEDICINE | Facility: CLINIC | Age: 79
End: 2019-09-24

## 2019-09-24 NOTE — TELEPHONE ENCOUNTER
PT is calling in to check on this [employer] message.  She is very concerned about her kidney.  [facility] is calling her asking if she wants to do the procedure.  Dr. Valdes had suggested something else.    She would really like a call back or a [employer] message in return.  She is very anxious about the results and the care that is suggested from here on out.

## 2019-09-24 NOTE — TELEPHONE ENCOUNTER
Please see My Chart message below and advise as appropriate.  Please address ASAP.  KIERSTEN DacostaN, RN  [employer] [facility]

## 2019-09-30 ENCOUNTER — TRANSFERRED RECORDS (OUTPATIENT)
Dept: HEALTH INFORMATION MANAGEMENT | Facility: CLINIC | Age: 79
End: 2019-09-30

## 2019-10-01 ENCOUNTER — HEALTH MAINTENANCE LETTER (OUTPATIENT)
Age: 79
End: 2019-10-01

## 2019-10-02 NOTE — TELEPHONE ENCOUNTER
"The patient called back and she said \"I'm very upset\" the My chart says she can expect a response in 2 business days and she wants Dr Valdes to give her some feedback today if possible  "

## 2019-10-22 ENCOUNTER — OFFICE VISIT (OUTPATIENT)
Dept: FAMILY MEDICINE | Facility: CLINIC | Age: 79
End: 2019-10-22
Payer: COMMERCIAL

## 2019-10-22 ENCOUNTER — HOSPITAL ENCOUNTER (OUTPATIENT)
Dept: MAMMOGRAPHY | Facility: CLINIC | Age: 79
Discharge: HOME | End: 2019-10-22
Attending: INTERNAL MEDICINE | Admitting: INTERNAL MEDICINE
Payer: COMMERCIAL

## 2019-10-22 VITALS
OXYGEN SATURATION: 97 % | WEIGHT: 94.1 LBS | SYSTOLIC BLOOD PRESSURE: 116 MMHG | HEART RATE: 68 BPM | BODY MASS INDEX: 17.32 KG/M2 | TEMPERATURE: 97 F | HEIGHT: 62 IN | DIASTOLIC BLOOD PRESSURE: 62 MMHG

## 2019-10-22 DIAGNOSIS — Z12.31 VISIT FOR SCREENING MAMMOGRAM: ICD-10-CM

## 2019-10-22 DIAGNOSIS — K52.0 RADIATION COLITIS: ICD-10-CM

## 2019-10-22 DIAGNOSIS — M81.0 AGE-RELATED OSTEOPOROSIS WITHOUT CURRENT PATHOLOGICAL FRACTURE: ICD-10-CM

## 2019-10-22 DIAGNOSIS — M41.25 OTHER IDIOPATHIC SCOLIOSIS, THORACOLUMBAR REGION: ICD-10-CM

## 2019-10-22 DIAGNOSIS — R53.83 FATIGUE, UNSPECIFIED TYPE: ICD-10-CM

## 2019-10-22 DIAGNOSIS — K56.609 SBO (SMALL BOWEL OBSTRUCTION) (H): ICD-10-CM

## 2019-10-22 DIAGNOSIS — K21.9 GASTROESOPHAGEAL REFLUX DISEASE WITHOUT ESOPHAGITIS: ICD-10-CM

## 2019-10-22 DIAGNOSIS — E53.8 VITAMIN B12 DEFICIENCY (NON ANEMIC): Primary | ICD-10-CM

## 2019-10-22 DIAGNOSIS — D62 ANEMIA DUE TO BLOOD LOSS, ACUTE: ICD-10-CM

## 2019-10-22 DIAGNOSIS — K52.9 CHRONIC DIARRHEA: ICD-10-CM

## 2019-10-22 DIAGNOSIS — N13.30 HYDRONEPHROSIS, UNSPECIFIED HYDRONEPHROSIS TYPE: ICD-10-CM

## 2019-10-22 DIAGNOSIS — I47.10 SVT (SUPRAVENTRICULAR TACHYCARDIA) (H): ICD-10-CM

## 2019-10-22 LAB
ERYTHROCYTE [DISTWIDTH] IN BLOOD BY AUTOMATED COUNT: 14.3 % (ref 10–15)
HCT VFR BLD AUTO: 35 % (ref 35–47)
HGB BLD-MCNC: 11.7 G/DL (ref 11.7–15.7)
MCH RBC QN AUTO: 32.8 PG (ref 26.5–33)
MCHC RBC AUTO-ENTMCNC: 33.4 G/DL (ref 31.5–36.5)
MCV RBC AUTO: 98 FL (ref 78–100)
PLATELET # BLD AUTO: 226 10E9/L (ref 150–450)
RBC # BLD AUTO: 3.57 10E12/L (ref 3.8–5.2)
WBC # BLD AUTO: 4.6 10E9/L (ref 4–11)

## 2019-10-22 PROCEDURE — 84443 ASSAY THYROID STIM HORMONE: CPT | Performed by: INTERNAL MEDICINE

## 2019-10-22 PROCEDURE — 85027 COMPLETE CBC AUTOMATED: CPT | Performed by: INTERNAL MEDICINE

## 2019-10-22 PROCEDURE — 77063 BREAST TOMOSYNTHESIS BI: CPT

## 2019-10-22 PROCEDURE — 36415 COLL VENOUS BLD VENIPUNCTURE: CPT | Performed by: INTERNAL MEDICINE

## 2019-10-22 PROCEDURE — 99214 OFFICE O/P EST MOD 30 MIN: CPT | Performed by: INTERNAL MEDICINE

## 2019-10-22 PROCEDURE — 80053 COMPREHEN METABOLIC PANEL: CPT | Performed by: INTERNAL MEDICINE

## 2019-10-22 ASSESSMENT — MIFFLIN-ST. JEOR: SCORE: 850.08

## 2019-10-22 NOTE — PROGRESS NOTES
"Subjective     Georgette Alatorre is a 79 year old female who presents to clinic today for the following health issues:    HPI   Patient presents to clinic for follow up on her Urology appt.    {additonal problems for provider to add (Optional):822301}    {HIST REVIEW/ LINKS 2 (Optional):736920}    Reviewed and updated as needed this visit by Provider         Review of Systems   {ROS COMP (Optional):583737}      Objective    Ht 1.575 m (5' 2\")   Breastfeeding? No   BMI 17.32 kg/m    Body mass index is 17.32 kg/m .  Physical Exam   {Exam List (Optional):130313}    {Diagnostic Test Results (Optional):349635::\"Diagnostic Test Results:\",\"Labs reviewed in Epic\"}        {PROVIDER CHARTING PREFERENCE:861424}    "

## 2019-10-23 LAB
ALBUMIN SERPL-MCNC: 3.6 G/DL (ref 3.4–5)
ALP SERPL-CCNC: 65 U/L (ref 40–150)
ALT SERPL W P-5'-P-CCNC: 20 U/L (ref 0–50)
ANION GAP SERPL CALCULATED.3IONS-SCNC: 7 MMOL/L (ref 3–14)
AST SERPL W P-5'-P-CCNC: 25 U/L (ref 0–45)
BILIRUB SERPL-MCNC: 0.5 MG/DL (ref 0.2–1.3)
BUN SERPL-MCNC: 16 MG/DL (ref 7–30)
CALCIUM SERPL-MCNC: 8.9 MG/DL (ref 8.5–10.1)
CHLORIDE SERPL-SCNC: 108 MMOL/L (ref 94–109)
CO2 SERPL-SCNC: 25 MMOL/L (ref 20–32)
CREAT SERPL-MCNC: 0.66 MG/DL (ref 0.52–1.04)
GFR SERPL CREATININE-BSD FRML MDRD: 83 ML/MIN/{1.73_M2}
GLUCOSE SERPL-MCNC: 110 MG/DL (ref 70–99)
POTASSIUM SERPL-SCNC: 3.3 MMOL/L (ref 3.4–5.3)
PROT SERPL-MCNC: 7.1 G/DL (ref 6.8–8.8)
SODIUM SERPL-SCNC: 140 MMOL/L (ref 133–144)
TSH SERPL DL<=0.005 MIU/L-ACNC: 3.61 MU/L (ref 0.4–4)

## 2019-10-25 DIAGNOSIS — K52.9 CHRONIC DIARRHEA: ICD-10-CM

## 2019-10-25 DIAGNOSIS — D62 ANEMIA DUE TO BLOOD LOSS, ACUTE: ICD-10-CM

## 2019-10-25 DIAGNOSIS — K52.9 CHRONIC DIARRHEA: Primary | ICD-10-CM

## 2019-10-25 LAB — POTASSIUM SERPL-SCNC: 3.6 MMOL/L (ref 3.4–5.3)

## 2019-10-25 PROCEDURE — 36415 COLL VENOUS BLD VENIPUNCTURE: CPT | Performed by: INTERNAL MEDICINE

## 2019-10-25 PROCEDURE — 84132 ASSAY OF SERUM POTASSIUM: CPT | Performed by: INTERNAL MEDICINE

## 2019-10-25 PROCEDURE — 83550 IRON BINDING TEST: CPT | Performed by: INTERNAL MEDICINE

## 2019-10-25 PROCEDURE — 83540 ASSAY OF IRON: CPT | Performed by: INTERNAL MEDICINE

## 2019-10-25 NOTE — RESULT ENCOUNTER NOTE
I called the patient and informed of results. The following changes were made to treatment:   Reviewed lab studies: Hypokalemia, recommended increasing dietary potassium containing foods and recheck.  Falling hemoglobin, iron iron-binding capacity which was ordered but was not able to be drawn because of tube choices, patient will return to clinic and have blood drawn next week.    Patient voices understanding and will contact me with any further questions.     Mario Valdes MD

## 2019-10-26 LAB
IRON SATN MFR SERPL: 25 % (ref 15–46)
IRON SERPL-MCNC: 76 UG/DL (ref 35–180)
TIBC SERPL-MCNC: 302 UG/DL (ref 240–430)

## 2019-12-15 ENCOUNTER — HEALTH MAINTENANCE LETTER (OUTPATIENT)
Age: 79
End: 2019-12-15

## 2019-12-21 ENCOUNTER — HOSPITAL ENCOUNTER (EMERGENCY)
Facility: CLINIC | Age: 79
Discharge: HOME | End: 2019-12-21
Attending: EMERGENCY MEDICINE | Admitting: EMERGENCY MEDICINE
Payer: COMMERCIAL

## 2019-12-21 ENCOUNTER — APPOINTMENT (OUTPATIENT)
Dept: CT IMAGING | Facility: CLINIC | Age: 79
End: 2019-12-21
Attending: EMERGENCY MEDICINE
Payer: COMMERCIAL

## 2019-12-21 VITALS
WEIGHT: 93 LBS | HEART RATE: 63 BPM | RESPIRATION RATE: 16 BRPM | DIASTOLIC BLOOD PRESSURE: 74 MMHG | HEIGHT: 62 IN | OXYGEN SATURATION: 95 % | SYSTOLIC BLOOD PRESSURE: 113 MMHG | BODY MASS INDEX: 17.11 KG/M2 | TEMPERATURE: 96.7 F

## 2019-12-21 DIAGNOSIS — N28.9 RENAL INSUFFICIENCY: ICD-10-CM

## 2019-12-21 DIAGNOSIS — N20.1 URETERAL STONE: ICD-10-CM

## 2019-12-21 DIAGNOSIS — N23 RENAL COLIC: ICD-10-CM

## 2019-12-21 LAB
ALBUMIN SERPL-MCNC: 3.9 G/DL (ref 3.4–5)
ALBUMIN UR-MCNC: 10 MG/DL
ALP SERPL-CCNC: 71 U/L (ref 40–150)
ALT SERPL W P-5'-P-CCNC: 27 U/L (ref 0–50)
ANION GAP SERPL CALCULATED.3IONS-SCNC: 4 MMOL/L (ref 3–14)
APPEARANCE UR: CLEAR
AST SERPL W P-5'-P-CCNC: 33 U/L (ref 0–45)
BASOPHILS # BLD AUTO: 0 10E9/L (ref 0–0.2)
BASOPHILS NFR BLD AUTO: 0.1 %
BILIRUB SERPL-MCNC: 0.4 MG/DL (ref 0.2–1.3)
BILIRUB UR QL STRIP: NEGATIVE
BUN SERPL-MCNC: 22 MG/DL (ref 7–30)
CALCIUM SERPL-MCNC: 9 MG/DL (ref 8.5–10.1)
CHLORIDE SERPL-SCNC: 105 MMOL/L (ref 94–109)
CO2 SERPL-SCNC: 30 MMOL/L (ref 20–32)
COLOR UR AUTO: YELLOW
CREAT BLD-MCNC: 1.1 MG/DL (ref 0.52–1.04)
CREAT SERPL-MCNC: 1.06 MG/DL (ref 0.52–1.04)
DIFFERENTIAL METHOD BLD: ABNORMAL
EOSINOPHIL # BLD AUTO: 0 10E9/L (ref 0–0.7)
EOSINOPHIL NFR BLD AUTO: 0.1 %
ERYTHROCYTE [DISTWIDTH] IN BLOOD BY AUTOMATED COUNT: 13.7 % (ref 10–15)
GFR SERPL CREATININE-BSD FRML MDRD: 48 ML/MIN/{1.73_M2}
GFR SERPL CREATININE-BSD FRML MDRD: 49 ML/MIN/{1.73_M2}
GLUCOSE SERPL-MCNC: 130 MG/DL (ref 70–99)
GLUCOSE UR STRIP-MCNC: NEGATIVE MG/DL
HCT VFR BLD AUTO: 38.3 % (ref 35–47)
HGB BLD-MCNC: 12.8 G/DL (ref 11.7–15.7)
HGB UR QL STRIP: NEGATIVE
IMM GRANULOCYTES # BLD: 0 10E9/L (ref 0–0.4)
IMM GRANULOCYTES NFR BLD: 0.2 %
KETONES UR STRIP-MCNC: 40 MG/DL
LEUKOCYTE ESTERASE UR QL STRIP: NEGATIVE
LIPASE SERPL-CCNC: 156 U/L (ref 73–393)
LYMPHOCYTES # BLD AUTO: 0.5 10E9/L (ref 0.8–5.3)
LYMPHOCYTES NFR BLD AUTO: 5.7 %
MCH RBC QN AUTO: 32 PG (ref 26.5–33)
MCHC RBC AUTO-ENTMCNC: 33.4 G/DL (ref 31.5–36.5)
MCV RBC AUTO: 96 FL (ref 78–100)
MONOCYTES # BLD AUTO: 0.6 10E9/L (ref 0–1.3)
MONOCYTES NFR BLD AUTO: 7.3 %
MUCOUS THREADS #/AREA URNS LPF: PRESENT /LPF
NEUTROPHILS # BLD AUTO: 7.4 10E9/L (ref 1.6–8.3)
NEUTROPHILS NFR BLD AUTO: 86.6 %
NITRATE UR QL: NEGATIVE
PH UR STRIP: 5 PH (ref 5–7)
PLATELET # BLD AUTO: 231 10E9/L (ref 150–450)
POTASSIUM SERPL-SCNC: 2.9 MMOL/L (ref 3.4–5.3)
PROT SERPL-MCNC: 7.9 G/DL (ref 6.8–8.8)
RBC # BLD AUTO: 4 10E12/L (ref 3.8–5.2)
RBC #/AREA URNS AUTO: 13 /HPF (ref 0–2)
SODIUM SERPL-SCNC: 139 MMOL/L (ref 133–144)
SOURCE: ABNORMAL
SP GR UR STRIP: 1.03 (ref 1–1.03)
SQUAMOUS #/AREA URNS AUTO: <1 /HPF (ref 0–1)
UROBILINOGEN UR STRIP-MCNC: NORMAL MG/DL (ref 0–2)
WBC # BLD AUTO: 8.5 10E9/L (ref 4–11)
WBC #/AREA URNS AUTO: 2 /HPF (ref 0–5)

## 2019-12-21 PROCEDURE — 85025 COMPLETE CBC W/AUTO DIFF WBC: CPT | Performed by: EMERGENCY MEDICINE

## 2019-12-21 PROCEDURE — 25000125 ZZHC RX 250: Performed by: EMERGENCY MEDICINE

## 2019-12-21 PROCEDURE — 83690 ASSAY OF LIPASE: CPT | Performed by: EMERGENCY MEDICINE

## 2019-12-21 PROCEDURE — 96374 THER/PROPH/DIAG INJ IV PUSH: CPT | Mod: 59

## 2019-12-21 PROCEDURE — 80053 COMPREHEN METABOLIC PANEL: CPT | Performed by: EMERGENCY MEDICINE

## 2019-12-21 PROCEDURE — 25000132 ZZH RX MED GY IP 250 OP 250 PS 637: Mod: GY | Performed by: EMERGENCY MEDICINE

## 2019-12-21 PROCEDURE — 25800030 ZZH RX IP 258 OP 636: Performed by: EMERGENCY MEDICINE

## 2019-12-21 PROCEDURE — 25000128 H RX IP 250 OP 636: Performed by: EMERGENCY MEDICINE

## 2019-12-21 PROCEDURE — 74177 CT ABD & PELVIS W/CONTRAST: CPT

## 2019-12-21 PROCEDURE — 81001 URINALYSIS AUTO W/SCOPE: CPT | Performed by: EMERGENCY MEDICINE

## 2019-12-21 PROCEDURE — 82565 ASSAY OF CREATININE: CPT

## 2019-12-21 PROCEDURE — 99285 EMERGENCY DEPT VISIT HI MDM: CPT | Mod: 25

## 2019-12-21 PROCEDURE — 96361 HYDRATE IV INFUSION ADD-ON: CPT

## 2019-12-21 RX ORDER — SODIUM CHLORIDE 9 MG/ML
1000 INJECTION, SOLUTION INTRAVENOUS CONTINUOUS
Status: DISCONTINUED | OUTPATIENT
Start: 2019-12-21 | End: 2019-12-22 | Stop reason: HOSPADM

## 2019-12-21 RX ORDER — ONDANSETRON 2 MG/ML
4 INJECTION INTRAMUSCULAR; INTRAVENOUS EVERY 30 MIN PRN
Status: DISCONTINUED | OUTPATIENT
Start: 2019-12-21 | End: 2019-12-22 | Stop reason: HOSPADM

## 2019-12-21 RX ORDER — ONDANSETRON 4 MG/1
4 TABLET, ORALLY DISINTEGRATING ORAL EVERY 8 HOURS PRN
Qty: 10 TABLET | Refills: 0 | Status: SHIPPED | OUTPATIENT
Start: 2019-12-21 | End: 2020-01-07

## 2019-12-21 RX ORDER — OXYCODONE HYDROCHLORIDE 5 MG/1
TABLET ORAL
Qty: 8 TABLET | Refills: 0 | Status: SHIPPED | OUTPATIENT
Start: 2019-12-21 | End: 2020-01-07

## 2019-12-21 RX ORDER — TAMSULOSIN HYDROCHLORIDE 0.4 MG/1
0.4 CAPSULE ORAL DAILY
Qty: 10 CAPSULE | Refills: 0 | Status: SHIPPED | OUTPATIENT
Start: 2019-12-21 | End: 2020-01-07

## 2019-12-21 RX ORDER — POTASSIUM CHLORIDE 1.5 G/1.58G
40 POWDER, FOR SOLUTION ORAL ONCE
Status: COMPLETED | OUTPATIENT
Start: 2019-12-21 | End: 2019-12-21

## 2019-12-21 RX ORDER — KETOROLAC TROMETHAMINE 15 MG/ML
15 INJECTION, SOLUTION INTRAMUSCULAR; INTRAVENOUS ONCE
Status: COMPLETED | OUTPATIENT
Start: 2019-12-21 | End: 2019-12-21

## 2019-12-21 RX ORDER — HYDROMORPHONE HYDROCHLORIDE 1 MG/ML
0.5 INJECTION, SOLUTION INTRAMUSCULAR; INTRAVENOUS; SUBCUTANEOUS
Status: DISCONTINUED | OUTPATIENT
Start: 2019-12-21 | End: 2019-12-22 | Stop reason: HOSPADM

## 2019-12-21 RX ORDER — IOPAMIDOL 755 MG/ML
47 INJECTION, SOLUTION INTRAVASCULAR ONCE
Status: COMPLETED | OUTPATIENT
Start: 2019-12-21 | End: 2019-12-21

## 2019-12-21 RX ADMIN — SODIUM CHLORIDE 60 ML: 9 INJECTION, SOLUTION INTRAVENOUS at 19:13

## 2019-12-21 RX ADMIN — IOPAMIDOL 47 ML: 755 INJECTION, SOLUTION INTRAVENOUS at 19:13

## 2019-12-21 RX ADMIN — POTASSIUM CHLORIDE 40 MEQ: 1.5 POWDER, FOR SOLUTION ORAL at 20:37

## 2019-12-21 RX ADMIN — SODIUM CHLORIDE 1000 ML: 9 INJECTION, SOLUTION INTRAVENOUS at 18:44

## 2019-12-21 RX ADMIN — KETOROLAC TROMETHAMINE 15 MG: 15 INJECTION, SOLUTION INTRAMUSCULAR; INTRAVENOUS at 19:39

## 2019-12-21 ASSESSMENT — MIFFLIN-ST. JEOR: SCORE: 845.1

## 2019-12-21 NOTE — ED TRIAGE NOTES
L flank pain that is radiating from back around to front. Hx of SBO and kidney stones. Loose stools

## 2019-12-21 NOTE — ED AVS SNAPSHOT
Emergency Department  [address] [facility] [address]  Phone:  [phone]  Fax:  [phone]                                    Georgette Alatorre   MRN: [id number]    Department:   Emergency Department   Date of Visit:  12/21/2019           After Visit Summary Signature Page    I have received my discharge instructions, and my questions have been answered. I have discussed any challenges I see with this plan with the nurse or doctor.    ..........................................................................................................................................  Patient/Patient Representative Signature      ..........................................................................................................................................  Patient Representative Print Name and Relationship to Patient    ..................................................               ................................................  Date                                   Time    ..........................................................................................................................................  Reviewed by Signature/Title    ...................................................              ..............................................  Date                                               Time          22EPIC Rev 08/18

## 2019-12-22 ENCOUNTER — TELEPHONE (OUTPATIENT)
Dept: FAMILY MEDICINE | Facility: CLINIC | Age: 79
End: 2019-12-22

## 2019-12-22 ENCOUNTER — NURSE TRIAGE (OUTPATIENT)
Dept: FAMILY MEDICINE | Facility: CLINIC | Age: 79
End: 2019-12-22

## 2019-12-22 NOTE — DISCHARGE INSTRUCTIONS
Discharge Instructions  Kidney Stones    Kidney stones are a common problem that can cause a lot of pain but fortunately are usually not dangerous. Kidney stones form in the kidney and then can cause a blockage (obstruction) of the flow of urine from the kidney which leads to pain. Most patients can manage kidney stones at home (without a hospital stay).  However, sometimes your condition may be worse than it seemed at first, or may get worse with time. Most kidney stones will pass on their own, but occasionally stones may need to be removed by an urologist.    Generally, every Emergency Department visit should have a follow-up clinic visit with either a primary or a specialty clinic/provider. Please follow-up as instructed by your emergency provider today.      Return to the Emergency Department if:  Your pain is not controlled despite the medications provided or recommended.  You are vomiting (throwing up) and cannot keep fluids or medications down.  You develop a fever (>100.4 F).  You feel much more ill or develop new symptoms.  What can I do to help myself?  Be sure to drink plenty of fluids.  If instructed to do so, strain your urine (pee) with the urine strainer you were provided with today. Your stone may look like a grain of sand or a small pebble. Collect any stones in the cup provided and bring to your follow-up appointment.  Staying active is good, and may help the stone to pass. You may do whatever you feel up to doing without restrictions.   Treatment:  Non-steroidal anti-inflammatory drugs (NSAIDs). This includes prescription medicines like Toradol  (ketorolac) and non-prescription medicines like Advil  (ibuprofen) and Nuprin  (ibuprofen) and Naproxen. These pain relievers are very effective for kidney stones.  Nausea (sick to your stomach) medication.  Nausea and vomiting are common with kidney stones, so your provider may send you home with medicine for this.   Flomax  (tamsulosin). This medicine is  sometimes used for men with prostate problems, but also can help kidney stones to pass. Its effectiveness is controversial or questionable so it is prescribed in certain situations. This medicine can lower blood pressure, and you may feel faint/lightheaded, especially when you first stand up. Be sure to get up gradually, sit down if you feel faint, and avoid activity where feeling faint would be dangerous, such as climbing ladders.  If you were given a prescription for medicine here today, be sure to read all of the information (including the package insert) that comes with your prescription.  This will include important information about the medicine, its side effects, and any warnings that you need to know about.  The pharmacist who fills the prescription can provide more information and answer questions you may have about the medicine.  If you have questions or concerns that the pharmacist cannot address, please call or return to the Emergency Department.   Remember that you can always come back to the Emergency Department if you are not able to see your regular provider in the amount of time listed above, if you get any new symptoms, or if there is anything that worries you.      Opioid Medication Information    You have been given a prescription for an opioid (narcotic) pain medicine and/or have received a pain medicine while here in the Emergency Department. These medicines can make you drowsy or impaired. You must not drive, operate dangerous equipment, or engage in any other dangerous activities while taking these medications. If you drive while taking these medications, you could be arrested for driving under the influence (DUI). Do not drink any alcohol while you are taking these medications.     Opioid pain medications can cause addiction. If you have a history of chemical dependency of any type, you are at a higher risk of becoming addicted to pain medications.  Only take these prescribed medications to  treat your pain when all other options have been tried. Take it for as short a time and as few doses as possible. Store your pain pills in a secure place, as they are frequently stolen and provide a dangerous opportunity for children or visitors in your house to start abusing these powerful medications. We will not replace any lost or stolen medicine.    If you do not finish your medication, it is a good idea to get rid of it but please do not flush it down the toilet. Please dispose of the remaining medication at a local pharmacy or law enforcement facility. The [place] [employer] has additional information on medication disposal: https://www.pca.[facility].us/living-green/managing-unwanted-medications.      Many prescription pain medications contain Tylenol  (acetaminophen), including Vicodin , Tylenol #3 , Norco , Lortab , and Percocet .  You should not take any extra pills of Tylenol  if you are using these prescription medications or you can get very sick.  Do not ever take more than 3000 mg of acetaminophen in any 24 hour period.    All opioids tend to cause constipation. Drink plenty of water and eat foods that have a lot of fiber, such as fruits, vegetables, prune juice, apple juice and high fiber cereal.  Take a laxative if you don t move your bowels at least every other day. Miralax , Milk of Magnesia, Colace , or Senna  can be used to keep you regular.

## 2019-12-22 NOTE — TELEPHONE ENCOUNTER
Clinic Action Needed:No  Reason for Call: Georgette was seen in ER last night and dx with kidney stone.  She was prescribed Flomax and she is wondering if she really needs to take it.  Advised caller that I cannot advise her NOT to take a medication that was prescribed.  Reiterated that this was prescribed to help her body clear or pass the stone.  Georgette is reluctant to take as she already has an issue with urinary incontinence and some stool issues and going to bathroom frequently is an issue.  She does not want to exacerbate situation.    Paged on call provider for [facility].  Dr. Dick is on call and on his cell.  He advised that she absolutely should take the medication to treat the acute medical condition that she has.  He advised to push plenty of fluids.    Notified Georgette of Dr. Dick's advice.  She appears to understand directives and agrees with plan.    She noted that she didn't  zofran as it was not covered by insurance and it was cost prohibitive for to pay out of pocket.  I offered to call her pharmacy to inquire what anti nausea medication would be covered and she declined offer.    Also left telephone message for Dr. Valdes's care team to call her tomorrow to help her arrange a follow up appointment.  She has one for 1/7/2020 and would like to be seen sooner.    Routed to: Not routed.    Denisha Waterman RN  [facility]

## 2019-12-22 NOTE — ED PROVIDER NOTES
"  History     Chief Complaint:  Flank Pain    HPI   Georgette Alatorre is a 79 year old female with a history of scoliosis, chronic diarrhea, kidney stones, and endometrial cancer who presents with flank pain. Before the patient went to [employer] this morning she took Pepto-Bismol and and half a loperamide for her chronic diarrhea. The patient was at [employer] choir this morning at 1100 when she began to experience acute lower left back pain. She took tylenol which she states may have helped the pain. Her last bowel movement was at 1600. The patient cannot remember if her flank pain is similar to her past kidney stones. The patient denies dysuria, blood in her stool, and vomiting. She noted that she was seen by urology in the Fall that recommended a stent be placed in her ureter although she cannot recall the doctor she saw. She has had a hysterectomy and appendectomy in the past along with radiation for rectal cancer thought to be in remission.    Allergies:  No known allergies    Medications:    Ascorbic Acid   Calcium carbonate  Cholecalciferol   Diltiazem  Wheat Dextrin    Past Medical History:    Arthritis  Endometrial cancer  Gastroesophageal reflux disease  Hydronephrosis  Scoliosis  Small bowel obstruction  Supraventricular tachycardia   Chronic diarrhea     Past Surgical History:    Appendectomy   Cataract   Hysterectomy    Family History:    No past pertinent family history.    Social History:  Smoking Status: former smoker   Smokeless Tobacco: Never Used  Alcohol Use: Yes  Drug Use: No  PCP: Mario Valdes    Review of Systems   All other systems reviewed and are negative.    Physical Exam     Patient Vitals for the past 24 hrs:   BP Temp Temp src Pulse Resp SpO2 Height Weight   12/21/19 1738 (!) 143/56 96.7  F (35.9  C) Temporal 79 16 96 % 1.575 m (5' 2\") 42.2 kg (93 lb)     Physical Exam  General: Nontoxic-appearing woman sitting upright in room 19  HENT: mucous membranes moist  CV: regular rate, regular " rhythm  Resp: clear throughout, normal effort, no crackles or wheezing  GI: abdomen soft and nontender, no guarding, no palpable masses  MSK:   Thoracic spine: nontender, no CVAT  Lumbar spine: nontender  Pelvis stable.  : deferred  Skin: appropriately warm and dry, no erythema/ecchymosis/vesicles to back  Neuro: alert, clear speech, oriented, ambulatory  Psych: very pleasant, cooperative      Emergency Department Course     Imaging:  Radiology findings were communicated with the patient who voiced understanding of the findings.    CT Abdomen Pelvis w Contrast  1. A 0.3 cm distal left ureter stone is suggested along with mild left  hydronephrosis. This appears to be present on the prior CT from  7/22/2018 as well.  2. Small nonobstructing stone within the lower left kidney.  3. Stable right hydronephrosis but no obstructing etiology is seen.  4. No other acute abnormality identified.   Reading per radiology    Laboratory:  Laboratory findings were communicated with the patient who voiced understanding of the findings.    CBC: WBC 8.5, HGB 12.8,   CMP: potassium 2.9 (L), glucose 130 (H) GFR 49 (L) o/w WNL (Creatinine 1.06)  Lipase: 156  Creatinine POCT: creatinine 1.1 (H), GFR 48 (L)    UA with microscopic: urineketon 40, protein albumin urine 10, RBC 13 (H), mucous present o/w WNL    Interventions:  1844 NS bolus 1,000 ml IV  1939 Toradol 15 mg IV  2037 Potassium chloride 40 mEq oral    Emergency Department Course:  Nursing notes and vitals reviewed.    1818 I performed an exam of the patient as documented above.     The patient was sent for a CT abdomen pelvis while in the emergency department, results above.     IV was inserted and blood was drawn for laboratory testing, results above.    The patient provided a urine sample here in the emergency department. This was sent for laboratory testing, findings above.    2021 I returned to update the patient.     2146 I returned to update the patient about their  plan for discharge.     Findings and plan explained to the Patient. Patient discharged home with instructions regarding supportive care, medications, and reasons to return. The importance of close follow-up was reviewed. The patient was prescribed zofran, oxycodone, and flomax.     Impression & Plan        Medical Decision Making:  Her presenting symptoms can be explained by renal colic from a left-sided ureteral stone seen on CT.  While she has a history of the same, given her age and the fact that she was not sure how much today symptoms resemble her prior kidney stone, I felt that CT imaging was indicated not only to identify the suspected etiology but also rule out alternate etiologies including bowel obstruction, internal hemorrhage, abscess, aortic pathology.  She improved dramatically with Toradol and declined any additional treatments.  I do not suspect infected kidney stone.  She is eager for discharge home which I think is appropriate, though provide referral information for urology if she has not passed a stone and feel better in the next few days she and she should return here for sudden worsening in any hour.  Potential adverse effects of opioids and the rationale for medications prescribed was reviewed with her.  Urine strainer given at time of discharge.  Diagnosis:    ICD-10-CM    1. Ureteral stone N20.1    2. Renal colic N23    3. Renal insufficiency N28.9        Disposition:   The patient is discharged to home.    Discharge Medications:  New Prescriptions    ONDANSETRON (ZOFRAN ODT) 4 MG ODT TAB    Take 1 tablet (4 mg) by mouth every 8 hours as needed for nausea    OXYCODONE (ROXICODONE) 5 MG TABLET    Take 5-10 mg (1-2 tablets) every 6 hours as needed for pain    TAMSULOSIN (FLOMAX) 0.4 MG CAPSULE    Take 1 capsule (0.4 mg) by mouth daily for 10 doses       Scribe Disclosure:  Zabrina BUSH, am serving as a scribe at 6:12 PM on 12/21/2019 to document services personally performed by  Justice Tanner, based on my observations and the provider's statements to me.     This record was created at least in part using electronic voice recognition software, so please excuse any typographical errors.        EMERGENCY DEPARTMENT       Justice Tanner MD  12/22/19 0809

## 2019-12-22 NOTE — TELEPHONE ENCOUNTER
Clinic Action Needed:Yes, please return call    Reason for Call: Georgette was seen in the ER on Saturday night/early Sunday morning for  Kidney stone.  She is to follow up with Dr. Valdes and has an appointment for January 7, 2020.  Please return call to discuss if she can get in sooner for ER follow up visit. Thank you.    Routed to: ALYSON Waterman RN  [facility]

## 2019-12-23 NOTE — TELEPHONE ENCOUNTER
LVM for patient to come in tomorrow morning at 8:30 a.m. If she calls back, it's okay to double book.  There is also a hospital f/u on 12/30 at 3pm if that would work.     Conchita Noriega MA

## 2019-12-24 ENCOUNTER — OFFICE VISIT (OUTPATIENT)
Dept: FAMILY MEDICINE | Facility: CLINIC | Age: 79
End: 2019-12-24
Payer: COMMERCIAL

## 2019-12-24 DIAGNOSIS — K21.9 GASTROESOPHAGEAL REFLUX DISEASE WITHOUT ESOPHAGITIS: ICD-10-CM

## 2019-12-24 DIAGNOSIS — M81.0 AGE-RELATED OSTEOPOROSIS WITHOUT CURRENT PATHOLOGICAL FRACTURE: ICD-10-CM

## 2019-12-24 DIAGNOSIS — M41.25 OTHER IDIOPATHIC SCOLIOSIS, THORACOLUMBAR REGION: ICD-10-CM

## 2019-12-24 DIAGNOSIS — D62 ANEMIA DUE TO BLOOD LOSS, ACUTE: ICD-10-CM

## 2019-12-24 DIAGNOSIS — K52.9 CHRONIC DIARRHEA: ICD-10-CM

## 2019-12-24 DIAGNOSIS — E53.8 VITAMIN B12 DEFICIENCY (NON ANEMIC): ICD-10-CM

## 2019-12-24 DIAGNOSIS — K52.0 RADIATION COLITIS: ICD-10-CM

## 2019-12-24 DIAGNOSIS — I47.10 SVT (SUPRAVENTRICULAR TACHYCARDIA) (H): ICD-10-CM

## 2019-12-24 DIAGNOSIS — N20.0 NEPHROLITHIASIS: Primary | ICD-10-CM

## 2019-12-24 LAB
ALBUMIN UR-MCNC: NEGATIVE MG/DL
APPEARANCE UR: CLEAR
BACTERIA #/AREA URNS HPF: ABNORMAL /HPF
BILIRUB UR QL STRIP: NEGATIVE
COLOR UR AUTO: YELLOW
ERYTHROCYTE [DISTWIDTH] IN BLOOD BY AUTOMATED COUNT: 14.2 % (ref 10–15)
GLUCOSE UR STRIP-MCNC: NEGATIVE MG/DL
HCT VFR BLD AUTO: 35.2 % (ref 35–47)
HGB BLD-MCNC: 11.6 G/DL (ref 11.7–15.7)
HGB UR QL STRIP: ABNORMAL
KETONES UR STRIP-MCNC: NEGATIVE MG/DL
LEUKOCYTE ESTERASE UR QL STRIP: NEGATIVE
MCH RBC QN AUTO: 32.7 PG (ref 26.5–33)
MCHC RBC AUTO-ENTMCNC: 33 G/DL (ref 31.5–36.5)
MCV RBC AUTO: 99 FL (ref 78–100)
NITRATE UR QL: NEGATIVE
NON-SQ EPI CELLS #/AREA URNS LPF: ABNORMAL /LPF
PH UR STRIP: 5 PH (ref 5–7)
PLATELET # BLD AUTO: 221 10E9/L (ref 150–450)
RBC # BLD AUTO: 3.55 10E12/L (ref 3.8–5.2)
RBC #/AREA URNS AUTO: ABNORMAL /HPF
SOURCE: ABNORMAL
SP GR UR STRIP: 1.02 (ref 1–1.03)
UROBILINOGEN UR STRIP-ACNC: 0.2 EU/DL (ref 0.2–1)
WBC # BLD AUTO: 4.5 10E9/L (ref 4–11)
WBC #/AREA URNS AUTO: ABNORMAL /HPF

## 2019-12-24 PROCEDURE — 99213 OFFICE O/P EST LOW 20 MIN: CPT | Mod: 25 | Performed by: INTERNAL MEDICINE

## 2019-12-24 PROCEDURE — 81001 URINALYSIS AUTO W/SCOPE: CPT | Performed by: INTERNAL MEDICINE

## 2019-12-24 PROCEDURE — 36415 COLL VENOUS BLD VENIPUNCTURE: CPT | Performed by: INTERNAL MEDICINE

## 2019-12-24 PROCEDURE — 85027 COMPLETE CBC AUTOMATED: CPT | Performed by: INTERNAL MEDICINE

## 2019-12-24 PROCEDURE — 46600 DIAGNOSTIC ANOSCOPY SPX: CPT | Performed by: INTERNAL MEDICINE

## 2019-12-24 PROCEDURE — 80053 COMPREHEN METABOLIC PANEL: CPT | Performed by: INTERNAL MEDICINE

## 2019-12-24 ASSESSMENT — MIFFLIN-ST. JEOR: SCORE: 850.54

## 2019-12-24 NOTE — PROGRESS NOTES
Subjective     Georgette Alatorre is a 79 year old female who presents to clinic today for the following health issues:    HPI   ED/UC Followup:    Facility:   ED  Date of visit: 12/21/2019  Reason for visit:     Ureteral stone  Renal colic  Renal insufficiency    Current Status: patient states she is doing fine and is not experiencing any pain    She has not noted any hematuria, feverishness chills or sweats.    Chronic diarrhea continues with occasional blood-streaked stools.  She states that the diarrhea is intermittent, never predictable and not related to any foods that she is noted.  She is afraid to use Imodium because of her previous bowel obstructions and has not titrated Pepto-Bismol.  Weight remains stable.        Current Outpatient Medications   Medication Sig Dispense Refill     Acetaminophen (TYLENOL PO) Take 500 mg by mouth every 4 hours as needed for mild pain       Ascorbic Acid (VITAMIN C PO) Take 1,000 mg by mouth daily        bismuth subsalicylate (PEPTO BISMOL) 262 MG/15ML suspension Take 15 mLs by mouth every 6 hours as needed for indigestion       calcium carbonate (OS-ILYA 500 MG [place]. [place]) 1250 MG tablet Take 1 tablet by mouth 2 times daily       Cholecalciferol (VITAMIN D3 PO) Take 2,000 Units by mouth daily.       diltiazem ER (DILT-XR) 180 MG 24 hr capsule TAKE ONE CAPSULE BY MOUTH ONCE DAILY 90 capsule 3     Nutritional Supplements (CARNATION INSTANT BREAKFAST PO) Take 1 Can by mouth daily        ondansetron (ZOFRAN ODT) 4 MG ODT tab Take 1 tablet (4 mg) by mouth every 8 hours as needed for nausea 10 tablet 0     oxyCODONE (ROXICODONE) 5 MG tablet Take 5-10 mg (1-2 tablets) every 6 hours as needed for pain 8 tablet 0     probiotic CAPS Take 1 capsule by mouth daily        Protein POWD Take by mouth daily        tamsulosin (FLOMAX) 0.4 MG capsule Take 1 capsule (0.4 mg) by mouth daily for 10 doses 10 capsule 0     Wheat Dextrin (BENEFIBER PO) Take by mouth 2 times daily       Allergies  "  Allergen Reactions     No Known Allergies        Reviewed and updated as needed this visit by Provider         Review of Systems   ROS COMP: Constitutional, HEENT, cardiovascular, pulmonary, GI, , musculoskeletal, neuro, skin, endocrine and psych systems are negative, except as otherwise noted.      Objective    There were no vitals taken for this visit.  There is no height or weight on file to calculate BMI.  Physical Exam BP (!) 145/78 (BP Location: Left arm, Patient Position: Sitting, Cuff Size: Adult Regular)   Pulse 95   Temp 97.7  F (36.5  C) (Tympanic)   Ht 1.575 m (5' 2\")   Wt 42.7 kg (94 lb 3.2 oz)   SpO2 96%   BMI 17.23 kg/m      GENERAL: healthy, alert and no distress  NECK: no adenopathy, no asymmetry, masses, or scars and thyroid normal to palpation  RESP: lungs clear to auscultation - no rales, rhonchi or wheezes  CV: regular rate and rhythm, normal S1 S2, no S3 or S4, no murmur, click or rub, no peripheral edema and peripheral pulses strong  ABDOMEN: soft, nontender, no hepatosplenomegaly, no masses and bowel sounds normal, no flank or CVA tenderness  Anoscopy anus was normal without hemorrhoids, there are no internal hemorrhoids or other abnormalities noted  MS: no gross musculoskeletal defects noted, no edema            Assessment & Plan     1. Nephrolithiasis  Recommended that the patient start using her strainer for further analysis  - UA reflex to Microscopic and Culture  - CBC with platelets  - Comprehensive metabolic panel  - Urine Microscopic    2. SVT (supraventricular tachycardia) (H)  Quiesced sent with current medications    3. Chronic diarrhea  Recommended that the patient start titrating her Pepto-Bismol beginning at 2 cc twice daily to limit bowel movements to 2 stools per day    4. Other idiopathic scoliosis, thoracolumbar region  Not causing any symptoms at this time with careful management of activities    5. Vitamin B12 deficiency (non anemic)      6. Gastroesophageal " reflux disease without esophagitis  No current symptoms    7. Anemia due to blood loss, acute    - CBC with platelets    8. Age-related osteoporosis without current pathological fracture      9. Radiation colitis           FUTURE APPOINTMENTS:       - Follow-up visit in 1 mo    No follow-ups on file.    Mario Valdes MD  [facility]

## 2019-12-26 LAB
ALBUMIN SERPL-MCNC: 3.5 G/DL (ref 3.4–5)
ALP SERPL-CCNC: 59 U/L (ref 40–150)
ALT SERPL W P-5'-P-CCNC: 27 U/L (ref 0–50)
ANION GAP SERPL CALCULATED.3IONS-SCNC: 6 MMOL/L (ref 3–14)
AST SERPL W P-5'-P-CCNC: 36 U/L (ref 0–45)
BILIRUB SERPL-MCNC: 0.4 MG/DL (ref 0.2–1.3)
BUN SERPL-MCNC: 15 MG/DL (ref 7–30)
CALCIUM SERPL-MCNC: 8.9 MG/DL (ref 8.5–10.1)
CHLORIDE SERPL-SCNC: 111 MMOL/L (ref 94–109)
CO2 SERPL-SCNC: 26 MMOL/L (ref 20–32)
CREAT SERPL-MCNC: 0.72 MG/DL (ref 0.52–1.04)
GFR SERPL CREATININE-BSD FRML MDRD: 78 ML/MIN/{1.73_M2}
GLUCOSE SERPL-MCNC: 101 MG/DL (ref 70–99)
POTASSIUM SERPL-SCNC: 3.5 MMOL/L (ref 3.4–5.3)
PROT SERPL-MCNC: 7.2 G/DL (ref 6.8–8.8)
SODIUM SERPL-SCNC: 143 MMOL/L (ref 133–144)

## 2019-12-30 VITALS
HEART RATE: 95 BPM | HEIGHT: 62 IN | DIASTOLIC BLOOD PRESSURE: 78 MMHG | TEMPERATURE: 97.7 F | BODY MASS INDEX: 17.34 KG/M2 | WEIGHT: 94.2 LBS | SYSTOLIC BLOOD PRESSURE: 130 MMHG | OXYGEN SATURATION: 96 %

## 2020-01-07 ENCOUNTER — OFFICE VISIT (OUTPATIENT)
Dept: FAMILY MEDICINE | Facility: CLINIC | Age: 80
End: 2020-01-07
Payer: COMMERCIAL

## 2020-01-07 VITALS
TEMPERATURE: 98 F | OXYGEN SATURATION: 98 % | SYSTOLIC BLOOD PRESSURE: 124 MMHG | HEIGHT: 62 IN | DIASTOLIC BLOOD PRESSURE: 61 MMHG | BODY MASS INDEX: 17.3 KG/M2 | WEIGHT: 94 LBS | HEART RATE: 61 BPM

## 2020-01-07 DIAGNOSIS — K56.7 ILEUS OF UNSPECIFIED TYPE (H): ICD-10-CM

## 2020-01-07 DIAGNOSIS — I47.10 SVT (SUPRAVENTRICULAR TACHYCARDIA) (H): ICD-10-CM

## 2020-01-07 DIAGNOSIS — K52.0 RADIATION COLITIS: ICD-10-CM

## 2020-01-07 DIAGNOSIS — E53.8 VITAMIN B12 DEFICIENCY (NON ANEMIC): ICD-10-CM

## 2020-01-07 DIAGNOSIS — K52.9 CHRONIC DIARRHEA: ICD-10-CM

## 2020-01-07 DIAGNOSIS — M41.25 OTHER IDIOPATHIC SCOLIOSIS, THORACOLUMBAR REGION: ICD-10-CM

## 2020-01-07 DIAGNOSIS — K21.9 GASTROESOPHAGEAL REFLUX DISEASE WITHOUT ESOPHAGITIS: ICD-10-CM

## 2020-01-07 DIAGNOSIS — D62 ANEMIA DUE TO BLOOD LOSS, ACUTE: ICD-10-CM

## 2020-01-07 DIAGNOSIS — M81.0 AGE-RELATED OSTEOPOROSIS WITHOUT CURRENT PATHOLOGICAL FRACTURE: ICD-10-CM

## 2020-01-07 DIAGNOSIS — N20.0 NEPHROLITHIASIS: Primary | ICD-10-CM

## 2020-01-07 LAB
ERYTHROCYTE [DISTWIDTH] IN BLOOD BY AUTOMATED COUNT: 14.7 % (ref 10–15)
HCT VFR BLD AUTO: 36.2 % (ref 35–47)
HGB BLD-MCNC: 11.8 G/DL (ref 11.7–15.7)
MCH RBC QN AUTO: 32.2 PG (ref 26.5–33)
MCHC RBC AUTO-ENTMCNC: 32.6 G/DL (ref 31.5–36.5)
MCV RBC AUTO: 99 FL (ref 78–100)
PLATELET # BLD AUTO: 256 10E9/L (ref 150–450)
RBC # BLD AUTO: 3.66 10E12/L (ref 3.8–5.2)
WBC # BLD AUTO: 6 10E9/L (ref 4–11)

## 2020-01-07 PROCEDURE — 36415 COLL VENOUS BLD VENIPUNCTURE: CPT | Performed by: INTERNAL MEDICINE

## 2020-01-07 PROCEDURE — 83550 IRON BINDING TEST: CPT | Performed by: INTERNAL MEDICINE

## 2020-01-07 PROCEDURE — 81015 MICROSCOPIC EXAM OF URINE: CPT | Performed by: INTERNAL MEDICINE

## 2020-01-07 PROCEDURE — 99214 OFFICE O/P EST MOD 30 MIN: CPT | Performed by: INTERNAL MEDICINE

## 2020-01-07 PROCEDURE — 83540 ASSAY OF IRON: CPT | Performed by: INTERNAL MEDICINE

## 2020-01-07 PROCEDURE — 81003 URINALYSIS AUTO W/O SCOPE: CPT | Performed by: INTERNAL MEDICINE

## 2020-01-07 PROCEDURE — 85027 COMPLETE CBC AUTOMATED: CPT | Performed by: INTERNAL MEDICINE

## 2020-01-07 PROCEDURE — 80048 BASIC METABOLIC PNL TOTAL CA: CPT | Performed by: INTERNAL MEDICINE

## 2020-01-07 PROCEDURE — 82040 ASSAY OF SERUM ALBUMIN: CPT | Performed by: INTERNAL MEDICINE

## 2020-01-07 ASSESSMENT — MIFFLIN-ST. JEOR: SCORE: 849.63

## 2020-01-07 NOTE — PROGRESS NOTES
Subjective     Georgette Alatorre is a 79 year old female who presents to clinic today for the following health issues:    HPI   Patient presents to clinic for one month follow up of Nephrolithiasis.  Flomax seems to be bothering her diarrhea and causing fluid retention so she discontinued it.    Diarrhea continues to be an ongoing problem.  She states that it is better however she continues to have 2-10 bowel movements per day without abdominal bloating or discomfort.  Her diet has improved and the diarrhea has been slightly improved with adding Benefiber.  She is having no abdominal pain.  She still feels trapped unable to maintain her activities without fasting because of the fear of having diarrhea and even passing gas is worrisome.        Current Outpatient Medications   Medication Sig Dispense Refill     Acetaminophen (TYLENOL PO) Take 500 mg by mouth every 4 hours as needed for mild pain       Ascorbic Acid (VITAMIN C PO) Take 1,000 mg by mouth daily        bismuth subsalicylate (PEPTO BISMOL) 262 MG/15ML suspension Take 15 mLs by mouth every 6 hours as needed for indigestion       calcium carbonate (OS-ILYA 500 MG [place]. [place]) 1250 MG tablet Take 1 tablet by mouth 2 times daily       Cholecalciferol (VITAMIN D3 PO) Take 2,000 Units by mouth daily.       diltiazem ER (DILT-XR) 180 MG 24 hr capsule TAKE ONE CAPSULE BY MOUTH ONCE DAILY 90 capsule 3     probiotic CAPS Take 1 capsule by mouth daily        Protein POWD Take by mouth daily        Wheat Dextrin (BENEFIBER PO) Take by mouth 2 times daily       Allergies   Allergen Reactions     No Known Allergies        Reviewed and updated as needed this visit by Provider         Review of Systems   ROS COMP: Constitutional, HEENT, cardiovascular, pulmonary, GI, , musculoskeletal, neuro, skin, endocrine and psych systems are negative, except as otherwise noted.      Objective    /61 (BP Location: Left arm, Patient Position: Sitting, Cuff Size: Adult Regular)   " Pulse 61   Temp 98  F (36.7  C) (Oral)   Ht 1.575 m (5' 2\")   Wt 42.6 kg (94 lb)   SpO2 98%   Breastfeeding No   BMI 17.19 kg/m    Body mass index is 17.19 kg/m .  Physical Exam   GENERAL: healthy, anxious, alert and no distress  NECK: no adenopathy, no asymmetry, masses, or scars and thyroid normal to palpation  Kyphoscoliosis  RESP: lungs clear to auscultation - no rales, rhonchi or wheezes  CV: regular rate and rhythm, normal S1 S2, no S3 or S4, no murmur, click or rub, no peripheral edema and peripheral pulses strong  ABDOMEN: soft, nontender, no hepatosplenomegaly, no masses and bowel sounds normal  Rectal: Anus was normal there are no external tags rectal exam showed no palpable masses  MS: no gross musculoskeletal defects noted, no edema            Assessment & Plan     1. Nephrolithiasis  Continue with strainer and follow-up kidney function tests  - Basic metabolic panel  - UA reflex to Microscopic and Culture  - Albumin level  - Urine Microscopic    2. Ileus of unspecified type (H)      3. SVT (supraventricular tachycardia) (H)  No recent symptoms    4. Age-related osteoporosis without current pathological fracture      5. Anemia due to blood loss, acute    - CBC with platelets  - Iron and iron binding capacity    6. Gastroesophageal reflux disease without esophagitis      7. Chronic diarrhea  Recommended increasing her Pepto-Bismol to twice daily along with her Benefiber.  She also could use small intermittent doses of Imodium especially prior to activities  - Basic metabolic panel  - CBC with platelets  - Albumin level    8. Other idiopathic scoliosis, thoracolumbar region      9. Vitamin B12 deficiency (non anemic)      10. Radiation colitis    - Albumin level       FUTURE APPOINTMENTS:       - Follow-up visit in 1 month    No follow-ups on file.  Greater than 30 minutes were spent during this encounter with greater than 50% of the time spent in counseling and coordinating care  Mario Valdes, " MD  [facility]

## 2020-01-08 LAB
ALBUMIN SERPL-MCNC: 3.6 G/DL (ref 3.4–5)
ALBUMIN UR-MCNC: NEGATIVE MG/DL
ANION GAP SERPL CALCULATED.3IONS-SCNC: 3 MMOL/L (ref 3–14)
APPEARANCE UR: CLEAR
BILIRUB UR QL STRIP: NEGATIVE
BUN SERPL-MCNC: 15 MG/DL (ref 7–30)
CALCIUM SERPL-MCNC: 8.8 MG/DL (ref 8.5–10.1)
CHLORIDE SERPL-SCNC: 108 MMOL/L (ref 94–109)
CO2 SERPL-SCNC: 30 MMOL/L (ref 20–32)
COLOR UR AUTO: YELLOW
CREAT SERPL-MCNC: 0.71 MG/DL (ref 0.52–1.04)
GFR SERPL CREATININE-BSD FRML MDRD: 80 ML/MIN/{1.73_M2}
GLUCOSE SERPL-MCNC: 89 MG/DL (ref 70–99)
GLUCOSE UR STRIP-MCNC: NEGATIVE MG/DL
HGB UR QL STRIP: ABNORMAL
IRON SATN MFR SERPL: 18 % (ref 15–46)
IRON SERPL-MCNC: 62 UG/DL (ref 35–180)
KETONES UR STRIP-MCNC: NEGATIVE MG/DL
LEUKOCYTE ESTERASE UR QL STRIP: NEGATIVE
NITRATE UR QL: NEGATIVE
PH UR STRIP: 5 PH (ref 5–7)
POTASSIUM SERPL-SCNC: 3 MMOL/L (ref 3.4–5.3)
RBC #/AREA URNS AUTO: NORMAL /HPF
SODIUM SERPL-SCNC: 141 MMOL/L (ref 133–144)
SOURCE: ABNORMAL
SP GR UR STRIP: >1.03 (ref 1–1.03)
TIBC SERPL-MCNC: 337 UG/DL (ref 240–430)
UROBILINOGEN UR STRIP-ACNC: 0.2 EU/DL (ref 0.2–1)
WBC #/AREA URNS AUTO: NORMAL /HPF

## 2020-01-21 ENCOUNTER — HOSPITAL ENCOUNTER (OUTPATIENT)
Facility: CLINIC | Age: 80
End: 2020-01-21
Attending: UROLOGY | Admitting: UROLOGY
Payer: COMMERCIAL

## 2020-04-13 DIAGNOSIS — I10 BENIGN ESSENTIAL HYPERTENSION: ICD-10-CM

## 2020-04-13 NOTE — TELEPHONE ENCOUNTER
"  diltiazem ER (DILT-XR) 180 MG 24 hr capsule  90 capsule  3  6/19/2019          Last Written Prescription Date:  06/19/2019  Last Fill Quantity: 90,  # refills: 3   Last office visit: 1/7/2020 with prescribing provider:     Future Office Visit:  Unknown      Requested Prescriptions   Pending Prescriptions Disp Refills     diltiazem ER (DILT-XR) 180 MG 24 hr capsule [Pharmacy Med Name: Dilt- MG Oral Capsule Extended Release 24 Hour] 90 capsule 0     Sig: Take 1 capsule by mouth once daily       Calcium Channel Blockers Protocol  Passed - 4/13/2020 12:22 PM        Passed - Blood pressure under 140/90 in past 12 months     BP Readings from Last 3 Encounters:   01/07/20 124/61   12/24/19 130/78   12/21/19 113/74                 Passed - Normal ALT in past 12 months     Recent Labs   Lab Test 12/24/19  0904   ALT 27             Passed - Recent (12 mo) or future (30 days) visit within the authorizing provider's specialty     Patient has had an office visit with the authorizing provider or a provider within the authorizing providers department within the previous 12 mos or has a future within next 30 days. See \"Patient Info\" tab in inbasket, or \"Choose Columns\" in Meds & Orders section of the refill encounter.              Passed - Medication is active on med list        Passed - Patient is age 18 or older        Passed - No active pregnancy on record        Passed - Normal serum creatinine on file in past 12 months     Recent Labs   Lab Test 01/07/20  1157  12/21/19  1844   CR 0.71   < >  --    CREAT  --   --  1.1*    < > = values in this interval not displayed.       Ok to refill medication if creatinine is low          Passed - No positive pregnancy test in past 12 months             "

## 2020-04-14 RX ORDER — DILTIAZEM HYDROCHLORIDE 180 MG/1
CAPSULE, EXTENDED RELEASE ORAL
Qty: 90 CAPSULE | Refills: 0 | Status: SHIPPED | OUTPATIENT
Start: 2020-04-14

## 2020-05-28 ENCOUNTER — APPOINTMENT (OUTPATIENT)
Dept: CT IMAGING | Facility: CLINIC | Age: 80
End: 2020-05-28
Attending: EMERGENCY MEDICINE
Payer: COMMERCIAL

## 2020-05-28 ENCOUNTER — HOSPITAL ENCOUNTER (OUTPATIENT)
Facility: CLINIC | Age: 80
Setting detail: OBSERVATION
Discharge: HOME | End: 2020-05-30
Attending: EMERGENCY MEDICINE | Admitting: INTERNAL MEDICINE
Payer: COMMERCIAL

## 2020-05-28 DIAGNOSIS — M41.25 OTHER IDIOPATHIC SCOLIOSIS, THORACOLUMBAR REGION: Primary | ICD-10-CM

## 2020-05-28 DIAGNOSIS — M54.9 INTRACTABLE BACK PAIN: ICD-10-CM

## 2020-05-28 LAB
ANION GAP SERPL CALCULATED.3IONS-SCNC: 4 MMOL/L (ref 3–14)
BASOPHILS # BLD AUTO: 0 10E9/L (ref 0–0.2)
BASOPHILS NFR BLD AUTO: 0.2 %
BUN SERPL-MCNC: 10 MG/DL (ref 7–30)
CALCIUM SERPL-MCNC: 8.9 MG/DL (ref 8.5–10.1)
CHLORIDE SERPL-SCNC: 103 MMOL/L (ref 94–109)
CO2 SERPL-SCNC: 30 MMOL/L (ref 20–32)
CREAT SERPL-MCNC: 0.69 MG/DL (ref 0.52–1.04)
DIFFERENTIAL METHOD BLD: ABNORMAL
EOSINOPHIL # BLD AUTO: 0 10E9/L (ref 0–0.7)
EOSINOPHIL NFR BLD AUTO: 0.1 %
ERYTHROCYTE [DISTWIDTH] IN BLOOD BY AUTOMATED COUNT: 14.4 % (ref 10–15)
GFR SERPL CREATININE-BSD FRML MDRD: 82 ML/MIN/{1.73_M2}
GLUCOSE SERPL-MCNC: 109 MG/DL (ref 70–99)
HCT VFR BLD AUTO: 35.2 % (ref 35–47)
HGB BLD-MCNC: 11.5 G/DL (ref 11.7–15.7)
IMM GRANULOCYTES # BLD: 0 10E9/L (ref 0–0.4)
IMM GRANULOCYTES NFR BLD: 0.2 %
LYMPHOCYTES # BLD AUTO: 0.8 10E9/L (ref 0.8–5.3)
LYMPHOCYTES NFR BLD AUTO: 8 %
MCH RBC QN AUTO: 31.8 PG (ref 26.5–33)
MCHC RBC AUTO-ENTMCNC: 32.7 G/DL (ref 31.5–36.5)
MCV RBC AUTO: 97 FL (ref 78–100)
MONOCYTES # BLD AUTO: 1 10E9/L (ref 0–1.3)
MONOCYTES NFR BLD AUTO: 10.4 %
NEUTROPHILS # BLD AUTO: 7.7 10E9/L (ref 1.6–8.3)
NEUTROPHILS NFR BLD AUTO: 81.1 %
NRBC # BLD AUTO: 0 10*3/UL
NRBC BLD AUTO-RTO: 0 /100
PLATELET # BLD AUTO: 239 10E9/L (ref 150–450)
POTASSIUM SERPL-SCNC: 3.7 MMOL/L (ref 3.4–5.3)
RBC # BLD AUTO: 3.62 10E12/L (ref 3.8–5.2)
SODIUM SERPL-SCNC: 137 MMOL/L (ref 133–144)
WBC # BLD AUTO: 9.5 10E9/L (ref 4–11)

## 2020-05-28 PROCEDURE — 99219 ZZC INITIAL OBSERVATION CARE,LEVL II: CPT | Performed by: INTERNAL MEDICINE

## 2020-05-28 PROCEDURE — 25000132 ZZH RX MED GY IP 250 OP 250 PS 637: Performed by: EMERGENCY MEDICINE

## 2020-05-28 PROCEDURE — 25000128 H RX IP 250 OP 636: Performed by: EMERGENCY MEDICINE

## 2020-05-28 PROCEDURE — G0378 HOSPITAL OBSERVATION PER HR: HCPCS

## 2020-05-28 PROCEDURE — 80048 BASIC METABOLIC PNL TOTAL CA: CPT | Performed by: EMERGENCY MEDICINE

## 2020-05-28 PROCEDURE — 85025 COMPLETE CBC W/AUTO DIFF WBC: CPT | Performed by: EMERGENCY MEDICINE

## 2020-05-28 PROCEDURE — 25000132 ZZH RX MED GY IP 250 OP 250 PS 637: Performed by: INTERNAL MEDICINE

## 2020-05-28 PROCEDURE — 99285 EMERGENCY DEPT VISIT HI MDM: CPT | Mod: 25

## 2020-05-28 PROCEDURE — 96375 TX/PRO/DX INJ NEW DRUG ADDON: CPT

## 2020-05-28 PROCEDURE — 72131 CT LUMBAR SPINE W/O DYE: CPT

## 2020-05-28 PROCEDURE — 96374 THER/PROPH/DIAG INJ IV PUSH: CPT

## 2020-05-28 PROCEDURE — 72128 CT CHEST SPINE W/O DYE: CPT

## 2020-05-28 RX ORDER — DILTIAZEM HYDROCHLORIDE 180 MG/1
180 CAPSULE, COATED, EXTENDED RELEASE ORAL DAILY
Status: DISCONTINUED | OUTPATIENT
Start: 2020-05-29 | End: 2020-05-30 | Stop reason: HOSPADM

## 2020-05-28 RX ORDER — ONDANSETRON 2 MG/ML
4 INJECTION INTRAMUSCULAR; INTRAVENOUS EVERY 6 HOURS PRN
Status: DISCONTINUED | OUTPATIENT
Start: 2020-05-28 | End: 2020-05-30 | Stop reason: HOSPADM

## 2020-05-28 RX ORDER — ONDANSETRON 2 MG/ML
4 INJECTION INTRAMUSCULAR; INTRAVENOUS ONCE
Status: COMPLETED | OUTPATIENT
Start: 2020-05-28 | End: 2020-05-28

## 2020-05-28 RX ORDER — LIDOCAINE 4 G/G
1-2 PATCH TOPICAL
Status: DISCONTINUED | OUTPATIENT
Start: 2020-05-28 | End: 2020-05-30 | Stop reason: HOSPADM

## 2020-05-28 RX ORDER — CYCLOBENZAPRINE HCL 5 MG
5-10 TABLET ORAL EVERY 8 HOURS PRN
Status: DISCONTINUED | OUTPATIENT
Start: 2020-05-28 | End: 2020-05-30 | Stop reason: HOSPADM

## 2020-05-28 RX ORDER — ACETAMINOPHEN 500 MG
1000 TABLET ORAL ONCE
Status: COMPLETED | OUTPATIENT
Start: 2020-05-28 | End: 2020-05-28

## 2020-05-28 RX ORDER — NALOXONE HYDROCHLORIDE 0.4 MG/ML
.1-.4 INJECTION, SOLUTION INTRAMUSCULAR; INTRAVENOUS; SUBCUTANEOUS
Status: DISCONTINUED | OUTPATIENT
Start: 2020-05-28 | End: 2020-05-30 | Stop reason: HOSPADM

## 2020-05-28 RX ORDER — ACETAMINOPHEN 500 MG
1000 TABLET ORAL 3 TIMES DAILY
Status: DISCONTINUED | OUTPATIENT
Start: 2020-05-28 | End: 2020-05-30 | Stop reason: HOSPADM

## 2020-05-28 RX ORDER — HYDROMORPHONE HYDROCHLORIDE 1 MG/ML
0.5 INJECTION, SOLUTION INTRAMUSCULAR; INTRAVENOUS; SUBCUTANEOUS ONCE
Status: COMPLETED | OUTPATIENT
Start: 2020-05-28 | End: 2020-05-28

## 2020-05-28 RX ORDER — DIPHENOXYLATE HCL/ATROPINE 2.5-.025MG
1 TABLET ORAL 3 TIMES DAILY PRN
Status: DISCONTINUED | OUTPATIENT
Start: 2020-05-28 | End: 2020-05-30 | Stop reason: HOSPADM

## 2020-05-28 RX ORDER — DIPHENOXYLATE HCL/ATROPINE 2.5-.025MG
1 TABLET ORAL 3 TIMES DAILY PRN
COMMUNITY
End: 2022-09-16

## 2020-05-28 RX ORDER — ONDANSETRON 4 MG/1
4 TABLET, ORALLY DISINTEGRATING ORAL EVERY 6 HOURS PRN
Status: DISCONTINUED | OUTPATIENT
Start: 2020-05-28 | End: 2020-05-30 | Stop reason: HOSPADM

## 2020-05-28 RX ADMIN — ACETAMINOPHEN 1000 MG: 500 TABLET, FILM COATED ORAL at 21:15

## 2020-05-28 RX ADMIN — HYDROMORPHONE HYDROCHLORIDE 0.5 MG: 1 INJECTION, SOLUTION INTRAMUSCULAR; INTRAVENOUS; SUBCUTANEOUS at 09:55

## 2020-05-28 RX ADMIN — LIDOCAINE 1 PATCH: 560 PATCH PERCUTANEOUS; TOPICAL; TRANSDERMAL at 16:59

## 2020-05-28 RX ADMIN — CYCLOBENZAPRINE HYDROCHLORIDE 5 MG: 5 TABLET, FILM COATED ORAL at 21:15

## 2020-05-28 RX ADMIN — ONDANSETRON 4 MG: 2 INJECTION INTRAMUSCULAR; INTRAVENOUS at 09:55

## 2020-05-28 RX ADMIN — ACETAMINOPHEN 1000 MG: 500 TABLET, FILM COATED ORAL at 10:01

## 2020-05-28 RX ADMIN — ACETAMINOPHEN 1000 MG: 500 TABLET, FILM COATED ORAL at 16:59

## 2020-05-28 ASSESSMENT — ENCOUNTER SYMPTOMS: BACK PAIN: 1

## 2020-05-28 ASSESSMENT — MIFFLIN-ST. JEOR: SCORE: 845.1

## 2020-05-28 NOTE — PHARMACY-ADMISSION MEDICATION HISTORY
Pharmacy Medication History  Admission medication history interview status for the 5/28/2020  admission is complete. See EPIC admission navigator for prior to admission medications     Medication history sources: Patient and Surescripts  Medication history source reliability: Good  Adherence assessment: Good    Significant changes made to the medication list:  Added diclofenac, lomotil, oxycodone. Deleted pepto bismol.      Additional medication history information:   none    Medication reconciliation completed by provider prior to medication history? No    Time spent in this activity: 15 minutes      Prior to Admission medications    Medication Sig Last Dose Taking? Auth Provider   Ascorbic Acid (VITAMIN C PO) Take 500 mg by mouth daily  5/27/2020 at Unknown time Yes Unknown, Entered By History   calcium carbonate (OS-ILYA 500 MG [place]. [place]) 1250 MG tablet Take 1 tablet by mouth 2 times daily 5/27/2020 at Unknown time Yes Reported, Patient   Cholecalciferol (VITAMIN D3 PO) Take 2,000 Units by mouth daily. 5/27/2020 at Unknown time Yes Unknown, Entered By History   diclofenac (VOLTAREN) 1 % topical gel Place 2 g onto the skin 2 times daily as needed for moderate pain Past Week at Unknown time Yes Unknown, Entered By History   diltiazem ER (DILT-XR) 180 MG 24 hr capsule Take 1 capsule by mouth once daily 5/28/2020 at am Yes Mario Valdes MD   diphenoxylate-atropine (LOMOTIL) 2.5-0.025 MG tablet Take 1 tablet by mouth 3 times daily as needed for diarrhea 5/27/2020 at Unknown time Yes Unknown, Entered By History   oxyCODONE HCl (OXAYDO) 5 MG TABA Take 2.5-5 mg by mouth every 4 hours as needed 5/28/2020 at Unknown time Yes Unknown, Entered By History   probiotic CAPS Take 1 capsule by mouth daily  5/27/2020 at Unknown time Yes Jerry Fontanez, APRN CNP   Wheat Dextrin (BENEFIBER PO) Take 2 teaspoonful by mouth 2 times daily  5/27/2020 at Unknown time Yes Reported, Patient   Acetaminophen (TYLENOL PO) Take 500 mg by  mouth every 4 hours as needed for mild pain prn  Reported, Patient

## 2020-05-28 NOTE — PLAN OF CARE
Pt A/O, VSS on 2L. Up form ED at 1430. Plan for MRI today. Void via bedpan. Call light within reach, non slip socks on when OOb & bed in lowest/locked position. Will continue to monitor.

## 2020-05-28 NOTE — ED PROVIDER NOTES
History     Chief Complaint:  Back Pain    HPI   Georgette Alatorre is a 79 year old female with a history of osteoporosis, kidney stone, endometrial cancer and scoliosis who presents to the emergency department for evaluation of back pain. The patient reports that 5 days ago she was gardening when she noticed that she was having upper lumbar back pain. She initially thought the pain was due to a kidney stone. However, her pain has gradually worsened to the point where she was forced to crawl parts of yesterday and has not walked today. She took one dose of Oxycontin at 0700 today and was supposed to have an x-ray of her back done today prior to calling EMS. Her pain is greatest when attempting to stand up. She states that she has had issue with her back in the past and is regularly seen for this. She denies any shoot pain, trauma to the area, or previous back fracture or surgery. The patient also notes that she was scheduled to have an injection, but missed the appointment.    Allergies:  No Known Drug Allergies    Medications:    Pepto bismol  Calcium carbonate  Diltiazem    Past Medical History:    Arthritis  Endometrial cancer  Gastroesophageal reflux disease  History of blood transfusion  Kidney stones  Hydronephrosis  Osteoporosis  Scoliosis  Small bowel obstruction  Supraventricular tachycardia  Chronic diarrhea    Past Surgical History:    Appendectomy  Cataract IOL  Colonoscopy  Cystoscopy  Endoscopy x2  Retinal eye surgery  Hysterectomy  Exploratory laparotomy x2    Family History:    No past pertinent family history.    Social History:  The patient was accompanied to the ED by EMS.  Smoking Status: Former  Smokeless Tobacco: Never  Alcohol Use: Yes  Drug Use: No  Marital Status:  Single     Review of Systems   Musculoskeletal: Positive for back pain.   All other systems reviewed and are negative.      Physical Exam   Vitals:  Patient Vitals for the past 24 hrs:   BP Temp Temp src Pulse Heart Rate  "Resp SpO2 Height Weight   05/28/20 1100 -- -- -- -- 65 -- -- -- --   05/28/20 1057 124/67 -- -- -- 65 16 99 % -- --   05/28/20 1007 -- -- -- -- -- -- 100 % -- --   05/28/20 1000 -- -- -- -- -- -- (!) 80 % -- --   05/28/20 0931 132/79 99.2  F (37.3  C) Oral 82 -- 18 96 % 1.575 m (5' 2\") 42.2 kg (93 lb)       Physical Exam  Eye:  Pupils are equal, round, and reactive.  Extraocular movements intact.    ENT:  No rhinorrhea.  Moist mucus membranes.  Normal tongue and tonsil.    Cardiac:  Regular rate and rhythm.  No murmurs, gallops, or rubs.    Pulmonary:  Clear to auscultation bilaterally.  No wheezes, rales, or rhonchi.    Abdomen:  Positive bowel sounds.  Abdomen is soft and non-distended, without focal tenderness.    Musculoskeletal:  Normal movement of all extremities without evidence for deficit. There is diffuse lower thoracic and upper lumbar back pain including midline tenderness without step off.     Skin:  Warm and dry without rashes.    Neurologic:  Non-focal exam without asymmetric weakness or numbness. Focused leg exam shows appropriate strength, sensation and reflexes.     Psychiatric:  Normal affect with appropriate interaction with examiner.     Emergency Department Course     Imaging:  Radiographic findings were communicated with the patient who voiced understanding of the findings.    CT Lumbar Spine WO Contrast  1. No fractures are identified.  2. Scoliosis of the lumbar spine convex towards the left.  3. Multilevel degenerative change. The degenerative changes are  greatest in the facet joints.  4. Degenerative change is present in the sacroiliac joints.  Reading per radiology.    CT Thoracic Spine WO Contrast   1. Severe scoliosis convex towards the right.  2. No fractures are identified.  3. Degenerative changes. The degenerative changes are greatest in the  left facet joints at T10-T11, T11-T12, and T12-L1.  Reading per radiology.    Laboratory:  CBC: HGB 11.5 (L) o/w WNL. (WBC 9.5, ) "   BMP: Glucose 109 (H)o/w AWNL (Creatinine 0.69)    Interventions:  0955 Dilaudid 0.5 mg IV  0955 Zofran 4 mg IV  1001 Tylenol 1000 mg PO    Emergency Department Course:  Past medical records, nursing notes, and vitals reviewed.    0942 I performed an exam of the patient as documented above.     IV was inserted and blood was drawn for laboratory testing, results above.  The patient was sent for a lumbar spine CT and thoracic spine CT while in the emergency department, results above.     1142 I rechecked the patient and discussed the results of her workup thus far.     1208 I spoke with Dr. Reyes, hospitalist, who agreed to admit the patient.     Findings and plan explained to the Patient who consents to admission. Discussed the patient with Dr. Reyes, who will admit the patient to a obs bed for further monitoring, evaluation, and treatment.    I personally reviewed the laboratory and imaging results with the Patient and answered all related questions prior to admission.    Impression & Plan      Medical Decision Making:  This unfortunate 79-year-old woman with a history of chronic back pain though not requiring surgery presents to us with an exacerbation of this discomfort over the last 5 days.  She believes it was from pushing herself too hard while she was gardening.  She notes over the past 2 days that she has not been able to get out of bed and has needed to crawl about her dwelling.  She has eventually been able to get to her feet to stretch out, but again this morning, despite taking oxycodone, she was unable to get out of bed and therefore called EMS.    On my exam, she is uncomfortable with any attempts at movement including the midline.  With concerns for compression fractures, CT of the thoracic and lumbar spines were obtained.  Fortunately, this does not show any evidence of a fracture, but significant degenerative changes.  With her inability to ambulate without any other emergent neurologic findings,  plan will be to admit her under observation status for probable MRI and assessment by the spine team to see if there are other things that we can do.  She also may require a stay at a skilled nursing facility for strengthening and pain control.  The patient is comfortable with this plan as is the hospitalist service who admit the patient.      Diagnosis:    ICD-10-CM    1. Intractable back pain  M54.9        Disposition:  Admitted to observation under the care of Dr. Reyes.    I, Von Ulloa, am serving as a scribe on 5/28/2020 at 9:42 AM to personally document services performed by No att. providers found based on my observations and the provider's statements to me.   Von Ulloa  5/28/2020    EMERGENCY DEPARTMENT       Trierweiler, Chad A, MD  05/29/20 0811

## 2020-05-28 NOTE — ED TRIAGE NOTES
Doing yard work, since Saturday c/o upper lumbar pain. Severe osteoporosis, concerned for a fracture. No trauma. Has been crawling around X 2 days.

## 2020-05-28 NOTE — PROGRESS NOTES
RECEIVING UNIT ED HANDOFF REVIEW    ED Nurse Handoff Report was reviewed by: ISA KRUEGER on May 28, 2020 at 1:18 PM

## 2020-05-28 NOTE — ED NOTES
Bed: ED23  Expected date:   Expected time:   Means of arrival:   Comments:  [facility] - 516 - 79 F back pain no covid eta 0964

## 2020-05-28 NOTE — H&P
Admitted:     05/28/2020      PRIMARY CARE PHYSICIAN:  Dr. Merlin Brown.      CHIEF COMPLAINT:  Back pain.      HISTORY OF PRESENT ILLNESS:  Georgette Padron is a very pleasant 79-year-old female with a past medical history significant for GERD, SVT, history of kidney stones, osteoporosis and scoliosis.  She presented to the Emergency Room today for evaluation of intractable back pain.      She relays she has a known history of scoliosis and follows with Dr. John Davenport at the [facility] every 2 years for ongoing monitoring.  She relays that recently she has become somewhat lax on continuing her daily exercises.  She was out doing some gardening 5-6 days ago, and thereafter she noted onset of worsening back pain.  She denies any injury.  She denies any abnormal twisting or popping sensations.  She has had no symptoms suggestive of a radiculopathy.  She describes her pain as being in the midline lower thoracic spine and upper lumbar spine.  She relays her pain has been quite severe.  She has had no changes in her bowel or bladder habits.  She has been trying to manage symptomatically at home.  She says she has used some Tylenol, which may have taken the edge off.  She did not notice any significant relief with use of heat.  Over the past 2 days, she began taking 2.5 to 5 mg of oxycodone.  She says in general, she feels better at rest and is worse when up and ambulating, and her pain has become severe enough to the point where she was unable to walk today and had to crawl around her home.  She denies any other recent changes to her health including fevers, chills, chest pain, shortness of breath, cough, abdominal pain, nausea, vomiting, changes in her bowel or bladder habits.  She has been otherwise taking her medications as prescribed.  Given the persistence severity of her pain and precluding her ability to ambulate, she ultimately called EMS and was brought to the Emergency Room for further  evaluation.      In the Emergency Room, she was seen and evaluated by Dr. Trierweiler.  She had a temperature of 99.2 but was otherwise hemodynamically stable.  O2 sats were initially stable on room air but after receiving 0.5 mg of IV Dilaudid, she desaturated to the 80s and required placement of 2 liters supplemental O2.  Her exam was relatively unremarkable, as were basic labs including a BMP and a CBC.  CT imaging of her thoracic and lumbar spines were obtained due to concerns for possible compression fracture.  They were notable for findings of severe scoliosis and some degenerative changes, but no acute fractures were noted.  An MRI was deferred given the absence of radicular symptoms.  She has some mild improvement after a dose of IV Dilaudid in the Emergency Room and a gram of Tylenol.  Plan at this juncture is to admit her under observation for ongoing evaluation and care.      PAST MEDICAL HISTORY:   1.  SVT.  She manages with diltiazem.   2.  Severe scoliosis.  Follows with Dr. Davenport at [facility] every 2 years.   3.  Osteoporosis.   4.  History of nephrolithiasis.   5.  History of hydronephrosis.  She follows with [facility].  Her renal function is otherwise normal   6.  GERD.   7.  History of endometrial cancer, status post hysterectomy and subsequent radiation therapy.   8.  History of radiation colitis.  She has frequent episodes of stool incontinence, for which she takes Lomotil as needed.   9.  Arthritis.      PAST SURGICAL HISTORY:     Retinal eye surgery in 2010, bilateral cataract procedures in 2009, history of a cystoscopy in 2007, history of exploratory laparotomy with extensive lysis of adhesions in 2007, history of exploratory laparotomy with lysis of adhesions and segmental small bowel resection in 2005, hysterectomy in 2000 due to history of endometrial cancer and a remote appendectomy.      FAMILY HISTORY:  Reviewed and noncontributory to current presentation.       SOCIAL HISTORY:  She has a history of tobacco use.  She smoked half pack per day for 40 years but no longer smokes.  She consumes alcohol, approximately 1 glass of wine with dinner.  She lives at home alone with her cat.      HOME MEDICATIONS:    1.  Vitamin C 500 mg daily.  2.  Calcium carbonate 1 tab b.i.d.  3.  Cholecalciferol 2000 units daily.  4.  Voltaren gel 2 times daily as needed for moderate pain.  5.  Diltiazem extended release 180 mg daily.  6.  Lomotil 1 tab 3 times daily as needed for diarrhea.  7.  Oxycodone 2.5 to 5 mg every 4 hours as needed.  8.  Probiotic capsule daily.  9.  Benefiber 2 teaspoons full b.i.d.  10.  Tylenol 500 mg every 4 hours as needed.      ALLERGIES:  THE PATIENT HAS NO KNOWN DRUG ALLERGIES.      REVIEW OF SYSTEMS:  A full 10-point review of systems was discussed with patient and negative unless otherwise stated per HPI.      PHYSICAL EXAMINATION:   VITAL SIGNS:  Temperature 99.2, pulse 65, respirations 16, blood pressure 124/67, O2 sat 99% on 2 liters nasal cannula.   GENERAL:  The patient is a well-nourished, well-developed female, who appears her stated age.  She is alert, conversing appropriately, and in no acute distress.   HEENT:  Pupils equal, round and accompanying nerves were intact.  Mucous membranes are moist.   CARDIOVASCULAR:  Heart rate and rhythm regular.  No murmurs, gallops or rubs.  Pulses are +2 and symmetric in bilateral upper extremities.  There is no lower extremity edema.   RESPIRATORY:  Lungs are clear to auscultation bilaterally, free of rales or rhonchi.  No increased work of breathing or accessory muscle use.   ABDOMEN:  Soft, nontender, nondistended, positive bowel sounds.   INTEGUMENTARY:  Skin is warm and dry.  No rashes, jaundice or ecchymosis.   MUSCULOSKELETAL:  She did have tenderness with palpation of the midline lower thoracic and upper lumbar spine with some paraspinal tenderness.  Straight leg raise test was negative.      LABORATORY DATA  AND IMAGING:  BMP shows sodium of 137, potassium 3.7, creatinine 0.69, calcium of 8.9, glucose 109.  CBC showed white count 9.5, hemoglobin 11.5, platelet count of 239,000.      CT of the thoracic spine showed severe scoliosis with convex towards the right.  There were no fractures identified.  She had degenerative changes that were greatest in the left facet joints at T10 to T11, T11 to T12 and T12 to L1.  CT of the lumbar spine without contrast showed no fractures.  There is scoliosis of the lumbar spine, convex towards the left.  She also had multilevel degenerative changes, greatest in the facet joints, as well as degenerative changes in the sacroiliac joints.      ASSESSMENT AND PLAN:  Georgette Alatorre is a very pleasant 79-year-old female with a past medical history significant for scoliosis, osteoporosis, osteoarthritis, history of supraventricular tachycardia (SVT), history of nephrolithiasis and reflux who presents to the Emergency Room for evaluation of intractable back pain.  Initial examination is nonrevealing.  Plan at this juncture is to admit under observation status for continued attempts at pain control.   1.  Intractable back pain.  Began about 5 days prior to admission after she had been working in her garden.  She denies any recent injury or trauma.  She denies hearing any popping sensation or abnormal movements, and she has had no symptoms suggestive of radiculopathy.  She describes her pain as being severe in the midline in her lower thoracic and upper lumbar spines.  Exam today is rather unremarkable.  Straight leg raise test was negative.  Her labs have been unremarkable, and her CT imaging was negative for acute pathology including a compression fracture.  I suspect her pain may be musculoskeletal in etiology.  She does relay she had recently been lax on following up with her therapy exercises.  For now, we will continue attempts at pain control with Tylenol 1000 mg t.i.d.  Can use  oxycodone 2.5 to 5 mg every 4 hours as needed.  Can also try a low dose Flexeril 5-10 mg every 8 hours as needed.  Can use other adjuncts including topical Lidoderm patches and a heating pad as needed.  I have deferred obtaining any additional imaging such as an MRI at this time given absence of any clear neuro deficits or symptoms suggestive of radiculopathy.  She follows with a spine surgeon through [facility], and we talked about a possible consult to the Spine Surgery Service here.  She was in agreement with this for now to get some additional input as any other workup to be considered at this time.  Additionally, a PT consult has been placed.  Given her difficulties with ambulation in recent days, I am concerned she may not be safe to return home, living independently at this time and may require a TCU stay.   2.  History of supraventricular tachycardia (SVT).  Manages with diltiazem.  Reportedly took her dose this morning after EMS had arrived and noticed her heart rate was initially in the 160s.  Here, her heart rates are stable.  Her diltiazem will be continued.   3.  History of radiation colitis.  This is following prior radiation treatment for management of her endometrial cancer.  She manages with Lomotil 3 times daily as needed.  This will be continued.   4.  History of nephrolithiasis and hydronephrosis.  The patient was initially concerned at symptom onset that she may be passing a kidney stone, but her presentation at present does not appear consistent with this diagnosis.  She follows with [facility] as needed.  No concerns at this time.     Deep venous thrombosis prophylaxis.  Anticipate short hospitalization.  Will encourage ambulation.      CODE STATUS:  Full code as was discussed on admission.         CARLY LIANG DO             D: 2020   T: 2020   MT:       Name:     MER THOMPSON   MRN:      [id number]        Account:      [id number]   :       1940        Admitted:     05/28/2020                   Document: [id number]       cc: Merlin Brown MD

## 2020-05-28 NOTE — ED NOTES
"[facility]  ED Nurse Handoff Report    ED Chief complaint: Back Pain      ED Diagnosis:   Final diagnoses:   Intractable back pain       Code Status: see epic    Allergies:   Allergies   Allergen Reactions     No Known Allergies        Patient Story: pt was gardening last week and then starting Saturday developed thoracic back pain. Denies any trauma. Hx osteoporosis . Pt states she has been crawling around her home since yesterday.    Focused Assessment:  AOX4. VSS. Back pain with minimal movement. Given iv dilaudid and sats dropped to low 80s, on 2L o2 sating high 90s now, pain improved . Using bedpan with assist X 1. CT shows multilevel degenerative changes.    Treatments and/or interventions provided: dilaudid  Patient's response to treatments and/or interventions: resting in bed    To be done/followed up on inpatient unit:  pain management    Does this patient have any cognitive concerns?: none    Activity level - Baseline/Home:  Independent  Activity Level - Current:   Has not been out of bed    Patient's Preferred language: English   Needed?: No    Isolation: None  Infection: Not Applicable  Bariatric?: No    Vital Signs:   Vitals:    05/28/20 0931 05/28/20 1000 05/28/20 1007 05/28/20 1057   BP: 132/79   124/67   Pulse: 82      Resp: 18   16   Temp: 99.2  F (37.3  C)      TempSrc: Oral      SpO2: 96% (!) 80% 100% 99%   Weight: 42.2 kg (93 lb)      Height: 1.575 m (5' 2\")          Cardiac Rhythm:     Was the PSS-3 completed:   Yes  What interventions are required if any?               Family Comments: na  OBS brochure/video discussed/provided to patient/family: N/A              Name of person given brochure if not patient: na              Relationship to patient: na    For the majority of the shift this patient's behavior was Green.   Behavioral interventions performed were none.    ED NURSE PHONE NUMBER: [phone]         "

## 2020-05-28 NOTE — CONSULTS
Consult received   Will obtain MRI thoracic and lumbar spine and upright 2 view scoliosis X-ray  Will complete consult tomorrow

## 2020-05-28 NOTE — PROGRESS NOTES
Pt had lido patch applied to mid back and pain shot up to #8 when turning to side.  When lying supine pain is minimal.  CMS good to ft.  Voided earlier on bedpan.  Awaiting to go to MRI.

## 2020-05-29 ENCOUNTER — APPOINTMENT (OUTPATIENT)
Dept: PHYSICAL THERAPY | Facility: CLINIC | Age: 80
End: 2020-05-29
Attending: INTERNAL MEDICINE
Payer: COMMERCIAL

## 2020-05-29 ENCOUNTER — APPOINTMENT (OUTPATIENT)
Dept: MRI IMAGING | Facility: CLINIC | Age: 80
End: 2020-05-29
Attending: NEUROLOGICAL SURGERY
Payer: COMMERCIAL

## 2020-05-29 ENCOUNTER — APPOINTMENT (OUTPATIENT)
Dept: GENERAL RADIOLOGY | Facility: CLINIC | Age: 80
End: 2020-05-29
Attending: NEUROLOGICAL SURGERY
Payer: COMMERCIAL

## 2020-05-29 PROCEDURE — 99207 ZZC CDG-CODE CATEGORY CHANGED: CPT | Performed by: PHYSICIAN ASSISTANT

## 2020-05-29 PROCEDURE — 72148 MRI LUMBAR SPINE W/O DYE: CPT

## 2020-05-29 PROCEDURE — 72082 X-RAY EXAM ENTIRE SPI 2/3 VW: CPT

## 2020-05-29 PROCEDURE — 97116 GAIT TRAINING THERAPY: CPT | Mod: GP | Performed by: PHYSICAL THERAPIST

## 2020-05-29 PROCEDURE — 97161 PT EVAL LOW COMPLEX 20 MIN: CPT | Mod: GP | Performed by: PHYSICAL THERAPIST

## 2020-05-29 PROCEDURE — G0378 HOSPITAL OBSERVATION PER HR: HCPCS

## 2020-05-29 PROCEDURE — 72146 MRI CHEST SPINE W/O DYE: CPT

## 2020-05-29 PROCEDURE — 25000132 ZZH RX MED GY IP 250 OP 250 PS 637: Performed by: INTERNAL MEDICINE

## 2020-05-29 PROCEDURE — 99225 ZZC SUBSEQUENT OBSERVATION CARE,LEVEL II: CPT | Performed by: PHYSICIAN ASSISTANT

## 2020-05-29 RX ADMIN — ACETAMINOPHEN 1000 MG: 500 TABLET, FILM COATED ORAL at 21:30

## 2020-05-29 RX ADMIN — DIPHENOXYLATE HYDROCHLORIDE AND ATROPINE SULFATE 1 TABLET: 2.5; .025 TABLET ORAL at 09:46

## 2020-05-29 RX ADMIN — CYCLOBENZAPRINE HYDROCHLORIDE 5 MG: 5 TABLET, FILM COATED ORAL at 12:57

## 2020-05-29 RX ADMIN — OXYCODONE HYDROCHLORIDE 5 MG: 5 TABLET ORAL at 04:35

## 2020-05-29 RX ADMIN — ACETAMINOPHEN 1000 MG: 500 TABLET, FILM COATED ORAL at 16:06

## 2020-05-29 RX ADMIN — LIDOCAINE 1 PATCH: 560 PATCH PERCUTANEOUS; TOPICAL; TRANSDERMAL at 16:05

## 2020-05-29 RX ADMIN — DIPHENOXYLATE HYDROCHLORIDE AND ATROPINE SULFATE 1 TABLET: 2.5; .025 TABLET ORAL at 16:13

## 2020-05-29 RX ADMIN — DILTIAZEM HYDROCHLORIDE 180 MG: 180 CAPSULE, COATED, EXTENDED RELEASE ORAL at 07:53

## 2020-05-29 RX ADMIN — ACETAMINOPHEN 1000 MG: 500 TABLET, FILM COATED ORAL at 08:00

## 2020-05-29 NOTE — PLAN OF CARE
Discharge Planner PT   Patient plan for discharge: daughter can stay with pt  Current status: Orders received and chart reviewed. Evaluation and treatment completed. Pt is a 79 year old female admitted on OBS for severe back pain. Pt has hx of scoliosis and radiation colitis. Pt lives alone in a house and at baseline is independent with no device. No fall history.  Pt currently is independent with bed mobilty via sidelying. Pt is SBA for standing due to pain. Pt uses FWW for support. Pt was able to ambulate 150ft with FWW with no LOB or deviation and increased comfort. Pt ascended and descended steps with rail and SBA.   Walker Order was placed and will be issued when pt goes home.  Barriers to return to prior living situation: lives alone but daughter will stay with pt, steps, needs walker  Recommendations for discharge: home with walker to use for gait, Supervision initially at home for steps and ADLs as needed, homecare for assist bathing,  Rationale for recommendations: Pt demonstrated mobility with walker for  functional mobility at home and assist of daughter as needed.       Entered by: Johanna Russ 05/29/2020 3:05 PM

## 2020-05-29 NOTE — PLAN OF CARE
PRIOR TO DISCHARGE      Comments: -adequate pain control on oral analgesics -not  met  - returns to baseline functional status- not met  Nurse to notify provider when observation goals have been met and patient is ready for discharge.   A&0. On Reg. Diet. Up x 1.Getting schedule tylenol for pain.MRI pending.VSS on RA. Will continue to monitor.

## 2020-05-29 NOTE — PROGRESS NOTES
Observation Goal    -adequate pain control on oral analgesics :  met   -returns to baseline functional status : not met  Nurse to notify provider when observation goals have been met and patient is ready for discharge.     Updated hospitalist, pt staying overnight for better pain management

## 2020-05-29 NOTE — PROGRESS NOTES
[facility]      OUTPATIENT PHYSICAL THERAPY EVALUATION  PLAN OF TREATMENT FOR OUTPATIENT REHABILITATION  (COMPLETE FOR INITIAL CLAIMS ONLY)  Patient's Last Name, First Name, M.I.  YOB: 1940  Georgette Alatorre                        Provider's Name  [facility] Medical Record No.  [id number]                               Onset Date:  05/28/20   Start of Care Date:  05/29/20      Type:     _X_PT   ___OT   ___SLP Medical Diagnosis:  back pain                        PT Diagnosis:  impaired gait   Visits from SOC:  1   _________________________________________________________________________________  Plan of Treatment/Functional Goals    Planned Interventions:  ,    gait training,       Goals: See Physical Therapy Goals on Care Plan in "[employer]" electronic health record.    Therapy Frequency: (one eval and treat)  Predicted Duration of Therapy Intervention: 1 day  _________________________________________________________________________________    I CERTIFY THE NEED FOR THESE SERVICES FURNISHED UNDER        THIS PLAN OF TREATMENT AND WHILE UNDER MY CARE     (Physician co-signature of this document indicates review and certification of the therapy plan).                Certification date from: 05/29/20, Certification date to: 05/29/20    Referring Physician: Dr. Reyes            Initial Assessment        See Physical Therapy evaluation dated 05/29/20 in Epic electronic health record.

## 2020-05-29 NOTE — CONSULTS
Neurosurgery Spine Consult   [facility]   [place], [place] and [facility]      CC: Acute low back pain and scoliosis    Primary care Provider: Brown, Merlin Emery    I was asked to see the patient by:  No referring provider defined for this encounter.      [place]: Georgette Alatorre is a 79 year old female that presents to [facility] with a complaint of acute onset of low back pain. The patient has a history of a double curve scoliosis in her thoracolumbar spine.She has been seen and evaluated by Dr. Barker at [facility]. She has not had surgery. She began having low back pain while gardening and says her pain has improved since she has been hospitalized. She has been up out of bed with pain and is working with PT today. She is not interested in surgery.       Past Medical History:   Diagnosis Date     Arthritis      Endometrial ca (H)      Gastro-oesophageal reflux disease      History of blood transfusion     no adverse reactions     Hydronephrosis      Kidney stone      Osteoporosis      Scoliosis      Small bowel obstruction (H)      SVT (supraventricular tachycardia) (H)        Past Surgical History:   Procedure Laterality Date     APPENDECTOMY       CATARACT IOL, RT/LT  2009    Cataract IOL Bilateral     COLONOSCOPY  03/30/2006    Normal; repeat in 5 years     CYSTOSCOPY  11/14/2007    Cystoscopy, bilateral retrograde pyelograms, dilatation of left ureteral stricture, left ureteroscopy and insertion of left double pigtail stent.      ENDOSCOPY  10/22/2008    Upper GI:  Normal     ENDOSCOPY  1/11/2007    Upper GI     EYE SURGERY  2010    retinal eye surgery     HYSTERECTOMY, PAP NO LONGER INDICATED  2000    due to endometrial cancer     LAPAROTOMY EXPLORATORY  08/22/2007    1.  Exploratory laparotomy. 2.  Extensive lysis of intra-abdominal adhesions. Performed by Dr Rad Pierce     LAPAROTOMY EXPLORATORY  04/20/2005    1.  Exploratory laparotomy.  2.  Lysis of adhesions.  Segmental small  bowel resection Performed by Dr Rad Pierce.       Current Facility-Administered Medications   Medication     acetaminophen (TYLENOL) tablet 1,000 mg     cyclobenzaprine (FLEXERIL) tablet 5-10 mg     diltiazem ER COATED BEADS (CARDIZEM CD/CARTIA XT) 24 hr capsule 180 mg     diphenoxylate-atropine (LOMOTIL) 2.5-0.025 MG per tablet 1 tablet     Lidocaine (LIDOCARE) 4 % Patch 1-2 patch     lidocaine patch in PLACE     melatonin tablet 1 mg     naloxone (NARCAN) injection 0.1-0.4 mg     ondansetron (ZOFRAN-ODT) ODT tab 4 mg    Or     ondansetron (ZOFRAN) injection 4 mg     oxyCODONE IR (ROXICODONE) half-tab 2.5-5 mg       Allergies   Allergen Reactions     No Known Allergies        Social History     Socioeconomic History     Marital status: Single     Spouse name: None     Number of children: None     Years of education: None     Highest education level: None   Occupational History     None   Social Needs     Financial resource strain: None     Food insecurity     Worry: None     Inability: None     Transportation needs     Medical: None     Non-medical: None   Tobacco Use     Smoking status: Former Smoker     Packs/day: 0.50     Years: 40.00     Pack years: 20.00     Smokeless tobacco: Never Used   Substance and Sexual Activity     Alcohol use: Yes     Alcohol/week: 0.0 standard drinks     Comment: 1 glass of wine with dinner     Drug use: No     Sexual activity: Not Currently     Partners: Male   Lifestyle     Physical activity     Days per week: None     Minutes per session: None     Stress: None   Relationships     Social connections     Talks on phone: None     Gets together: None     Attends [employer] service: None     Active member of club or organization: None     Attends meetings of clubs or organizations: None     Relationship status: None     Intimate partner violence     Fear of current or ex partner: None     Emotionally abused: None     Physically abused: None     Forced sexual activity: None   Other  Topics Concern     Parent/sibling w/ CABG, MI or angioplasty before 65F 55M? Not Asked   Social History Narrative     None       No family history on file.      Review Of Systems  Skin: negative  Eyes: negative  Ears/Nose/Throat: negative  Respiratory: No shortness of breath, dyspnea on exertion, cough, or hemoptysis  Cardiovascular: negative  Gastrointestinal: negative  Genitourinary: negative  Musculoskeletal: back pain  Neurologic: negative  Psychiatric: negative  Hematologic/Lymphatic/Immunologic: negative  Endocrine: negative    B/P: 106/59, T: 98.1, P: 52, R: 16    Examination:  Normal affect and mood  No obvious labored respiration  No skin lesions noted   No obvious pitting edema  No abnormal psychiatric behavior  No obvious musculoskeletal abnormalities   Awake  Alert  Oriented x 3  Speech clear  Cranial nerves II - XII intact  Face symmetric  Motor exam nonfocal  Clonus nehative  DTR 1+  Negative Lase'mir's sign  Ambulation not examined while in bed    Imaging: X-ray and MRI scans were independently reviewed by me as below;     Scoliosis X-rays - Right convex curvature of the thoracic spine centered at  the T9 level measuring 63 degrees between T8 and L1. There is moderate  left convex curvature of the lumbar spine centered at the L3-L4 level  measuring roughly 37 degrees between L1 and the top of the sacrum. No fracture noted.    MRI Lumbar spine   1. Degenerative changes of the lumbar spine  2. Severe left convex curvature of the lumbar spine centered at the  L3-L4 level again noted.  3. Moderate right foraminal stenosis at the L2-L3 level. Multiple  areas of mild neural foraminal narrowing as detailed above.  4. No significant spinal canal stenosis of the lumbar spine.  5. No fractures.spine     MRI thoracic spine  Severe right convex curvature of the thoracic spine  centered at the T9 level and moderate left convex curvature of the  thoracic spine centered at the T3-T4 level. No spinal canal or  neural  foraminal stenosis of the thoracic spine. No fractures      Diagnosis:  Thoracolumbar scoliosis   Acute onset low back pain      Assessment/Plan:   I do not recommend surgery  She needs to work with PT/OT, muscle relaxer and pain medication as needed  May follow up in my clinic - Neurosurgery follow up appointment with either Layne Prieto CNP at [facility] by calling [phone] for Kim the Care Coordinator or [phone] for O general number in 4-6 weeks.      Shady Palma MD, MS, FAANS  Neurosurgeon  [facility]  [facility] and [facility]  [phone] (office)  [phone] (Care Coordinator)

## 2020-05-29 NOTE — PROGRESS NOTES
05/29/20 1420   Quick Adds   Type of Visit Initial PT Evaluation   Living Environment   Lives With alone   Living Arrangements house   Home Accessibility stairs to enter home;stairs within home   Number of Stairs, Main Entrance 1   Stair Railings, Main Entrance none   Number of Stairs, Within Home, Primary other (see comments)  (12)   Stair Railings, Within Home, Primary railing on left side (ascending)   Transportation Anticipated car, drives self   Self-Care   Usual Activity Tolerance good   Current Activity Tolerance moderate   Regular Exercise No   Equipment Currently Used at Home none   Activity/Exercise/Self-Care Comment independent with all ADLs, IADLs   Functional Level Prior   Ambulation 0-->independent   Transferring 0-->independent   Toileting 0-->independent   Bathing 0-->independent   Communication 0-->understands/communicates without difficulty   Swallowing 0-->swallows foods/liquids without difficulty   Cognition 0 - no cognition issues reported   Fall history within last six months no   General Information   Onset of Illness/Injury or Date of Surgery - Date 05/28/20   Referring Physician Dr. Reyes   Patient/Family Goals Statement pt reports daughter can stay for a few days   Pertinent History of Current Problem (include personal factors and/or comorbidities that impact the POC) Pt is a 79 year old female admitted on OBS for back pain. Hx of scoliosis. radiation colitis   Precautions/Limitations fall precautions   Cognitive Status Examination   Orientation orientation to person, place and time   Level of Consciousness alert   Follows Commands and Answers Questions 100% of the time   Personal Safety and Judgment intact   Memory intact   Pain Assessment   Patient Currently in Pain Yes, see Vital Sign flowsheet   Posture    Posture Comments scoliosis   Range of Motion (ROM)   ROM Comment extremities WFL, trunk limited due to pain and scoliosis   Strength   Strength Comments no MMT due to pain bue  "demonstrates antigravity strength in all extremities   Bed Mobility   Bed Mobility Comments supine to and from sit via sidelying independent no rail and bedflat   Transfer Skills   Transfer Comments sit to stand SBA and uses FWW for support. Guarded due to pain but no LOB   Gait   Gait Comments Pt ambulated with FWW 75ft with FWW with no LOB or deviations. Pt had a couple of spasms when stopping.    Balance   Balance Comments requires AD for support due to pain   Sensory Examination   Sensory Perception no deficits were identified   Coordination   Coordination no deficits were identified   General Therapy Interventions   Planned Therapy Interventions gait training   Clinical Impression   Criteria for Skilled Therapeutic Intervention yes, treatment indicated   PT Diagnosis impaired gait   Influenced by the following impairments pain wthi gait   Functional limitations due to impairments decreaed tolerance for activity due to pain    Clinical Presentation Stable/Uncomplicated   Clinical Presentation Rationale clinical judgement   Clinical Decision Making (Complexity) Low complexity   Therapy Frequency   (one eval and treat)   Predicted Duration of Therapy Intervention (days/wks) 1 day   Anticipated Equipment Needs at Discharge front wheeled walker   Anticipated Discharge Disposition Home with Assist   Risk & Benefits of therapy have been explained Yes   Patient, Family & other staff in agreement with plan of care Yes   Therapy Certification   Start of care date 05/29/20   Certification date from 05/29/20   Certification date to 05/29/20   Medical Diagnosis back pain   [facility] AM-PAC TM \"6 Clicks\"   2016, Trustees of [facility], under license to [employer].  All rights reserved.   6 Clicks Short Forms Basic Mobility Inpatient Short Form   [facility] AM-PAC  \"6 Clicks\" V.2 Basic Mobility Inpatient Short Form   1. Turning from your back to your side while in a flat bed without using bedrails? 4 - " None   2. Moving from lying on your back to sitting on the side of a flat bed without using bedrails? 4 - None   3. Moving to and from a bed to a chair (including a wheelchair)? 3 - A Little   4. Standing up from a chair using your arms (e.g., wheelchair, or bedside chair)? 4 - None   5. To walk in hospital room? 3 - A Little   6. Climbing 3-5 steps with a railing? 3 - A Little   Basic Mobility Raw Score (Score out of 24.Lower scores equate to lower levels of function) 21   Total Evaluation Time   Total Evaluation Time (Minutes) 20

## 2020-05-29 NOTE — PROGRESS NOTES
"Spiritual Health  54     visited Pt per request. Pt has suffered multiple health issues but is trying to stay positive. She has had severe back pain most recently as well as other physical issues. She was forced to retire early due to her health condition and she misses the social interaction that came with work very much. Her children are very supportive and her daughter is currently caring for her cat \"Buddy.\" Pt attends a local [employer] and is a member of the choir virtually. Pt became tearful when SH prayed and appreciated the prayer given by .     SH listened, provided support, and prayed.     Pt is coping well despite health condition and pain. She finds support in her brandon, family, and cat.     SH will remain available as needed.     Dania Ho  Chaplain Resident   "

## 2020-05-29 NOTE — PROGRESS NOTES
Patient is A&O x4. Denies chest pain or SOB. Up with one assist/gb/walker. Voiding, had BM x3 this shift. Pain managed with lidocaine patch, flexeril and scheduled tylenol. Pt refused oxycodone. Discharging home tomorrow.

## 2020-05-29 NOTE — PROGRESS NOTES
[facility]    Hospitalist Progress Note      Assessment & Plan   Georgette Alatorre is a 79 year old female with a history of SVT, GERD, kidney stones, osteoporosis, scoliosis, hx endometrial cancer, and radiation colitis with fecal incontinence who was admitted on 5/28/2020 with intractable back pain.      Intractable back pain.   Scoliosis   Pain began about 5 days prior to admission after she had been working in her garden.  She denies any recent injury or trauma.  She denies hearing any popping sensation or abnormal movements, and she has had no symptoms suggestive of radiculopathy.  She describes her pain as being severe in the midline in her lower thoracic and upper lumbar spines. Her labs have been unremarkable, and her CT imaging was negative for acute pathology including a compression fracture.  She does relay she had recently been lax on following up with her therapy exercises.  No focal neuro deficits.   --Spine Surgery following, appreciate assistance. Ordered MRI thoracic and lumbar spine as well as xray. Appreciate assistance  --MRI shows severe left convex curvature of lumbar spine centered at L3-L4 (previoulsy known), degenerative changes of lumbar spine, and severe right convex curvature of the thoracic spine centered at T9 and moderate left convex curvature centered at T3-T4. No fractures seen  --continue pain control with tylenol, low dose oxycodone, flexeril and lidoderm patch.   --will need PT consult after seen by Spine    History of supraventricular tachycardia (SVT).  Manages with diltiazem.  Reportedly took her dose morning of admission after EMS had arrived and noticed her heart rate was initially in the 160s.   --continue PTA diltiazem    History of radiation colitis.  This is following prior radiation treatment for management of her endometrial cancer.  She manages with Lomotil 3 times daily as needed.  This will be continued.      History of nephrolithiasis and  hydronephrosis.  The patient was initially concerned at symptom onset that she may be passing a kidney stone, but her presentation at present does not appear consistent with this diagnosis.  She follows with [facility] as needed.  No concerns at this time.     DVT Prophylaxis: Pneumatic Compression Devices  Code Status: Full Code    Disposition: Expected discharge later this evening vs tomorrow pending spine consult, PT consult    This patient was discussed with Dr. Lyle who agrees with the above plan.      Pamela Bowden PA-C    Interval History   Doing a little better today. Able to ambulate to the bathroom with assistance. Reports ongoing pain in her back for which she is currently using tylenol and flexeril. Does feel there is a component that feels like spasm. Incontinent of loose stool this am, not atypical for her after radiation for colitis.     -Data reviewed today: I reviewed all new labs and imaging results over the last 24 hours. I personally reviewed no images or EKG's today.    Physical Exam   Temp: 98  F (36.7  C) Temp src: Oral BP: 107/54 Pulse: 52 Heart Rate: 52 Resp: 16 SpO2: 97 % O2 Device: None (Room air) Oxygen Delivery: 2 LPM  Vitals:    05/28/20 0931   Weight: 42.2 kg (93 lb)     Vital Signs with Ranges  Temp:  [97.9  F (36.6  C)-99.2  F (37.3  C)] 98  F (36.7  C)  Pulse:  [50-82] 52  Heart Rate:  [51-65] 52  Resp:  [16-18] 16  BP: ()/(47-79) 107/54  SpO2:  [80 %-100 %] 97 %  I/O last 3 completed shifts:  In: 350 [P.O.:350]  Out: -     Constitutional: Alert and oriented, sitting up in bed. Appears comfortable and is pleasantly conversant   ENT: normal cephalic, moist mucous membranes  Eyes:  Sclera anicteric, EOMI  Respiratory: Lungs clear to auscultation bilaterally, no increased work of breathing or wheezing   Cardiovascular: Regular rate and rhythm, no murmur  GI:  active bowel sounds, abdomen soft, non-tender  MSK:  Able to straight leg raise bilaterally. Dorsi and  plantar flexion intact. Diffuse tenderness to palpation in paraspinal muscles of back.   Neuro:  Speech is clear. Face symmetric. Sensation intact.     Medications       acetaminophen  1,000 mg Oral TID     diltiazem ER COATED BEADS  180 mg Oral Daily     lidocaine  1-2 patch Transdermal Q24H     lidocaine   Transdermal Q8H       Data   Recent Labs   Lab 05/28/20  0949   WBC 9.5   HGB 11.5*   MCV 97         POTASSIUM 3.7   CHLORIDE 103   CO2 30   BUN 10   CR 0.69   ANIONGAP 4   ILYA 8.9   *       Recent Results (from the past 24 hour(s))   CT Thoracic Spine w/o Contrast    Narrative    CT THORACIC SPINE WITHOUT CONTRAST   5/28/2020 11:08 AM     HISTORY: Back pain.     TECHNIQUE: Axial images of the thoracic spine were obtained without  intravenous contrast. Multiplanar reformations were performed.   Radiation dose for this scan was reduced using automated exposure  control, adjustment of the mA and/or kV according to patient size, or  iterative reconstruction technique.    COMPARISON: None.    FINDINGS:  There is severe scoliosis of the thoracic spine convex  towards the right.  No fractures are identified. There are severe  degenerative changes in the costovertebral joints at the left T9-T10  and left T10-T11 levels. Severe degenerative change in the left  T10-T11, T11-T12 and left T12-L1 facet joints.      Impression    IMPRESSION:  1. Severe scoliosis convex towards the right.  2. No fractures are identified.  3. Degenerative changes. The degenerative changes are greatest in the  left facet joints at T10-T11, T11-T12, and T12-L1.    EARLE PAPPAS MD   Lumbar spine CT w/o contrast    Narrative    CT LUMBAR SPINE WITHOUT CONTRAST  5/28/2020 11:24 AM     HISTORY: Back pain.     TECHNIQUE: Axial images of the lumbar spine were obtained without  intravenous contrast. Multiplanar reformations were performed.  Radiation dose for this scan was reduced using automated exposure  control, adjustment of the  mA and/or kV according to patient size, or  iterative reconstruction technique.    COMPARISON: None.    FINDINGS: There is scoliosis of the lumbar spine convex towards the  left. There is no fracture or subluxation. There is a 6.7 cm cyst  arising from the lower pole of the right kidney.    T12-L1: Degenerative change is seen in the left facet joint with a  vacuum joint phenomenon. Central canal is adequate.     L1-L2: There is right lateral subluxation of L1 on L2. There are  severe degenerative changes in the right facet joint. Central canal is  adequate.    L2-L3: There is mild right lateral subluxation of L2 on L3. Central  canal is normal. Severe degenerative change in the right facet joint.     L3-L4: Loss of disc space height. Mild annular disc bulge.  Moderate-severe degenerative change in the right facet joint. Central  canal and neural foramen are patent.     L4-L5: Mild annular disc bulge. Central canal normal. Moderate  degenerative change in the facet joints.     L5-S1: Central canal and neural foramen are patent.    Degenerative change is seen in the sacroiliac joints with vacuum joint  phenomenon in the sacroiliac joints.       Impression    IMPRESSION:    1. No fractures are identified.  2. Scoliosis of the lumbar spine convex towards the left.  3. Multilevel degenerative change. The degenerative changes are  greatest in the facet joints.  4. Degenerative change is present in the sacroiliac joints.    EARLE PAPPAS MD

## 2020-05-29 NOTE — PROVIDER NOTIFICATION
Paged hospitalist regarding loose Bm x3, wondering if need to rule out cdiff? Also, wondering about discharge plan.     Received call back, baseline, no stool  culture needed, administer lomotil.

## 2020-05-29 NOTE — PROGRESS NOTES
Observation Goals  -adequate pain control on oral analgesics : Met   -returns to baseline functional status : Not met       Awaiting for spine consult.

## 2020-05-30 VITALS
WEIGHT: 93 LBS | SYSTOLIC BLOOD PRESSURE: 102 MMHG | TEMPERATURE: 97.7 F | HEIGHT: 62 IN | BODY MASS INDEX: 17.11 KG/M2 | RESPIRATION RATE: 16 BRPM | DIASTOLIC BLOOD PRESSURE: 55 MMHG | HEART RATE: 59 BPM | OXYGEN SATURATION: 98 %

## 2020-05-30 PROCEDURE — 99217 ZZC OBSERVATION CARE DISCHARGE: CPT | Performed by: INTERNAL MEDICINE

## 2020-05-30 PROCEDURE — 40000893 ZZH STATISTIC PT IP EVAL DEFER

## 2020-05-30 PROCEDURE — G0378 HOSPITAL OBSERVATION PER HR: HCPCS

## 2020-05-30 PROCEDURE — 25000132 ZZH RX MED GY IP 250 OP 250 PS 637: Performed by: INTERNAL MEDICINE

## 2020-05-30 RX ORDER — CYCLOBENZAPRINE HCL 5 MG
5-10 TABLET ORAL EVERY 8 HOURS PRN
Qty: 20 TABLET | Refills: 0 | Status: SHIPPED | OUTPATIENT
Start: 2020-05-30 | End: 2022-09-07

## 2020-05-30 RX ORDER — LIDOCAINE 4 G/G
1 PATCH TOPICAL EVERY 24 HOURS
Qty: 10 PATCH | Refills: 0 | Status: SHIPPED | OUTPATIENT
Start: 2020-05-30 | End: 2022-09-07

## 2020-05-30 RX ORDER — LIDOCAINE 4 G/G
1 PATCH TOPICAL EVERY 24 HOURS
Qty: 10 PATCH | Refills: 0 | Status: SHIPPED | OUTPATIENT
Start: 2020-05-30 | End: 2020-05-30

## 2020-05-30 RX ORDER — CYCLOBENZAPRINE HCL 5 MG
5-10 TABLET ORAL EVERY 8 HOURS PRN
Qty: 20 TABLET | Refills: 0 | Status: SHIPPED | OUTPATIENT
Start: 2020-05-30 | End: 2020-05-30

## 2020-05-30 RX ORDER — ACETAMINOPHEN 500 MG
1000 TABLET ORAL 3 TIMES DAILY
COMMUNITY
Start: 2020-05-30 | End: 2020-06-02

## 2020-05-30 RX ADMIN — CYCLOBENZAPRINE HYDROCHLORIDE 5 MG: 5 TABLET, FILM COATED ORAL at 04:54

## 2020-05-30 RX ADMIN — ACETAMINOPHEN 1000 MG: 500 TABLET, FILM COATED ORAL at 08:28

## 2020-05-30 RX ADMIN — DILTIAZEM HYDROCHLORIDE 180 MG: 180 CAPSULE, COATED, EXTENDED RELEASE ORAL at 06:57

## 2020-05-30 RX ADMIN — ACETAMINOPHEN 1000 MG: 500 TABLET, FILM COATED ORAL at 16:06

## 2020-05-30 RX ADMIN — DIPHENOXYLATE HYDROCHLORIDE AND ATROPINE SULFATE 1 TABLET: 2.5; .025 TABLET ORAL at 08:28

## 2020-05-30 NOTE — DISCHARGE SUMMARY
[facility]  Hospitalist Discharge Summary      Date of Admission:  5/28/2020  Date of Discharge:  5/30/2020  Discharging Provider: Britni Lyle MD      Discharge Diagnoses   Intractable back pain  Scoliosis    Chronic medical problems:  H/o SVT  H/o radiation colitis      Follow-ups Needed After Discharge   Follow-up Appointments     Follow-up and recommended labs and tests       Per Dr. Palma- May follow up in my clinic - Neurosurgery follow up   appointment with either Layne Prieto CNP at [facility] by   calling [phone] for Kim the Care Coordinator or [phone] for   [facility] general number in 4-6 weeks.            Follow-up and recommended labs and tests       Follow up with primary care provider, Merlin Emery Brown, within 7 days   for hospital follow- up.  No follow up labs or test are needed.             Unresulted Labs Ordered in the Past 30 Days of this Admission     No orders found from 4/28/2020 to 5/29/2020.      None    Discharge Disposition   Discharged to home  Condition at discharge: Good      Hospital Course   Georgette Alatorre is a 79 year old female with a history of SVT, GERD, kidney stones, osteoporosis, scoliosis, hx endometrial cancer, and radiation colitis with fecal incontinence who was admitted on 5/28/2020 with intractable back pain; for a detailed HPI- please refer to H&P done by Dr Ria Reyes on 05/28/2020.    Intractable back pain.   Severe Scoliosis  - the pain began about 5 days prior to admission after she had been working in her garden.  She denies any recent injury or trauma.  She denies hearing any popping sensation or abnormal movements, and she has had no symptoms suggestive of radiculopathy.  She describes her pain as being severe in the midline in her lower thoracic and upper lumbar spines. Her labs have been unremarkable, and her CTspine showed severe scoliosis but negative for acute pathology including a compression  fracture.    - MRI T/L spine shows severe left convex curvature of lumbar spine centered at L3-L4 (previoulsy known), degenerative changes of lumbar spine, and severe right convex curvature of the thoracic spine centered at T9 and moderate left convex curvature centered at T3-T4. No fractures seen.  - Spine Surgery consulted- no surgical interventions at this time  - pain management- scheduled Tylenol, Lidoderm patch, Flexeril prn; she also had Oxycodone available prn but she declined using narcotics  - PT evaluation- felt that that patient is safe to be discharged home with FWW; SW/CC help appreciated.   - follow up with NS in 4-6 weeks.     History of supraventricular tachycardia (SVT)   - continue PTA diltiazem     History of radiation colitis.   - following prior radiation treatment for management of her endometrial cancer.    - continue PTA Lomotil 3 times daily prn     History of nephrolithiasis and hydronephrosis.   - She follows with [facility] as needed.  No concerns at this time.     Consultations This Hospital Stay   SOCIAL WORK IP CONSULT  PHYSICAL THERAPY ADULT IP CONSULT  SPINE SURGERY ADULT IP CONSULT  CARE TRANSITION RN/SW IP CONSULT    Code Status   Full Code    Time Spent on this Encounter   I, Britni Lyle MD, personally saw the patient today and spent less than or equal to 30 minutes discharging this patient.       Britni Lyle MD  [facility]  ______________________________________________________________________    Physical Exam   Vital Signs: Temp: 97.7  F (36.5  C) Temp src: Oral BP: 102/55 Pulse: 59 Heart Rate: 60 Resp: 16 SpO2: 98 % O2 Device: None (Room air)    Weight: 93 lbs 0 oz     Constitutional: awake, alert, up in the chair, NAD, very pleasant  ENT: normal cephalic, moist mucous membranes  Eyes:  Sclera anicteric, EOMI  Respiratory: Lungs clear to auscultation bilaterally, no increased work of breathing or wheezing   Cardiovascular: S1S2, RRR,  systolic murmur 2/6 best heard right 2nd intercostal space  GI:  active bowel sounds, abdomen soft, non-tender  MSK:  Able to straight leg raise bilaterally. Dorsi and plantar flexion intact. Diffuse tenderness to palpation in paraspinal muscles of back.   Neuro:  Speech is clear. Face symmetric. Sensation intact.        Primary Care Physician   Merlin Emery Brown    Discharge Orders      Follow-up and recommended labs and tests     Per Dr. Palma- May follow up in my clinic - Neurosurgery follow up appointment with either Layne Prieto CNP at [facility] by calling [phone] for Kim the Care Coordinator or [phone] for [facility] general number in 4-6 weeks.        Reason for your hospital stay    Back pain     Follow-up and recommended labs and tests     Follow up with primary care provider, Merlin Emery Brown, within 7 days for hospital follow- up.  No follow up labs or test are needed.     Activity    Your activity upon discharge: activity as tolerated     When to contact your care team    Call your primary doctor if you have any of the following: temperature greater than 100.5 or less than 96, chills, increased shortness of breath, chest pain, worsening back pain, dizziness, loss of consciousness, falls.     Full Code     Walker    DME Documentation:   Describe the reason for need to support medical necessity: pain with gait    I, the undersigned, certify that the above prescribed supplies are medically necessary for this patient and is both reasonable and necessary in reference to accepted standards of medical and necessary in reference to accepted standards of medical practice in the treatment of this patient's condition and is not prescribed as a convenience.     Diet    Follow this diet upon discharge: Orders Placed This Encounter      Regular Diet Adult       Significant Results and Procedures   Most Recent 3 CBC's:  Recent Labs   Lab Test 05/28/20  0949 01/07/20  1157 12/24/19  0904   WBC  9.5 6.0 4.5   HGB 11.5* 11.8 11.6*   MCV 97 99 99    256 221     Most Recent 3 BMP's:  Recent Labs   Lab Test 05/28/20  0949 01/07/20  1157 12/24/19  0904    141 143   POTASSIUM 3.7 3.0* 3.5   CHLORIDE 103 108 111*   CO2 30 30 26   BUN 10 15 15   CR 0.69 0.71 0.72   ANIONGAP 4 3 6   ILYA 8.9 8.8 8.9   * 89 101*     Most Recent 2 LFT's:  Recent Labs   Lab Test 12/24/19  0904 12/21/19  1845   AST 36 33   ALT 27 27   ALKPHOS 59 71   BILITOTAL 0.4 0.4     Most Recent 3 INR's:No lab results found.,   Results for orders placed or performed during the hospital encounter of 05/28/20   CT Thoracic Spine w/o Contrast    Narrative    CT THORACIC SPINE WITHOUT CONTRAST   5/28/2020 11:08 AM     HISTORY: Back pain.     TECHNIQUE: Axial images of the thoracic spine were obtained without  intravenous contrast. Multiplanar reformations were performed.   Radiation dose for this scan was reduced using automated exposure  control, adjustment of the mA and/or kV according to patient size, or  iterative reconstruction technique.    COMPARISON: None.    FINDINGS:  There is severe scoliosis of the thoracic spine convex  towards the right.  No fractures are identified. There are severe  degenerative changes in the costovertebral joints at the left T9-T10  and left T10-T11 levels. Severe degenerative change in the left  T10-T11, T11-T12 and left T12-L1 facet joints.      Impression    IMPRESSION:  1. Severe scoliosis convex towards the right.  2. No fractures are identified.  3. Degenerative changes. The degenerative changes are greatest in the  left facet joints at T10-T11, T11-T12, and T12-L1.    EARLE PAPPAS MD   Lumbar spine CT w/o contrast    Narrative    CT LUMBAR SPINE WITHOUT CONTRAST  5/28/2020 11:24 AM     HISTORY: Back pain.     TECHNIQUE: Axial images of the lumbar spine were obtained without  intravenous contrast. Multiplanar reformations were performed.  Radiation dose for this scan was reduced using automated  exposure  control, adjustment of the mA and/or kV according to patient size, or  iterative reconstruction technique.    COMPARISON: None.    FINDINGS: There is scoliosis of the lumbar spine convex towards the  left. There is no fracture or subluxation. There is a 6.7 cm cyst  arising from the lower pole of the right kidney.    T12-L1: Degenerative change is seen in the left facet joint with a  vacuum joint phenomenon. Central canal is adequate.     L1-L2: There is right lateral subluxation of L1 on L2. There are  severe degenerative changes in the right facet joint. Central canal is  adequate.    L2-L3: There is mild right lateral subluxation of L2 on L3. Central  canal is normal. Severe degenerative change in the right facet joint.     L3-L4: Loss of disc space height. Mild annular disc bulge.  Moderate-severe degenerative change in the right facet joint. Central  canal and neural foramen are patent.     L4-L5: Mild annular disc bulge. Central canal normal. Moderate  degenerative change in the facet joints.     L5-S1: Central canal and neural foramen are patent.    Degenerative change is seen in the sacroiliac joints with vacuum joint  phenomenon in the sacroiliac joints.       Impression    IMPRESSION:    1. No fractures are identified.  2. Scoliosis of the lumbar spine convex towards the left.  3. Multilevel degenerative change. The degenerative changes are  greatest in the facet joints.  4. Degenerative change is present in the sacroiliac joints.    EARLE PAPPAS MD   MR Thoracic Spine w/o Contrast    Narrative    MRI OF THE THORACIC SPINE WITHOUT CONTRAST May 29, 2020 6:23 AM     HISTORY: Mid-back/thoracic spine pain, initial exam.    TECHNIQUE: Multiplanar, multisequence MRI images of the thoracic spine  were acquired without gadolinium IV contrast.    COMPARISON: Thoracic spine CT scan 5/26/2020.    FINDINGS: Severe right convex curvature of the thoracic spine centered  at the T9 level and moderate left convex  curvature of the thoracic  spine centered at the T3-T4 level again noted. Alignment of the  thoracic vertebrae is otherwise normal. Vertebral body heights of the  thoracic spine are normal. Marrow signal throughout the thoracic  vertebrae is within normal limits. There is no evidence for fracture  or pathologic bony lesion of the thoracic spine. There is a T2  hyperintense, heterogeneously T1 hyperintense nodule in the T10  vertebral body consistent with a benign hemangioma.    There are no significant posterior disc bulges or herniations at any  level of the thoracic spine.    The thoracic spinal cord is normal in contour and signal intensity.    There is no spinal canal stenosis at any level of the thoracic spine.  There is no significant neural foraminal stenosis at any level of the  thoracic spine.      Impression    IMPRESSION: Severe right convex curvature of the thoracic spine  centered at the T9 level and moderate left convex curvature of the  thoracic spine centered at the T3-T4 level. No spinal canal or neural  foraminal stenosis of the thoracic spine. No fractures.    IAN BUENO MD   MR Lumbar Spine w/o Contrast    Narrative    MRI OF THE LUMBAR SPINE WITHOUT CONTRAST May 29, 2020 6:23 AM     HISTORY: Back pain, prior surgery, new or progressive symptoms.    TECHNIQUE: Multiplanar, multisequence MRI images of the lumbar spine  were acquired without IV contrast.    COMPARISON: Lumbar spine CT 5/28/2020.    FINDINGS: There are five lumbar-type vertebrae for the purposes of  this dictation.     Severe left convex curvature of the lumbar spine centered at the L3-L4  level again noted. Right lateral listhesis of L1 upon L2 and of L2  upon L3 is again noted. Alignment of the lumbar vertebrae is otherwise  normal. Vertebral body heights of the lumbar spine remain normal.  There is no evidence for fracture or pathologic bony lesion of the  lumbar spine.    There is loss of disc height, disc desiccation and  posterior disc  bulging to varying degrees at all levels of the lumbar spine with the  exception of the normal-appearing L5-S1 disc.    The tip of the conus medullaris is at the L1-L2 level which is within  normal limits. There is no evidence for intrathecal abnormality.     Level by level:     L1-L2: There is mild loss of disc height, disc desiccation,  circumferential disc bulging and minimal facet arthropathy  bilaterally. There is mild left foraminal narrowing but no spinal  canal or right foraminal stenosis.    L2-L3: There is loss of disc height, disc desiccation and  circumferential disc bulging as well as moderate facet arthropathy  bilaterally. These findings result in moderate right foraminal  stenosis but no spinal canal or left foraminal narrowing.    L3-L4: There is loss of disc height, disc desiccation, circumferential  disc bulging, moderate facet arthropathy in the right and mild facet  arthropathy on the left. These findings result in mild-moderate right  foraminal narrowing but no spinal canal or left foraminal stenosis.    L4-L5: There is mild loss of disc height and minimal circumferential  disc bulging as well as mild facet arthropathy bilaterally. There is  mild right foraminal narrowing but no spinal canal or left foraminal  stenosis.    L5-S1: There is no spinal canal or neural foraminal stenosis at this  level.      Impression    IMPRESSION:  1. Degenerative changes of the lumbar spine as detailed above.  2. Severe left convex curvature of the lumbar spine centered at the  L3-L4 level again noted.  3. Moderate right foraminal stenosis at the L2-L3 level. Multiple  areas of mild neural foraminal narrowing as detailed above.  4. No significant spinal canal stenosis of the lumbar spine.  5. No fractures.    IAN BUENO MD   XR Spine Complete Scoliosis 2 Views    Narrative    SPINE COMPLETE SCOLIOSIS TWO VIEWS May 29, 2020 6:53 AM     HISTORY: Scoliosis.    COMPARISON: CT thoracic and lumbar  spine 5/28/2020.      Impression    IMPRESSION: Right convex curvature of the thoracic spine centered at  the T9 level measuring 63 degrees between T8 and L1. There is moderate  left convex curvature of the lumbar spine centered at the L3-L4 level  measuring roughly 37 degrees between L1 and the top of the sacrum. No  definite fracture is noted.    IAN BUENO MD       Discharge Medications   Current Discharge Medication List      START taking these medications    Details   cyclobenzaprine (FLEXERIL) 5 MG tablet Take 1-2 tablets (5-10 mg) by mouth every 8 hours as needed for muscle spasms  Qty: 20 tablet, Refills: 0    Associated Diagnoses: Intractable back pain      Lidocaine (LIDOCARE) 4 % Patch Place 1 patch onto the skin every 24 hours To prevent lidocaine toxicity, patient should be patch free for 12 hrs daily.  Qty: 10 patch, Refills: 0    Associated Diagnoses: Intractable back pain         CONTINUE these medications which have CHANGED    Details   acetaminophen (TYLENOL) 500 MG tablet Take 2 tablets (1,000 mg) by mouth 3 times daily for 3 days Then take Tylenol 500 mg every 4 hours as needed.    Associated Diagnoses: Intractable back pain         CONTINUE these medications which have NOT CHANGED    Details   Ascorbic Acid (VITAMIN C PO) Take 500 mg by mouth daily       calcium carbonate (OS-ILYA 500 MG [place]. [place]) 1250 MG tablet Take 1 tablet by mouth 2 times daily      Cholecalciferol (VITAMIN D3 PO) Take 2,000 Units by mouth daily.      Cyanocobalamin 1000 MCG CAPS Take 1,000 mcg by mouth daily      diclofenac (VOLTAREN) 1 % topical gel Place 2 g onto the skin 2 times daily as needed for moderate pain      diltiazem ER (DILT-XR) 180 MG 24 hr capsule Take 1 capsule by mouth once daily  Qty: 90 capsule, Refills: 0    Associated Diagnoses: Benign essential hypertension      diphenoxylate-atropine (LOMOTIL) 2.5-0.025 MG tablet Take 1 tablet by mouth 3 times daily as needed for diarrhea      oxyCODONE HCl  (OXAYDO) 5 MG TABA Take 2.5-5 mg by mouth every 4 hours as needed      probiotic CAPS Take 1 capsule by mouth daily       Wheat Dextrin (BENEFIBER PO) Take 2 teaspoonful by mouth 2 times daily            Allergies   Allergies   Allergen Reactions     No Known Allergies

## 2020-05-30 NOTE — PLAN OF CARE
Patient is A&O x4. Up with one assist to BR. Voiding, had few BM this shift. Pain managed with tylenol. Denies chest pain or SOB. IV access discontinued. Reviewed discharge paperwork with pt. Med (3 tabs oxycodone) from lock box returned to pt. Discharging patient with belonging and medications. No further questions asked. Awaiting for pt's daughter.

## 2020-05-30 NOTE — PROGRESS NOTES
Observation Goals     -adequate pain control on oral analgesics: Met  -returns to baseline functional status: Met   Nurse to notify provider when observation goals have been met and patient is ready for discharge.

## 2020-05-30 NOTE — PLAN OF CARE
Observation Goals     -adequate pain control on oral analgesics: Met  -returns to baseline functional status: Met   Nurse to notify provider when observation goals have been met and patient is ready for discharge.     A&Ox4. VSS on RA. Complaint of back pain, managed with scheduled Tylenol and Lidocaine patch. PRN Flexeril x1. Regular diet. Up with assist of one. CMS intact. Voiding adequately in BR. IV saline locked

## 2020-05-30 NOTE — PROGRESS NOTES
Observation Goal    adequate pain control on oral analgesics : Met  -returns to baseline functional status  : Met  Nurse to notify provider when observation goals have been met and patient is ready for discharge.

## 2020-05-30 NOTE — CONSULTS
"Care Coordination:    Care Transition Initial Assessment - RN        Met with: Patient.  Santino Vasquez by phone  DATA   Active Problems:    Intractable back pain       Cognitive Status: awake, alert and oriented.     Primary Care MD Name: Mansoor  Contact information and PCP information verified: Yes  Lives With: alone   Living Arrangements: house                 Insurance concerns: No Insurance issues identified  ASSESSMENT  Patient currently receives the following services:  Lives alone in house with cat.  Pt normally indep prior to pain.          Identified issues/concerns regarding health management: Pt admitted with intractable back pain.  Seen by neurosurgery and recommended non surgical, pain control, flexeril, and PT.  PT recommends home w/ supervision on stairs on home care. Met with patient and explained role.  Pt states she and son have been talking and wonder if Pt should go [facility].  Pt feels unsafe going home alone due to 12 stairs to bedroom and bathroom (with shower). Main level has 1/4 bathroom.  Daughter is able to stay for \"a bit\" but not for a long time.   Explained that under observation BCBS medicare advantage may not cover a TCU stay.  Pt also voices some concern over going to facility with COVID.  Pt would like CC to speak with son Pedro.  Pedro is feeling it would be \"safest\" for his mom to go to TCU if covered by Insurance.    CC dicussed concerns with SW.  Pt's choice of TCU would be [facility] or [facility] Medicare.gov web site given to Pt, along with list of Home care agencies.  Pt deciding between TCU (if covered) vs Home with Home care.      1400Addendum: Spoke with MD/SW and plan to have PT come back and assess pt again in light of fact Dtr can't stay more than 1-2 days and Pt's concern on steps.    14:30:  PT saw Pt and felt she performed better than yesterday and was SBA/Indep and able to manage more stairs.  Discussed Medicare/BCBS would most likely not approve TCU stay given Pt's indep " and therapy was also not indicated.  Pt in agreement with going home.  Has spoke with dtr who can stay tonight- Monday mid day.  Son updated and ok with plan to return home.  Bedside nurse to shannen LYONS for discharge orders  Dtr to pick Pt up this afternoon.     PLAN  Financial costs for the patient include TBD .  Patient given options and choices for discharge yes .  Patient/family is agreeable to the plan?  Yes:   Patient anticipates discharging to home vs TCU .        Patient anticipates needs for home equipment: Yes, Walker  Transportation/person available to transport on day of discharge  is dtr and have they been notified/set up call when ready  Plan/Disposition: Home vs TCU   Appointments: TBD      Care  (CTS) will continue to follow as needed.      Dena Bro RN BSN  Inpatient Care Coordination  [facility]  [phone]

## 2020-05-30 NOTE — PLAN OF CARE
Discharge Planner PT   Patient plan for discharge: Would like to return to home if able  Current status:     Re-eval as pt DC'd from PT however per CC family and pt not comfortable with pt negotiated steps at home    Pt screened for PT needs. Pt demonstrates IND with STS x 4 from varying surface heights. No LOB. Able to perform toileting IND. Pt ambulated in hallway mod I with FWW at appropriate samia. Pt ambulated wihtout walker as well, demonstrating IND, slowed samia and guarded however did well. Pt negotiated 12 steps in stairwell use of RHR, reciprocal pattern, safe ft placement, pt tolerated well. No concerns regarding stairs from a PT standpoint. Discussed ambulating without assistive device when upstairs. Leave walker on main level to use during the day as needed    PT will complete orders as pt mobilizing mod I to IND.   Barriers to return to prior living situation: None  Recommendations for discharge: Home with FWW for community distances as needed to manage back pain.   Rationale for recommendations: Pt mod I to IND with mobility, no further skilled PT needs. Will complete orders          Entered by: Nusrat Jones 05/30/2020 2:20 PM

## 2020-06-01 ENCOUNTER — TELEPHONE (OUTPATIENT)
Dept: FAMILY MEDICINE | Facility: CLINIC | Age: 80
End: 2020-06-01

## 2020-06-01 NOTE — TELEPHONE ENCOUNTER
"ED / Discharge Outreach Protocol    Patient Contact    Attempt # 1    Was call answered?  Yes.  \"May I please speak with <BJ>\"  Is patient available?   Yes    Pt has a new PCP and \"has been dealing with Dr. Max\"     Alem FREEMAN RN                        "

## 2020-06-01 NOTE — TELEPHONE ENCOUNTER
Chief Complaint: Intractable Back Pain, Other Idiopathic Scoliosis, Thoracolumbar Region,  SAT 30-MAY-2020  2 / 0    [phone] (home)

## 2020-06-26 NOTE — PROGRESS NOTES
Subjective     Georgette Alatorre is a 79 year old female who presents to clinic today for the following health issues:    HPI   Patient presents to clinic for follow up on her Urology appt.    Follow-up hydronephrosis without symptoms or notable change in renal function.    Her chronic diarrhea seems to be slightly improved and is able to use Imodium to allow her to make appointments.  She continues to avoid eating and is somewhat socially isolated because of her ongoing diarrhea.    Current Outpatient Medications   Medication Sig Dispense Refill     Ascorbic Acid (VITAMIN C PO) Take 500 mg by mouth daily        calcium carbonate (OS-ILYA 500 MG [place]. [place]) 1250 MG tablet Take 1 tablet by mouth 2 times daily       Cholecalciferol (VITAMIN D3 PO) Take 2,000 Units by mouth daily.       probiotic CAPS Take 1 capsule by mouth daily        Wheat Dextrin (BENEFIBER PO) Take 2 teaspoonful by mouth 2 times daily        Cyanocobalamin 1000 MCG CAPS Take 1,000 mcg by mouth daily       cyclobenzaprine (FLEXERIL) 5 MG tablet Take 1-2 tablets (5-10 mg) by mouth every 8 hours as needed for muscle spasms 20 tablet 0     diclofenac (VOLTAREN) 1 % topical gel Place 2 g onto the skin 2 times daily as needed for moderate pain       diltiazem ER (DILT-XR) 180 MG 24 hr capsule Take 1 capsule by mouth once daily 90 capsule 0     diphenoxylate-atropine (LOMOTIL) 2.5-0.025 MG tablet Take 1 tablet by mouth 3 times daily as needed for diarrhea       Lidocaine (LIDOCARE) 4 % Patch Place 1 patch onto the skin every 24 hours To prevent lidocaine toxicity, patient should be patch free for 12 hrs daily. 10 patch 0     oxyCODONE HCl (OXAYDO) 5 MG TABA Take 2.5-5 mg by mouth every 4 hours as needed       Allergies   Allergen Reactions     No Known Allergies        Reviewed and updated as needed this visit by Provider         Review of Systems   ROS COMP: Constitutional, HEENT, cardiovascular, pulmonary, gi and gu systems are negative, except as  "otherwise noted.      Objective    /62 (BP Location: Left arm, Patient Position: Sitting, Cuff Size: Adult Small)   Pulse 68   Temp 97  F (36.1  C) (Oral)   Ht 1.575 m (5' 2\")   Wt 42.7 kg (94 lb 1.6 oz)   SpO2 97%   Breastfeeding No   BMI 17.21 kg/m    Body mass index is 17.21 kg/m .  Physical Exam   GENERAL: healthy, alert and no distress  NECK: no adenopathy, no asymmetry, masses, or scars and thyroid normal to palpation  RESP: lungs clear to auscultation - no rales, rhonchi or wheezes  CV: regular rate and rhythm, normal S1 S2, no S3 or S4, no murmur, click or rub, no peripheral edema and peripheral pulses strong  ABDOMEN: soft, nontender, no hepatosplenomegaly, no masses and bowel sounds normal  MS: no gross musculoskeletal defects noted, no edema            Assessment & Plan     1. Chronic diarrhea    Ongoing recommendations for management of her diarrhea.  2. Vitamin B12 deficiency (non anemic)  Continue with same replacement therapy    3. Gastroesophageal reflux disease without esophagitis    Well-controlled with current therapy  4. SBO (small bowel obstruction) (H)  Resolved and no suggestions of recurrence at this time    5. SVT (supraventricular tachycardia) (H)  Well-controlled with current therapy    6. Other idiopathic scoliosis, thoracolumbar region      7. Radiation colitis  See above    8. Age-related osteoporosis without current pathological fracture  Continue same therapy    9. Hydronephrosis, unspecified hydronephrosis type    - CBC with platelets  - Comprehensive metabolic panel    10. Fatigue, unspecified type  Reevaluate to rule out hypothyroidism  - TSH with free T4 reflex    11. Anemia due to blood loss, acute    Ongoing follow-up       FUTURE APPOINTMENTS:       - Follow-up visit in 2 months    Return in about 4 weeks (around 11/19/2019).    Mario Valdes MD  [facility]    "

## 2020-07-30 NOTE — ADDENDUM NOTE
Addended by: JOLLY FRANCISCO on: 6/5/2018 01:27 PM     Modules accepted: Orders    
not applicable (Male)

## 2020-08-10 NOTE — PROGRESS NOTES
Subjective     Georgette Alatorre is a 79 year old female who presents to clinic today for the following health issues:    HPI   Patient presents to clinic for follow up on her Urology appt.    Follow-up hydronephrosis without symptoms or notable change in renal function.    Her chronic diarrhea seems to be slightly improved and is able to use Imodium to allow her to make appointments.  She continues to avoid eating and is somewhat socially isolated because of her ongoing diarrhea.    Current Outpatient Medications   Medication Sig Dispense Refill     Ascorbic Acid (VITAMIN C PO) Take 500 mg by mouth daily        calcium carbonate (OS-ILYA 500 MG [place]. [place]) 1250 MG tablet Take 1 tablet by mouth 2 times daily       Cholecalciferol (VITAMIN D3 PO) Take 2,000 Units by mouth daily.       probiotic CAPS Take 1 capsule by mouth daily        Wheat Dextrin (BENEFIBER PO) Take 2 teaspoonful by mouth 2 times daily        Cyanocobalamin 1000 MCG CAPS Take 1,000 mcg by mouth daily       cyclobenzaprine (FLEXERIL) 5 MG tablet Take 1-2 tablets (5-10 mg) by mouth every 8 hours as needed for muscle spasms 20 tablet 0     diclofenac (VOLTAREN) 1 % topical gel Place 2 g onto the skin 2 times daily as needed for moderate pain       diltiazem ER (DILT-XR) 180 MG 24 hr capsule Take 1 capsule by mouth once daily 90 capsule 0     diphenoxylate-atropine (LOMOTIL) 2.5-0.025 MG tablet Take 1 tablet by mouth 3 times daily as needed for diarrhea       Lidocaine (LIDOCARE) 4 % Patch Place 1 patch onto the skin every 24 hours To prevent lidocaine toxicity, patient should be patch free for 12 hrs daily. 10 patch 0     oxyCODONE HCl (OXAYDO) 5 MG TABA Take 2.5-5 mg by mouth every 4 hours as needed       Allergies   Allergen Reactions     No Known Allergies        Reviewed and updated as needed this visit by Provider         Review of Systems   ROS COMP: Constitutional, HEENT, cardiovascular, pulmonary, gi and gu systems are negative, except as  "otherwise noted.      Objective    /62 (BP Location: Left arm, Patient Position: Sitting, Cuff Size: Adult Small)   Pulse 68   Temp 97  F (36.1  C) (Oral)   Ht 1.575 m (5' 2\")   Wt 42.7 kg (94 lb 1.6 oz)   SpO2 97%   Breastfeeding No   BMI 17.21 kg/m    Body mass index is 17.21 kg/m .  Physical Exam   GENERAL: healthy, alert and no distress  NECK: no adenopathy, no asymmetry, masses, or scars and thyroid normal to palpation  RESP: lungs clear to auscultation - no rales, rhonchi or wheezes  CV: regular rate and rhythm, normal S1 S2, no S3 or S4, no murmur, click or rub, no peripheral edema and peripheral pulses strong  ABDOMEN: soft, nontender, no hepatosplenomegaly, no masses and bowel sounds normal  MS: no gross musculoskeletal defects noted, no edema            Assessment & Plan     1. Chronic diarrhea    Ongoing recommendations for management of her diarrhea.  2. Vitamin B12 deficiency (non anemic)  Continue with same replacement therapy    3. Gastroesophageal reflux disease without esophagitis    Well-controlled with current therapy  4. SBO (small bowel obstruction) (H)  Resolved and no suggestions of recurrence at this time    5. SVT (supraventricular tachycardia) (H)  Well-controlled with current therapy    6. Other idiopathic scoliosis, thoracolumbar region      7. Radiation colitis  See above    8. Age-related osteoporosis without current pathological fracture  Continue same therapy    9. Hydronephrosis, unspecified hydronephrosis type    - CBC with platelets  - Comprehensive metabolic panel    10. Fatigue, unspecified type  Reevaluate to rule out hypothyroidism  - TSH with free T4 reflex    11. Anemia due to blood loss, acute    Ongoing follow-up       FUTURE APPOINTMENTS:       - Follow-up visit in 2 months    Return in about 4 weeks (around 11/19/2019).    Mario Valdes MD  [facility]    "

## 2020-08-10 NOTE — PROGRESS NOTES
Subjective     Georgette Alatorre is a 79 year old female who presents to clinic today for the following health issues:    HPI   Patient presents to clinic for follow up on her Urology appt.    Follow-up hydronephrosis without symptoms or notable change in renal function.    Her chronic diarrhea seems to be slightly improved and is able to use Imodium to allow her to make appointments.  She continues to avoid eating and is somewhat socially isolated because of her ongoing diarrhea.    Current Outpatient Medications   Medication Sig Dispense Refill     Ascorbic Acid (VITAMIN C PO) Take 500 mg by mouth daily        calcium carbonate (OS-ILYA 500 MG [place]. [place]) 1250 MG tablet Take 1 tablet by mouth 2 times daily       Cholecalciferol (VITAMIN D3 PO) Take 2,000 Units by mouth daily.       probiotic CAPS Take 1 capsule by mouth daily        Wheat Dextrin (BENEFIBER PO) Take 2 teaspoonful by mouth 2 times daily        Cyanocobalamin 1000 MCG CAPS Take 1,000 mcg by mouth daily       cyclobenzaprine (FLEXERIL) 5 MG tablet Take 1-2 tablets (5-10 mg) by mouth every 8 hours as needed for muscle spasms 20 tablet 0     diclofenac (VOLTAREN) 1 % topical gel Place 2 g onto the skin 2 times daily as needed for moderate pain       diltiazem ER (DILT-XR) 180 MG 24 hr capsule Take 1 capsule by mouth once daily 90 capsule 0     diphenoxylate-atropine (LOMOTIL) 2.5-0.025 MG tablet Take 1 tablet by mouth 3 times daily as needed for diarrhea       Lidocaine (LIDOCARE) 4 % Patch Place 1 patch onto the skin every 24 hours To prevent lidocaine toxicity, patient should be patch free for 12 hrs daily. 10 patch 0     oxyCODONE HCl (OXAYDO) 5 MG TABA Take 2.5-5 mg by mouth every 4 hours as needed       Allergies   Allergen Reactions     No Known Allergies        Reviewed and updated as needed this visit by Provider         Review of Systems   ROS COMP: Constitutional, HEENT, cardiovascular, pulmonary, gi and gu systems are negative, except as  "otherwise noted.      Objective    /62 (BP Location: Left arm, Patient Position: Sitting, Cuff Size: Adult Small)   Pulse 68   Temp 97  F (36.1  C) (Oral)   Ht 1.575 m (5' 2\")   Wt 42.7 kg (94 lb 1.6 oz)   SpO2 97%   Breastfeeding No   BMI 17.21 kg/m    Body mass index is 17.21 kg/m .  Physical Exam   GENERAL: healthy, alert and no distress  NECK: no adenopathy, no asymmetry, masses, or scars and thyroid normal to palpation  RESP: lungs clear to auscultation - no rales, rhonchi or wheezes  CV: regular rate and rhythm, normal S1 S2, no S3 or S4, no murmur, click or rub, no peripheral edema and peripheral pulses strong  ABDOMEN: soft, nontender, no hepatosplenomegaly, no masses and bowel sounds normal  MS: no gross musculoskeletal defects noted, no edema            Assessment & Plan     1. Chronic diarrhea    Ongoing recommendations for management of her diarrhea.  2. Vitamin B12 deficiency (non anemic)  Continue with same replacement therapy    3. Gastroesophageal reflux disease without esophagitis    Well-controlled with current therapy  4. SBO (small bowel obstruction) (H)  Resolved and no suggestions of recurrence at this time    5. SVT (supraventricular tachycardia) (H)  Well-controlled with current therapy    6. Other idiopathic scoliosis, thoracolumbar region      7. Radiation colitis  See above    8. Age-related osteoporosis without current pathological fracture  Continue same therapy    9. Hydronephrosis, unspecified hydronephrosis type    - CBC with platelets  - Comprehensive metabolic panel    10. Fatigue, unspecified type  Reevaluate to rule out hypothyroidism  - TSH with free T4 reflex    11. Anemia due to blood loss, acute    Ongoing follow-up       FUTURE APPOINTMENTS:       - Follow-up visit in 2 months    Return in about 4 weeks (around 11/19/2019).    Mario Valdes MD  [facility]    "

## 2020-09-18 ENCOUNTER — HOSPITAL ENCOUNTER (OUTPATIENT)
Facility: CLINIC | Age: 80
End: 2020-09-18
Attending: INTERNAL MEDICINE | Admitting: INTERNAL MEDICINE
Payer: COMMERCIAL

## 2020-09-18 DIAGNOSIS — Z11.59 ENCOUNTER FOR SCREENING FOR OTHER VIRAL DISEASES: Primary | ICD-10-CM

## 2020-12-16 ENCOUNTER — HOSPITAL ENCOUNTER (OUTPATIENT)
Dept: MAMMOGRAPHY | Facility: CLINIC | Age: 80
Discharge: HOME | End: 2020-12-16
Attending: INTERNAL MEDICINE | Admitting: INTERNAL MEDICINE
Payer: COMMERCIAL

## 2020-12-16 DIAGNOSIS — Z12.31 VISIT FOR SCREENING MAMMOGRAM: ICD-10-CM

## 2020-12-16 PROCEDURE — 77063 BREAST TOMOSYNTHESIS BI: CPT

## 2020-12-19 ENCOUNTER — APPOINTMENT (OUTPATIENT)
Dept: GENERAL RADIOLOGY | Facility: CLINIC | Age: 80
End: 2020-12-19
Attending: EMERGENCY MEDICINE
Payer: COMMERCIAL

## 2020-12-19 ENCOUNTER — HOSPITAL ENCOUNTER (EMERGENCY)
Facility: CLINIC | Age: 80
Discharge: HOME | End: 2020-12-19
Attending: EMERGENCY MEDICINE | Admitting: EMERGENCY MEDICINE
Payer: COMMERCIAL

## 2020-12-19 VITALS
WEIGHT: 93 LBS | BODY MASS INDEX: 17.01 KG/M2 | OXYGEN SATURATION: 95 % | SYSTOLIC BLOOD PRESSURE: 150 MMHG | RESPIRATION RATE: 16 BRPM | DIASTOLIC BLOOD PRESSURE: 73 MMHG | TEMPERATURE: 97.4 F | HEART RATE: 82 BPM

## 2020-12-19 DIAGNOSIS — S42.201A CLOSED FRACTURE OF PROXIMAL END OF RIGHT HUMERUS, UNSPECIFIED FRACTURE MORPHOLOGY, INITIAL ENCOUNTER: ICD-10-CM
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
## 2020-12-19 DIAGNOSIS — E87.6 HYPOKALEMIA: ICD-10-CM

## 2020-12-19 LAB
ANION GAP SERPL CALCULATED.3IONS-SCNC: 3 MMOL/L (ref 3–14)
BASOPHILS # BLD AUTO: 0 10E9/L (ref 0–0.2)
BASOPHILS NFR BLD AUTO: 0.4 %
BUN SERPL-MCNC: 19 MG/DL (ref 7–30)
CALCIUM SERPL-MCNC: 8.7 MG/DL (ref 8.5–10.1)
CHLORIDE SERPL-SCNC: 105 MMOL/L (ref 94–109)
CO2 SERPL-SCNC: 33 MMOL/L (ref 20–32)
CREAT SERPL-MCNC: 0.82 MG/DL (ref 0.52–1.04)
DIFFERENTIAL METHOD BLD: ABNORMAL
EOSINOPHIL # BLD AUTO: 0 10E9/L (ref 0–0.7)
EOSINOPHIL NFR BLD AUTO: 0.6 %
ERYTHROCYTE [DISTWIDTH] IN BLOOD BY AUTOMATED COUNT: 14.3 % (ref 10–15)
GFR SERPL CREATININE-BSD FRML MDRD: 67 ML/MIN/{1.73_M2}
GLUCOSE SERPL-MCNC: 154 MG/DL (ref 70–99)
HCT VFR BLD AUTO: 36.4 % (ref 35–47)
HGB BLD-MCNC: 11.8 G/DL (ref 11.7–15.7)
IMM GRANULOCYTES # BLD: 0 10E9/L (ref 0–0.4)
IMM GRANULOCYTES NFR BLD: 0.4 %
LYMPHOCYTES # BLD AUTO: 1.2 10E9/L (ref 0.8–5.3)
LYMPHOCYTES NFR BLD AUTO: 16.7 %
MCH RBC QN AUTO: 31.6 PG (ref 26.5–33)
MCHC RBC AUTO-ENTMCNC: 32.4 G/DL (ref 31.5–36.5)
MCV RBC AUTO: 97 FL (ref 78–100)
MONOCYTES # BLD AUTO: 0.7 10E9/L (ref 0–1.3)
MONOCYTES NFR BLD AUTO: 9.3 %
NEUTROPHILS # BLD AUTO: 5.3 10E9/L (ref 1.6–8.3)
NEUTROPHILS NFR BLD AUTO: 72.6 %
NRBC # BLD AUTO: 0 10*3/UL
NRBC BLD AUTO-RTO: 0 /100
PLATELET # BLD AUTO: 264 10E9/L (ref 150–450)
POTASSIUM SERPL-SCNC: 3 MMOL/L (ref 3.4–5.3)
RBC # BLD AUTO: 3.74 10E12/L (ref 3.8–5.2)
SODIUM SERPL-SCNC: 141 MMOL/L (ref 133–144)
WBC # BLD AUTO: 7.2 10E9/L (ref 4–11)

## 2020-12-19 PROCEDURE — 73060 X-RAY EXAM OF HUMERUS: CPT | Mod: RT

## 2020-12-19 PROCEDURE — 73030 X-RAY EXAM OF SHOULDER: CPT | Mod: RT

## 2020-12-19 PROCEDURE — 85025 COMPLETE CBC W/AUTO DIFF WBC: CPT | Performed by: EMERGENCY MEDICINE

## 2020-12-19 PROCEDURE — 250N000011 HC RX IP 250 OP 636: Performed by: EMERGENCY MEDICINE

## 2020-12-19 PROCEDURE — 23600 CLTX PROX HUMRL FX W/O MNPJ: CPT | Mod: RT

## 2020-12-19 PROCEDURE — 96374 THER/PROPH/DIAG INJ IV PUSH: CPT | Mod: 59

## 2020-12-19 PROCEDURE — 250N000013 HC RX MED GY IP 250 OP 250 PS 637: Performed by: EMERGENCY MEDICINE

## 2020-12-19 PROCEDURE — 80048 BASIC METABOLIC PNL TOTAL CA: CPT | Performed by: EMERGENCY MEDICINE

## 2020-12-19 PROCEDURE — 29105 APPLICATION LONG ARM SPLINT: CPT | Mod: RT

## 2020-12-19 PROCEDURE — 99285 EMERGENCY DEPT VISIT HI MDM: CPT | Mod: 25

## 2020-12-19 RX ORDER — FENTANYL CITRATE 50 UG/ML
50 INJECTION, SOLUTION INTRAMUSCULAR; INTRAVENOUS ONCE
Status: COMPLETED | OUTPATIENT
Start: 2020-12-19 | End: 2020-12-19

## 2020-12-19 RX ORDER — OXYCODONE HYDROCHLORIDE 5 MG/1
5 TABLET ORAL EVERY 6 HOURS PRN
Qty: 10 TABLET | Refills: 0 | Status: SHIPPED | OUTPATIENT
Start: 2020-12-19 | End: 2022-09-07

## 2020-12-19 RX ORDER — POTASSIUM CHLORIDE 1500 MG/1
40 TABLET, EXTENDED RELEASE ORAL ONCE
Status: COMPLETED | OUTPATIENT
Start: 2020-12-19 | End: 2020-12-19

## 2020-12-19 RX ORDER — OXYCODONE HYDROCHLORIDE 5 MG/1
5 TABLET ORAL ONCE
Status: COMPLETED | OUTPATIENT
Start: 2020-12-19 | End: 2020-12-19

## 2020-12-19 RX ADMIN — FENTANYL CITRATE 50 MCG: 50 INJECTION, SOLUTION INTRAMUSCULAR; INTRAVENOUS at 13:48

## 2020-12-19 RX ADMIN — POTASSIUM CHLORIDE 40 MEQ: 1500 TABLET, EXTENDED RELEASE ORAL at 14:40

## 2020-12-19 RX ADMIN — OXYCODONE HYDROCHLORIDE 5 MG: 5 TABLET ORAL at 14:40

## 2020-12-19 ASSESSMENT — ENCOUNTER SYMPTOMS
ARTHRALGIAS: 1
MYALGIAS: 0
NECK PAIN: 1
HEADACHES: 0
NUMBNESS: 1

## 2020-12-19 NOTE — ED TRIAGE NOTES
Fell on driveway, fell to buttocks then shoulder right, may have hit head, no loc , right shoulder hurts the most

## 2020-12-19 NOTE — ED AVS SNAPSHOT
[facility]  [address] [address]  Phone: [phone]  Fax: [phone]                                    Georgetet Alatorre   MRN: [id number]    Department: [facility] Emergency [facility]   Date of Visit: 12/19/2020           After Visit Summary Signature Page    I have received my discharge instructions, and my questions have been answered. I have discussed any challenges I see with this plan with the nurse or doctor.    ..........................................................................................................................................  Patient/Patient Representative Signature      ..........................................................................................................................................  Patient Representative Print Name and Relationship to Patient    ..................................................               ................................................  Date                                   Time    ..........................................................................................................................................  Reviewed by Signature/Title    ...................................................              ..............................................  Date                                               Time          22EPIC Rev 08/18

## 2020-12-19 NOTE — ED PROVIDER NOTES
History   Chief Complaint:  Fall    HPI   Georgette Alatorre is a 80 year old female with history of scoliosis who presents with fall. At 1030, the patient went out to her driveway to take out the trash when she slipped on ice. She hit her right buttocks, right shoulder, and bumped her head. She did not lose consciousness. The patient was on the ground for 10 minutes before she was able to get a neighbor's attention and EMS call. During evaluation, the patient denies pain in her head, hip pain, and lower extremity pain, but admits to midline neck tenderness and well as shoulder pain. She notes her right arm felt numb after the fall.    Review of Systems   Musculoskeletal: Positive for arthralgias and neck pain. Negative for myalgias.   Neurological: Positive for numbness. Negative for syncope and headaches.   All other systems reviewed and are negative.      Allergies:  No known drug allergies    Medications:  Cyanocobalamin  Flexeril  Voltaren  Diltiazem  Lomotil  Lidocaine 4%  Oxycodone    Past Medical History:    Arthritis  Endometrial cancer  GERD  Blood transfusion  Hydronephrosis  Kidney stone  Osteoporosis  Scoliosis  SBO  SVT  Chronic diarrhea  Vitamin B12 deficiency     Past Surgical History:    Appendectomy  Cataract bilateral  Retinal eye surgery  Hysterectomy  Laparotomy    Social History:  The patient presents to the emergency department via EMS.      Physical Exam     Patient Vitals for the past 24 hrs:   BP Temp Pulse Resp SpO2 Weight   12/19/20 1305 -- -- -- -- 95 % --   12/19/20 1300 -- -- -- -- 95 % --   12/19/20 1232 -- -- -- -- -- 42.2 kg (93 lb)   12/19/20 1230 (!) 150/73 -- 82 -- 92 % --   12/19/20 1229 (!) 150/73 97.4  F (36.3  C) 82 16 93 % --       Physical Exam  Nursing note and vitals reviewed.    Constitutional:  Appears somewhat uncomfortable.  HENT:  No evidence of facial or scalp trauma.  Cardiovascular:  Normal rate, regular rhythm.     Radial pulse 2+.  Cap refill less than 2  seconds.  Musculoskeletal:  Range of motion of the left arm is normal.  Right elbow and wrist are without pain or swelling but she has significant pain in the right shoulder so range of motion is limited..  Significant swelling over the right shoulder with pain.  No clavicle pain, no midline cervical tenderness.  No chest wall pain.  Lower extremities reveal full range of motion without tenderness.  Neurological:   Alert and oriented. Exhibits good muscle tone.      Sensation in the right hand is normal.     GCS eye subscore is 4. GCS verbal subscore is 5.      GCS motor subscore is 6.   Skin:    Skin is warm and dry.  No break in the skin or rash.  Psychiatric:   Behavior is normal. Appropriate mood and affect.     Judgment and thought content normal.     Emergency Department Course   Imaging:  Humerus XR, G/E 2 views, right  IMPRESSION: Acute moderately displaced and impacted fracture of the surgical neck of the right humerus with anterosuperior displacement of the humeral shaft. The humeral head remains normally located.     As read by Radiology.    XR Shoulder Right G/E 3 Views  IMPRESSION: Acute moderately displaced and impacted fracture of the surgical neck of the right humerus with anterosuperior displacement of the humeral shaft. The humeral head remains normally located.     As read by Radiology.    Laboratory:  CBC: WNL (WBC 7.2, HGB 11.8, )  BMP: Potassium 3.0 (L), CO2 33 (H), Glucose 154 (H), o/w WNL (Creatinine 0.82)    Emergency Department Course:  Reviewed:  I reviewed the patient's nursing notes, vitals, past medical records, Care Everywhere.     Assessments:  1258: I performed an exam of the patient and obtained history, as documented above.  1343: I rechecked the patient.  1430: Patient was walking great and is ready to be discharged.  1440: I rechecked the patient. Findings and plan explained to the Patient.    Interventions:  1348 Fentanyl 50 mcg IV  1440 Klor-con 40 mEq PO  1440 Oxycodone  5 mg PO    Disposition:  The patient was discharged to home.     Impression & Plan   Medical Decision Making:  Patient comes in after slipping on the ice landing on her right shoulder.  She has a mildly displaced subcapital humeral neck fracture.  She has good CMS distally.  She was placed in a shoulder immobilizer and was able to get up and ambulate without difficulty.  She lives alone.  I encouraged her to contact her [employer] to get some help with meals and other things.  She will see orthopedics next week for recheck and further management.  She was given fentanyl here and then a dose of oxycodone and she will be sent home with a prescription because of the pain will use them very conservatively.    Shoulder immobilizer at all times except when you shower or bathe, ice your shoulder frequently, Tylenol and/or oxycodone as needed for pain.  Get some help with your meals and other things at home.  See a doctor at [facility] orthopedics next week for a recheck and further management.  Eat bananas and have your potassium checked at your next clinic appointment.    Diagnosis:    ICD-10-CM    1. Fall, initial encounter  W19.XXXA    2. Closed fracture of proximal end of right humerus, unspecified fracture morphology, initial encounter  S42.201A    3. Hypokalemia  E87.6        Discharge Medications:  New Prescriptions    OXYCODONE (ROXICODONE) 5 MG TABLET    Take 1 tablet (5 mg) by mouth every 6 hours as needed for severe pain       Scribe Disclosure:  I, Shelbi Will, am serving as a scribe at 12:58 PM on 12/19/2020 to document services personally performed by Eva Rodriguez MD based on my observations and the provider's statements to me.        Eva Rodriguez MD  12/19/20 0070

## 2020-12-19 NOTE — DISCHARGE INSTRUCTIONS
Shoulder immobilizer at all times except when you shower or bathe, ice your shoulder frequently, Tylenol and/or oxycodone as needed for pain.  Get some help with your meals and other things at home.  See a doctor at [facility] orthopedics next week for a recheck and further management.  Eat bananas and have your potassium checked at your next clinic appointment.

## 2020-12-19 NOTE — ED NOTES
Bed: ED27  Expected date:   Expected time:   Means of arrival:   Comments:  Dylon [address] 83 yo fall, Right arm pain (-) covid.

## 2020-12-19 NOTE — ED TRIAGE NOTES
Pt presents to ED via EMS for evaluation following fall. Pt was taking out trash can and slipped on ice. Pt denies hitting head or LOC. Pt reports R arm pain. Per EMS, pt refused ccollar. EMS administered 25mcg of Fentanyl.

## 2021-01-15 ENCOUNTER — HEALTH MAINTENANCE LETTER (OUTPATIENT)
Age: 81
End: 2021-01-15

## 2021-03-11 ENCOUNTER — IMMUNIZATION (OUTPATIENT)
Dept: NURSING | Facility: CLINIC | Age: 81
End: 2021-03-11
Payer: COMMERCIAL

## 2021-03-11 ENCOUNTER — TRANSFERRED RECORDS (OUTPATIENT)
Dept: HEALTH INFORMATION MANAGEMENT | Facility: CLINIC | Age: 81
End: 2021-03-11

## 2021-03-11 PROCEDURE — 91300 PR COVID VAC PFIZER DIL RECON 30 MCG/0.3 ML IM: CPT

## 2021-03-11 PROCEDURE — 0001A PR COVID VAC PFIZER DIL RECON 30 MCG/0.3 ML IM: CPT

## 2021-04-01 ENCOUNTER — IMMUNIZATION (OUTPATIENT)
Dept: NURSING | Facility: CLINIC | Age: 81
End: 2021-04-01
Attending: INTERNAL MEDICINE
Payer: COMMERCIAL

## 2021-04-01 PROCEDURE — 0002A PR COVID VAC PFIZER DIL RECON 30 MCG/0.3 ML IM: CPT

## 2021-04-01 PROCEDURE — 91300 PR COVID VAC PFIZER DIL RECON 30 MCG/0.3 ML IM: CPT

## 2021-04-17 DIAGNOSIS — Z11.59 ENCOUNTER FOR SCREENING FOR OTHER VIRAL DISEASES: ICD-10-CM

## 2021-04-28 ENCOUNTER — ANESTHESIA EVENT (OUTPATIENT)
Dept: SURGERY | Facility: CLINIC | Age: 81
End: 2021-04-28
Payer: COMMERCIAL

## 2021-04-29 ENCOUNTER — ANESTHESIA (OUTPATIENT)
Dept: SURGERY | Facility: CLINIC | Age: 81
End: 2021-04-29
Payer: COMMERCIAL

## 2021-04-29 ENCOUNTER — APPOINTMENT (OUTPATIENT)
Dept: GENERAL RADIOLOGY | Facility: CLINIC | Age: 81
End: 2021-04-29
Attending: UROLOGY
Payer: COMMERCIAL

## 2021-04-29 ENCOUNTER — HOSPITAL ENCOUNTER (OUTPATIENT)
Facility: CLINIC | Age: 81
Discharge: HOME | End: 2021-04-29
Attending: UROLOGY | Admitting: UROLOGY
Payer: COMMERCIAL

## 2021-04-29 VITALS
DIASTOLIC BLOOD PRESSURE: 91 MMHG | RESPIRATION RATE: 16 BRPM | SYSTOLIC BLOOD PRESSURE: 166 MMHG | OXYGEN SATURATION: 96 % | HEART RATE: 66 BPM | BODY MASS INDEX: 17.55 KG/M2 | WEIGHT: 95.4 LBS | TEMPERATURE: 96.5 F | HEIGHT: 62 IN

## 2021-04-29 DIAGNOSIS — N20.1 URETERAL STONE: Primary | ICD-10-CM

## 2021-04-29 PROCEDURE — 258N000003 HC RX IP 258 OP 636: Performed by: NURSE ANESTHETIST, CERTIFIED REGISTERED

## 2021-04-29 PROCEDURE — 250N000011 HC RX IP 250 OP 636: Performed by: NURSE ANESTHETIST, CERTIFIED REGISTERED

## 2021-04-29 PROCEDURE — 370N000017 HC ANESTHESIA TECHNICAL FEE, PER MIN: Performed by: UROLOGY

## 2021-04-29 PROCEDURE — 250N000011 HC RX IP 250 OP 636: Performed by: PHYSICIAN ASSISTANT

## 2021-04-29 PROCEDURE — 710N000009 HC RECOVERY PHASE 1, LEVEL 1, PER MIN: Performed by: UROLOGY

## 2021-04-29 PROCEDURE — 250N000009 HC RX 250: Performed by: NURSE ANESTHETIST, CERTIFIED REGISTERED

## 2021-04-29 PROCEDURE — C1769 GUIDE WIRE: HCPCS | Performed by: UROLOGY

## 2021-04-29 PROCEDURE — 250N000013 HC RX MED GY IP 250 OP 250 PS 637: Performed by: ANESTHESIOLOGY

## 2021-04-29 PROCEDURE — 250N000009 HC RX 250: Performed by: UROLOGY

## 2021-04-29 PROCEDURE — 710N000012 HC RECOVERY PHASE 2, PER MINUTE: Performed by: UROLOGY

## 2021-04-29 PROCEDURE — 360N000076 HC SURGERY LEVEL 3, PER MIN: Performed by: UROLOGY

## 2021-04-29 PROCEDURE — 999N000179 XR SURGERY CARM FLUORO LESS THAN 5 MIN W STILLS

## 2021-04-29 PROCEDURE — 999N000141 HC STATISTIC PRE-PROCEDURE NURSING ASSESSMENT: Performed by: UROLOGY

## 2021-04-29 PROCEDURE — 272N000001 HC OR GENERAL SUPPLY STERILE: Performed by: UROLOGY

## 2021-04-29 RX ORDER — FENTANYL CITRATE 50 UG/ML
25-50 INJECTION, SOLUTION INTRAMUSCULAR; INTRAVENOUS
Status: DISCONTINUED | OUTPATIENT
Start: 2021-04-29 | End: 2021-04-29 | Stop reason: HOSPADM

## 2021-04-29 RX ORDER — NALOXONE HYDROCHLORIDE 0.4 MG/ML
0.4 INJECTION, SOLUTION INTRAMUSCULAR; INTRAVENOUS; SUBCUTANEOUS
Status: DISCONTINUED | OUTPATIENT
Start: 2021-04-29 | End: 2021-04-29 | Stop reason: HOSPADM

## 2021-04-29 RX ORDER — LIDOCAINE HYDROCHLORIDE 20 MG/ML
INJECTION, SOLUTION INFILTRATION; PERINEURAL PRN
Status: DISCONTINUED | OUTPATIENT
Start: 2021-04-29 | End: 2021-04-29

## 2021-04-29 RX ORDER — MEPERIDINE HYDROCHLORIDE 25 MG/ML
12.5 INJECTION INTRAMUSCULAR; INTRAVENOUS; SUBCUTANEOUS
Status: DISCONTINUED | OUTPATIENT
Start: 2021-04-29 | End: 2021-04-29 | Stop reason: HOSPADM

## 2021-04-29 RX ORDER — CIPROFLOXACIN 2 MG/ML
400 INJECTION, SOLUTION INTRAVENOUS
Status: COMPLETED | OUTPATIENT
Start: 2021-04-29 | End: 2021-04-29

## 2021-04-29 RX ORDER — ONDANSETRON 2 MG/ML
INJECTION INTRAMUSCULAR; INTRAVENOUS PRN
Status: DISCONTINUED | OUTPATIENT
Start: 2021-04-29 | End: 2021-04-29

## 2021-04-29 RX ORDER — ONDANSETRON 4 MG/1
4 TABLET, ORALLY DISINTEGRATING ORAL EVERY 30 MIN PRN
Status: DISCONTINUED | OUTPATIENT
Start: 2021-04-29 | End: 2021-04-29 | Stop reason: HOSPADM

## 2021-04-29 RX ORDER — NALOXONE HYDROCHLORIDE 0.4 MG/ML
0.2 INJECTION, SOLUTION INTRAMUSCULAR; INTRAVENOUS; SUBCUTANEOUS
Status: DISCONTINUED | OUTPATIENT
Start: 2021-04-29 | End: 2021-04-29 | Stop reason: HOSPADM

## 2021-04-29 RX ORDER — FENTANYL CITRATE 50 UG/ML
INJECTION, SOLUTION INTRAMUSCULAR; INTRAVENOUS PRN
Status: DISCONTINUED | OUTPATIENT
Start: 2021-04-29 | End: 2021-04-29

## 2021-04-29 RX ORDER — DEXAMETHASONE SODIUM PHOSPHATE 4 MG/ML
INJECTION, SOLUTION INTRA-ARTICULAR; INTRALESIONAL; INTRAMUSCULAR; INTRAVENOUS; SOFT TISSUE PRN
Status: DISCONTINUED | OUTPATIENT
Start: 2021-04-29 | End: 2021-04-29

## 2021-04-29 RX ORDER — ONDANSETRON 2 MG/ML
4 INJECTION INTRAMUSCULAR; INTRAVENOUS EVERY 30 MIN PRN
Status: DISCONTINUED | OUTPATIENT
Start: 2021-04-29 | End: 2021-04-29 | Stop reason: HOSPADM

## 2021-04-29 RX ORDER — PROPOFOL 10 MG/ML
INJECTION, EMULSION INTRAVENOUS CONTINUOUS PRN
Status: DISCONTINUED | OUTPATIENT
Start: 2021-04-29 | End: 2021-04-29

## 2021-04-29 RX ORDER — CIPROFLOXACIN 500 MG/1
500 TABLET, FILM COATED ORAL 2 TIMES DAILY
Qty: 2 TABLET | Refills: 0 | Status: SHIPPED | OUTPATIENT
Start: 2021-04-29 | End: 2021-04-30

## 2021-04-29 RX ORDER — HYDROMORPHONE HYDROCHLORIDE 1 MG/ML
.3-.5 INJECTION, SOLUTION INTRAMUSCULAR; INTRAVENOUS; SUBCUTANEOUS EVERY 10 MIN PRN
Status: DISCONTINUED | OUTPATIENT
Start: 2021-04-29 | End: 2021-04-29 | Stop reason: HOSPADM

## 2021-04-29 RX ORDER — PROPOFOL 10 MG/ML
INJECTION, EMULSION INTRAVENOUS PRN
Status: DISCONTINUED | OUTPATIENT
Start: 2021-04-29 | End: 2021-04-29

## 2021-04-29 RX ORDER — SODIUM CHLORIDE, SODIUM LACTATE, POTASSIUM CHLORIDE, CALCIUM CHLORIDE 600; 310; 30; 20 MG/100ML; MG/100ML; MG/100ML; MG/100ML
INJECTION, SOLUTION INTRAVENOUS CONTINUOUS PRN
Status: DISCONTINUED | OUTPATIENT
Start: 2021-04-29 | End: 2021-04-29

## 2021-04-29 RX ORDER — SODIUM CHLORIDE, SODIUM LACTATE, POTASSIUM CHLORIDE, CALCIUM CHLORIDE 600; 310; 30; 20 MG/100ML; MG/100ML; MG/100ML; MG/100ML
INJECTION, SOLUTION INTRAVENOUS CONTINUOUS
Status: DISCONTINUED | OUTPATIENT
Start: 2021-04-29 | End: 2021-04-29 | Stop reason: HOSPADM

## 2021-04-29 RX ORDER — ACETAMINOPHEN 325 MG/1
975 TABLET ORAL ONCE
Status: COMPLETED | OUTPATIENT
Start: 2021-04-29 | End: 2021-04-29

## 2021-04-29 RX ADMIN — FENTANYL CITRATE 50 MCG: 50 INJECTION, SOLUTION INTRAMUSCULAR; INTRAVENOUS at 09:46

## 2021-04-29 RX ADMIN — DEXAMETHASONE SODIUM PHOSPHATE 4 MG: 4 INJECTION, SOLUTION INTRA-ARTICULAR; INTRALESIONAL; INTRAMUSCULAR; INTRAVENOUS; SOFT TISSUE at 09:52

## 2021-04-29 RX ADMIN — PROPOFOL 150 MCG/KG/MIN: 10 INJECTION, EMULSION INTRAVENOUS at 09:46

## 2021-04-29 RX ADMIN — CIPROFLOXACIN 400 MG: 2 INJECTION INTRAVENOUS at 09:44

## 2021-04-29 RX ADMIN — ONDANSETRON 4 MG: 2 INJECTION INTRAMUSCULAR; INTRAVENOUS at 10:08

## 2021-04-29 RX ADMIN — SODIUM CHLORIDE, POTASSIUM CHLORIDE, SODIUM LACTATE AND CALCIUM CHLORIDE: 600; 310; 30; 20 INJECTION, SOLUTION INTRAVENOUS at 09:38

## 2021-04-29 RX ADMIN — LIDOCAINE HYDROCHLORIDE 60 MG: 20 INJECTION, SOLUTION INFILTRATION; PERINEURAL at 09:46

## 2021-04-29 RX ADMIN — ACETAMINOPHEN 975 MG: 325 TABLET, FILM COATED ORAL at 11:37

## 2021-04-29 RX ADMIN — FENTANYL CITRATE 50 MCG: 50 INJECTION, SOLUTION INTRAMUSCULAR; INTRAVENOUS at 09:43

## 2021-04-29 RX ADMIN — PROPOFOL 110 MG: 10 INJECTION, EMULSION INTRAVENOUS at 09:46

## 2021-04-29 ASSESSMENT — MIFFLIN-ST. JEOR: SCORE: 850.98

## 2021-04-29 NOTE — ANESTHESIA PREPROCEDURE EVALUATION
Anesthesia Pre-Procedure Evaluation    Patient: Georgette Alatorre   MRN: [id number] : 1940        Preoperative Diagnosis: Kidney stone on left side [N20.0]   Procedure : Procedure(s):  CYSTOSCOPY LEFT URETEROSCOPY, HOLMIUM LASER LITHOTRIPSY POSSIBLE LEFT URETERAL STENT PLACEMENT     Past Medical History:   Diagnosis Date     Arthritis      Chronic back pain     with scoliosis     Chronic diarrhea     secondary to radiation, colitis     Endometrial ca (H)      Gastro-oesophageal reflux disease      History of blood transfusion     no adverse reactions     History of endometrial cancer     no recurrence     Hydronephrosis      Irregular heart beat     PSVT     Kidney stone      Kidney stones      Osteoporosis      Osteoporosis      PSVT (paroxysmal supraventricular tachycardia) (H)      Scoliosis      Small bowel obstruction (H)      Small bowel obstruction (H)     recurrent     SVT (supraventricular tachycardia) (H)       Past Surgical History:   Procedure Laterality Date     APPENDECTOMY       CATARACT IOL, RT/LT      Cataract IOL Bilateral     COLONOSCOPY  2006    Normal; repeat in 5 years     CYSTOSCOPY  2007    Cystoscopy, bilateral retrograde pyelograms, dilatation of left ureteral stricture, left ureteroscopy and insertion of left double pigtail stent.      ENDOSCOPY  10/22/2008    Upper GI:  Normal     ENDOSCOPY  2007    Upper GI     EYE SURGERY  2010    retinal eye surgery     GENITOURINARY SURGERY      cystoscopy     GI SURGERY      exploratory lap x2 for bowel obstructions     HYSTERECTOMY, PAP NO LONGER INDICATED      due to endometrial cancer     LAPAROTOMY EXPLORATORY  2007    1.  Exploratory laparotomy. 2.  Extensive lysis of intra-abdominal adhesions. Performed by Dr Rad Pierce     LAPAROTOMY EXPLORATORY  2005    1.  Exploratory laparotomy.  2.  Lysis of adhesions.  Segmental small bowel resection Performed by Dr Rad Pierce.      Allergies    Allergen Reactions     No Known Allergies       Social History     Tobacco Use     Smoking status: Former Smoker     Packs/day: 0.50     Years: 40.00     Pack years: 20.00     Smokeless tobacco: Never Used   Substance Use Topics     Alcohol use: Yes     Alcohol/week: 0.0 standard drinks     Comment: 1 glass of wine with dinner      Wt Readings from Last 1 Encounters:   04/29/21 43.3 kg (95 lb 6.4 oz)        Anesthesia Evaluation   Pt has had prior anesthetic.     No history of anesthetic complications       ROS/MED HX  ENT/Pulmonary:       Neurologic:  - neg neurologic ROS     Cardiovascular: Comment: H/o PSVT - controlled with diltiazem.    (+) -----Irregular Heartbeat/Palpitations,     METS/Exercise Tolerance:     Hematologic:  - neg hematologic  ROS     Musculoskeletal: Comment: Osteoporosis.      GI/Hepatic: Comment: Occasional GERD, none recently.  H/o SBO.    (+) GERD, Other,     Renal/Genitourinary:     (+) renal disease,     Endo:  - neg endo ROS     Psychiatric/Substance Use:  - neg psychiatric ROS     Infectious Disease:       Malignancy:       Other:            Physical Exam    Airway  airway exam normal           Respiratory Devices and Support         Dental  no notable dental history         Cardiovascular   cardiovascular exam normal          Pulmonary   pulmonary exam normal                OUTSIDE LABS:  CBC:   Lab Results   Component Value Date    WBC 7.2 12/19/2020    WBC 9.5 05/28/2020    HGB 11.8 12/19/2020    HGB 11.5 (L) 05/28/2020    HCT 36.4 12/19/2020    HCT 35.2 05/28/2020     12/19/2020     05/28/2020     BMP:   Lab Results   Component Value Date     12/19/2020     05/28/2020    POTASSIUM 3.0 (L) 12/19/2020    POTASSIUM 3.7 05/28/2020    CHLORIDE 105 12/19/2020    CHLORIDE 103 05/28/2020    CO2 33 (H) 12/19/2020    CO2 30 05/28/2020    BUN 19 12/19/2020    BUN 10 05/28/2020    CR 0.82 12/19/2020    CR 0.69 05/28/2020     (H) 12/19/2020     (H)  05/28/2020     COAGS:   Lab Results   Component Value Date    INR 0.98 04/19/2005     POC: No results found for: BGM, HCG, HCGS  HEPATIC:   Lab Results   Component Value Date    ALBUMIN 3.6 01/07/2020    PROTTOTAL 7.2 12/24/2019    ALT 27 12/24/2019    AST 36 12/24/2019    ALKPHOS 59 12/24/2019    BILITOTAL 0.4 12/24/2019     OTHER:   Lab Results   Component Value Date    ILYA 8.7 12/19/2020    PHOS 3.4 09/04/2018    MAG 2.0 09/04/2018    LIPASE 156 12/21/2019    AMYLASE 139 (H) 12/14/2006    TSH 3.61 10/22/2019    T3 90 08/25/2007    SED 25 11/02/2017       Anesthesia Plan    ASA Status:  2      Anesthesia Type: General.     - Airway: LMA   Induction: Intravenous.   Maintenance: Inhalation.        Consents    Anesthesia Plan(s) and associated risks, benefits, and realistic alternatives discussed. Questions answered and patient/representative(s) expressed understanding.     - Discussed with:  Patient         Postoperative Care    Pain management: IV analgesics, Multi-modal analgesia.   PONV prophylaxis: Ondansetron (or other 5HT-3), Dexamethasone or Solumedrol     Comments:                Jhonathan Gar MD

## 2021-04-29 NOTE — ANESTHESIA PROCEDURE NOTES
Airway       Patient location during procedure: OR  Staff -        CRNA: Loraine Palencia APRN CRNA       Performed By: CRNA  Consent for Airway        Urgency: elective  Indications and Patient Condition       Indications for airway management: mindi-procedural       Induction type:intravenous       Mask difficulty assessment: 1 - vent by mask    Final Airway Details       Final airway type: supraglottic airway    Supraglottic Airway Details        Type: LMA       Brand: LMA Unique       LMA size: 4    Post intubation assessment        Placement verified by: capnometry and equal breath sounds        Number of attempts at approach: 1       Number of other approaches attempted: 0       Secured with: pink tape       Ease of procedure: easy       Dentition: Intact and Unchanged

## 2021-04-29 NOTE — ANESTHESIA CARE TRANSFER NOTE
Patient: Georgette Alatorre    Procedure(s):  CYSTOSCOPY LEFT URETEROSCOPY    Diagnosis: Kidney stone on left side [N20.0]  Diagnosis Additional Information: No value filed.    Anesthesia Type:   General     Note:    Oropharynx: oropharynx clear of all foreign objects  Level of Consciousness: awake  Oxygen Supplementation: nasal cannula  Level of Supplemental Oxygen (L/min / FiO2): 2  Independent Airway: airway patency satisfactory and stable  Dentition: dentition unchanged  Vital Signs Stable: post-procedure vital signs reviewed and stable  Report to RN Given: handoff report given  Patient transferred to: PACU    Handoff Report: Identifed the Patient, Identified the Reponsible Provider, Reviewed the pertinent medical history, Discussed the surgical course, Reviewed Intra-OP anesthesia mangement and issues during anesthesia, Set expectations for post-procedure period and Allowed opportunity for questions and acknowledgement of understanding      Vitals: (Last set prior to Anesthesia Care Transfer)  CRNA VITALS  4/29/2021 0946 - 4/29/2021 1022      4/29/2021             Pulse:  59    SpO2:  98 %    Resp Rate (set):  10        Electronically Signed By: JAYJAY Perla CRNA  April 29, 2021  10:22 AM

## 2021-04-29 NOTE — ANESTHESIA POSTPROCEDURE EVALUATION
Patient: Georgette Alatorre    Procedure(s):  CYSTOSCOPY LEFT URETEROSCOPY    Diagnosis:Kidney stone on left side [N20.0]  Diagnosis Additional Information: No value filed.    Anesthesia Type:  General    Note:  Disposition: Outpatient   Postop Pain Control: Uneventful            Sign Out: Well controlled pain   PONV: No   Neuro/Psych: Uneventful            Sign Out: Acceptable/Baseline neuro status   Airway/Respiratory: Uneventful            Sign Out: AIRWAY IN SITU/Resp. Support   CV/Hemodynamics: Uneventful            Sign Out: Acceptable CV status; No obvious hypovolemia; No obvious fluid overload   Other NRE: NONE   DID A NON-ROUTINE EVENT OCCUR? No           Last vitals:  Vitals:    04/29/21 0700 04/29/21 1030   BP: (!) 154/89 (!) 145/82   Pulse: 75 60   Resp: 18 8   Temp: 35.8  C (96.5  F)    SpO2: 95% 96%       Last vitals prior to Anesthesia Care Transfer:  CRNA VITALS  4/29/2021 0946 - 4/29/2021 1046      4/29/2021             Pulse:  59    SpO2:  98 %    Resp Rate (set):  10          Electronically Signed By: Jhonathan Gar MD  April 29, 2021  10:55 AM

## 2021-04-29 NOTE — DISCHARGE INSTRUCTIONS
Same Day Surgery Discharge Instructions for  Sedation and General Anesthesia       It's not unusual to feel dizzy, light-headed or faint for up to 24 hours after surgery or while taking pain medication.  If you have these symptoms: sit for a few minutes before standing and have someone assist you when you get up to walk or use the bathroom.      You should rest and relax for the next 24 hours. We recommend you make arrangements to have an adult stay with you for at least 24 hours after your discharge.  Avoid hazardous and strenuous activity.      DO NOT DRIVE any vehicle or operate mechanical equipment for 24 hours following the end of your surgery.  Even though you may feel normal, your reactions may be affected by the medication you have received.      Do not drink alcoholic beverages for 24 hours following surgery.       Slowly progress to your regular diet as you feel able. It's not unusual to feel nauseated and/or vomit after receiving anesthesia.  If you develop these symptoms, drink clear liquids (apple juice, ginger ale, broth, 7-up, etc. ) until you feel better.  If your nausea and vomiting persists for 24 hours, please notify your surgeon.        All narcotic pain medications, along with inactivity and anesthesia, can cause constipation. Drinking plenty of liquids and increasing fiber intake will help.      For any questions of a medical nature, call your surgeon.      Do not make important decisions for 24 hours.      If you had general anesthesia, you may have a sore throat for a couple of days related to the breathing tube used during surgery.  You may use Cepacol lozenges to help with this discomfort.  If it worsens or if you develop a fever, contact your surgeon.       If you feel your pain is not well managed with the pain medications prescribed by your surgeon, please contact your surgeon's office to let them know so they can address your concerns.       CoVid 19 Information    We want to give you  information regarding Covid. Please consult your primary care provider with any questions you might have.     Patient who have symptoms (cough, fever, or shortness of breath), need to isolate for 7 days from when symptoms started OR 72 hours after fever resolves (without fever reducing medications) AND improvement of respiratory symptoms (whichever is longer).      Isolate yourself at home (in own room/own bathroom if possible)    Do Not allow any visitors    Do Not go to work or school    Do Not go to [employer],  centers, shopping, or other public places.    Do Not shake hands.    Avoid close and intimate contact with others (hugging, kissing).    Follow CDC recommendations for household cleaning of frequently touched services.     After the initial 7 days, continue to isolate yourself from household members as much as possible. To continue decrease the risk of community spread and exposure, you and any members of your household should limit activities in public for 14 days after starting home isolation.     You can reference the following CDC link for helpful home isolation/care tips:  https://www.cdc.gov/coronavirus/2019-ncov/downloads/10Things.pdf    Protect Others:    Cover Your Mouth and Nose with a mask, disposable tissue or wash cloth to avoid spreading germs to others.    Wash your hands and face frequently with soap and water    Call Your Primary Doctor If: Breathing difficulty develops or you become worse.    For more information about COVID19 and options for caring for yourself at home, please visit the CDC website at https://www.cdc.gov/coronavirus/2019-ncov/about/steps-when-sick.html  For more options for care at [facility], please visit our website at [email or web address]      Cystoscopy Discharge Instructions      Diet:    Return to the diet that you were on before the procedure, unless you are given specific diet instructions.    It is important to drink 6-8  glasses of fluids per day at home - at least 3-4 glasses should be water.    Activity:    Walk short distances and increase as your strength allows.    You may climb stairs.    Do not do strenuous exercise or heavy lifting until approved by surgeon.    Do not drive while taking narcotic pain medications.    Bathing:    You may take a shower.    Call your physician if these signs/symptoms are present:    Pain that is not relieved by a short rest or ordered pain medications.    Temperature at or above 101.0 F or chills.    Inability or difficulty urinating.  Excessive blood in urine.    Any questions or concerns.        **If you have questions or concerns about your procedure,   call Dr. Lee at [phone]**

## 2021-04-29 NOTE — OP NOTE
Procedure Date: 2021    PREOPERATIVE DIAGNOSIS:  Left distal ureteral stone.    POSTOPERATIVE DIAGNOSIS:  No stone.    PROCEDURE:  Left ureteroscopy.    SURGEON:  Shabnam Lee MD    ANESTHESIA:  General.  Preoperatively, she received antibiotics.    SPECIMENS:  None.    ESTIMATED BLOOD LOSS:  None.    INDICATIONS FOR PROCEDURE:  Mer is an 80-year-old female who has a quite complex history for pelvic radiation for her colon cancer/rectal cancer, status post hysterectomy, who presents with intermittent left flank pain, but she also has significant scoliosis and history of chronic back pain.  On repeat CAT scan, she demonstrated persistent distal left 3.1 mm stone.  I consented her for the procedure with the risks of bleeding, infection, injury, need for additional surgeries.  She would like to proceed.    DESCRIPTION OF PROCEDURE:  Mer was brought to the operating room and placed in supine position.  After excellent induction of general anesthesia, her perineum was prepped and draped in the regular fashion.  A 22 French cystoscope was placed per urethra.  Evaluation of the bladder did not demonstrate any pathology.  Her bladder was consistent with the postradiation changes with hypervascularity throughout the bladder.    Attention was brought to the left ureteral orifice.  A Glidewire was passed all the way to the left renal pelvis under fluoroscopic guidance.  I performed rigid ureteroscopy all the way to the proximal ureter.  No stone identified, no obvious stricture, although there was some difficulty manipulating the scope.  At the completion of the procedure, the scope was withdrawn and she was transferred to the recovery room in stable condition.      The plan as of now to follow up with Urology as needed.    Shabnam Lee MD        D: 2021   T: 2021   MT: SEVERINO    Name:     MER THOMPSON  MRN:      [id number]        Account:        [id number]   :       1940           Procedure Date: 04/29/2021     Document: [id number]

## 2021-09-04 ENCOUNTER — HEALTH MAINTENANCE LETTER (OUTPATIENT)
Age: 81
End: 2021-09-04

## 2022-02-01 ENCOUNTER — THERAPY VISIT (OUTPATIENT)
Dept: PHYSICAL THERAPY | Facility: CLINIC | Age: 82
End: 2022-02-01
Payer: COMMERCIAL

## 2022-02-01 DIAGNOSIS — M54.50 CHRONIC BILATERAL LOW BACK PAIN WITHOUT SCIATICA: ICD-10-CM

## 2022-02-01 DIAGNOSIS — M62.81 GENERALIZED MUSCLE WEAKNESS: ICD-10-CM

## 2022-02-01 DIAGNOSIS — G89.29 CHRONIC BILATERAL LOW BACK PAIN WITHOUT SCIATICA: ICD-10-CM

## 2022-02-01 PROCEDURE — 97110 THERAPEUTIC EXERCISES: CPT | Mod: GP | Performed by: PHYSICAL THERAPIST

## 2022-02-01 PROCEDURE — 97161 PT EVAL LOW COMPLEX 20 MIN: CPT | Mod: GP | Performed by: PHYSICAL THERAPIST

## 2022-02-01 NOTE — PROGRESS NOTES
Physical Therapy Initial Evaluation  Subjective:  Pt is fatigued and has lost a lot of weight.  So doing day-to-day activities is difficult for her - cleaning, ADL's.  She has an 8 lb cat that is hard for her to lift up.  She fell one year ago in December on her driveway on the ice and fx'd her R shoulder.  She did PT on it - it is doing well, however it isn't 100% and she can't reach up as high as she wants to with her R UE.    The history is provided by the patient. No  was used.   Patient Health History  Georgette Alatorre being seen for to strengthen muscles.     Problem began: 1/24/2022 (MD order written).   Problem occurred: radiation for colitis   Pain is reported as 3/10 (for back due to scoliosis) on pain scale.  General health as reported by patient is fair.  Pertinent medical history includes: cancer, incontinence and osteoporosis.   Red flags:  Unexplained weight loss (pt reports she is being followed by an internist post-cancer).  Medical allergies: none.   Surgeries include:  Cancer surgery and other. 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     Other medications details: probiotic, vitamins B, C,D, B12, zinc, potassium and calcium.    Current occupation is retired.                     Therapist Generated HPI Evaluation     Georgette Alatorre is a 81 year old female with deconditioning condition which occurred with other (previous health issues).   This is a chronic condition.  Where condition occurred: other.      Barriers to activity include unsure how to start.      Patient reports pain:  Lumbar spine (due to significant scoliosis). Pain is described as aching and is intermittent.  Pain timing: varies.  Associated symptoms:  Loss of strength.  Symptoms are exacerbated by lifting  and relieved by nothing.                      Since onset symptoms are unchanged.  Imaging testing: being followed  by an internist to r/o non-musculoskeletal issues.          Barriers include:  Lives alone.                        Objective:  System    Physical Exam        General Evaluation:  AROM:          Upper Extremity:  Significant findings:  L shoulder AROM flex 145, R shoulder AROM flex 113      Gross Strength:            Upper Extremity:   Significant findings: B shoulder flex and abd 4+/5, shoulder IR/ER 5/5, bicep 4+/5      Lower Extremity Strength:  Strength wnl lower extremity general: MMT B hip flex 4/5.  Knee Extension: 5-      Knee Flexion: 5        Ankle Dorsiflexion: 5                                                               ROS    Assessment/Plan:    Patient is a 81 year old female with general weakness/deconditioned complaints.    Patient has the following significant findings with corresponding treatment plan.                Diagnosis 1:  General weakness/deconditioned  Decreased ROM/flexibility - therapeutic exercise and home program  Decreased strength - therapeutic exercise, therapeutic activities and home program  Impaired muscle performance - neuro re-education and home program  Decreased function - therapeutic activities and home program    Therapy Evaluation Codes:   Cumulative Therapy Evaluation is: Low complexity.    Previous and current functional limitations:  (See Goal Flow Sheet for this information)    Short term and Long term goals: (See Goal Flow Sheet for this information)     Communication ability:  Patient appears to be able to clearly communicate and understand verbal and written communication and follow directions correctly.  Treatment Explanation - The following has been discussed with the patient:   RX ordered/plan of care  Anticipated outcomes  Possible risks and side effects  This patient would benefit from PT intervention to resume normal activities.   Rehab potential is good.    Frequency:  1 X week, once daily  Duration:  for 6 weeks  Discharge Plan:  Achieve all  LTG.  Independent in home treatment program.  Reach maximal therapeutic benefit.    Please refer to the daily flowsheet for treatment today, total treatment time and time spent performing 1:1 timed codes.

## 2022-02-02 PROBLEM — M54.50 CHRONIC BILATERAL LOW BACK PAIN WITHOUT SCIATICA: Status: ACTIVE | Noted: 2022-02-02

## 2022-02-02 PROBLEM — G89.29 CHRONIC BILATERAL LOW BACK PAIN WITHOUT SCIATICA: Status: ACTIVE | Noted: 2022-02-02

## 2022-02-02 NOTE — PROGRESS NOTES
[facility]    OUTPATIENT Physical Therapy ORTHOPEDIC EVALUATION  PLAN OF TREATMENT FOR OUTPATIENT REHABILITATION  (COMPLETE FOR INITIAL CLAIMS ONLY)  Patient's Last Name, First Name, M.I.  YOB: 1940  Georgette Alatorre    Provider s Name:  [facility]   Medical Record No.  [id number]   Start of Care Date:  02/01/22   Onset Date:   01/24/22 (MD order written)   Type:     _X__PT   ___OT Medical Diagnosis:    Encounter Diagnosis   Name Primary?     Generalized muscle weakness         Treatment Diagnosis:  Generalized weakness        Goals:     02/01/22 0500   Body Part   Goals listed below are for General weakness   Goal #1   Goal #1 lifting/carrying   Previous Functional Level No restrictions   Current Functional Level Cannot lift;an item to counter height weighing   Performance level >5 lbs   STG [employer] Lift an item to counter height weighing   Performance level 5-9 lbs   Rationale for grocery shopping;for housework such as laundry, emptying garbage, use of    Due date 02/22/22   LTG [employer] Lift an item to counter height weighing   Performance Level 10+ lbs   Rationale for grocery shopping;for housework such as laundry, emptying garbage, use of ;for yard work such as shoveling snow;for meal preparation   Due date 03/15/22       Therapy Frequency:  1x/week  Predicted Duration of Therapy Intervention:  6 weeks    Emma Karimi PT                 I CERTIFY THE NEED FOR THESE SERVICES FURNISHED UNDER        THIS PLAN OF TREATMENT AND WHILE UNDER MY CARE .             Physician Signature               Date    X_____________________________________________________                             Certification Date From:  02/01/22   Certification Date To:  05/01/22    Referring Provider:  Merlin Emery Brown    Initial  Assessment        See Epic Evaluation SOC Date: 02/01/22

## 2022-02-19 ENCOUNTER — HEALTH MAINTENANCE LETTER (OUTPATIENT)
Age: 82
End: 2022-02-19

## 2022-04-08 ENCOUNTER — HOSPITAL ENCOUNTER (OUTPATIENT)
Dept: MAMMOGRAPHY | Facility: CLINIC | Age: 82
Discharge: HOME | End: 2022-04-08
Attending: INTERNAL MEDICINE | Admitting: INTERNAL MEDICINE
Payer: COMMERCIAL

## 2022-04-08 DIAGNOSIS — Z12.31 VISIT FOR SCREENING MAMMOGRAM: ICD-10-CM

## 2022-04-08 PROCEDURE — 77067 SCR MAMMO BI INCL CAD: CPT

## 2022-06-22 ENCOUNTER — ANCILLARY PROCEDURE (OUTPATIENT)
Dept: CT IMAGING | Facility: CLINIC | Age: 82
End: 2022-06-22
Attending: INTERNAL MEDICINE
Payer: COMMERCIAL

## 2022-06-22 DIAGNOSIS — R59.1 LYMPHADENOPATHY: ICD-10-CM

## 2022-06-22 DIAGNOSIS — Z85.9 HISTORY OF CANCER: ICD-10-CM

## 2022-06-22 LAB
CREAT BLD-MCNC: 0.8 MG/DL (ref 0.5–1)
GFR SERPL CREATININE-BSD FRML MDRD: >60 ML/MIN/1.73M2

## 2022-06-22 PROCEDURE — 74177 CT ABD & PELVIS W/CONTRAST: CPT

## 2022-06-22 PROCEDURE — 250N000011 HC RX IP 250 OP 636: Performed by: INTERNAL MEDICINE

## 2022-06-22 PROCEDURE — 82565 ASSAY OF CREATININE: CPT

## 2022-06-22 RX ORDER — IOPAMIDOL 755 MG/ML
100 INJECTION, SOLUTION INTRAVASCULAR ONCE
Status: COMPLETED | OUTPATIENT
Start: 2022-06-22 | End: 2022-06-22

## 2022-06-22 RX ADMIN — IOPAMIDOL 75 ML: 755 INJECTION, SOLUTION INTRAVENOUS at 08:45

## 2022-09-07 ENCOUNTER — APPOINTMENT (OUTPATIENT)
Dept: CT IMAGING | Facility: CLINIC | Age: 82
End: 2022-09-07
Attending: EMERGENCY MEDICINE
Payer: COMMERCIAL

## 2022-09-07 ENCOUNTER — HOSPITAL ENCOUNTER (OUTPATIENT)
Facility: CLINIC | Age: 82
Setting detail: OBSERVATION
Discharge: HOME | End: 2022-09-09
Attending: EMERGENCY MEDICINE | Admitting: EMERGENCY MEDICINE
Payer: COMMERCIAL

## 2022-09-07 DIAGNOSIS — S32.001A CLOSED BURST FRACTURE OF LUMBAR VERTEBRA, INITIAL ENCOUNTER (H): ICD-10-CM

## 2022-09-07 LAB
ALBUMIN SERPL-MCNC: 3.2 G/DL (ref 3.4–5)
ALBUMIN UR-MCNC: NEGATIVE MG/DL
ALP SERPL-CCNC: 62 U/L (ref 40–150)
ALT SERPL W P-5'-P-CCNC: 21 U/L (ref 0–50)
ANION GAP SERPL CALCULATED.3IONS-SCNC: 10 MMOL/L (ref 3–14)
APPEARANCE UR: CLEAR
AST SERPL W P-5'-P-CCNC: 25 U/L (ref 0–45)
BASOPHILS # BLD AUTO: 0 10E3/UL (ref 0–0.2)
BASOPHILS NFR BLD AUTO: 1 %
BILIRUB SERPL-MCNC: 0.8 MG/DL (ref 0.2–1.3)
BILIRUB UR QL STRIP: NEGATIVE
BUN SERPL-MCNC: 12 MG/DL (ref 7–30)
CALCIUM SERPL-MCNC: 8.6 MG/DL (ref 8.5–10.1)
CHLORIDE BLD-SCNC: 105 MMOL/L (ref 94–109)
CO2 SERPL-SCNC: 26 MMOL/L (ref 20–32)
COLOR UR AUTO: NORMAL
CREAT SERPL-MCNC: 0.73 MG/DL (ref 0.52–1.04)
EOSINOPHIL # BLD AUTO: 0 10E3/UL (ref 0–0.7)
EOSINOPHIL NFR BLD AUTO: 0 %
ERYTHROCYTE [DISTWIDTH] IN BLOOD BY AUTOMATED COUNT: 14 % (ref 10–15)
GFR SERPL CREATININE-BSD FRML MDRD: 81 ML/MIN/1.73M2
GLUCOSE BLD-MCNC: 100 MG/DL (ref 70–99)
GLUCOSE UR STRIP-MCNC: NEGATIVE MG/DL
HCT VFR BLD AUTO: 33.2 % (ref 35–47)
HGB BLD-MCNC: 10.9 G/DL (ref 11.7–15.7)
HGB UR QL STRIP: NEGATIVE
HOLD SPECIMEN: NORMAL
HOLD SPECIMEN: NORMAL
IMM GRANULOCYTES # BLD: 0 10E3/UL
IMM GRANULOCYTES NFR BLD: 0 %
KETONES UR STRIP-MCNC: NEGATIVE MG/DL
LEUKOCYTE ESTERASE UR QL STRIP: NEGATIVE
LIPASE SERPL-CCNC: 170 U/L (ref 73–393)
LYMPHOCYTES # BLD AUTO: 0.6 10E3/UL (ref 0.8–5.3)
LYMPHOCYTES NFR BLD AUTO: 8 %
MCH RBC QN AUTO: 31.7 PG (ref 26.5–33)
MCHC RBC AUTO-ENTMCNC: 32.8 G/DL (ref 31.5–36.5)
MCV RBC AUTO: 97 FL (ref 78–100)
MONOCYTES # BLD AUTO: 0.9 10E3/UL (ref 0–1.3)
MONOCYTES NFR BLD AUTO: 13 %
NEUTROPHILS # BLD AUTO: 5.8 10E3/UL (ref 1.6–8.3)
NEUTROPHILS NFR BLD AUTO: 78 %
NITRATE UR QL: NEGATIVE
NRBC # BLD AUTO: 0 10E3/UL
NRBC BLD AUTO-RTO: 0 /100
PH UR STRIP: 5 [PH] (ref 5–7)
PLATELET # BLD AUTO: 192 10E3/UL (ref 150–450)
POTASSIUM BLD-SCNC: 3.4 MMOL/L (ref 3.4–5.3)
PROT SERPL-MCNC: 7 G/DL (ref 6.8–8.8)
RBC # BLD AUTO: 3.44 10E6/UL (ref 3.8–5.2)
RBC URINE: <1 /HPF
SARS-COV-2 RNA RESP QL NAA+PROBE: POSITIVE
SODIUM SERPL-SCNC: 141 MMOL/L (ref 133–144)
SP GR UR STRIP: 1.02 (ref 1–1.03)
UROBILINOGEN UR STRIP-MCNC: NORMAL MG/DL
WBC # BLD AUTO: 7.4 10E3/UL (ref 4–11)
WBC URINE: <1 /HPF

## 2022-09-07 PROCEDURE — 250N000011 HC RX IP 250 OP 636: Performed by: EMERGENCY MEDICINE

## 2022-09-07 PROCEDURE — 83690 ASSAY OF LIPASE: CPT | Performed by: EMERGENCY MEDICINE

## 2022-09-07 PROCEDURE — 99285 EMERGENCY DEPT VISIT HI MDM: CPT | Mod: 25

## 2022-09-07 PROCEDURE — 99220 PR INITIAL OBSERVATION CARE,LEVEL III: CPT | Performed by: PHYSICIAN ASSISTANT

## 2022-09-07 PROCEDURE — G0378 HOSPITAL OBSERVATION PER HR: HCPCS

## 2022-09-07 PROCEDURE — 80053 COMPREHEN METABOLIC PANEL: CPT | Performed by: EMERGENCY MEDICINE

## 2022-09-07 PROCEDURE — 72131 CT LUMBAR SPINE W/O DYE: CPT

## 2022-09-07 PROCEDURE — 81001 URINALYSIS AUTO W/SCOPE: CPT | Performed by: EMERGENCY MEDICINE

## 2022-09-07 PROCEDURE — C9803 HOPD COVID-19 SPEC COLLECT: HCPCS

## 2022-09-07 PROCEDURE — 250N000009 HC RX 250: Performed by: EMERGENCY MEDICINE

## 2022-09-07 PROCEDURE — 250N000013 HC RX MED GY IP 250 OP 250 PS 637: Performed by: PHYSICIAN ASSISTANT

## 2022-09-07 PROCEDURE — 85025 COMPLETE CBC W/AUTO DIFF WBC: CPT | Performed by: EMERGENCY MEDICINE

## 2022-09-07 PROCEDURE — 36415 COLL VENOUS BLD VENIPUNCTURE: CPT | Performed by: EMERGENCY MEDICINE

## 2022-09-07 PROCEDURE — U0003 INFECTIOUS AGENT DETECTION BY NUCLEIC ACID (DNA OR RNA); SEVERE ACUTE RESPIRATORY SYNDROME CORONAVIRUS 2 (SARS-COV-2) (CORONAVIRUS DISEASE [COVID-19]), AMPLIFIED PROBE TECHNIQUE, MAKING USE OF HIGH THROUGHPUT TECHNOLOGIES AS DESCRIBED BY CMS-2020-01-R: HCPCS | Performed by: PHYSICIAN ASSISTANT

## 2022-09-07 PROCEDURE — 74177 CT ABD & PELVIS W/CONTRAST: CPT

## 2022-09-07 PROCEDURE — 96374 THER/PROPH/DIAG INJ IV PUSH: CPT

## 2022-09-07 RX ORDER — POTASSIUM CHLORIDE 600 MG/1
16 TABLET, FILM COATED, EXTENDED RELEASE ORAL DAILY
COMMUNITY

## 2022-09-07 RX ORDER — POTASSIUM CHLORIDE 750 MG/1
10 TABLET, EXTENDED RELEASE ORAL 2 TIMES DAILY
Status: DISCONTINUED | OUTPATIENT
Start: 2022-09-07 | End: 2022-09-09 | Stop reason: HOSPADM

## 2022-09-07 RX ORDER — PANTOPRAZOLE SODIUM 40 MG/1
40 TABLET, DELAYED RELEASE ORAL DAILY
Status: DISCONTINUED | OUTPATIENT
Start: 2022-09-07 | End: 2022-09-09 | Stop reason: HOSPADM

## 2022-09-07 RX ORDER — PANTOPRAZOLE SODIUM 40 MG/1
40 TABLET, DELAYED RELEASE ORAL DAILY
COMMUNITY
End: 2023-02-22

## 2022-09-07 RX ORDER — ONDANSETRON 4 MG/1
4 TABLET, ORALLY DISINTEGRATING ORAL EVERY 6 HOURS PRN
Status: DISCONTINUED | OUTPATIENT
Start: 2022-09-07 | End: 2022-09-09 | Stop reason: HOSPADM

## 2022-09-07 RX ORDER — NALOXONE HYDROCHLORIDE 0.4 MG/ML
0.2 INJECTION, SOLUTION INTRAMUSCULAR; INTRAVENOUS; SUBCUTANEOUS
Status: DISCONTINUED | OUTPATIENT
Start: 2022-09-07 | End: 2022-09-09 | Stop reason: HOSPADM

## 2022-09-07 RX ORDER — ACETAMINOPHEN 325 MG/1
975 TABLET ORAL EVERY 8 HOURS
Status: DISCONTINUED | OUTPATIENT
Start: 2022-09-07 | End: 2022-09-09 | Stop reason: HOSPADM

## 2022-09-07 RX ORDER — ONDANSETRON 2 MG/ML
4 INJECTION INTRAMUSCULAR; INTRAVENOUS EVERY 6 HOURS PRN
Status: DISCONTINUED | OUTPATIENT
Start: 2022-09-07 | End: 2022-09-09 | Stop reason: HOSPADM

## 2022-09-07 RX ORDER — DIPHENOXYLATE HCL/ATROPINE 2.5-.025MG
1 TABLET ORAL 3 TIMES DAILY PRN
Status: DISCONTINUED | OUTPATIENT
Start: 2022-09-07 | End: 2022-09-09 | Stop reason: HOSPADM

## 2022-09-07 RX ORDER — HYDROMORPHONE HCL IN WATER/PF 6 MG/30 ML
0.2 PATIENT CONTROLLED ANALGESIA SYRINGE INTRAVENOUS
Status: DISCONTINUED | OUTPATIENT
Start: 2022-09-07 | End: 2022-09-09 | Stop reason: HOSPADM

## 2022-09-07 RX ORDER — AMOXICILLIN 250 MG
1 CAPSULE ORAL 2 TIMES DAILY PRN
Status: DISCONTINUED | OUTPATIENT
Start: 2022-09-07 | End: 2022-09-09 | Stop reason: HOSPADM

## 2022-09-07 RX ORDER — NALOXONE HYDROCHLORIDE 0.4 MG/ML
0.4 INJECTION, SOLUTION INTRAMUSCULAR; INTRAVENOUS; SUBCUTANEOUS
Status: DISCONTINUED | OUTPATIENT
Start: 2022-09-07 | End: 2022-09-09 | Stop reason: HOSPADM

## 2022-09-07 RX ORDER — BISACODYL 10 MG
10 SUPPOSITORY, RECTAL RECTAL DAILY PRN
Status: DISCONTINUED | OUTPATIENT
Start: 2022-09-07 | End: 2022-09-09 | Stop reason: HOSPADM

## 2022-09-07 RX ORDER — POTASSIUM CHLORIDE 750 MG/1
10 CAPSULE, EXTENDED RELEASE ORAL 2 TIMES DAILY
Status: DISCONTINUED | OUTPATIENT
Start: 2022-09-07 | End: 2022-09-07

## 2022-09-07 RX ORDER — AMOXICILLIN 250 MG
2 CAPSULE ORAL 2 TIMES DAILY PRN
Status: DISCONTINUED | OUTPATIENT
Start: 2022-09-07 | End: 2022-09-09 | Stop reason: HOSPADM

## 2022-09-07 RX ORDER — DILTIAZEM HYDROCHLORIDE 180 MG/1
180 CAPSULE, COATED, EXTENDED RELEASE ORAL DAILY
Status: DISCONTINUED | OUTPATIENT
Start: 2022-09-07 | End: 2022-09-09 | Stop reason: HOSPADM

## 2022-09-07 RX ORDER — FENTANYL CITRATE 50 UG/ML
50 INJECTION, SOLUTION INTRAMUSCULAR; INTRAVENOUS
Status: DISPENSED | OUTPATIENT
Start: 2022-09-07 | End: 2022-09-07

## 2022-09-07 RX ORDER — IOPAMIDOL 755 MG/ML
48 INJECTION, SOLUTION INTRAVASCULAR ONCE
Status: COMPLETED | OUTPATIENT
Start: 2022-09-07 | End: 2022-09-07

## 2022-09-07 RX ORDER — LANOLIN ALCOHOL/MO/W.PET/CERES
1000 CREAM (GRAM) TOPICAL DAILY
COMMUNITY

## 2022-09-07 RX ORDER — POLYETHYLENE GLYCOL 3350 17 G/17G
17 POWDER, FOR SOLUTION ORAL DAILY PRN
Status: DISCONTINUED | OUTPATIENT
Start: 2022-09-07 | End: 2022-09-09 | Stop reason: HOSPADM

## 2022-09-07 RX ADMIN — FENTANYL CITRATE 50 MCG: 50 INJECTION, SOLUTION INTRAMUSCULAR; INTRAVENOUS at 07:04

## 2022-09-07 RX ADMIN — OXYCODONE HYDROCHLORIDE 2.5 MG: 5 TABLET ORAL at 14:57

## 2022-09-07 RX ADMIN — PANTOPRAZOLE SODIUM 40 MG: 40 TABLET, DELAYED RELEASE ORAL at 17:39

## 2022-09-07 RX ADMIN — OXYCODONE HYDROCHLORIDE 5 MG: 5 TABLET ORAL at 20:56

## 2022-09-07 RX ADMIN — ACETAMINOPHEN 975 MG: 325 TABLET ORAL at 22:42

## 2022-09-07 RX ADMIN — POTASSIUM CHLORIDE 10 MEQ: 750 TABLET, EXTENDED RELEASE ORAL at 20:10

## 2022-09-07 RX ADMIN — SODIUM CHLORIDE 60 ML: 900 INJECTION INTRAVENOUS at 08:58

## 2022-09-07 RX ADMIN — IOPAMIDOL 48 ML: 755 INJECTION, SOLUTION INTRAVENOUS at 08:58

## 2022-09-07 RX ADMIN — PANCRELIPASE 1 CAPSULE: 60000; 12000; 38000 CAPSULE, DELAYED RELEASE PELLETS ORAL at 17:39

## 2022-09-07 RX ADMIN — OXYCODONE HYDROCHLORIDE 5 MG: 5 TABLET ORAL at 17:39

## 2022-09-07 ASSESSMENT — ACTIVITIES OF DAILY LIVING (ADL)
ADLS_ACUITY_SCORE: 35
ADLS_ACUITY_SCORE: 34
ADLS_ACUITY_SCORE: 34
ADLS_ACUITY_SCORE: 35
ADLS_ACUITY_SCORE: 37
ADLS_ACUITY_SCORE: 35
ADLS_ACUITY_SCORE: 36
ADLS_ACUITY_SCORE: 37
ADLS_ACUITY_SCORE: 34

## 2022-09-07 NOTE — ED NOTES
[facility]  ED Nurse Handoff Report    ED Chief complaint: Back Pain      ED Diagnosis:   Final diagnoses:   Closed burst fracture of lumbar vertebra, initial encounter (H)       Code Status: Full Code    Allergies:   Allergies   Allergen Reactions     No Known Allergies        Patient Story: pt with chronic back pain had severe pain yesterday low back  Focused Assessment:  Continues to have back pain relieved with fentanyl.  Pt has a burst fracture of L5 and remains on bedrest pt states  Had covid in August  Treatments and/or interventions provided: labs x ray ct scan  Patient's response to treatments and/or interventions: pain relieved with fentynal    To be done/followed up on inpatient unit:      Does this patient have any cognitive concerns?:     Activity level - Baseline/Home:  Independent  Activity Level - Current:   Total Care    Patient's Preferred language: English   Needed?: No    Isolation: special precautions  Infection: covid  Patient tested for COVID 19 prior to admission: YES  Bariatric?: No    Vital Signs:   Vitals:    09/07/22 0800 09/07/22 0830 09/07/22 1015 09/07/22 1100   BP: 122/74 (!) 142/132 112/72 112/64   Pulse: 63   67   Resp: 16 18 16 18   Temp:       TempSrc:       SpO2: 95% 95% 95% 94%       Cardiac Rhythm:     Was the PSS-3 completed:   Yes  What interventions are required if any?               Family Comments: none here  OBS brochure/video discussed/provided to patient/family: Yes              Name of person given brochure if not patient:               Relationship to patient:     For the majority of the shift this patient's behavior was Green.   Behavioral interventions performed were .    ED NURSE PHONE NUMBER: [phone]

## 2022-09-07 NOTE — PROGRESS NOTES
RECEIVING UNIT ED HANDOFF REVIEW    ED Nurse Handoff Report was reviewed by: Rox Collazo RN on September 7, 2022 at 3:23 PM

## 2022-09-07 NOTE — PHARMACY-ADMISSION MEDICATION HISTORY
Pharmacy Medication History  Admission medication history interview status for the 9/7/2022  admission is complete. See EPIC admission navigator for prior to admission medications     Location of Interview: Patient room  Medication history sources: Patient, Surescripts and Care Everywhere    Significant changes made to the medication list:  Added: Creon, Potassium, Pantoprazole  Removed: Oxycodone, Lidocaine patch, probiotic, diclofenac gel    In the past week, patient estimated taking medication this percent of the time: greater than 90%      Time spent in this activity: 20 minutes    Prior to Admission medications    Medication Sig Last Dose Taking? Auth Provider Long Term End Date   amylase-lipase-protease (CREON 12) 44183-15189-68039 units CPEP Take 1 capsule by mouth 3 times daily (with meals) 9/6/2022 at Unknown time Yes Unknown, Entered By History     Ascorbic Acid (VITAMIN C PO) Take 500 mg by mouth daily  9/6/2022 at AM Yes Unknown, Entered By History Yes    Cholecalciferol (VITAMIN D3 PO) Take 2,000 Units by mouth daily. 9/6/2022 at AM Yes Unknown, Entered By History Yes    cyanocobalamin (VITAMIN B-12) 1000 MCG tablet Take 1,000 mcg by mouth daily 9/6/2022 at AM Yes Unknown, Entered By History     diltiazem ER (DILT-XR) 180 MG 24 hr capsule Take 1 capsule by mouth once daily 9/6/2022 at AM Yes Mario Valdes MD Yes    diphenoxylate-atropine (LOMOTIL) 2.5-0.025 MG tablet Take 1 tablet by mouth 3 times daily as needed for diarrhea Past Month at Unknown time Yes Unknown, Entered By History     pantoprazole (PROTONIX) 40 MG EC tablet Take 40 mg by mouth daily 9/6/2022 at AM Yes Unknown, Entered By History     potassium chloride ER (KLOR-CON) 8 MEQ CR tablet Take 8 mEq by mouth 2 times daily AM and Noon 9/6/2022 at Unknown time Yes Unknown, Entered By History         The information provided in this note is only as accurate as the sources available at the time of update(s)

## 2022-09-07 NOTE — PROGRESS NOTES
Subacute L5 insufficiency fracture. This is stable. No treatment needed. Likely to heal over 3-4 months.

## 2022-09-07 NOTE — H&P
[facility]    History and Physical  Hospitalist       Date of Admission:  9/7/2022    Assessment & Plan   Georgette Alatorre is a 82 year old female with PMHx endometrial cancer s/p SALVADOR / BSO and post op radiation in remission to knowledge, SVT, radiation colitis, and scoliosis who presents with acute on chronic low back and right gluteal pain found to have an acute-subacute L5 burst fracture and incidental COVID positive PCR.    Acute-subacute L5 burst fracture.  Osteoporosis.  Scoliosis.  No identified trauma. Hx endometrial cancer in remission to knowledge. No red flag sxs otherwise. No focal neuro deficits. UA negative.  * CT L-spine showing above with posterior displacement of the vertebral body towards the spinal canal contributing to mild spinal canal narrowing; prior scoliotic changes and multilevel degenerative changes.  - Admit obs.  - Scheduled APAP 975mg TID, PRN oxycodone 2.5-5mg Q3H, PRN IV dilaudid.  - No scheduled bowel regimen given chronic diarrhea.  - Bedrest until further rec from ortho.  - Appreciate ortho spine consult.  - Appreciate PT and care mngt consults.  - Resume PTA calcium and vit D at obs discharge.    Paroxsymal SVT.  - Cont PTA diltiazem and potassium maintenance.    Radiation colitis.  Often has 4 or more soft bms daily.  - Cont PTA Creon, PRN Lomotil.    Asymptomatic COVID-19. PCR positive 9/7/2022.  No cardiopulm symptoms. No oxygen needs.  - Appears incidental test positive, but result was not present at time of interview - do not know if she had recent known infection.  - Special precautions ordered.    Normocytic anemia.  Unclear baseline. Hgb 10.9 on adm and hemodynamically stable. Endorses intermittent bright red blood in stool. Last colonoscopy 2017, does not recall any abnl findings. Normal EGD 2010. Drinks 1-2 alcoholic beverages most nights of week.  - Further eval outpatient.  - Resume PTA vitamin B12 supplementation at observation  discharge.    Clinically Significant Risk Factors Present on Admission             # Hypoalbuminemia: Albumin = 3.2 g/dL (Ref range: 3.4 - 5.0 g/dL) on admission, will monitor as appropriate            DVT Prophylaxis: Ambulate every shift  Code Status: Full Code    This patient was discussed with Dr. Emanuel of the Hospitalist Service who agrees with current plans as outlined above.    Disposition: Expected discharge in 1-2 days once pain controlled, PT eval and recs completed, safe dispo arranged.    JoAnna K. Barthell, PA-C, PADAVID    Primary Care Physician   Merlin Emery Brown    Chief Complaint   Acute on chronic back pain.    History is obtained from the patient and chart review.    History of Present Illness   Georgette Alatorre is a 82 year old female with PMHx endometrial cancer s/p SALVADOR / BSO and post op radiation in remission to knowledge with chronic diarrhea, pSVT, radiation colitis, and scoliosis who presents with acute on chronic low back and right gluteal pain found to have an acute-subacute L5 burst fracture and incidental COVID positive PCR.    Acute onset pain day prior to adm. No identifiable trauma. Has hx endometerial cancer as above and in remission to knowledge. Pain worst in right gluteus and dull ache radiating down right leg. No paresthesias, saddle anesthesia, new bowel / bladder incontinence or retention. Able to ambulate, but pain prohibits. Improved pain after Fentanyl in ED, but still presents.    Lives alone in a town house. Does not ambulate with cane or walker at baseline.    PAST MEDICAL HISTORY  Past Medical History:   Diagnosis Date     Arthritis      Chronic back pain     with scoliosis     Chronic diarrhea     secondary to radiation, colitis     Endometrial ca (H)      Gastro-oesophageal reflux disease      History of blood transfusion     no adverse reactions     History of endometrial cancer     no recurrence     Hydronephrosis      Irregular heart beat     PSVT     Kidney  stone      Kidney stones      Osteoporosis      Osteoporosis      PSVT (paroxysmal supraventricular tachycardia) (H)      Scoliosis      Small bowel obstruction (H)      Small bowel obstruction (H)     recurrent     SVT (supraventricular tachycardia) (H)        PAST SURGICAL HISTORY  Past Surgical History:   Procedure Laterality Date     APPENDECTOMY       CATARACT IOL, RT/LT  2009    Cataract IOL Bilateral     COLONOSCOPY  03/30/2006    Normal; repeat in 5 years     CYSTOSCOPY  11/14/2007    Cystoscopy, bilateral retrograde pyelograms, dilatation of left ureteral stricture, left ureteroscopy and insertion of left double pigtail stent.      ENDOSCOPY  10/22/2008    Upper GI:  Normal     ENDOSCOPY  1/11/2007    Upper GI     EYE SURGERY  2010    retinal eye surgery     GENITOURINARY SURGERY      cystoscopy     GI SURGERY      exploratory lap x2 for bowel obstructions     HYSTERECTOMY, PAP NO LONGER INDICATED  2000    due to endometrial cancer     LAPAROTOMY EXPLORATORY  08/22/2007    1.  Exploratory laparotomy. 2.  Extensive lysis of intra-abdominal adhesions. Performed by Dr Rad Pierce     LAPAROTOMY EXPLORATORY  04/20/2005    1.  Exploratory laparotomy.  2.  Lysis of adhesions.  Segmental small bowel resection Performed by Dr Rad Pierce.     LASER HOLMIUM LITHOTRIPSY URETER(S), INSERT STENT, COMBINED Left 4/29/2021    Procedure: CYSTOSCOPY LEFT URETEROSCOPY;  Surgeon: Shabnam Lee MD;  Location:  OR       HOME MEDICATIONS  Prior to Admission medications    Medication Sig Last Dose Taking? Auth Provider Long Term End Date   amylase-lipase-protease (CREON 12) [id number] units CPEP Take 1 capsule by mouth 3 times daily (with meals) 9/6/2022 at Unknown time Yes Unknown, Entered By History     Ascorbic Acid (VITAMIN C PO) Take 500 mg by mouth daily  9/6/2022 at AM Yes Unknown, Entered By History Yes    Cholecalciferol (VITAMIN D3 PO) Take 2,000 Units by mouth daily. 9/6/2022 at AM Yes Unknown,  Entered By History Yes    cyanocobalamin (VITAMIN B-12) 1000 MCG tablet Take 1,000 mcg by mouth daily 9/6/2022 at AM Yes Unknown, Entered By History     diltiazem ER (DILT-XR) 180 MG 24 hr capsule Take 1 capsule by mouth once daily 9/6/2022 at AM Yes Mario Valdes MD Yes    diphenoxylate-atropine (LOMOTIL) 2.5-0.025 MG tablet Take 1 tablet by mouth 3 times daily as needed for diarrhea Past Month at Unknown time Yes Unknown, Entered By History     pantoprazole (PROTONIX) 40 MG EC tablet Take 40 mg by mouth daily 9/6/2022 at AM Yes Unknown, Entered By History     potassium chloride ER (KLOR-CON) 8 MEQ CR tablet Take 8 mEq by mouth 2 times daily AM and Noon 9/6/2022 at Unknown time Yes Unknown, Entered By History         ALLERGIES  Allergies   Allergen Reactions     No Known Allergies        SOCIAL HISTORY   reports that she has quit smoking. She has a 20.00 pack-year smoking history. She has never used smokeless tobacco. She reports current alcohol use. She reports that she does not use drugs.    FAMILY HISTORY  Mother: meningitis.  Father: MVA.    REVIEW OF SYSTEMS  A 10 point ROS was negative other than the symptoms noted above in the HPI.    Physical Exam   Nursing Notes Reviewed.  /64   Pulse 67   Temp 99.1  F (37.3  C) (Oral)   Resp 18   SpO2 94%    General:  Pleasant elderly female who appears stated age in no acute distress. Thin and narrow physical frame.  Skin:  Warm, dry. No rashes or lesions on exposed skin.  HEENT:  Normocephalic, atraumatic; EOMs grossly intact.  Neck:  Supple.  Chest:  Breath sounds CTA and no increased work of breathing.  Cardiovascular:  RRR, no rub or murmur. No peripheral edema.  Abdomen:  Soft, very thin, non-tender, non-distended.  Musculoskeletal:  Moves all four extremities. Strength major muscle groups upper and lower extremities symmetric. CMS intact.  Neurological:  CN 2-12 grossly intact.    Data   Data reviewed today:  I reviewed all medications, new labs and  imaging results over the last 24 hours.  Recent Labs   Lab 09/07/22  0652   WBC 7.4   HGB 10.9*   MCV 97         POTASSIUM 3.4   CHLORIDE 105   CO2 26   BUN 12   CR 0.73   ANIONGAP 10   ILYA 8.6   *   ALBUMIN 3.2*   PROTTOTAL 7.0   BILITOTAL 0.8   ALKPHOS 62   ALT 21   AST 25   LIPASE 170       Imaging:  Recent Results (from the past 24 hour(s))   CT Abdomen Pelvis w Contrast    Narrative    CT ABDOMEN AND PELVIS WITH CONTRAST 9/7/2022 9:12 AM    CLINICAL HISTORY: Abdominal pain. Right lower back pain, history of  kidney stones and endometrial cancer.    TECHNIQUE: CT scan of the abdomen and pelvis was performed following  injection of IV contrast. Multiplanar reformats were obtained. Dose  reduction techniques were used.  CONTRAST: 48 mL [id number]    COMPARISON: June 22, 2022    FINDINGS:   LOWER CHEST: Similar-appearing consolidation and/or atelectasis at the  right base. No effusions.    HEPATOBILIARY: No significant mass or bile duct dilatation. No  calcified gallstones. Scattered subcentimeter low attenuation foci are  likely tiny cysts which are stable.    PANCREAS: No significant mass, duct dilatation, or inflammatory  change.    SPLEEN: Normal size.    ADRENAL GLANDS: No significant nodules.    KIDNEYS/BLADDER: No significant mass, stones, or hydronephrosis. There  are simple or benign cysts. No follow up is needed.    BOWEL: No obstruction or inflammatory change.    PELVIC ORGANS: No pelvic masses.    ADDITIONAL FINDINGS: No ascites.    MUSCULOSKELETAL: No frankly destructive bony lesions. Thoracolumbar  scoliosis stable.      Impression    IMPRESSION: No acute process demonstrated in the abdomen and pelvis.    KELLEE VALERO MD         SYSTEM ID:  [id number]   CT Lumbar Spine w/o Contrast    Narrative    CT LUMBAR SPINE WITHOUT CONTRAST  9/7/2022 9:15 AM     HISTORY: Acute on chronic lower back pain.      TECHNIQUE: Axial images of the lumbar spine were reconstructed from  contrast  enhanced CT scan of the abdomen. Multiplanar reformations  were performed.  Radiation dose for this scan was reduced using  automated exposure control, adjustment of the mA and/or kV according  to patient size, or iterative reconstruction technique.     COMPARISON: CT abdomen and pelvis 6/22/2022. Lumbar spine MR  5/29/2020.     FINDINGS:  There are five lumbar-type vertebral bodies assumed for the  purposes of this dictation. Presumed small 12th ribs.    Marked convex left curvature of the lumbar spine centered at L3-L4.  Rightward listhesis of L1 on L2 and L2 on L3. Leftward listhesis of L4  on L5.    Burst type fracture of the L5 vertebral body with loss of  approximately 50% L5 vertebral body height, this was not present on CT  6/22/2022. There is cortical step-off and lucent fracture lines  present with mild sclerosis in the fractured L5 vertebral body. The  fractured L5 vertebral body is posteriorly displaced towards the  spinal canal contributing to mild spinal canal narrowing. Lucent  fracture lines are not seen extending into the L5 pedicles, although  there is subtle sclerosis in the pedicles.    Moderate degenerative endplate changes and loss of disc height  throughout the lumbar spine, particularly on the right at L2-L3 and  L3-L4. Disc herniations are present asymmetric towards the right at  L2-L3, L3-L4, and L4-L5. Marked facet hypertrophy asymmetric towards  the left at T11-T12 and T12-L1 and on the right at L1-L2, L2-L3,  L3-L4, and L4-L5. Marked neural foraminal narrowings on the left at  T11-T12 and T12-L1 and on the right at L1-L2, L2-L3, L3-L4, and L4-L5.    Please see dedicated CT abdomen and pelvis report for paraspinous  details.      Impression    IMPRESSION:    1. Acute to subacute-appearing burst type fracture of the L5 vertebral  body with loss of approximately 50% vertebral body height. This is new  since 6/22/2022. There is posterior displacement of the fractured L5  vertebral body  towards the spinal canal contributing to mild spinal  canal narrowing.  2. Marked convex left curvature of the lumbar spine with multilevel  degenerative changes throughout the spine as detailed above causing  left neural foraminal narrowings in the visualized lower thoracic  spine and right neural foraminal narrowings in the lumbar spine.  Degenerative findings are likely similar to prior MR 5/29/2020 given  differences in technique.    Results discussed with Justice Tanner at 9:40 AM on 9/7/2022.    DAISY HERNÁNDEZ MD         SYSTEM ID:  [id number]

## 2022-09-07 NOTE — ED PROVIDER NOTES
"History   Chief Complaint:  Back pain      HPI   History supplemented by electronic chart review  History limited as patient is a poor historian    Georgette Alatorre is a 82 year old female who presents by EMS for evaluation of back pain that she states started yesterday and is primarily in the right part of her back, spreading somewhat down towards her right leg.  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  Chart review shows a history of chronic back pain for which she normally takes 2 Tylenol as needed on occasion, and has taken this with incomplete relief lately.  She denies any new trauma.  History of previously diagnosed scoliosis.  She lives at home with a cat and called EMS today because of the severity of her pain.  She wonders whether she might have a kidney stone.  She previously had a left-sided ureteral stent placed by Dr. Lee with urology last year, and the pain at that time felt different.  She is also previously had an appendectomy and small bowel obstruction.  She called her doctor, Dr. Merlin Brown, yesterday and was prescribed Flexeril, which has helped under similar circumstances in the past, though she took this with little additional relief.  No new weakness or numbness in her legs.  No fevers or vomiting.    Review of Systems  History limited by poor historian    Allergies:  No Known Allergies     Medications:    amylase-lipase-protease (CREON 12) [id number] units CPEP  Ascorbic Acid (VITAMIN C PO)  Cholecalciferol (VITAMIN D3 PO)  cyanocobalamin (VITAMIN B-12) 1000 MCG tablet  diltiazem ER (DILT-XR) 180 MG 24 hr capsule  diphenoxylate-atropine (LOMOTIL) 2.5-0.025 MG tablet  pantoprazole (PROTONIX) 40 MG EC tablet  potassium chloride ER " (KLOR-CON) 8 MEQ CR tablet      Past Medical History:    Past Medical History:   Diagnosis Date     Arthritis      Chronic back pain      Chronic diarrhea      Endometrial ca (H)      Gastro-oesophageal reflux disease      History of blood transfusion      History of endometrial cancer      Hydronephrosis      Irregular heart beat      Kidney stone      Kidney stones      Osteoporosis      Osteoporosis      PSVT (paroxysmal supraventricular tachycardia) (H)      Scoliosis      Small bowel obstruction (H)      Small bowel obstruction (H)      SVT (supraventricular tachycardia) (H)        Patient Active Problem List    Diagnosis Date Noted     Closed burst fracture of lumbar vertebra, initial encounter (H) 09/07/2022     Priority: Medium     Chronic bilateral low back pain without sciatica 02/02/2022     Priority: Medium     Generalized muscle weakness 02/01/2022     Priority: Medium     Intractable back pain 05/28/2020     Priority: Medium     Chronic diarrhea 10/22/2019     Priority: Medium     Gastroesophageal reflux disease without esophagitis 03/28/2019     Priority: Medium     Other idiopathic scoliosis, thoracolumbar region 03/28/2019     Priority: Medium     Age-related osteoporosis without current pathological fracture 08/22/2018     Priority: Medium     SVT (supraventricular tachycardia) (H) 07/16/2018     Priority: Medium     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     Priority: Medium     Vitamin B12 deficiency (non anemic) 09/22/2016     Priority: Medium     SBO (small bowel obstruction) (H) 07/03/2014     Priority: Medium     Obstruction of bowel (H) 05/13/2013     Priority: Medium        Past Surgical History:    Past Surgical History:   Procedure Laterality Date     APPENDECTOMY       CATARACT IOL, RT/LT  2009    Cataract IOL Bilateral     COLONOSCOPY  03/30/2006    Normal; repeat in 5 years     CYSTOSCOPY  11/14/2007    Cystoscopy, bilateral retrograde pyelograms, dilatation of left ureteral stricture, left  ureteroscopy and insertion of left double pigtail stent.      ENDOSCOPY  10/22/2008    Upper GI:  Normal     ENDOSCOPY  1/11/2007    Upper GI     EYE SURGERY  2010    retinal eye surgery     GENITOURINARY SURGERY      cystoscopy     GI SURGERY      exploratory lap x2 for bowel obstructions     HYSTERECTOMY, PAP NO LONGER INDICATED  2000    due to endometrial cancer     LAPAROTOMY EXPLORATORY  08/22/2007    1.  Exploratory laparotomy. 2.  Extensive lysis of intra-abdominal adhesions. Performed by Dr Rad Pierce     LAPAROTOMY EXPLORATORY  04/20/2005    1.  Exploratory laparotomy.  2.  Lysis of adhesions.  Segmental small bowel resection Performed by Dr Rad Pierce.     LASER HOLMIUM LITHOTRIPSY URETER(S), INSERT STENT, COMBINED Left 4/29/2021    Procedure: CYSTOSCOPY LEFT URETEROSCOPY;  Surgeon: Shabnam Lee MD;  Location:  OR        Family History:    family history is not on file.    Social History:  Lives alone with cat    Physical Exam     Patient Vitals for the past 24 hrs:   BP Temp Temp src Pulse Resp SpO2   09/07/22 1200 118/59 -- -- 64 -- 96 %   09/07/22 1100 112/64 -- -- 67 18 94 %   09/07/22 1015 112/72 -- -- -- 16 95 %   09/07/22 0830 (!) 142/132 -- -- -- 18 95 %   09/07/22 0800 122/74 -- -- 63 16 95 %   09/07/22 0605 121/61 99.1  F (37.3  C) Oral 75 14 95 %      Physical Exam  General: Nontoxic-appearing woman recumbent in room 13  HENT: mucous membranes moist, face nontender  CV: rate as above, regular rhythm, no lower extremity edema, palpable foot pulses bilaterally, compartments soft in both legs  Resp: normal effort, speaks in full phrases, no stridor, no cough observed  GI: abdomen soft and nontender, no guarding  MSK:   Cervical spine: nontender with FROM  Thoracic spine: nontender, no CVAT  Lumbar spine: Mild diffuse tenderness to lower back and right lower lumbar region, scoliosis present, pelvis stable.  FROM bilateral hips and knees without deformity.  Skin: appropriately warm  and dry, no erythema/ecchymosis/vesicles to back  Neuro: alert, clear speech, oriented, hip flexion/extension normal, SILT bilateral legs and feet, patellar DTRs normal, ambulation not initially tested  Psych: cooperative    Emergency Department Course     Imaging:    CT Lumbar Spine w/o Contrast   Final Result   IMPRESSION:     1. Acute to subacute-appearing burst type fracture of the L5 vertebral   body with loss of approximately 50% vertebral body height. This is new   since 6/22/2022. There is posterior displacement of the fractured L5   vertebral body towards the spinal canal contributing to mild spinal   canal narrowing.   2. Marked convex left curvature of the lumbar spine with multilevel   degenerative changes throughout the spine as detailed above causing   left neural foraminal narrowings in the visualized lower thoracic   spine and right neural foraminal narrowings in the lumbar spine.   Degenerative findings are likely similar to prior MR 5/29/2020 given   differences in technique.      Results discussed with Justice Tanner at 9:40 AM on 9/7/2022.      DAISY HERNÁNDEZ MD            SYSTEM ID:  [id number]      CT Abdomen Pelvis w Contrast   Final Result   IMPRESSION: No acute process demonstrated in the abdomen and pelvis.      KELLEE VALERO MD            SYSTEM ID:  [id number]           Laboratory:  Labs Ordered and Resulted from Time of ED Arrival to Time of ED Departure   COMPREHENSIVE METABOLIC PANEL - Abnormal       Result Value    Sodium 141      Potassium 3.4      Chloride 105      Carbon Dioxide (CO2) 26      Anion Gap 10      Urea Nitrogen 12      Creatinine 0.73      Calcium 8.6      Glucose 100 (*)     Alkaline Phosphatase 62      AST 25      ALT 21      Protein Total 7.0      Albumin 3.2 (*)     Bilirubin Total 0.8      GFR Estimate 81     CBC WITH PLATELETS AND DIFFERENTIAL - Abnormal    WBC Count 7.4      RBC Count 3.44 (*)     Hemoglobin 10.9 (*)     Hematocrit 33.2 (*)     MCV 97      MCH 31.7       MCHC 32.8      RDW 14.0      Platelet Count 192      % Neutrophils 78      % Lymphocytes 8      % Monocytes 13      % Eosinophils 0      % Basophils 1      % Immature Granulocytes 0      NRBCs per 100 WBC 0      Absolute Neutrophils 5.8      Absolute Lymphocytes 0.6 (*)     Absolute Monocytes 0.9      Absolute Eosinophils 0.0      Absolute Basophils 0.0      Absolute Immature Granulocytes 0.0      Absolute NRBCs 0.0     COVID-19 VIRUS (CORONAVIRUS) BY PCR - Abnormal    SARS CoV2 PCR Positive (*)    LIPASE - Normal    Lipase 170     ROUTINE UA WITH MICROSCOPIC - Normal    Color Urine Light Yellow      Appearance Urine Clear      Glucose Urine Negative      Bilirubin Urine Negative      Ketones Urine Negative      Specific Gravity Urine 1.016      Blood Urine Negative      pH Urine 5.0      Protein Albumin Urine Negative      Urobilinogen Urine Normal      Nitrite Urine Negative      Leukocyte Esterase Urine Negative      RBC Urine <1      WBC Urine <1          Emergency Department Course:  Reviewed:  I reviewed nursing notes, vitals, and past medical history    Assessments:  I obtained history and examined the patient as noted above.     ED Course as of 09/07/22 1351   Wed Sep 07, 2022   0940 I spoke with Dr. Child, Radiology, regarding CT results.   1017 I spoke with [facility], regarding lumbar burst fx.   1017 I spoke with Dr. Emanuel, Hospitalist, who accepts care.       Consults:   See above in ED Course    Interventions:  Medications   fentaNYL (PF) (SUBLIMAZE) injection 50 mcg (50 mcg Intravenous Given 9/7/22 0704)   iopamidol (ISOVUE-370) solution 48 mL (48 mLs Intravenous Given 9/7/22 0858)   Saline Flush (60 mLs Intravenous Given 9/7/22 0858)        Disposition:  Admit to Dr. Emanuel    Impression & Plan    Medical Decision Making:  I think that her current back pain is most likely secondary to the burst fracture seen on CT, which was fairly unexpected in the absence of any specific trauma recently,  though does appear to be new since June.  Fortunately, she is neurologically at her baseline.  I spoke with the on-call orthopedic spine team who did not recommend any immediate interventions though formal consultation and specific guidance are still pending.  In the meantime I have arranged for her to be admitted to the hospital service for further multidisciplinary care and optimization of analgesia.  She declined any additional treatments here at this time.    Diagnosis:    ICD-10-CM    1. Closed burst fracture of lumbar vertebra, initial encounter (H)  S32.001A       9/7/2022   MD Flores Small Jeffrey Alan, MD  09/07/22 1358

## 2022-09-07 NOTE — ED NOTES
Bed: ED13  Expected date: 9/7/22  Expected time: 5:57 AM  Means of arrival: Ambulance  Comments:  [address] 82F back pain

## 2022-09-07 NOTE — ED TRIAGE NOTES
Pt BIBA from home. C/o back pain that was exacerbated yesterday. Pt has Hx of back issues beginning in 2020. Seen by primary yesterday and prescribed pain meds with no relief. Denies trauma/fall. 15 mg Toradol IM given by EMS.      Triage Assessment     Row Name 09/07/22 0607       Triage Assessment (Adult)    Airway WDL WDL       Respiratory WDL    Respiratory WDL WDL       Cardiac WDL    Cardiac WDL WDL       Cognitive/Neuro/Behavioral WDL    Cognitive/Neuro/Behavioral WDL WDL

## 2022-09-08 ENCOUNTER — APPOINTMENT (OUTPATIENT)
Dept: PHYSICAL THERAPY | Facility: CLINIC | Age: 82
End: 2022-09-08
Attending: PHYSICIAN ASSISTANT
Payer: COMMERCIAL

## 2022-09-08 LAB
CK SERPL-CCNC: 75 U/L (ref 30–225)
CRP SERPL-MCNC: 69.2 MG/L (ref 0–8)

## 2022-09-08 PROCEDURE — 82550 ASSAY OF CK (CPK): CPT | Performed by: HOSPITALIST

## 2022-09-08 PROCEDURE — 36415 COLL VENOUS BLD VENIPUNCTURE: CPT | Performed by: HOSPITALIST

## 2022-09-08 PROCEDURE — 250N000013 HC RX MED GY IP 250 OP 250 PS 637: Performed by: PHYSICIAN ASSISTANT

## 2022-09-08 PROCEDURE — 97530 THERAPEUTIC ACTIVITIES: CPT | Mod: GP | Performed by: PHYSICAL THERAPIST

## 2022-09-08 PROCEDURE — 99217 PR OBSERVATION CARE DISCHARGE: CPT | Performed by: PHYSICIAN ASSISTANT

## 2022-09-08 PROCEDURE — 97116 GAIT TRAINING THERAPY: CPT | Mod: GP | Performed by: PHYSICAL THERAPIST

## 2022-09-08 PROCEDURE — G0378 HOSPITAL OBSERVATION PER HR: HCPCS

## 2022-09-08 PROCEDURE — 97161 PT EVAL LOW COMPLEX 20 MIN: CPT | Mod: GP | Performed by: PHYSICAL THERAPIST

## 2022-09-08 PROCEDURE — 86140 C-REACTIVE PROTEIN: CPT | Performed by: HOSPITALIST

## 2022-09-08 RX ORDER — AMOXICILLIN 250 MG
1 CAPSULE ORAL 2 TIMES DAILY PRN
Qty: 12 TABLET | Refills: 0 | Status: SHIPPED | OUTPATIENT
Start: 2022-09-08 | End: 2022-09-23

## 2022-09-08 RX ORDER — CYCLOBENZAPRINE HCL 5 MG
5 TABLET ORAL 3 TIMES DAILY
Qty: 20 TABLET | Refills: 3 | Status: SHIPPED | OUTPATIENT
Start: 2022-09-08 | End: 2022-09-27

## 2022-09-08 RX ORDER — LIDOCAINE 4 G/G
2 PATCH TOPICAL
Status: DISCONTINUED | OUTPATIENT
Start: 2022-09-08 | End: 2022-09-09 | Stop reason: HOSPADM

## 2022-09-08 RX ORDER — OXYCODONE HYDROCHLORIDE 5 MG/1
2.5-5 TABLET ORAL EVERY 6 HOURS PRN
Qty: 15 TABLET | Refills: 0 | Status: SHIPPED | OUTPATIENT
Start: 2022-09-08 | End: 2022-09-16

## 2022-09-08 RX ORDER — ACETAMINOPHEN 325 MG/1
975 TABLET ORAL EVERY 8 HOURS
Qty: 63 TABLET | Refills: 0 | Status: ON HOLD | OUTPATIENT
Start: 2022-09-08 | End: 2022-09-15

## 2022-09-08 RX ORDER — LIDOCAINE 4 G/G
2 PATCH TOPICAL EVERY 24 HOURS
Qty: 6 PATCH | Refills: 3 | Status: ON HOLD | OUTPATIENT
Start: 2022-09-08 | End: 2022-09-15

## 2022-09-08 RX ORDER — CYCLOBENZAPRINE HCL 5 MG
5 TABLET ORAL 3 TIMES DAILY
Status: DISCONTINUED | OUTPATIENT
Start: 2022-09-08 | End: 2022-09-09 | Stop reason: HOSPADM

## 2022-09-08 RX ADMIN — DILTIAZEM HYDROCHLORIDE 180 MG: 180 CAPSULE, COATED, EXTENDED RELEASE ORAL at 08:35

## 2022-09-08 RX ADMIN — ACETAMINOPHEN 975 MG: 325 TABLET ORAL at 23:15

## 2022-09-08 RX ADMIN — OXYCODONE HYDROCHLORIDE 2.5 MG: 5 TABLET ORAL at 00:07

## 2022-09-08 RX ADMIN — OXYCODONE HYDROCHLORIDE 2.5 MG: 5 TABLET ORAL at 18:46

## 2022-09-08 RX ADMIN — CYCLOBENZAPRINE HYDROCHLORIDE 5 MG: 5 TABLET, FILM COATED ORAL at 20:55

## 2022-09-08 RX ADMIN — LIDOCAINE 2 PATCH: 560 PATCH PERCUTANEOUS; TOPICAL; TRANSDERMAL at 13:18

## 2022-09-08 RX ADMIN — CYCLOBENZAPRINE HYDROCHLORIDE 5 MG: 5 TABLET, FILM COATED ORAL at 13:16

## 2022-09-08 RX ADMIN — PANCRELIPASE 1 CAPSULE: 60000; 12000; 38000 CAPSULE, DELAYED RELEASE PELLETS ORAL at 18:46

## 2022-09-08 RX ADMIN — ACETAMINOPHEN 975 MG: 325 TABLET ORAL at 15:22

## 2022-09-08 RX ADMIN — POTASSIUM CHLORIDE 10 MEQ: 750 TABLET, EXTENDED RELEASE ORAL at 20:56

## 2022-09-08 RX ADMIN — PANCRELIPASE 1 CAPSULE: 60000; 12000; 38000 CAPSULE, DELAYED RELEASE PELLETS ORAL at 10:32

## 2022-09-08 RX ADMIN — PANCRELIPASE 1 CAPSULE: 60000; 12000; 38000 CAPSULE, DELAYED RELEASE PELLETS ORAL at 15:22

## 2022-09-08 RX ADMIN — ACETAMINOPHEN 975 MG: 325 TABLET ORAL at 07:00

## 2022-09-08 RX ADMIN — POTASSIUM CHLORIDE 10 MEQ: 750 TABLET, EXTENDED RELEASE ORAL at 08:36

## 2022-09-08 ASSESSMENT — ACTIVITIES OF DAILY LIVING (ADL)
ADLS_ACUITY_SCORE: 38
ADLS_ACUITY_SCORE: 34
ADLS_ACUITY_SCORE: 38
ADLS_ACUITY_SCORE: 34
ADLS_ACUITY_SCORE: 41
ADLS_ACUITY_SCORE: 38
ADLS_ACUITY_SCORE: 37
ADLS_ACUITY_SCORE: 38
DEPENDENT_IADLS:: INDEPENDENT
ADLS_ACUITY_SCORE: 34
ADLS_ACUITY_SCORE: 41

## 2022-09-08 NOTE — PROGRESS NOTES
"   09/08/22 1400   Quick Adds   Quick Adds Certification   Type of Visit Initial PT Evaluation       Present no   Living Environment   People in Home alone   Current Living Arrangements house   Home Accessibility stairs to enter home;stairs within home   Number of Stairs, Main Entrance 1   Stair Railings, Main Entrance none   Number of Stairs, Within Home, Primary greater than 10 stairs  (12)   Stair Railings, Within Home, Primary railing on right side (ascending)   Transportation Anticipated   (Pt unsure at this time)   Living Environment Comments Pt lives alone in a house. Pt reports needing to negotiate stairs to enter and within the home. Pt is unsure of how she will transport upon discharge and does not have assist.   Self-Care   Usual Activity Tolerance good   Current Activity Tolerance fair   Regular Exercise No   Equipment Currently Used at Home none   Fall history within last six months no   Activity/Exercise/Self-Care Comment Pt reports being IND at baseline with all ADLs. Pt reports ambulating without an AD at baseline but does have a FWW at home. Pt reports stairs are not difficult for her. Pt reports being able to ambulate ~1 block before needing a rest break.   General Information   Onset of Illness/Injury or Date of Surgery 09/08/22   Referring Physician Barthell, Joanna Kersten Ulmen, PA-C   Patient/Family Therapy Goals Statement (PT) \"To go home\"   Pertinent History of Current Problem (include personal factors and/or comorbidities that impact the POC) Per Chart: Georgette Alatorre is a 82 year old female with PMHx endometrial cancer s/p SALVADOR / BSO and post op radiation in remission to knowledge, SVT, radiation colitis, and scoliosis who presents with acute on chronic low back and right gluteal pain found to have an acute-subacute L5 burst fracture and incidental COVID positive PCR.   Existing Precautions/Restrictions fall   Weight-Bearing Status - LLE full weight-bearing "   Weight-Bearing Status - RLE full weight-bearing   Cognition   Orientation Status (Cognition) oriented x 3   Pain Assessment   Patient Currently in Pain Yes, see Vital Sign flowsheet  (8/10)   Integumentary/Edema   Integumentary/Edema no deficits were identifed   Posture    Posture Forward head position;Protracted shoulders   Range of Motion (ROM)   Range of Motion ROM is WFL   Strength (Manual Muscle Testing)   Strength (Manual Muscle Testing) Deficits observed during functional mobility   Strength Comments L Hip Flexion: 4/5; R Hip Flexion: 3/5   Bed Mobility   Comment, (Bed Mobility) Supine>sit w/ CGA   Transfers   Comment, (Transfers) Sit>stand w/ FWW and CGA   Gait/Stairs (Locomotion)   [place] Level (Gait) contact guard   Assistive Device (Gait) walker, front-wheeled   Distance in Feet (Required for LE Total Joints) 40'  (10' eval)   Comment, (Gait/Stairs) pt ambulated ~10' w/ FWW and CGA for eval.   Balance   Balance Comments Pt able to sit at EOB unsupported without LOB. Pt ambulates using a FWW for added stability and support.   Sensory Examination   Sensory Perception patient reports no sensory changes   Clinical Impression   Criteria for Skilled Therapeutic Intervention Yes, treatment indicated   PT Diagnosis (PT) Impaired gait   Influenced by the following impairments Decreased activity tolerance; decreased balance; decreased strength; Increased pain   Functional limitations due to impairments Impaired functional mobility   Clinical Presentation (PT Evaluation Complexity) Stable/Uncomplicated   Clinical Presentation Rationale Clinical Judgement   Clinical Decision Making (Complexity) low complexity   Planned Therapy Interventions (PT) balance training;bed mobility training;gait training;patient/family education;stair training;strengthening;transfer training;progressive activity/exercise   Risk & Benefits of therapy have been explained evaluation/treatment results reviewed;care plan/treatment goals  reviewed;risks/benefits reviewed;current/potential barriers reviewed;participants voiced agreement with care plan;participants included;patient   PT Discharge Planning   PT Discharge Recommendation (DC Rec) Transitional Care Facility;home with home care physical therapy;home with assist   PT Rationale for DC Rec Pt is below baseline. Pt currently requiring assist with all functional mobility. Pt unable to attempt stairs at this time due to increased pain and weakness. Pt presenting with deficits in activity tolerance, balance, and strength. Due to these deficits, pt is unsafe to return home alone at this time. Pt would benefit from continued skilled PT services via TCU to address deficits and improve IND with safety and functional mobility. If pt were to return home, pt would require 24/7 A x 1 with all functional mobility and ADLs, along with HHPT. Pt is refusing TCU at this time as she is concerned for her cat who is at home alone.   PT Brief overview of current status Supine>sit w/ CGA: sit>stand w/ FWW and CGA; gait w/ FWW and CGA   Plan of Care Review   Plan of Care Reviewed With patient   Therapy Certification   Start of care date 09/08/22   Certification date from 09/08/22   Certification date to 09/13/22   Medical Diagnosis L5 Fracture   Total Evaluation Time   Total Evaluation Time (Minutes) 10   Physical Therapy Goals   PT Frequency 5x/week   PT Predicted Duration/Target Date for Goal Attainment 09/13/22   PT Goals Bed Mobility;Transfers;Gait;Stairs   PT: Bed Mobility Independent;Supine to/from sit   PT: Transfers Modified independent;Sit to/from stand;Assistive device   PT: Gait Modified independent;Assistive device;150 feet   PT: Stairs Modified independent;Greater than 10 stairs;Rail on right

## 2022-09-08 NOTE — PROGRESS NOTES
Observation goals  PRIOR TO DISCHARGE         -vital signs normal or at patient baseline - Yes   -tolerating oral intake to maintain hydration -  No, barely drank overnight - slept most of shift   -adequate pain control on oral analgesics - Yes oxycodone and tylenol given   -returns to baseline functional status - No movt moderately impaired

## 2022-09-08 NOTE — PLAN OF CARE
Goal Outcome Evaluation:    Orientation/Cognitive: AOx4  Observation Goals (Met/ Not Met): Not met  Mobility Level/Assist Equipment: Not OOB  Fall Risk (Y/N): Y  Behavior Concerns: Green  Pain Management: PRN oxycodone given x 1  Tele/VS/O2: VSS on RA  ABNL Lab/BG: Hgb 10.9; Albumin 3.2  Diet: Regular  Bowel/Bladder: Continent, No BM  Skin Concerns: None  Drains/Devices: Purewick in place  Tests/Procedures for next shift: PT/OT/SW consulted  Anticipated DC date & active delays: TBD  Patient Stated Goal for Today: Get some food and rest

## 2022-09-08 NOTE — PROVIDER NOTIFICATION
MD Notification    Notified Person: MD    Notified Person Name:    Notification Date/Time:9-8 1850    Notification Interaction:amcom    Purpose of Notification:Pt does not feel comfortable leaving tonight. Friend will not be available to help until tomorrow. Please remove discharge order  Orders Received:    Comments:

## 2022-09-08 NOTE — PROGRESS NOTES
Care Management Follow Up    Length of Stay (days): 0    Expected Discharge Date: 09/08/2022     Concerns to be Addressed:       Patient plan of care discussed at interdisciplinary rounds: Yes    Anticipated Discharge Disposition:  Home with friends and family     Anticipated Discharge Services: Home Care   Anticipated Discharge DME:      Patient/family educated on Medicare website which has current facility and service quality ratings:    Education Provided on the Discharge Plan:    Patient/Family in Agreement with the Plan:yes      Referrals Placed by CM/SW:  [facility]-declined due to being at capacity.  Private pay costs discussed: Not applicable    Additional Information:  Patient has been accepted by [facility] care. Discharge orders can be faxed to [phone].  Discharge orders faxed to [facility], patient updated on Agency acceptance.      Katelynn Limon RN

## 2022-09-08 NOTE — PLAN OF CARE
[facility]      OUTPATIENT PHYSICAL THERAPY EVALUATION  PLAN OF TREATMENT FOR OUTPATIENT REHABILITATION  (COMPLETE FOR INITIAL CLAIMS ONLY)  Patient's Last Name, First Name, M.I.  YOB: 1940  Georgette Alatorre                        Provider's Name  [facility] Medical Record No.  [id number]                               Onset Date:  09/08/22   Start of Care Date:  09/08/22      Type:     _X_PT   ___OT   ___SLP Medical Diagnosis:  L5 Fracture                        PT Diagnosis:  Impaired gait   Visits from SOC:  1   _________________________________________________________________________________  Plan of Treatment/Functional Goals    Planned Interventions: balance training, bed mobility training, gait training, patient/family education, stair training, strengthening, transfer training, progressive activity/exercise     Goals: See Physical Therapy Goals on Care Plan in [employer] electronic health record.    Therapy Frequency: 5x/week  Predicted Duration of Therapy Intervention: 09/13/22  _________________________________________________________________________________    I CERTIFY THE NEED FOR THESE SERVICES FURNISHED UNDER        THIS PLAN OF TREATMENT AND WHILE UNDER MY CARE     (Physician co-signature of this document indicates review and certification of the therapy plan).              Certification date from: 09/08/22, Certification date to: 09/13/22    Referring Physician: Barthell, Joanna Kersten Ulmen, PA-C            Initial Assessment        See Physical Therapy evaluation dated 09/08/22 in Epic electronic health record.

## 2022-09-08 NOTE — PROGRESS NOTES
Summary:    Care Plan Summary Note: Fracture of L5, Covid positive.  Orientation: A&Ox4  Observation Goals (met & not met): not met  Activity Level: Not OOB  Fall Risk: Y  Behavior & Aggression Tool Color: Green  Pain Management: Oxycodone given x1, scheduled tylenol  ABNL VS/O2: VSS on RA Tmax 99.5  ABNL Lab/BG: Hgb 10.9  Diet: Reg  Bowel/Bladder: Purewick, no BM this shift  Drains/Devices: Purewick  Tests/Procedures for next shift:   Anticipated DC date: Pending placement probably TCU  Pt's stated goal: Pain control and sleep

## 2022-09-08 NOTE — DISCHARGE SUMMARY
[facility]  Hospitalist Discharge Summary      Date of Admission:  9/7/2022  Date of Discharge:  9/8/2022  Discharging Provider: Emma Manning PA-C  Discharge Service: Hospitalist Service    Discharge Diagnoses   Acute-subacute L5 burst fracture.  Osteoporosis.  Scoliosis.  Normocytic anemia.    Other chronic stable diagnoses addressed this visit  Paroxsymal SVT  Radiation colitis  Recent COVID-19 infection    Follow-ups Needed After Discharge   Follow-up Appointments     Follow-up and recommended labs and tests       Follow up with primary care provider, Merlin Emery Brown, within 7 days   for hospital follow- up.  The following labs/tests are recommended: follow   up on pain and please consider DEXA scan and further management of   osteoporosis. Please address anemia and any need for repeat colonoscopy.               Discharge Disposition   Discharged to home  Condition at discharge: Stable    Hospital Course   Georgette Alatorre is a 82 year old female with PMHx endometrial cancer s/p SALVADOR / BSO and post op radiation in remission to knowledge, SVT, radiation colitis, and scoliosis who presents with acute on chronic low back and right gluteal pain found to have an acute-subacute L5 burst fracture and incidental COVID positive PCR. For full HPI please see admission H&P from Joanna Barthell, PA-C dated 9/7/22.     Acute-subacute L5 burst fracture.  Osteoporosis.  Scoliosis.  No identified trauma. Hx endometrial cancer in remission to knowledge. No red flag sxs otherwise. No focal neuro deficits. UA negative.  * CT L-spine showing above with posterior displacement of the vertebral body towards the spinal canal contributing to mild spinal canal narrowing; prior scoliotic changes and multilevel degenerative changes.    The patient was admitted to the observation unit for pain control and orthopedic evaluation. Scheduled APAP 975mg TID, PRN oxycodone 2.5-5mg Q3H, PRN IV dilaudid. No  scheduled bowel regimen given chronic diarrhea.  Subacute L5 insufficiency fx which is stable without treatment needed. Likely to heal over 3-4 months. Appreciate PT and care mngt consults who recommended TCU vs home with home care, patient elected to go home with home care RN, PT. Resume PTA calcium and vit D at obs discharge. Sent with 3-5 days of pain regimen including oxycodone and bowel regimen PRN, patient counseled to use oxycodone sparingly and only if needed. She will follow up with PCP for pain control and to consider a DEXA scan and any referral for further osteoporosis management.      Paroxsymal SVT: Continue PTA diltiazem and potassium maintenance.     Radiation colitis: Often has 4 or more soft bms daily. Continue PTA Creon, PRN Lomotil.     Recent COVID-19 infection  No cardiopulmonary symptoms. No oxygen needs. Appears incidental test positive, but result was not present at time of interview - do not know if she had recent known infection. Special precautions ordered.     Normocytic anemia.  Unclear baseline. Hgb 10.9 on adm and hemodynamically stable. Endorses intermittent bright red blood in stool. Last colonoscopy 2017, does not recall any abnl findings. Normal EGD 2010. Drinks 1-2 alcoholic beverages most nights of week. Further eval outpatient with PCP. Resume PTA vitamin B12 supplementation at observation discharge.        Recent Labs   Lab 09/07/22  0652   HGB 10.9*            COVID-19 PCR Results    COVID-19 PCR Results 9/7/22   SARS CoV2 PCR Positive (A)   (A) Abnormal value              Consultations This Hospital Stay   CARE MANAGEMENT / SOCIAL WORK IP CONSULT  PHYSICAL THERAPY ADULT IP CONSULT  SPINE SURGERY ADULT IP CONSULT  INFECTION PREVENTION IP CONSULT  ORTHOPEDIC SURGERY IP CONSULT    Code Status   Full Code    Time Spent on this Encounter   Emma BUSH PA-C, personally saw the patient today and spent greater than 30 minutes discharging this patient.       Emma MIRANDA  KYLEE Manning  [facility] EXTENDED RECOVERY AND SHORT STAY  [address] [address]  Phone: [phone]  ______________________________________________________________________    Physical Exam   Vital Signs: Temp: 99.5  F (37.5  C) Temp src: Oral BP: 105/62 Pulse: 52   Resp: 18 SpO2: 97 % O2 Device: None (Room air) Oxygen Delivery: 1 LPM  Weight: 88 lbs 9.6 oz    General: Awake, alert, thin frail woman who appears stated age. Looks comfortable sitting up in bed . No acute distress.  HEENT: Normocephalic, atraumatic. Extraocular movements intact.   Respiratory: Clear to auscultation bilaterally, no rales, wheezing, or rhonchi.  Cardiovascular: Regular rate and rhythm, +S1 and S2, no murmur auscultated. No peripheral edema.   Gastrointestinal: Soft, non-tender, non-distended. Bowel sounds present.  Skin: Warm, dry. No obvious rashes or lesions on exposed skin. Dorsalis pedis pulses palpable bilaterally.  Musculoskeletal: No joint swelling, erythema or tenderness. Moves all extremities equally. TTP to Rt PSIS, hip and low back area.  Neurologic: AAO x3.   Psychiatric: Appropriate mood and affect. No obvious anxiety or depression.         Primary Care Physician   Merlin Emery Brown    Discharge Orders      Home Care Referral      Reason for your hospital stay    You were admitted with pain and found to have a burst fracture.     Activity    Your activity upon discharge: activity as tolerated     Follow-up and recommended labs and tests     Follow up with primary care provider, Merlin Emery Brown, within 7 days for hospital follow- up.  The following labs/tests are recommended: follow up on pain and please consider DEXA scan and further management of osteoporosis. Please address anemia and any need for repeat colonoscopy.     Diet    Follow this diet upon discharge: Orders Placed This Encounter      Regular Diet Adult       Significant Results and Procedures   Results for orders  placed or performed during the hospital encounter of 09/07/22   CT Abdomen Pelvis w Contrast    Narrative    CT ABDOMEN AND PELVIS WITH CONTRAST 9/7/2022 9:12 AM    CLINICAL HISTORY: Abdominal pain. Right lower back pain, history of  kidney stones and endometrial cancer.    TECHNIQUE: CT scan of the abdomen and pelvis was performed following  injection of IV contrast. Multiplanar reformats were obtained. Dose  reduction techniques were used.  CONTRAST: 48 mL [id number]-[id number]    COMPARISON: June 22, 2022    FINDINGS:   LOWER CHEST: Similar-appearing consolidation and/or atelectasis at the  right base. No effusions.    HEPATOBILIARY: No significant mass or bile duct dilatation. No  calcified gallstones. Scattered subcentimeter low attenuation foci are  likely tiny cysts which are stable.    PANCREAS: No significant mass, duct dilatation, or inflammatory  change.    SPLEEN: Normal size.    ADRENAL GLANDS: No significant nodules.    KIDNEYS/BLADDER: No significant mass, stones, or hydronephrosis. There  are simple or benign cysts. No follow up is needed.    BOWEL: No obstruction or inflammatory change.    PELVIC ORGANS: No pelvic masses.    ADDITIONAL FINDINGS: No ascites.    MUSCULOSKELETAL: No frankly destructive bony lesions. Thoracolumbar  scoliosis stable.      Impression    IMPRESSION: No acute process demonstrated in the abdomen and pelvis.    KELLEE VALERO MD         SYSTEM ID:  [id number]   CT Lumbar Spine w/o Contrast    Narrative    CT LUMBAR SPINE WITHOUT CONTRAST  9/7/2022 9:15 AM     HISTORY: Acute on chronic lower back pain.      TECHNIQUE: Axial images of the lumbar spine were reconstructed from  contrast enhanced CT scan of the abdomen. Multiplanar reformations  were performed.  Radiation dose for this scan was reduced using  automated exposure control, adjustment of the mA and/or kV according  to patient size, or iterative reconstruction technique.     COMPARISON: CT abdomen and pelvis 6/22/2022. Lumbar spine  MR  5/29/2020.     FINDINGS:  There are five lumbar-type vertebral bodies assumed for the  purposes of this dictation. Presumed small 12th ribs.    Marked convex left curvature of the lumbar spine centered at L3-L4.  Rightward listhesis of L1 on L2 and L2 on L3. Leftward listhesis of L4  on L5.    Burst type fracture of the L5 vertebral body with loss of  approximately 50% L5 vertebral body height, this was not present on CT  6/22/2022. There is cortical step-off and lucent fracture lines  present with mild sclerosis in the fractured L5 vertebral body. The  fractured L5 vertebral body is posteriorly displaced towards the  spinal canal contributing to mild spinal canal narrowing. Lucent  fracture lines are not seen extending into the L5 pedicles, although  there is subtle sclerosis in the pedicles.    Moderate degenerative endplate changes and loss of disc height  throughout the lumbar spine, particularly on the right at L2-L3 and  L3-L4. Disc herniations are present asymmetric towards the right at  L2-L3, L3-L4, and L4-L5. Marked facet hypertrophy asymmetric towards  the left at T11-T12 and T12-L1 and on the right at L1-L2, L2-L3,  L3-L4, and L4-L5. Marked neural foraminal narrowings on the left at  T11-T12 and T12-L1 and on the right at L1-L2, L2-L3, L3-L4, and L4-L5.    Please see dedicated CT abdomen and pelvis report for paraspinous  details.      Impression    IMPRESSION:    1. Acute to subacute-appearing burst type fracture of the L5 vertebral  body with loss of approximately 50% vertebral body height. This is new  since 6/22/2022. There is posterior displacement of the fractured L5  vertebral body towards the spinal canal contributing to mild spinal  canal narrowing.  2. Marked convex left curvature of the lumbar spine with multilevel  degenerative changes throughout the spine as detailed above causing  left neural foraminal narrowings in the visualized lower thoracic  spine and right neural foraminal  narrowings in the lumbar spine.  Degenerative findings are likely similar to prior MR 5/29/2020 given  differences in technique.    Results discussed with Justice Tanner at 9:40 AM on 9/7/2022.    DAISY HERNÁNDEZ MD         SYSTEM ID:  [id number]       Discharge Medications   Current Discharge Medication List      START taking these medications    Details   acetaminophen (TYLENOL) 325 MG tablet Take 3 tablets (975 mg) by mouth every 8 hours for 7 days  Qty: 63 tablet, Refills: 0    Comments: Future refills by PCP Dr. Merlin Emery Brown with phone number [phone].  Associated Diagnoses: Closed burst fracture of lumbar vertebra, initial encounter (H)      cyclobenzaprine (FLEXERIL) 5 MG tablet Take 1 tablet (5 mg) by mouth 3 times daily  Qty: 20 tablet, Refills: 3    Comments: Future refills by PCP Dr. Merlin Emery Brown with phone number [phone].  Associated Diagnoses: Closed burst fracture of lumbar vertebra, initial encounter (H)      Lidocaine (LIDOCARE) 4 % Patch Place 2 patches onto the skin every 24 hours To prevent lidocaine toxicity, patient should be patch free for 12 hrs daily.  Qty: 6 patch, Refills: 3    Associated Diagnoses: Closed burst fracture of lumbar vertebra, initial encounter (H)      oxyCODONE (ROXICODONE) 5 MG tablet Take 0.5-1 tablets (2.5-5 mg) by mouth every 6 hours as needed for moderate to severe pain  Qty: 15 tablet, Refills: 0    Comments: Future refills by PCP Dr. Merlin Emery Brown with phone number [phone].  Associated Diagnoses: Closed burst fracture of lumbar vertebra, initial encounter (H)      senna-docusate (SENOKOT-S/PERICOLACE) 8.6-50 MG tablet Take 1 tablet by mouth 2 times daily as needed for constipation  Qty: 12 tablet, Refills: 0    Comments: Future refills by PCP Dr. Merlin Emery Brown with phone number [phone].  Associated Diagnoses: Closed burst fracture of lumbar vertebra, initial encounter (H)         CONTINUE these medications which have NOT CHANGED     Details   amylase-lipase-protease (CREON 12) 86588-11795-58596 units CPEP Take 1 capsule by mouth 3 times daily (with meals)      Ascorbic Acid (VITAMIN C PO) Take 500 mg by mouth daily       Cholecalciferol (VITAMIN D3 PO) Take 2,000 Units by mouth daily.      cyanocobalamin (VITAMIN B-12) 1000 MCG tablet Take 1,000 mcg by mouth daily      diltiazem ER (DILT-XR) 180 MG 24 hr capsule Take 1 capsule by mouth once daily  Qty: 90 capsule, Refills: 0    Associated Diagnoses: Benign essential hypertension      diphenoxylate-atropine (LOMOTIL) 2.5-0.025 MG tablet Take 1 tablet by mouth 3 times daily as needed for diarrhea      pantoprazole (PROTONIX) 40 MG EC tablet Take 40 mg by mouth daily      potassium chloride ER (KLOR-CON) 8 MEQ CR tablet Take 8 mEq by mouth 2 times daily AM and Noon           Allergies   Allergies   Allergen Reactions     No Known Allergies

## 2022-09-08 NOTE — CONSULTS
Care Management Initial Consult    General Information  Assessment completed with: Georgette Perez  Type of CM/SW Visit: Initial Assessment    Primary Care Provider verified and updated as needed: Yes   Readmission within the last 30 days:        Reason for Consult: discharge planning  Advance Care Planning:            Communication Assessment  Patient's communication style: spoken language (English or Bilingual)    Hearing Difficulty or Deaf: no        Cognitive  Cognitive/Neuro/Behavioral: WDL                      Living Environment:   People in home: alone     Current living Arrangements: condominium      Able to return to prior arrangements:         Family/Social Support:  Care provided by: self  Provides care for: no one  Marital Status: Single             Description of Support System:           Current Resources:   Patient receiving home care services: No     Community Resources: None  Equipment currently used at home: none  Supplies currently used at home: None    Employment/Financial:  Employment Status: retired        Financial Concerns: No concerns identified           Lifestyle & Psychosocial Needs:  Social Determinants of Health     Tobacco Use: Not on file   Alcohol Use: Not on file   Financial Resource Strain: Not on file   Food Insecurity: Not on file   Transportation Needs: Not on file   Physical Activity: Not on file   Stress: Not on file   Social Connections: Not on file   Intimate Partner Violence: Not on file   Depression: Not at risk     PHQ-2 Score: 0   Housing Stability: Not on file       Functional Status:  Prior to admission patient needed assistance:   Dependent ADLs:: Independent  Dependent IADLs:: Independent       Mental Health Status:  Mental Health Status: No Current Concerns       Chemical Dependency Status:                Values/Beliefs:  Spiritual, Cultural Beliefs, [employer] Practices, Values that affect care: no               Additional Information:  Called patient in room as  "patient in on COVID precautions. Patient told writer \"I had COViID the beginning of August and was treated with that oral pill. Don't know why I am still positive or in the precautions.\"   Writer notified Charge RN who will have Infection Prevention weight in on precautions.  Will wait for Pt to see regarding     Katelynn Limon RN        "

## 2022-09-08 NOTE — PROGRESS NOTES
Summary:  11p-7a  Care Plan Summary Note: Fracture of L5, CV19 +  Orientation: AOx4  Observation Goals (met & not met): not met  Activity Level: Not OOB this shift  Fall Risk: Yes  Behavior & Aggression Tool Color: Green  Pain Management: Oxycodone given x1, plus scheduled tylenol  ABNL VS/O2: VSS on 1 Lpm overnight (O2 sats dipped to high 80s)  ABNL Lab/BG: Hgb   Diet: Reg  Bowel/Bladder: Pt has PW, barely drank anything all shift and thus didn't void much - slept most of shift   Drains/Devices: Purewick  Tests/Procedures for next shift: we need to determine if pt is strict bedrest   Anticipated DC date: Pending placement probably TCU  Pt's stated goal: Pain control and sleep    Pt c/o numbness and tingling to BLEs states this has been the case since L5 fx , states MD aware.

## 2022-09-08 NOTE — CONSULTS
Reason for consult: COVID    Patient positive for COVID 8/3/22, Patient meets 10 day criteria and can be recovered.    FOR SYMPTOMATIC - 10 days following symptom onset (day 0 is date of symptom onset),     >24 hr fever free without fever-reducing meds, AND substantial improvement in COVID-19 symptoms.

## 2022-09-08 NOTE — PROVIDER NOTIFICATION
MD Notification    Notified Person: MD    Notified Person Name:Emma Manning PA-C    Notification Date/Time:9-8 1728    Notification Interaction:vocera msg    Purpose of Notification:Pt does not feel comfortable leaving tonight. Friend will not be available to help until tomorrow.   Orders Received:    Comments:

## 2022-09-08 NOTE — PROGRESS NOTES
Pt does not feel comfortable leaving tonight. Friend will not be available to help until tomorrow.

## 2022-09-08 NOTE — PLAN OF CARE
Goal Outcome Evaluation:    Plan of care discussed with pt    Summary:  11p-7a  Care Plan Summary Note: Fracture of L5, CV19 +  Orientation: AOx4  Observation Goals (met & not met): not met  Activity Level: Not OOB this shift  Fall Risk: Yes  Behavior & Aggression Tool Color: Green  Pain Management: Oxycodone given x1, plus scheduled tylenol  ABNL VS/O2: VSS on 1 Lpm overnight (O2 sats dipped to high 80s)  ABNL Lab/BG: Hgb   Diet: Reg  Bowel/Bladder: Pt has PW, barely drank anything all shift and thus didn't void much - slept most of shift   Drains/Devices: Purewick  Tests/Procedures for next shift: we need to determine if pt is strict bedrest   Anticipated DC date: Pending placement probably TCU  Pt's stated goal: Pain control and sleep

## 2022-09-09 VITALS
HEART RATE: 67 BPM | TEMPERATURE: 97.2 F | HEIGHT: 63 IN | RESPIRATION RATE: 16 BRPM | BODY MASS INDEX: 15.7 KG/M2 | WEIGHT: 88.6 LBS | SYSTOLIC BLOOD PRESSURE: 125 MMHG | DIASTOLIC BLOOD PRESSURE: 68 MMHG | OXYGEN SATURATION: 96 %

## 2022-09-09 PROCEDURE — 250N000013 HC RX MED GY IP 250 OP 250 PS 637: Performed by: PHYSICIAN ASSISTANT

## 2022-09-09 PROCEDURE — 99224 PR SUBSEQUENT OBSERVATION CARE,LEVEL I: CPT | Performed by: PHYSICIAN ASSISTANT

## 2022-09-09 PROCEDURE — G0378 HOSPITAL OBSERVATION PER HR: HCPCS

## 2022-09-09 RX ADMIN — DILTIAZEM HYDROCHLORIDE 180 MG: 180 CAPSULE, COATED, EXTENDED RELEASE ORAL at 09:01

## 2022-09-09 RX ADMIN — CYCLOBENZAPRINE HYDROCHLORIDE 5 MG: 5 TABLET, FILM COATED ORAL at 08:49

## 2022-09-09 RX ADMIN — ACETAMINOPHEN 975 MG: 325 TABLET ORAL at 06:58

## 2022-09-09 RX ADMIN — LIDOCAINE 2 PATCH: 560 PATCH PERCUTANEOUS; TOPICAL; TRANSDERMAL at 08:48

## 2022-09-09 RX ADMIN — POTASSIUM CHLORIDE 10 MEQ: 750 TABLET, EXTENDED RELEASE ORAL at 08:49

## 2022-09-09 RX ADMIN — PANTOPRAZOLE SODIUM 40 MG: 40 TABLET, DELAYED RELEASE ORAL at 08:49

## 2022-09-09 RX ADMIN — PANCRELIPASE 1 CAPSULE: 60000; 12000; 38000 CAPSULE, DELAYED RELEASE PELLETS ORAL at 08:49

## 2022-09-09 ASSESSMENT — ACTIVITIES OF DAILY LIVING (ADL)
ADLS_ACUITY_SCORE: 37
ADLS_ACUITY_SCORE: 38
ADLS_ACUITY_SCORE: 41

## 2022-09-09 NOTE — PLAN OF CARE
Goal Outcome Evaluation:    Plan of Care Reviewed With: patient     Overall Patient Progress: improving        Observation goals  PRIOR TO DISCHARGE        Comments:     -Diagnostic tests and consults completed and resulted -Met     -Vital signs normal or at patient baseline -Met    -Tolerating oral intake to maintain hydration-Met     -Adequate pain control on oral analgesics -Met    -Returns to baseline functional status -Met    -Safe disposition plan has been identified-Met     -Ortho consult and recs completed -Met

## 2022-09-09 NOTE — PLAN OF CARE
Observation goals  PRIOR TO DISCHARGE        Comments:      -Diagnostic tests and consults completed and resulted -Met     -Vital signs normal or at patient baseline -Met    -Tolerating oral intake to maintain hydration-Met     -Adequate pain control on oral analgesics -Met    -Returns to baseline functional status -Met    -Safe disposition plan has been identified-Met     -Ortho consult and recs completed -Met

## 2022-09-09 NOTE — PLAN OF CARE
Patient has been discharged September 9, 2022 11:00 AM. Discharge instructions and education reviewed, medication schedule and follow up appts discussed. Pt had no questions. All belongings sent with pt. Medications reviewed. PIV removed prior to departure to home with home PT. Friend for transport.Discharge medications were filled and sent with pt. Pt educated on medications.

## 2022-09-09 NOTE — PROGRESS NOTES
Observation goals  PRIOR TO DISCHARGE        Comments:   -diagnostic tests and consults completed and resulted not met  -vital signs normal or at patient baseline met  -tolerating oral intake to maintain hydration met  -adequate pain control on oral analgesics met  -returns to baseline functional status not met  -safe disposition plan has been identified met  -ortho consult and recs completed not met  Nurse to notify provider when observation goals have been met and patient is ready for discharge.

## 2022-09-09 NOTE — PROGRESS NOTES
Observation goals  PRIOR TO DISCHARGE        Comments:   -diagnostic tests and consults completed and resulted not met  -vital signs normal or at patient baseline met  -tolerating oral intake to maintain hydration met  -adequate pain control on oral analgesics met  -returns to baseline functional status not met  -safe disposition plan has been identified not met  -ortho consult and recs completed not met  Nurse to notify provider when observation goals have been met and patient is ready for discharge.

## 2022-09-09 NOTE — PLAN OF CARE
Orientation/Cognitive: A&O  Observation Goals (Met/ Not Met): Met, med cleared for discharge. Did not have transport or someone to help her tonight, did not feel safe leaving.   Mobility Level/Assist Equipment: A1 gb/w L5 burst fracture. CMS intact  Fall Risk (Y/N): Y  Behavior Concerns: N  Pain Management: oxycodone given x1. Scheduled tylenol. 2 lidocaine patches in place, R buttock and R hip  Tele/VS/O2: Tacos soft bp, RA  ABNL Lab/BG: crp. Covid recovered  Diet: reg  Bowel/Bladder: mostly cont.   Skin Concerns: no  Drains/Devices: no  Tests/Procedures for next shift: no  Anticipated DC date & active delays: 9-9 am  Patient Stated Goal for Today: move around, does no want to go home yet

## 2022-09-09 NOTE — PLAN OF CARE
Goal Outcome Evaluation:    Plan of Care Reviewed With: patient     Overall Patient Progress: improving        Observation goals  PRIOR TO DISCHARGE        Comments:     -Diagnostic tests and consults completed and resulted -Met     -Vital signs normal or at patient baseline -Met    -Tolerating oral intake to maintain hydration-Met     -Adequate pain control on oral analgesics -Met    -Returns to baseline functional status -Met    -Safe disposition plan has been identified-Met     -Ortho consult and recs completed -Met          Orientation/Cognitive: A&Ox4  Observation Goals (Met/ Not Met): Met  Mobility Level/Assist Equipment: A1 gb/w, L5 burst fracture. CMS intact  Fall Risk (Y/N): Y  Behavior Concerns: N  Pain Management:Scheduled tylenol.  Tele/VS/O2: Tacos soft bp, RA  ABNL Lab/BG:none this shift  Diet: reg  Bowel/Bladder:continent   Skin Concerns: none  Drains/Devices: none  Tests/Procedures for next shift: none  Anticipated DC date & active delays: this morning  Patient Stated Goal for Today: move around, does no want to go home yet

## 2022-09-09 NOTE — PROGRESS NOTES
Observation goals  PRIOR TO DISCHARGE        Comments:   -diagnostic tests and consults completed and resulted met  -vital signs normal or at patient baseline met  -tolerating oral intake to maintain hydration met  -adequate pain control on oral analgesics met  -returns to baseline functional status not met  -safe disposition plan has been identified met  -ortho consult and recs completed met  Nurse to notify provider when observation goals have been met and patient is ready for discharge.

## 2022-09-09 NOTE — PROGRESS NOTES
Brief IM prog    Doing well. Was to go home yesterday with home care referral, but do to timing logistics and arranging a ride was delayed to this AM.    Physical Exam   Temp: 97.2  F (36.2  C) Temp src: Oral BP: 125/68 Pulse: 67   Resp: 16 SpO2: 96 % O2 Device: None (Room air)    Vitals:    09/07/22 1614 09/08/22 0607   Weight: 40.4 kg (89 lb) 40.2 kg (88 lb 9.6 oz)   Constitutional: Appears stated age, no acute distress.  Respiratory: No increased work of breathing.  GI: Thin.  MSK: Moving all four extremities to pull socks on.    COVID + beginning of August 2022.    Please refer to discharge summary from 9/8 by Emma Manning PA-C for further details.    JoAnna Barthell, PA-C  9/9/2022   9:29 AM

## 2022-09-11 ENCOUNTER — HOSPITAL ENCOUNTER (OUTPATIENT)
Facility: CLINIC | Age: 82
Setting detail: OBSERVATION
Discharge: NURSING, REHAB OR LONG-TERM CARE | End: 2022-09-16
Attending: EMERGENCY MEDICINE | Admitting: ORTHOPAEDIC SURGERY
Payer: COMMERCIAL

## 2022-09-11 DIAGNOSIS — K52.9 CHRONIC DIARRHEA: Primary | ICD-10-CM

## 2022-09-11 DIAGNOSIS — S32.001A CLOSED BURST FRACTURE OF LUMBAR VERTEBRA, INITIAL ENCOUNTER (H): ICD-10-CM

## 2022-09-11 DIAGNOSIS — S32.059A CLOSED FRACTURE OF FIFTH LUMBAR VERTEBRA, UNSPECIFIED FRACTURE MORPHOLOGY, INITIAL ENCOUNTER (H): ICD-10-CM

## 2022-09-11 LAB
ANION GAP SERPL CALCULATED.3IONS-SCNC: 5 MMOL/L (ref 3–14)
BUN SERPL-MCNC: 15 MG/DL (ref 7–30)
CALCIUM SERPL-MCNC: 8.9 MG/DL (ref 8.5–10.1)
CHLORIDE BLD-SCNC: 104 MMOL/L (ref 94–109)
CO2 SERPL-SCNC: 30 MMOL/L (ref 20–32)
CREAT SERPL-MCNC: 0.71 MG/DL (ref 0.52–1.04)
ERYTHROCYTE [DISTWIDTH] IN BLOOD BY AUTOMATED COUNT: 13.6 % (ref 10–15)
GFR SERPL CREATININE-BSD FRML MDRD: 84 ML/MIN/1.73M2
GLUCOSE BLD-MCNC: 134 MG/DL (ref 70–99)
HCT VFR BLD AUTO: 31 % (ref 35–47)
HGB BLD-MCNC: 10.2 G/DL (ref 11.7–15.7)
MCH RBC QN AUTO: 31.8 PG (ref 26.5–33)
MCHC RBC AUTO-ENTMCNC: 32.9 G/DL (ref 31.5–36.5)
MCV RBC AUTO: 97 FL (ref 78–100)
PLATELET # BLD AUTO: 237 10E3/UL (ref 150–450)
POTASSIUM BLD-SCNC: 4.1 MMOL/L (ref 3.4–5.3)
RBC # BLD AUTO: 3.21 10E6/UL (ref 3.8–5.2)
SARS-COV-2 RNA RESP QL NAA+PROBE: NEGATIVE
SODIUM SERPL-SCNC: 139 MMOL/L (ref 133–144)
WBC # BLD AUTO: 5.1 10E3/UL (ref 4–11)

## 2022-09-11 PROCEDURE — 36415 COLL VENOUS BLD VENIPUNCTURE: CPT | Performed by: EMERGENCY MEDICINE

## 2022-09-11 PROCEDURE — 250N000011 HC RX IP 250 OP 636: Performed by: EMERGENCY MEDICINE

## 2022-09-11 PROCEDURE — 82310 ASSAY OF CALCIUM: CPT | Performed by: EMERGENCY MEDICINE

## 2022-09-11 PROCEDURE — C9803 HOPD COVID-19 SPEC COLLECT: HCPCS

## 2022-09-11 PROCEDURE — 99285 EMERGENCY DEPT VISIT HI MDM: CPT | Mod: 25

## 2022-09-11 PROCEDURE — 250N000011 HC RX IP 250 OP 636: Performed by: INTERNAL MEDICINE

## 2022-09-11 PROCEDURE — 96375 TX/PRO/DX INJ NEW DRUG ADDON: CPT

## 2022-09-11 PROCEDURE — 99219 PR INITIAL OBSERVATION CARE,LEVEL II: CPT | Performed by: INTERNAL MEDICINE

## 2022-09-11 PROCEDURE — 85027 COMPLETE CBC AUTOMATED: CPT | Performed by: EMERGENCY MEDICINE

## 2022-09-11 PROCEDURE — 96374 THER/PROPH/DIAG INJ IV PUSH: CPT

## 2022-09-11 PROCEDURE — U0005 INFEC AGEN DETEC AMPLI PROBE: HCPCS | Performed by: EMERGENCY MEDICINE

## 2022-09-11 PROCEDURE — G0378 HOSPITAL OBSERVATION PER HR: HCPCS

## 2022-09-11 RX ORDER — HYDROMORPHONE HCL IN WATER/PF 6 MG/30 ML
0.2 PATIENT CONTROLLED ANALGESIA SYRINGE INTRAVENOUS
Status: DISCONTINUED | OUTPATIENT
Start: 2022-09-11 | End: 2022-09-16 | Stop reason: HOSPADM

## 2022-09-11 RX ORDER — MORPHINE SULFATE 4 MG/ML
4 INJECTION, SOLUTION INTRAMUSCULAR; INTRAVENOUS
Status: COMPLETED | OUTPATIENT
Start: 2022-09-11 | End: 2022-09-11

## 2022-09-11 RX ADMIN — MORPHINE SULFATE 4 MG: 4 INJECTION, SOLUTION INTRAMUSCULAR; INTRAVENOUS at 18:15

## 2022-09-11 RX ADMIN — HYDROMORPHONE HYDROCHLORIDE 0.2 MG: 0.2 INJECTION, SOLUTION INTRAMUSCULAR; INTRAVENOUS; SUBCUTANEOUS at 22:36

## 2022-09-11 ASSESSMENT — ENCOUNTER SYMPTOMS
ARTHRALGIAS: 1
NAUSEA: 0
FEVER: 0
BACK PAIN: 1
CONSTIPATION: 0
NUMBNESS: 0
WEAKNESS: 0

## 2022-09-11 ASSESSMENT — ACTIVITIES OF DAILY LIVING (ADL)
ADLS_ACUITY_SCORE: 35

## 2022-09-11 NOTE — ED TRIAGE NOTES
Pt presents via EMS. Pt is seeking rehab placement. Was discharged on Friday after a lumbar compression fx and was attempting to go home instead of the recommended rehab. Pt is unable to care for herself  Properly at home and lives alone.

## 2022-09-11 NOTE — ED PROVIDER NOTES
"  History   Chief Complaint:  Back Injury     The history is provided by the patient.      Georgette Alatorre is a 82 year old female with history of osteoporosis and scoliosis who presents with back injury. The patient reports experiencing back pain that started on Monday (9/5/22) and has gotten worse over time. Patient notes that she was seen in the ED last Thursday (9/8/22) for this pain, had a CT scan that showed a lumbar fracture (seen below) and was discharged on Friday (9/9/22). Patient states that the pain is intense on her lower back, right above her buttocks. The patient reports that recent activities that could have provoked the pain is bending over in the garden but has not had any recent falls or injuries since being discharged. She shares that she has scoliosis. Patient states that she is able to walk with a walker but cannot put weight on her right leg and sitting exacerbates her pain. She notes that her right leg is aching from thigh to foot, with majority of pain felt in the thigh and calf. Patient states that she feels a \"shooting\" pain with motion and due to her lack of mobility, she has slept on her couch for two nights. To help with the pain, she has been taking Tylenol, flexeril, lidocaine patches, oxycodone, and stool softener. Patient is not on any blood thinners and denies any numbness/tingling, weakness, fever, nausea, and constipation. Additionally, the patient has not seen any changes in her urine output. Patient reports that she does not feel safe to go home.     CT Lumbar Spine w/o IV Contrast 09/07/22 [facility]  1. Acute to subacute-appearing burst type fracture of the L5 vertebral  body with loss of approximately 50% vertebral body height. This is new  since 6/22/2022. There is posterior displacement of the fractured L5  vertebral body towards the spinal canal contributing to mild spinal  canal narrowing.  2. Marked convex left curvature of the lumbar spine with " multilevel  degenerative changes throughout the spine as detailed above causing  left neural foraminal narrowings in the visualized lower thoracic  spine and right neural foraminal narrowings in the lumbar spine.  Degenerative findings are likely similar to prior MR 5/29/2020 given  differences in technique.    Review of Systems   Constitutional: Negative for fever.   Gastrointestinal: Negative for constipation and nausea.   Musculoskeletal: Positive for arthralgias and back pain.   Neurological: Negative for weakness and numbness.   All other systems reviewed and are negative.    Allergies:  No Known Allergies    Medications:  Amylase-lipase-protease  Cyclobenzaprine  Diltiazem er  Diphenoxylate-atropine  Lidocaine patch  Oxycodone  Pantoprazole  Potassium chloride er  Senna-docusate    Past Medical History:     Small bowel obstruction  Radiation colitis  Vitamin B12 deficiency  Age-related osteoporosis without current pathological fracture  Gastroesophageal reflux disease without esophagitis   Other idiopathic scoliosis, thoracolumbar region  Chronic diarrhea  Intractable back pain  Generalized muscle weakness  Chronic bilateral low back pain without sciatica  Closed burst fracture of lumbar vertebra, initial encounter  Arthritis  Blood transfusion  Endometrial cancer  Hydronephrosis  Irregular heart beat  Kidney stones  Paroxysmal supraventricular tachycardia  Vitreous floaters  Cataracts, bilateral    Past Surgical History:    Appendectomy  Cataract iol, rt/lt  Hysterectomy   Laparotomy exploratory  Laser holmium lithotripsy ureter(s)  Small bowel obstruction repair    Social History:  Presents alone.  Presents via EMS.   Lives alone.  PCP: Brown, Merlin Emery     Physical Exam     Patient Vitals for the past 24 hrs:   BP Temp Temp src Pulse Resp SpO2   09/11/22 1809 -- 98.2  F (36.8  C) Oral 72 -- --   09/11/22 1740 117/86 -- -- -- 18 94 %     Physical Exam  SKIN:  Warm, dry.  HEMATOLOGIC/IMMUNOLOGIC/LYMPHATIC:   No pallor.  EYES:  Conjunctivae normal.  CARDIOVASCULAR:  Regular rate and rhythm.  RESPIRATORY:  No respiratory distress, breath sounds equal and normal.  GASTROINTESTINAL:  Soft, nontender abdomen.  MUSCULOSKELETAL: Normal body habitus.  Range of motion of torso exacerbates low back pain.  NEUROLOGIC:  Alert, conversant.  No tactile sensory or motor deficit of the lower limbs.  PSYCHIATRIC:  Normal mood.    Emergency Department Course   Laboratory:  Labs Ordered and Resulted from Time of ED Arrival to Time of ED Departure   CBC WITH PLATELETS - Abnormal       Result Value    WBC Count 5.1      RBC Count 3.21 (*)     Hemoglobin 10.2 (*)     Hematocrit 31.0 (*)     MCV 97      MCH 31.8      MCHC 32.9      RDW 13.6      Platelet Count 237     BASIC METABOLIC PANEL - Abnormal    Sodium 139      Potassium 4.1      Chloride 104      Carbon Dioxide (CO2) 30      Anion Gap 5      Urea Nitrogen 15      Creatinine 0.71      Calcium 8.9      Glucose 134 (*)     GFR Estimate 84     COVID-19 VIRUS (CORONAVIRUS) BY PCR - Normal    SARS CoV2 PCR Negative        Emergency Department Course:     Reviewed:  I reviewed nursing notes, vitals, past medical history and Care Everywhere    Assessments:  1752 I obtained history and examined the patient as noted above.     Consults:  2045 I spoke with Dr. Lyle, hospitalist, who accepts admission.     Interventions:  1815 Morphine, 4 mg, IV    Disposition:  The patient was admitted to the hospital under the care of Dr. Lyle.     Impression & Plan   Medical Decision Making:  This patient returns to the emergency department after a recent admission during which she was diagnosed with lumbar fracture.  She was provided the option of discharge to home or transitional care unit.  She opted for the former.  She is been doing poorly at home.  Lives independently.  Had a friend helping but that is not enough care for her at this time given the injury and her needs.  She realizes now she  requires an increased level of care.  Amenable to TCU placement to recover from this injury.  Screening test performed above.  Medicated with morphine.    Diagnosis:    ICD-10-CM    1. Closed fracture of fifth lumbar vertebra, unspecified fracture morphology, initial encounter (H)  S32.059A      Scribe Disclosure:  Elbert BUSH & Sandro Costello, am serving as a scribe at 5:52 PM on 9/11/2022 to document services personally performed by Luis Alfaro MD based on my observations and the provider's statements to me.            Luis Alfaro MD  09/11/22 [id number]

## 2022-09-11 NOTE — ED NOTES
[facility]  ED Nurse Handoff Report    ED Chief complaint: Back Injury      ED Diagnosis:   Final diagnoses:   None       Code Status: Full Code    Allergies:   Allergies   Allergen Reactions     No Known Allergies        Patient Story: pt presents seeking placement. Recent lumbar compression fx.     Focused Assessment:  C/o lower back pain and RLE pain      Treatments and/or interventions provided: morphine  Patient's response to treatments and/or interventions: tolerated    To be done/followed up on inpatient unit:  obs, seek placement    Does this patient have any cognitive concerns?: none    Activity level - Baseline/Home:  Independent  Activity Level - Current:   Unknown    Patient's Preferred language: English   Needed?: No    Isolation: None  Infection: Not Applicable  Patient tested for COVID 19 prior to admission: YES  Bariatric?: No    Vital Signs:   Vitals:    09/11/22 1740 09/11/22 1809   BP: 117/86    Pulse:  72   Resp: 18    Temp:  98.2  F (36.8  C)   TempSrc:  Oral   SpO2: 94%        Cardiac Rhythm:     Was the PSS-3 completed:   Yes  What interventions are required if any?               Family Comments: n/a  OBS brochure/video discussed/provided to patient/family: N/A              Name of person given brochure if not patient: n/a              Relationship to patient: n/a    For the majority of the shift this patient's behavior was Green.   Behavioral interventions performed were n/a.    ED NURSE PHONE NUMBER: [phone]

## 2022-09-11 NOTE — ED NOTES
Bed: ED01  Expected date:   Expected time:   Means of arrival:   Comments:  Dylon [phone] F lumbar compression fx can't go in wheelchair ETA [id number]

## 2022-09-12 PROCEDURE — 250N000013 HC RX MED GY IP 250 OP 250 PS 637: Performed by: PHYSICIAN ASSISTANT

## 2022-09-12 PROCEDURE — 99225 PR SUBSEQUENT OBSERVATION CARE,LEVEL II: CPT | Performed by: PHYSICIAN ASSISTANT

## 2022-09-12 PROCEDURE — 250N000011 HC RX IP 250 OP 636: Performed by: INTERNAL MEDICINE

## 2022-09-12 PROCEDURE — G0378 HOSPITAL OBSERVATION PER HR: HCPCS

## 2022-09-12 PROCEDURE — 96376 TX/PRO/DX INJ SAME DRUG ADON: CPT

## 2022-09-12 PROCEDURE — 250N000013 HC RX MED GY IP 250 OP 250 PS 637: Performed by: EMERGENCY MEDICINE

## 2022-09-12 PROCEDURE — 250N000013 HC RX MED GY IP 250 OP 250 PS 637: Performed by: INTERNAL MEDICINE

## 2022-09-12 RX ORDER — LIDOCAINE 40 MG/G
CREAM TOPICAL
Status: DISCONTINUED | OUTPATIENT
Start: 2022-09-12 | End: 2022-09-16 | Stop reason: HOSPADM

## 2022-09-12 RX ORDER — NALOXONE HYDROCHLORIDE 0.4 MG/ML
0.2 INJECTION, SOLUTION INTRAMUSCULAR; INTRAVENOUS; SUBCUTANEOUS
Status: DISCONTINUED | OUTPATIENT
Start: 2022-09-12 | End: 2022-09-16 | Stop reason: HOSPADM

## 2022-09-12 RX ORDER — AMOXICILLIN 250 MG
1 CAPSULE ORAL 2 TIMES DAILY PRN
Status: DISCONTINUED | OUTPATIENT
Start: 2022-09-12 | End: 2022-09-16 | Stop reason: HOSPADM

## 2022-09-12 RX ORDER — CYCLOBENZAPRINE HCL 10 MG
10 TABLET ORAL 3 TIMES DAILY
Status: DISCONTINUED | OUTPATIENT
Start: 2022-09-12 | End: 2022-09-12

## 2022-09-12 RX ORDER — POTASSIUM CHLORIDE 600 MG/1
8 TABLET, FILM COATED, EXTENDED RELEASE ORAL 2 TIMES DAILY
Status: DISCONTINUED | OUTPATIENT
Start: 2022-09-12 | End: 2022-09-12 | Stop reason: CLARIF

## 2022-09-12 RX ORDER — NALOXONE HYDROCHLORIDE 0.4 MG/ML
0.4 INJECTION, SOLUTION INTRAMUSCULAR; INTRAVENOUS; SUBCUTANEOUS
Status: DISCONTINUED | OUTPATIENT
Start: 2022-09-12 | End: 2022-09-16 | Stop reason: HOSPADM

## 2022-09-12 RX ORDER — CYCLOBENZAPRINE HCL 5 MG
5 TABLET ORAL 3 TIMES DAILY
Status: DISCONTINUED | OUTPATIENT
Start: 2022-09-12 | End: 2022-09-16 | Stop reason: HOSPADM

## 2022-09-12 RX ORDER — ONDANSETRON 2 MG/ML
4 INJECTION INTRAMUSCULAR; INTRAVENOUS EVERY 6 HOURS PRN
Status: DISCONTINUED | OUTPATIENT
Start: 2022-09-12 | End: 2022-09-16 | Stop reason: HOSPADM

## 2022-09-12 RX ORDER — DIPHENOXYLATE HCL/ATROPINE 2.5-.025MG
1 TABLET ORAL 3 TIMES DAILY PRN
Status: DISCONTINUED | OUTPATIENT
Start: 2022-09-12 | End: 2022-09-16 | Stop reason: HOSPADM

## 2022-09-12 RX ORDER — DILTIAZEM HYDROCHLORIDE 180 MG/1
180 CAPSULE, COATED, EXTENDED RELEASE ORAL DAILY
Status: DISCONTINUED | OUTPATIENT
Start: 2022-09-12 | End: 2022-09-16 | Stop reason: HOSPADM

## 2022-09-12 RX ORDER — ACETAMINOPHEN 325 MG/1
975 TABLET ORAL 3 TIMES DAILY
Status: DISCONTINUED | OUTPATIENT
Start: 2022-09-12 | End: 2022-09-15

## 2022-09-12 RX ORDER — CYCLOBENZAPRINE HCL 5 MG
5 TABLET ORAL 3 TIMES DAILY
Status: DISCONTINUED | OUTPATIENT
Start: 2022-09-12 | End: 2022-09-12

## 2022-09-12 RX ORDER — POTASSIUM CHLORIDE 750 MG/1
10 CAPSULE, EXTENDED RELEASE ORAL 2 TIMES DAILY
Status: DISCONTINUED | OUTPATIENT
Start: 2022-09-12 | End: 2022-09-12

## 2022-09-12 RX ORDER — POTASSIUM CHLORIDE 750 MG/1
10 TABLET, EXTENDED RELEASE ORAL 2 TIMES DAILY
Status: DISCONTINUED | OUTPATIENT
Start: 2022-09-12 | End: 2022-09-16 | Stop reason: HOSPADM

## 2022-09-12 RX ORDER — ONDANSETRON 4 MG/1
4 TABLET, ORALLY DISINTEGRATING ORAL EVERY 6 HOURS PRN
Status: DISCONTINUED | OUTPATIENT
Start: 2022-09-12 | End: 2022-09-16 | Stop reason: HOSPADM

## 2022-09-12 RX ORDER — PANTOPRAZOLE SODIUM 40 MG/1
40 TABLET, DELAYED RELEASE ORAL DAILY
Status: DISCONTINUED | OUTPATIENT
Start: 2022-09-12 | End: 2022-09-16 | Stop reason: HOSPADM

## 2022-09-12 RX ADMIN — PANCRELIPASE 1 CAPSULE: 60000; 12000; 38000 CAPSULE, DELAYED RELEASE PELLETS ORAL at 17:36

## 2022-09-12 RX ADMIN — CYCLOBENZAPRINE 5 MG: 5 TABLET, FILM COATED ORAL at 16:57

## 2022-09-12 RX ADMIN — CYCLOBENZAPRINE 5 MG: 5 TABLET, FILM COATED ORAL at 20:56

## 2022-09-12 RX ADMIN — OXYCODONE HYDROCHLORIDE 2.5 MG: 5 TABLET ORAL at 23:53

## 2022-09-12 RX ADMIN — HYDROMORPHONE HYDROCHLORIDE 0.2 MG: 0.2 INJECTION, SOLUTION INTRAMUSCULAR; INTRAVENOUS; SUBCUTANEOUS at 07:02

## 2022-09-12 RX ADMIN — DILTIAZEM HYDROCHLORIDE 180 MG: 180 CAPSULE, COATED, EXTENDED RELEASE ORAL at 09:25

## 2022-09-12 RX ADMIN — ACETAMINOPHEN 975 MG: 325 TABLET ORAL at 20:55

## 2022-09-12 RX ADMIN — ACETAMINOPHEN 975 MG: 325 TABLET ORAL at 12:03

## 2022-09-12 RX ADMIN — PANTOPRAZOLE SODIUM 40 MG: 40 TABLET, DELAYED RELEASE ORAL at 12:01

## 2022-09-12 RX ADMIN — POTASSIUM CHLORIDE 10 MEQ: 750 TABLET, EXTENDED RELEASE ORAL at 11:57

## 2022-09-12 ASSESSMENT — ACTIVITIES OF DAILY LIVING (ADL)
ADLS_ACUITY_SCORE: 35
ADLS_ACUITY_SCORE: 39
ADLS_ACUITY_SCORE: 35
ADLS_ACUITY_SCORE: 39
ADLS_ACUITY_SCORE: 39
ADLS_ACUITY_SCORE: 35
ADLS_ACUITY_SCORE: 35
ADLS_ACUITY_SCORE: 39
ADLS_ACUITY_SCORE: 35

## 2022-09-12 NOTE — H&P
"[facility]    History and Physical - Hospitalist Service       Date of Admission:  9/11/2022  Dictation #: [id number]  Brief Summary (see dictation for more details): Recent admission for acute-subacute L5 burst fracture, evaluated by Ortho spine- conservative management recommended, discharged home with home care, returned to ER because of ongoing severe right hip/rt LE pain, unable to ambulate and take care of herself at home, she is open to go to TCU at this time; plan for pain management, fall precautions and SW consult.     Clinically Significant Risk Factors Present on Admission                    # Cachexia: Estimated body mass index is 15.69 kg/m  as calculated from the following:    Height as of 9/7/22: 1.6 m (5' 3\").    Weight as of 9/8/22: 40.2 kg (88 lb 9.6 oz).          Britni Lyle MD  Hospitalist Service  [facility]  Securely message with the Vocera Web Console (learn more here)  Text page via [employer] Paging/Directory       "

## 2022-09-12 NOTE — PROGRESS NOTES
Pt was admitted from the ER. Came in for right hip and right lower extremity pain. past medical history of endometrial cancer, status post total abdominal hysterectomy with bilateral salpingo-oophorectomy, status post radiation, in remission, history of radiation colitis, SVT, scoliosis, recent COVID infection and recent admission to [facility] for acute/subacute L5 burst fracture, who presented to ER for evaluation of ongoing right hip and right lower extremity pain. A&OX4. Pure wick in place. Skin check done. Pt made comfortable. Will continue to monitor Pt.

## 2022-09-12 NOTE — H&P
Admitted: 09/11/2022    CHIEF COMPLAINT:  Right hip and right lower extremity pain.    HISTORY OF PRESENT ILLNESS: Has been obtained from the patient, who is a relatively good historian.  I discussed with the ER attending, Dr. Alfaro, and I reviewed her chart as well.    Ms. Georgette Alatorre is a very pleasant 82-year-old female with a past medical history of endometrial cancer, status post total abdominal hysterectomy with bilateral salpingo-oophorectomy, status post radiation, in remission, history of radiation colitis, SVT, scoliosis, recent COVID infection and recent admission to [facility] for acute/subacute L5 burst fracture, who presented to ER for evaluation of ongoing right hip and right lower extremity pain.    The patient lives by herself.  She had a recent admission to [facility] from 09/07/2022 to 09/08/2022 for acute-on-chronic low back pain and right buttock pain.  A CT of the L-spine at that time showed acute to subacute appearing burst type fracture of L5 with loss of approximately 50% vertebral body height.  There is posterior displacement of the fractured L5 vertebral body toward the spinal canal contributing to mild spinal canal narrowing.  She was seen by Dr. Domingo from ortho spine, who reviewed her CAT scan and thought that this represented a stable fracture.  No surgical intervention is needed at this time, and expected recovery time was 3 to 4 months.  She was seen by PT, who recommended her to go to TCU versus home with home care.  The patient preferred to be discharged home as she had a cat at home that she had to take care of.    She went home with her friend.  She states that at home, she transformed her couch into a bed.  She reported that she had a hard time getting up and ambulating into her house as she would have severe right buttock pain radiating to her right leg whenever she would try to get up and put weight on her right lower extremity.  She  states that she took a couple doses of oxycodone as well as Flexeril as prescribed without significant pain control.  She states that she realized that she needs more help and she is not able to manage taking care of herself at home and decided to come to ER for TCU placement.    Upon further questioning, patient denies any fevers.  She denies any headache. No chest pain, no shortness of breath.  She denies abdominal pain.  She reports having some chronic diarrhea related to history of radiation colitis.  She denies any dysuria.  The patient denies red flag symptoms such as perianal numbness, urinary or bowel incontinence.    In ER, she was seen by Dr. Alfaro.  Her vitals showed a blood pressure of 117/86, heart rate 66, respiratory rate 18, oxygen saturation 94% on room air and temperature 98.2.  She did have basic blood work, which showed unremarkable BMP with sodium of 139, potassium 4.1, chloride 104, bicarbonate 30, BUN 15, creatinine 0.71, calcium 8.9, anion gap of 5, glucose 134.  Her CBC with white blood cells 5.1, hemoglobin 10.2, hematocrit 31, platelet count 234.  She was tested negative for COVID-19 infection.    Hospitalist service was called regarding the admission under observational status for TCU placement.    PAST MEDICAL HISTORY:  1.  Recent acute/subacute L5 burst fracture.  2.  Scoliosis.  3.  Osteoporosis.  4.  History of endometrial cancer, status post total abdominal hysterectomy with bilateral salpingo-oophorectomy.  5.  History of radiation colitis.  6.  Paroxysmal SVT.  7.  Normocytic anemia.  8.  Recent COVID-19 infection with PCR positive on 09/07/2022.  9.  Cachexia, BMI is 15.6.    PAST SURGICAL HISTORY:    Past Surgical History:   Procedure Laterality Date     APPENDECTOMY       CATARACT IOL, RT/LT  2009    Cataract IOL Bilateral     COLONOSCOPY  03/30/2006    Normal; repeat in 5 years     CYSTOSCOPY  11/14/2007    Cystoscopy, bilateral retrograde pyelograms, dilatation of left ureteral  stricture, left ureteroscopy and insertion of left double pigtail stent.      ENDOSCOPY  10/22/2008    Upper GI:  Normal     ENDOSCOPY  1/11/2007    Upper GI     EYE SURGERY  2010    retinal eye surgery     GENITOURINARY SURGERY      cystoscopy     GI SURGERY      exploratory lap x2 for bowel obstructions     HYSTERECTOMY, PAP NO LONGER INDICATED  2000    due to endometrial cancer     LAPAROTOMY EXPLORATORY  08/22/2007    1.  Exploratory laparotomy. 2.  Extensive lysis of intra-abdominal adhesions. Performed by Dr Rad Pierce     LAPAROTOMY EXPLORATORY  04/20/2005    1.  Exploratory laparotomy.  2.  Lysis of adhesions.  Segmental small bowel resection Performed by Dr Rad Pierce.     LASER HOLMIUM LITHOTRIPSY URETER(S), INSERT STENT, COMBINED Left 4/29/2021    Procedure: CYSTOSCOPY LEFT URETEROSCOPY;  Surgeon: Shabnam Lee MD;  Location:  OR       SOCIAL HISTORY:  The patient denies smoking.  She does report having 1 to 2 drinks daily with happy hour.  She denies illicit drug abuse.    FAMILY HISTORY:  Reviewed, noncontributory to current admission.      PRIOR TO ADMISSION MEDICATIONS:  acetaminophen (TYLENOL) 325 MG tablet Take 3 tablets (975 mg) by mouth every 8 hours for 7 days 9/11/2022 at am Yes Emma Manning PA-C   9/15/22   amylase-lipase-protease (CREON 12) 31523-78279-13382 units CPEP Take 1 capsule by mouth 3 times daily (with meals) 9/11/2022 at 1x Yes Unknown, Entered By History       Ascorbic Acid (VITAMIN C PO) Take 500 mg by mouth daily  9/11/2022 at Unknown time Yes Unknown, Entered By History Yes     Cholecalciferol (VITAMIN D3 PO) Take 2,000 Units by mouth daily. 9/11/2022 at Unknown time Yes Unknown, Entered By History Yes     cyanocobalamin (VITAMIN B-12) 1000 MCG tablet Take 1,000 mcg by mouth daily 9/11/2022 at Unknown time Yes Unknown, Entered By History       cyclobenzaprine (FLEXERIL) 5 MG tablet Take 1 tablet (5 mg) by mouth 3 times daily 9/11/2022 at 1x AM Yes  Emma Manning PA-C       diltiazem ER (DILT-XR) 180 MG 24 hr capsule Take 1 capsule by mouth once daily 9/11/2022 at am Yes Mario Valdes MD Yes     diphenoxylate-atropine (LOMOTIL) 2.5-0.025 MG tablet Take 1 tablet by mouth 3 times daily as needed for diarrhea  at prn Yes Unknown, Entered By History       Lidocaine (LIDOCARE) 4 % Patch Place 2 patches onto the skin every 24 hours To prevent lidocaine toxicity, patient should be patch free for 12 hrs daily. 9/11/2022 at already removed Yes Emma Manning PA-C       oxyCODONE (ROXICODONE) 5 MG tablet Take 0.5-1 tablets (2.5-5 mg) by mouth every 6 hours as needed for moderate to severe pain 9/11/2022 at 2.5mg 1500 Yes Emma Manning PA-C       pantoprazole (PROTONIX) 40 MG EC tablet Take 40 mg by mouth daily Past Week at Unknown time Yes Unknown, Entered By History       potassium chloride ER (KLOR-CON) 8 MEQ CR tablet Take 8 mEq by mouth 2 times daily AM and Noon  at unsure if today Yes Unknown, Entered By History       senna-docusate (SENOKOT-S/PERICOLACE) 8.6-50 MG tablet Take 1 tablet by mouth 2 times daily as needed for constipation  at PRN Yes Emma Manning PA-C             ALLERGIES:  NO KNOWN DRUG ALLERGIES.    REVIEW OF SYSTEMS:  A 10-point review of systems was conducted and it was negative except for pertinent positives mentioned in history of present illness.    PHYSICAL EXAMINATION:    VITAL SIGNS:  Blood pressure is 117/86, heart rate 65, respiratory rate 18, oxygen saturation 94% on room air, temperature 98.2.  GENERAL:  The patient is awake, alert, no acute distress at the time of my examination.  She does look mildly anxious.  HEENT:  Head normocephalic, atraumatic.  Pupils are equally round and reactive to light.  Oral mucosa is moist.  NECK:  Supple, no cervical lymphadenopathy, no thyromegaly.  CHEST:  There is bilateral air entry.  No wheezing, no rales, no crackles.  CARDIOVASCULAR:  There is  normal S1 and S2, regular rate and rhythm.  No murmurs, no rubs.  ABDOMEN:  Soft, nontender, nondistended.  Bowel sounds are present.  EXTREMITIES:  There is no le swelling, no calf tenderness, 2+ peripheral pulses are palpable.  SKIN:  Intact.  No rash, no cyanosis.  NEUROLOGIC:  The patient is awake, alert, oriented to self, place and time.  There are no focal neurological deficits.  PSYCHIATRIC:  Mildly anxious.  MUSCULOSKELETAL:  There are no obvious joint deformities.  She does have positive straight leg elevation on the right side around 40 degrees.    LABORATORY DATA:  Reviewed and dictated above.    ASSESSMENT:  Ms. Georgette Alatorre is a very pleasant 82-year-old female with past medical history of a recent diagnosed acute to subacute L5 burst fracture, history of endometrial cancer, status post radiation, history of radiation colitis, paroxysmal SVT, osteoporosis, scoliosis, normocytic anemia and recent COVID-19 infection, who presented to ER for further evaluation of right buttock and the right lower extremity pain.    PLAN:    1.  Right buttock/right lower extremity pain.   2.  Acute/subacute L5 burst fracture.   3.  Osteoporosis.  4.  Scoliosis.   The patient had a recent admission to [facility] from 09/07/2022 to 09/08/2022 for evaluation of worsening lower back pain and right buttock pain.  At that time, she was found to have an acute to subacute L5 burst fracture.  She was seen by ortho spine, who recommended no surgical intervention at this time. It was felt that her fracture was stable with expected healing time of 3 to 4 months.  She was seen by PT, who recommended TCU at that time, but the patient preferred to go home with home care.  She was discharged on oxycodone and Flexeril p.r.n.  Unfortunately, the patient realized that she is not able to manage the pain at home and she is not able to take care by herself because of severe pain whenever she tries to get up and walk  around.  She is admitted under observational status.  We will continue at this time with pain control, Tylenol 975 mg p.o. 3 times daily and Flexeril 5 mg p.o. 3 times daily.  She also has an oxycodone 2.5 to 5 mg every 4 hours available p.r.n. as well as a low dose IV Dilaudid.  Fall precautions in place.  PT evaluation and social work consult requested.  The patient is open to go to TCU at this time.     4.  History of paroxysmal SVT.  We will resume her prior to admission diltiazem and potassium supplementation.    5.  History of radiation colitis.  We will continue her prior to admission Creon and Lomotil p.r.n.    6.  Normocytic anemia.  Her hemoglobin today is 10.2.  Her MCV is 97.  Her hemoglobin earlier this month was 10.9.  We will monitor her hemoglobin periodically.  Holding her vitamin B12 supplementation given observational status, but this should be resumed after discharge.     7.  DVT prophylaxis. Encourage ambulation as I anticipate a short hospital stay.    8.  Code status discussed with the patient.  The patient is FULL CODE.    DISPOSITION:  Admit under observational status.  I anticipate patient will be discharged in the next 1 to 2 days pending pain control and TCU placement.    Britni Lyle MD        D: 2022   T: 2022   MT: VERONICA    Name:     MER THOMPSON  MRN:      [id number]        Account:     [id number]   :      1940           Admitted:    2022       Document: [id number]

## 2022-09-12 NOTE — PROGRESS NOTES
[facility]    Medicine Progress Note - Hospitalist Service    Date of Admission:  9/11/2022    Assessment & Plan        Ms. Georgette Alatorre is a very pleasant 82-year-old female with past medical history of a recent diagnosed acute to subacute L5 burst fracture, history of endometrial cancer, status post radiation, history of radiation colitis, paroxysmal SVT, osteoporosis, scoliosis, normocytic anemia and recent COVID-19 infection, who presented to ER for further evaluation of right buttock and the right lower extremity pain.    Acute/subacute L5 burst fracture.   Right buttock/right lower extremity pain, likely sciatica related to the above.   Osteoporosis.  Scoliosis.   The patient had a recent admission to [facility] from 09/07/2022 to 09/08/2022 for evaluation of worsening lower back pain and right buttock pain.  At that time, she was found to have an acute to subacute L5 burst fracture.  She was seen by ortho spine, who recommended no surgical intervention at this time. It was felt that her fracture was stable with expected healing time of 3-4 months.  She was seen by PT, who recommended TCU at that time, but the patient preferred to go home with home care.  She was discharged on oxycodone and Flexeril p.r.n.  Unfortunately, the patient realized that she is not able to manage the pain at home and she is not able to take care by herself because of severe pain whenever she tries to get up and walk around.    *Still cannot tell what the mechanism of her injury was, but states that she was doing a lot of gardening prior to the incident, which involved a lot of bending over and some lifting.  --She is admitted under observational status.    Pain control:   --Tylenol 975 mg p.o. 3 times daily   --Flexeril 5 mg p.o. 3 times daily.     --She also has an oxycodone 2.5 to 5 mg every 4 hours available p.r.n.   --Low dose IV Dilaudid.    --Fall precautions in place.    --Check  "vitamin D level in a.m. -Holding PTA vitamin D supplement while observation status  --PT evaluation and social work consult requested.  The patient is open to go to TCU at this time.     History of paroxysmal SVT.    --Resume PTA diltiazem and potassium supplementation.    History of radiation colitis.    --Continue PTA Creon and Lomotil p.r.n.    Normocytic anemia.  Her hemoglobin today is 10.2.  Her MCV is 97.  Her hemoglobin earlier this month was 10.9.    --We will monitor her hemoglobin periodically.    --Holding her vitamin B12 supplementation given observation status, but this should be resumed after discharge.    Recent Labs   Lab 09/11/22  2101 09/07/22  0652   HGB 10.2* 10.9*          Diet: Regular Diet Adult    DVT Prophylaxis: Encourage ambulation as I anticipate a short hospital stay.  Carrera Catheter: Not present  Central Lines: None  Cardiac Monitoring: None  Code Status: Full Code    Disposition Plan        Admit under observational status.  I anticipate patient will be discharged in the next 1 to 2 days pending pain control and TCU placement.    The patient's care was discussed with the Attending Physician, Dr. Emanuel and Patient.    SHARI Barnes  Hospitalist Service  [facility]  Securely message with the [employer] Web Console (learn more here)  Text page via [employer] Paging/Directory         Clinically Significant Risk Factors Present on Admission                    # Cachexia: Estimated body mass index is 15.69 kg/m  as calculated from the following:    Height as of 9/7/22: 1.6 m (5' 3\").    Weight as of 9/8/22: 40.2 kg (88 lb 9.6 oz).        ______________________________________________________________________    Interval History   Patient lying on the gurney in the ER on my arrival.  She saw significant pain in the lower lumbar spine, radiating mainly to the right buttock and down into the thigh.  She states that her pain is severe with any attempts at movement, " even getting up to the bathroom/commode.  She denies any bowel or bladder dysfunction.  Denies focal weakness or numbness.  No fever, chest pain, shortness of breath, abdominal pain, nausea or vomiting.  Still cannot tell what the mechanism of her injury was, but states that she was doing a lot of gardening prior to the incident, which involved a lot of bending over and some lifting.    Data reviewed today: I reviewed all medications, new labs and imaging results over the last 24 hours.     Physical Exam   Vital Signs: Temp: 98.2  F (36.8  C) Temp src: Oral BP: 136/71 Pulse: 72   Resp: 17 SpO2: 96 % O2 Device: None (Room air)      Constitutional: Adult female, lying on the gurney, alert, oriented to person, place, date, situation.  Cooperative, lying in bed in NAD.   Respiratory:  Lungs CTAB.  No crackles, wheezes, or rhonchi, no labored breathing.  Cardiovascular:  Heart RRR, no MRG, no edema.  GI:  Abdomen soft, NT/ND and with normoactive BS  Skin/Integumen:  Warm, dry, non-diaphoretic.  MSK: CMS x4 intact.  Tenderness to palpation over lumbar spine, R>L, with pain elicited in the R buttock and posterior right thigh in sciatic region      Data   Recent Labs   Lab 09/11/22  2101 09/07/22  0652   WBC 5.1 7.4   HGB 10.2* 10.9*   MCV 97 97    192    141   POTASSIUM 4.1 3.4   CHLORIDE 104 105   CO2 30 26   BUN 15 12   CR 0.71 0.73   ANIONGAP 5 10   ILYA 8.9 8.6   * 100*   ALBUMIN  --  3.2*   PROTTOTAL  --  7.0   BILITOTAL  --  0.8   ALKPHOS  --  62   ALT  --  21   AST  --  25   LIPASE  --  170     No results found for this or any previous visit (from the past 24 hour(s)).  Medications

## 2022-09-12 NOTE — PHARMACY-ADMISSION MEDICATION HISTORY
Pharmacy Medication History  Admission medication history interview status for the 9/11/2022  admission is complete. See EPIC admission navigator for prior to admission medications     Location of Interview: Patient room  Medication history sources: Patient and Surescripts    Significant changes made to the medication list:  none    In the past week, patient estimated taking medication this percent of the time: 50-90% due to other    Additional medication history information:   None    Medication reconciliation completed by provider prior to medication history? No    Time spent in this activity: 20 minutes    Prior to Admission medications    Medication Sig Last Dose Taking? Auth Provider Long Term End Date   acetaminophen (TYLENOL) 325 MG tablet Take 3 tablets (975 mg) by mouth every 8 hours for 7 days 9/11/2022 at am Yes Emma Manning PA-C  9/15/22   amylase-lipase-protease (CREON 12) 11300-67596-04342 units CPEP Take 1 capsule by mouth 3 times daily (with meals) 9/11/2022 at 1x Yes Unknown, Entered By History     Ascorbic Acid (VITAMIN C PO) Take 500 mg by mouth daily  9/11/2022 at Unknown time Yes Unknown, Entered By History Yes    Cholecalciferol (VITAMIN D3 PO) Take 2,000 Units by mouth daily. 9/11/2022 at Unknown time Yes Unknown, Entered By History Yes    cyanocobalamin (VITAMIN B-12) 1000 MCG tablet Take 1,000 mcg by mouth daily 9/11/2022 at Unknown time Yes Unknown, Entered By History     cyclobenzaprine (FLEXERIL) 5 MG tablet Take 1 tablet (5 mg) by mouth 3 times daily 9/11/2022 at 1x AM Yes Emma Manning PA-C     diltiazem ER (DILT-XR) 180 MG 24 hr capsule Take 1 capsule by mouth once daily 9/11/2022 at am Yes Mario Valdes MD Yes    diphenoxylate-atropine (LOMOTIL) 2.5-0.025 MG tablet Take 1 tablet by mouth 3 times daily as needed for diarrhea  at prn Yes Unknown, Entered By History     Lidocaine (LIDOCARE) 4 % Patch Place 2 patches onto the skin every 24 hours To prevent  lidocaine toxicity, patient should be patch free for 12 hrs daily. 9/11/2022 at already removed Yes Emma Manning PA-C     oxyCODONE (ROXICODONE) 5 MG tablet Take 0.5-1 tablets (2.5-5 mg) by mouth every 6 hours as needed for moderate to severe pain 9/11/2022 at 2.5mg 1500 Yes Emma Manning PA-C     pantoprazole (PROTONIX) 40 MG EC tablet Take 40 mg by mouth daily Past Week at Unknown time Yes Unknown, Entered By History     potassium chloride ER (KLOR-CON) 8 MEQ CR tablet Take 8 mEq by mouth 2 times daily AM and Noon  at unsure if today Yes Unknown, Entered By History     senna-docusate (SENOKOT-S/PERICOLACE) 8.6-50 MG tablet Take 1 tablet by mouth 2 times daily as needed for constipation  at PRN Yes Emma Manning PA-C         The information provided in this note is only as accurate as the sources available at the time of update(s)

## 2022-09-13 ENCOUNTER — APPOINTMENT (OUTPATIENT)
Dept: PHYSICAL THERAPY | Facility: CLINIC | Age: 82
End: 2022-09-13
Attending: INTERNAL MEDICINE
Payer: COMMERCIAL

## 2022-09-13 ENCOUNTER — APPOINTMENT (OUTPATIENT)
Dept: CT IMAGING | Facility: CLINIC | Age: 82
End: 2022-09-13
Attending: PHYSICIAN ASSISTANT
Payer: COMMERCIAL

## 2022-09-13 LAB
ANION GAP SERPL CALCULATED.3IONS-SCNC: 6 MMOL/L (ref 3–14)
BUN SERPL-MCNC: 23 MG/DL (ref 7–30)
CALCIUM SERPL-MCNC: 9.1 MG/DL (ref 8.5–10.1)
CHLORIDE BLD-SCNC: 105 MMOL/L (ref 94–109)
CO2 SERPL-SCNC: 30 MMOL/L (ref 20–32)
CREAT SERPL-MCNC: 0.68 MG/DL (ref 0.52–1.04)
DEPRECATED CALCIDIOL+CALCIFEROL SERPL-MC: 46 UG/L (ref 20–75)
ERYTHROCYTE [DISTWIDTH] IN BLOOD BY AUTOMATED COUNT: 13.8 % (ref 10–15)
GFR SERPL CREATININE-BSD FRML MDRD: 86 ML/MIN/1.73M2
GLUCOSE BLD-MCNC: 97 MG/DL (ref 70–99)
HCT VFR BLD AUTO: 31.8 % (ref 35–47)
HGB BLD-MCNC: 10.4 G/DL (ref 11.7–15.7)
MCH RBC QN AUTO: 31.2 PG (ref 26.5–33)
MCHC RBC AUTO-ENTMCNC: 32.7 G/DL (ref 31.5–36.5)
MCV RBC AUTO: 96 FL (ref 78–100)
PLATELET # BLD AUTO: 270 10E3/UL (ref 150–450)
POTASSIUM BLD-SCNC: 3.4 MMOL/L (ref 3.4–5.3)
RBC # BLD AUTO: 3.33 10E6/UL (ref 3.8–5.2)
SODIUM SERPL-SCNC: 141 MMOL/L (ref 133–144)
WBC # BLD AUTO: 4.6 10E3/UL (ref 4–11)

## 2022-09-13 PROCEDURE — 99225 PR SUBSEQUENT OBSERVATION CARE,LEVEL II: CPT | Performed by: PHYSICIAN ASSISTANT

## 2022-09-13 PROCEDURE — 36415 COLL VENOUS BLD VENIPUNCTURE: CPT | Performed by: PHYSICIAN ASSISTANT

## 2022-09-13 PROCEDURE — 97530 THERAPEUTIC ACTIVITIES: CPT | Mod: GP | Performed by: PHYSICAL THERAPIST

## 2022-09-13 PROCEDURE — 97116 GAIT TRAINING THERAPY: CPT | Mod: GP | Performed by: PHYSICAL THERAPIST

## 2022-09-13 PROCEDURE — G0378 HOSPITAL OBSERVATION PER HR: HCPCS

## 2022-09-13 PROCEDURE — 250N000013 HC RX MED GY IP 250 OP 250 PS 637: Performed by: EMERGENCY MEDICINE

## 2022-09-13 PROCEDURE — 250N000013 HC RX MED GY IP 250 OP 250 PS 637: Performed by: INTERNAL MEDICINE

## 2022-09-13 PROCEDURE — 250N000013 HC RX MED GY IP 250 OP 250 PS 637: Performed by: PHYSICIAN ASSISTANT

## 2022-09-13 PROCEDURE — 97161 PT EVAL LOW COMPLEX 20 MIN: CPT | Mod: GP | Performed by: PHYSICAL THERAPIST

## 2022-09-13 PROCEDURE — 72192 CT PELVIS W/O DYE: CPT

## 2022-09-13 PROCEDURE — 80048 BASIC METABOLIC PNL TOTAL CA: CPT | Performed by: PHYSICIAN ASSISTANT

## 2022-09-13 PROCEDURE — 85027 COMPLETE CBC AUTOMATED: CPT | Performed by: PHYSICIAN ASSISTANT

## 2022-09-13 PROCEDURE — 82306 VITAMIN D 25 HYDROXY: CPT | Performed by: PHYSICIAN ASSISTANT

## 2022-09-13 RX ORDER — LIDOCAINE 4 G/G
1 PATCH TOPICAL
Status: DISCONTINUED | OUTPATIENT
Start: 2022-09-13 | End: 2022-09-16 | Stop reason: HOSPADM

## 2022-09-13 RX ORDER — HYDROXYZINE HYDROCHLORIDE 25 MG/1
50 TABLET, FILM COATED ORAL EVERY 6 HOURS PRN
Status: DISCONTINUED | OUTPATIENT
Start: 2022-09-13 | End: 2022-09-16 | Stop reason: HOSPADM

## 2022-09-13 RX ORDER — HYDROXYZINE HYDROCHLORIDE 25 MG/1
25 TABLET, FILM COATED ORAL EVERY 6 HOURS PRN
Status: DISCONTINUED | OUTPATIENT
Start: 2022-09-13 | End: 2022-09-16 | Stop reason: HOSPADM

## 2022-09-13 RX ADMIN — POTASSIUM CHLORIDE 10 MEQ: 750 TABLET, EXTENDED RELEASE ORAL at 14:05

## 2022-09-13 RX ADMIN — CYCLOBENZAPRINE 5 MG: 5 TABLET, FILM COATED ORAL at 14:04

## 2022-09-13 RX ADMIN — CYCLOBENZAPRINE 5 MG: 5 TABLET, FILM COATED ORAL at 21:29

## 2022-09-13 RX ADMIN — PANCRELIPASE 1 CAPSULE: 60000; 12000; 38000 CAPSULE, DELAYED RELEASE PELLETS ORAL at 19:30

## 2022-09-13 RX ADMIN — CYCLOBENZAPRINE 5 MG: 5 TABLET, FILM COATED ORAL at 08:24

## 2022-09-13 RX ADMIN — PANCRELIPASE 1 CAPSULE: 60000; 12000; 38000 CAPSULE, DELAYED RELEASE PELLETS ORAL at 14:42

## 2022-09-13 RX ADMIN — PANCRELIPASE 1 CAPSULE: 60000; 12000; 38000 CAPSULE, DELAYED RELEASE PELLETS ORAL at 08:25

## 2022-09-13 RX ADMIN — LIDOCAINE 1 PATCH: 560 PATCH PERCUTANEOUS; TOPICAL; TRANSDERMAL at 14:05

## 2022-09-13 RX ADMIN — ACETAMINOPHEN 975 MG: 325 TABLET ORAL at 21:29

## 2022-09-13 RX ADMIN — ACETAMINOPHEN 975 MG: 325 TABLET ORAL at 15:53

## 2022-09-13 RX ADMIN — POTASSIUM CHLORIDE 10 MEQ: 750 TABLET, EXTENDED RELEASE ORAL at 08:25

## 2022-09-13 RX ADMIN — OXYCODONE HYDROCHLORIDE 2.5 MG: 5 TABLET ORAL at 09:05

## 2022-09-13 RX ADMIN — ACETAMINOPHEN 975 MG: 325 TABLET ORAL at 08:24

## 2022-09-13 RX ADMIN — PANTOPRAZOLE SODIUM 40 MG: 40 TABLET, DELAYED RELEASE ORAL at 08:24

## 2022-09-13 RX ADMIN — DILTIAZEM HYDROCHLORIDE 180 MG: 180 CAPSULE, COATED, EXTENDED RELEASE ORAL at 08:24

## 2022-09-13 ASSESSMENT — ACTIVITIES OF DAILY LIVING (ADL)
ADLS_ACUITY_SCORE: 38
ADLS_ACUITY_SCORE: 35
ADLS_ACUITY_SCORE: 38
ADLS_ACUITY_SCORE: 35

## 2022-09-13 NOTE — PROGRESS NOTES
PRIMARY DIAGNOSIS: Right Hip Pain and LLE Pain    diagnostic tests and consults completed and resulted : YES    -vital signs normal or at patient baseline : YES  -adequate pain control on oral analgesics: NO  -safe disposition plan has been identified : NO    Nurse to notify provider when observation goals have been met and patient is ready for discharge.

## 2022-09-13 NOTE — PROGRESS NOTES
[facility]    Medicine Progress Note - Hospitalist Service    Date of Admission:  9/11/2022    Assessment & Plan        Ms. Georgette Alatorre is a very pleasant 82-year-old female with past medical history of a recent diagnosed acute to subacute L5 burst fracture, history of endometrial cancer, status post radiation, history of radiation colitis, paroxysmal SVT, osteoporosis, scoliosis, normocytic anemia and recent COVID-19 infection, who presented to ER for further evaluation of right buttock and the right lower extremity pain.    Acute/subacute L5 burst fracture.   Right buttock/right lower extremity pain, likely sciatica or soft tissue injury related to the above.   Osteoporosis.  Scoliosis.   The patient had a recent admission to [facility] from 09/07/2022 to 09/08/2022 for evaluation of worsening lower back pain and right buttock pain.  At that time, she was found to have an acute to subacute L5 burst fracture.  She was seen by ortho spine, who recommended no surgical intervention at this time. It was felt that her fracture was stable with expected healing time of 3-4 months.  She was seen by PT, who recommended TCU at that time, but the patient preferred to go home with home care.  She was discharged on oxycodone and Flexeril p.r.n.  Unfortunately, the patient realized that she is not able to manage the pain at home and she is not able to take care by herself because of severe pain whenever she tries to get up and walk around.    *Patient unsure how her L5 fracture occurred, states that there was no fall or other trauma, but states that she was doing a lot of gardening prior to the incident, which involved a lot of bending over and some lifting.  --Continue observation status.  --Pain seems to be localized to the right hip, tender to palpation.  She denies any trauma or injury as above, but due to concern for occult fracture, a right hip CT was obtained which is  negative for fracture.  Pain control:   --Tylenol 975 mg p.o. 3 times daily   --Flexeril 5 mg p.o. 3 times daily.     --Oxycodone 2.5 to 5 mg every 4 hours available p.r.n.   --Added Lidocaine patch daily and atarax 25-50 mg prn for adjuvant pain.   --Low dose IV Dilaudid only if p.o. medications not adequate.     --Ice/heat   --Continue to monitor, adjust medications as needed  --Fall precautions in place.    --Bone demineralization seen on CT.  Checking vitamin D level-pending. --Holding PTA vitamin D supplement while observation status.  --PT evaluation completed, patient requiring TCU for ongoing therapy.  --SW consulted, is open to go to TCU at this time.  Referrals made.    History of paroxysmal SVT.    --Resume PTA diltiazem and potassium supplement.    History of radiation colitis.    --Continue PTA Creon and Lomotil p.r.n.    Normocytic anemia.    Her hemoglobin today is 10.2.  Her MCV is 97.  Her hemoglobin earlier this month was 10.9.    --We will monitor her hgb periodically.    --Holding her vitamin B12 supplementation given observation status, but this should be resumed after discharge.    Recent Labs   Lab 09/13/22  0621 09/11/22  2101 09/07/22  0652   HGB 10.4* 10.2* 10.9*          Diet: Regular Diet Adult    DVT Prophylaxis: Encourage ambulation as I anticipate a short hospital stay.  Carrera Catheter: Not present  Central Lines: None  Cardiac Monitoring: None  Code Status: Full Code    Disposition Plan     Expected Discharge Date: 09/13/2022             Obs status.  I anticipate patient will be discharged in the next 1-2 days pending pain control and TCU placement.    The patient's care was discussed with the Attending Physician, Dr. Emanuel and Patient.     SHARI Barnes  Hospitalist Service  [facility]  Securely message with the [employer] Web Console (learn more here)  Text page via [employer] Paging/Directory         Clinically Significant Risk Factors Present on  "Admission                    # Cachexia: Estimated body mass index is 15.69 kg/m  as calculated from the following:    Height as of 9/7/22: 1.6 m (5' 3\").    Weight as of 9/8/22: 40.2 kg (88 lb 9.6 oz).        ______________________________________________________________________    Interval History   Patient lying in bed on my arrival.  See social back pain, but mainly reporting pain in the right buttock and hip, radiating down into the thigh.  She states that her pain is moderate-severe with any attempts at movement, even getting up to the bathroom/commode.  Pain medications are helping.  She denies any bowel or bladder dysfunction.  Denies focal weakness or numbness.  No fever, chest pain, shortness of breath, abdominal pain, nausea or vomiting.  Still unsure what the mechanism of her injury was, but states that she was doing a lot of gardening prior to the incident, which involved a lot of bending over and some lifting.     Data reviewed today: I reviewed all medications, new labs and imaging results over the last 24 hours.     Physical Exam   Vital Signs: Temp: 98.1  F (36.7  C) Temp src: Oral BP: 107/67 Pulse: 71   Resp: 18 SpO2: 94 % O2 Device: None (Room air)      Constitutional: Adult female, lying in bed, alert, oriented to person, place, date, situation.  Cooperative, lying in bed in NAD.    Respiratory:  Lungs CTAB.  No crackles, wheezes, or rhonchi, no labored breathing.  Cardiovascular:  Heart RRR, no murmur, no edema.  GI:  Abdomen soft, NT/ND and with normoactive BS  Skin/Integumen:  Warm, dry, non-diaphoretic.  Small area of ecchymosis to right buttock.  MSK: CMS x4 intact.  Tenderness to palpation over lumbar spine, R>L, with pain elicited in the R buttock and posterior right thigh in sciatic region.      Data   Recent Labs   Lab 09/13/22  0621 09/11/22 2101 09/07/22  0652   WBC 4.6 5.1 7.4   HGB 10.4* 10.2* 10.9*   MCV 96 97 97    237 192    139 141   POTASSIUM 3.4 4.1 3.4   CHLORIDE " 105 104 105   CO2 30 30 26   BUN 23 15 12   CR 0.68 0.71 0.73   ANIONGAP 6 5 10   ILYA 9.1 8.9 8.6   GLC 97 134* 100*   ALBUMIN  --   --  3.2*   PROTTOTAL  --   --  7.0   BILITOTAL  --   --  0.8   ALKPHOS  --   --  62   ALT  --   --  21   AST  --   --  25   LIPASE  --   --  170     Recent Results (from the past 24 hour(s))   CT Pelvis Bone wo Contrast    Narrative    CT PELVIS BONE WITHOUT CONTRAST September 13, 2022 12:47 PM    INDICATION: 82-year-old patient with right-sided hip pain.    COMPARISON: 9/7/2022 CT.    TECHNIQUE: Noncontrast. Axial, sagittal and coronal thin-section  reconstruction. Dose reduction techniques were used.   CONTRAST: None.    FINDINGS:     BONES:  -Moderate superior endplate compression fracture of the L5 vertebral  body. Slightly increased compression since the comparison exam.  -No new or additional fractures.  -Bone demineralization.    JOINTS:  -No joint malalignment.  -Advanced right lower lumbar facet arthrosis.  -Mild bilateral sacroiliac degenerative arthrosis.  -Mild bilateral hip degenerative arthrosis.    MUSCULATURE AND SOFT TISSUES:  -No soft tissue fluid collection.  -Moderate fatty atrophy of the bilateral gluteus minimus muscles.  -No asymmetric muscle enlargement.    OTHER:  -Moderate-sized cyst arising from the lower pole of the right kidney.  -Nonobstructive calculus in the lower pole of the left kidney.  -Bowel anastomosis in the pelvis.      Impression    IMPRESSION:  1.  Moderate compression fracture of the L5 vertebral body, slightly  increased since 9/7/2022.  2.  No new or additional fractures.  3.  Degenerative changes in the lower lumbar spine and pelvis.  4.  Bone demineralization.      JOLYNN AVALOS MD         SYSTEM ID:  [id number]     Medications       acetaminophen  975 mg Oral TID     amylase-lipase-protease  1 capsule Oral TID w/meals     cyclobenzaprine  5 mg Oral TID     diltiazem ER COATED BEADS  180 mg Oral Daily     lidocaine  1 patch Transdermal  Q24H     lidocaine   Transdermal Q8H DELFINA     pantoprazole  40 mg Oral Daily     potassium chloride  10 mEq Oral BID     sodium chloride (PF)  3 mL Intracatheter Q8H

## 2022-09-13 NOTE — PLAN OF CARE
Goal Outcome Evaluation: Pt resting comfortably in bed. SBAR given to KURT Man

## 2022-09-13 NOTE — PLAN OF CARE
[facility]      OUTPATIENT PHYSICAL THERAPY EVALUATION  PLAN OF TREATMENT FOR OUTPATIENT REHABILITATION  (COMPLETE FOR INITIAL CLAIMS ONLY)  Patient's Last Name, First Name, M.I.  YOB: 1940  Georgette Alatorre                        Provider's Name  [facility] Medical Record No.  [id number]                               Onset Date:  09/13/22   Start of Care Date:  09/13/22      Type:     _X_PT   ___OT   ___SLP Medical Diagnosis:  L5 Fracture                        PT Diagnosis:  Impaired gait   Visits from SOC:  1   _________________________________________________________________________________  Plan of Treatment/Functional Goals    Planned Interventions: balance training, bed mobility training, gait training, home exercise program, patient/family education, stair training, strengthening, transfer training     Goals: See Physical Therapy Goals on Care Plan in [employer] electronic health record.    Therapy Frequency: 3x/week  Predicted Duration of Therapy Intervention: 09/18/22  _________________________________________________________________________________    I CERTIFY THE NEED FOR THESE SERVICES FURNISHED UNDER        THIS PLAN OF TREATMENT AND WHILE UNDER MY CARE     (Physician co-signature of this document indicates review and certification of the therapy plan).              Certification date from: 09/13/22, Certification date to: 09/18/22    Referring Physician: Britni Lyle MD            Initial Assessment        See Physical Therapy evaluation dated 09/13/22 in Epic electronic health record.

## 2022-09-13 NOTE — UTILIZATION REVIEW
"  [facility] Utilization Review  Admission Status; Secondary Review Determination     Admission Date: 9/11/2022  5:35 PM      Under the authority of the Utilization Management Committee, the utilization review process indicated a secondary review on the above patient.  The review outcome is based on review of the medical records, discussions with staff, and applying clinical experience noted on the date of the review.        (X) Observation Status Appropriate - This patient does not meet hospital inpatient criteria and is placed in observation status. If this patient's primary payer is Medicare and was admitted as an inpatient, Condition Code 44 should be used and patient status changed to \"observation\".   () Observation Status concurrent Review           RATIONALE FOR DETERMINATION   82-year-old female with history of 82-year-old female with history of recently diagnosed acute to subacute L5 burst fracture, endometrial carcinoma status postradiation, radiation colitis, normocytic anemia, recent COVID-19 infection, was admitted to [facility] for evaluation of worsening right buttock and lower back pain, found to have acute to subacute L5 burst fracture, orthospine did not recommend any surgical intervention, was recommended to discharge to TCU but preferred to go home.  Patient was unable to manage pain and was unable to take care of herself at home.  Patient now returns with worsening pain in the right hip and leg, started on scheduled Tylenol, Flexeril, as needed oxycodone, no therapy recommends TCU, patient waiting for placement, does not meet criteria for inpatient stay, recommend continue observation status      The severity of illness, intensity of service provided, expected LOS make the care appropriate for observation status at this time.        The information on this document is developed by the utilization review team in order for the business office to ensure compliance.  This only " denotes the appropriateness of proper admission status and does not reflect the quality of care rendered.         The definitions of Inpatient Status and Observation Status used in making the determination above are those provided in the CMS Coverage Manual, Chapter 1 and Chapter 6, section 70.4.      Sincerely,       Abel Bowers MD  Physician Advisor  Utilization Review-[place]    Phone: [phone]   82-year-old female

## 2022-09-13 NOTE — PROGRESS NOTES
Orientation/Cognitive: A+Ox4  Observation Goals (Met/ Not Met): Not Met  Mobility Level/Assist Equipment: Ambulation with x1 assist  Fall Risk (Y/N): Y  Behavior Concerns: No  Pain Management: PRN oxycodone 2.5 mg @2253 with some effect. Pt still reports mild pain.  Tele/VS/O2: Temp: 98.1  F (36.7  C) Temp src: Oral BP: 107/67 Pulse: 71   Resp: 18 SpO2: 94 %      O2 Device: None (Room air)    ABNL Lab/BG:  Diet: Regular Diet  Bowel/Bladder: BMx1  Skin Concerns: Bruising to right buttocks  Drains/Devices: IV S/L RFA - flushing well without any difficulties.  Tests/Procedures for next shift: Pending BMP. CBC, Vitamin D results  Anticipated DC date & active delays:  Patient Stated Goal for Today:

## 2022-09-13 NOTE — PROGRESS NOTES
Care Management Follow Up    Length of Stay (days): 0    Expected Discharge Date: 09/14/2022     Concerns to be Addressed:       Patient plan of care discussed at interdisciplinary rounds: Yes    Anticipated Discharge Disposition: Transitional Care     Anticipated Discharge Services: None  Anticipated Discharge DME: None    Patient/family educated on Medicare website which has current facility and service quality ratings: yes  Education Provided on the Discharge Plan:    Patient/Family in Agreement with the Plan: yes    Referrals Placed by CM/SW:    Private pay costs discussed: Not applicable    Additional Information:  SW completed [employer] Auth and provided clinicals over the phone.   Reference # [id number]    Auth # [id number] (9/14-9/20)    [facility] will transport patient via [facility] tomorrow 9/14 at 1300.    STAS Zhu, LGSW

## 2022-09-13 NOTE — CONSULTS
Care Management Initial Consult    General Information  Assessment completed with: Patient,    Type of CM/SW Visit: Initial Assessment    Primary Care Provider verified and updated as needed: Yes   Readmission within the last 30 days:        Reason for Consult: discharge planning  Advance Care Planning: Advance Care Planning Reviewed: other (see comments) (Has ACP docs)          Communication Assessment  Patient's communication style: spoken language (English or Bilingual)             Cognitive  Cognitive/Neuro/Behavioral: WDL, orientation        Orientation: oriented x 4             Living Environment:   People in home: alone     Current living Arrangements: house      Able to return to prior arrangements: yes       Family/Social Support:  Care provided by: self  Provides care for: no one  Marital Status: Single  Children          Description of Support System: Involved, Supportive    Support Assessment: Adequate social supports, Adequate family and caregiver support    Current Resources:   Patient receiving home care services: No     Community Resources: None  Equipment currently used at home: none  Supplies currently used at home: None    Employment/Financial:  Employment Status: retired        Financial Concerns:             Lifestyle & Psychosocial Needs:  Social Determinants of Health     Tobacco Use: Not on file   Alcohol Use: Not on file   Financial Resource Strain: Not on file   Food Insecurity: Not on file   Transportation Needs: Not on file   Physical Activity: Not on file   Stress: Not on file   Social Connections: Not on file   Intimate Partner Violence: Not on file   Depression: Not at risk     PHQ-2 Score: 0   Housing Stability: Not on file       Functional Status:  Prior to admission patient needed assistance:              Mental Health Status:          Chemical Dependency Status:                Values/Beliefs:  Spiritual, Cultural Beliefs, Nondenominational Practices, Values that affect care: no                Additional Information:  Per care management/social work consult for discharge planning, patient was admitted on 9/11/2022 with closed fracture of fifth lumbar vertebra. Tentative date of discharge is 9/14/2022. Writer reviewed chart and saw that PT is recommending tcu for patient. Writer talked to patient and she said that she lives at home independently. She doesn't use any equipment. Mentioned tcu recommendation and she said that she would like referrals sent near [place]. She said she's not sure about transport yet and to check in closer to discharge. Patient is covid vaccinated and boosted.    Writer sent referrals via DOD to [facility], [facility], [facility], [facility], and [facility]. [facility] accepted patient for tomorrow. He said to call in morning for a time.    Addendum: Writer let patient know that [facility] accepted her for tomorrow at 1300 and that we received auth. She's in agreement.    Will continue to follow.    CHE Carlos

## 2022-09-13 NOTE — PROGRESS NOTES
"   09/13/22 0800   Quick Adds   Quick Adds Certification   Type of Visit Initial PT Evaluation       Present no   Living Environment   People in Home alone   Current Living Arrangements house   Home Accessibility stairs to enter home;stairs within home   Number of Stairs, Main Entrance 1   Stair Railings, Main Entrance none   Number of Stairs, Within Home, Primary greater than 10 stairs  (13)   Stair Railings, Within Home, Primary railing on right side (ascending)   Transportation Anticipated   (Pt unsure at this time.)   Living Environment Comments Pt lives alone in a house. Pt reports needing to negotiate stairs to enter and within the home. Pt reports she is unsure of how she will transport upon discharge. Pt does not have assist at home.   Self-Care   Usual Activity Tolerance good   Current Activity Tolerance fair   Regular Exercise No   Equipment Currently Used at Home none   Fall history within last six months no   Activity/Exercise/Self-Care Comment Pt reports being IND at baseline with all ADLs. Pt has been ambulating using a FWW. Pt reports stairs are difficult for her due to pain. Pt reports being able to ambulate ~15' w/ FWW before needing a rest break due to pain.   General Information   Onset of Illness/Injury or Date of Surgery 09/13/22   Referring Physician Britni Lyel MD   Patient/Family Therapy Goals Statement (PT) \"To get rid of the pain\"   Pertinent History of Current Problem (include personal factors and/or comorbidities that impact the POC) Per Chart: Ms. Georgette Alatorre is a very pleasant 82-year-old female with a past medical history of endometrial cancer, status post total abdominal hysterectomy with bilateral salpingo-oophorectomy, status post radiation, in remission, history of radiation colitis, SVT, scoliosis, recent COVID infection and recent admission to [facility] for acute/subacute L5 burst fracture, who presented to ER for " evaluation of ongoing right hip and right lower extremity pain.   Existing Precautions/Restrictions fall   Weight-Bearing Status - LLE full weight-bearing   Weight-Bearing Status - RLE full weight-bearing   Cognition   Orientation Status (Cognition) oriented x 3   Pain Assessment   Patient Currently in Pain Yes, see Vital Sign flowsheet  (6/10)   Integumentary/Edema   Integumentary/Edema no deficits were identifed   Posture    Posture Forward head position;Protracted shoulders   Range of Motion (ROM)   Range of Motion ROM is WFL   Strength (Manual Muscle Testing)   Strength (Manual Muscle Testing) Deficits observed during functional mobility   Strength Comments L Hip Flexion: 4/5; R Hip Flexion: 3/5   Bed Mobility   Comment, (Bed Mobility) Supine>sit w/ min A x 1   Transfers   Comment, (Transfers) Sit>stand w/ FWW and min A x 1   Gait/Stairs (Locomotion)   Hazen Level (Gait) contact guard   Assistive Device (Gait) walker, front-wheeled   Distance in Feet (Required for LE Total Joints) 20'  (10' eval)   Comment, (Gait/Stairs) Pt ambulated ~10' w/ FWW and CGA for eval.   Balance   Balance Comments Pt able to sit at EOB unsupported without LOB. Pt ambulates using a FWW for added stability and support.   Sensory Examination   Sensory Perception patient reports no sensory changes   Clinical Impression   Criteria for Skilled Therapeutic Intervention Yes, treatment indicated   PT Diagnosis (PT) Impaired gait   Influenced by the following impairments Decreased activity tolerance; decreased strength; increased pain   Functional limitations due to impairments Impaired functional mobility   Clinical Presentation (PT Evaluation Complexity) Stable/Uncomplicated   Clinical Presentation Rationale Clinical Judgement   Clinical Decision Making (Complexity) low complexity   Planned Therapy Interventions (PT) balance training;bed mobility training;gait training;home exercise program;patient/family education;stair  training;strengthening;transfer training   Risk & Benefits of therapy have been explained evaluation/treatment results reviewed;care plan/treatment goals reviewed;current/potential barriers reviewed;risks/benefits reviewed;participants voiced agreement with care plan;participants included;patient   PT Discharge Planning   PT Discharge Recommendation (DC Rec) Transitional Care Facility   PT Rationale for DC Rec Pt is below baseline. Pt attempted to return home after previous hospital stay but was unable to care for self. Pt currently requiring assist with all functional mobility. Pt unable to attempt stairs at this time due to increased pain and weakness. Pt presenting with deficits in activity tolerance, balance, and strength. Due to these deficits, pt is unsafe to return home alone at this time. Pt would benefit from continued skilled PT services via TCU to address deficits and improve IND with safety and functional mobility.   PT Brief overview of current status Supine>sit w/ min A x 1; sit>stand w/ FWW and min A x 1; gait w/ FWW and CGA   Plan of Care Review   Plan of Care Reviewed With patient   Therapy Certification   Start of care date 09/13/22   Certification date from 09/13/22   Certification date to 09/18/22   Medical Diagnosis L5 Fracture   Total Evaluation Time   Total Evaluation Time (Minutes) 10   Physical Therapy Goals   PT Frequency 3x/week   PT Predicted Duration/Target Date for Goal Attainment 09/18/22   PT Goals Bed Mobility;Transfers;Gait;Stairs   PT: Bed Mobility Independent;Supine to/from sit   PT: Transfers Modified independent;Sit to/from stand;Assistive device   PT: Gait Modified independent;Assistive device;150 feet   PT: Stairs Modified independent;Greater than 10 stairs;Rail on right

## 2022-09-14 ENCOUNTER — APPOINTMENT (OUTPATIENT)
Dept: MRI IMAGING | Facility: CLINIC | Age: 82
End: 2022-09-14
Attending: PHYSICIAN ASSISTANT
Payer: COMMERCIAL

## 2022-09-14 PROCEDURE — 255N000002 HC RX 255 OP 636: Performed by: INTERNAL MEDICINE

## 2022-09-14 PROCEDURE — 250N000013 HC RX MED GY IP 250 OP 250 PS 637: Performed by: EMERGENCY MEDICINE

## 2022-09-14 PROCEDURE — A9585 GADOBUTROL INJECTION: HCPCS | Performed by: INTERNAL MEDICINE

## 2022-09-14 PROCEDURE — 72158 MRI LUMBAR SPINE W/O & W/DYE: CPT

## 2022-09-14 PROCEDURE — 72197 MRI PELVIS W/O & W/DYE: CPT | Mod: 26 | Performed by: RADIOLOGY

## 2022-09-14 PROCEDURE — 99226 PR SUBSEQUENT OBSERVATION CARE,LEVEL III: CPT | Performed by: PHYSICIAN ASSISTANT

## 2022-09-14 PROCEDURE — G0378 HOSPITAL OBSERVATION PER HR: HCPCS

## 2022-09-14 PROCEDURE — 72197 MRI PELVIS W/O & W/DYE: CPT

## 2022-09-14 PROCEDURE — 250N000013 HC RX MED GY IP 250 OP 250 PS 637: Performed by: PHYSICIAN ASSISTANT

## 2022-09-14 PROCEDURE — 250N000013 HC RX MED GY IP 250 OP 250 PS 637: Performed by: INTERNAL MEDICINE

## 2022-09-14 RX ORDER — GADOBUTROL 604.72 MG/ML
4 INJECTION INTRAVENOUS ONCE
Status: COMPLETED | OUTPATIENT
Start: 2022-09-14 | End: 2022-09-14

## 2022-09-14 RX ADMIN — PANTOPRAZOLE SODIUM 40 MG: 40 TABLET, DELAYED RELEASE ORAL at 09:15

## 2022-09-14 RX ADMIN — CYCLOBENZAPRINE 5 MG: 5 TABLET, FILM COATED ORAL at 20:50

## 2022-09-14 RX ADMIN — POTASSIUM CHLORIDE 10 MEQ: 750 TABLET, EXTENDED RELEASE ORAL at 14:21

## 2022-09-14 RX ADMIN — CYCLOBENZAPRINE 5 MG: 5 TABLET, FILM COATED ORAL at 14:21

## 2022-09-14 RX ADMIN — LIDOCAINE 1 PATCH: 560 PATCH PERCUTANEOUS; TOPICAL; TRANSDERMAL at 09:14

## 2022-09-14 RX ADMIN — PANCRELIPASE 1 CAPSULE: 60000; 12000; 38000 CAPSULE, DELAYED RELEASE PELLETS ORAL at 09:17

## 2022-09-14 RX ADMIN — CYCLOBENZAPRINE 5 MG: 5 TABLET, FILM COATED ORAL at 09:15

## 2022-09-14 RX ADMIN — ACETAMINOPHEN 975 MG: 325 TABLET ORAL at 14:21

## 2022-09-14 RX ADMIN — DILTIAZEM HYDROCHLORIDE 180 MG: 180 CAPSULE, COATED, EXTENDED RELEASE ORAL at 08:18

## 2022-09-14 RX ADMIN — POTASSIUM CHLORIDE 10 MEQ: 750 TABLET, EXTENDED RELEASE ORAL at 09:15

## 2022-09-14 RX ADMIN — GADOBUTROL 4 ML: 604.72 INJECTION INTRAVENOUS at 11:50

## 2022-09-14 RX ADMIN — PANCRELIPASE 1 CAPSULE: 60000; 12000; 38000 CAPSULE, DELAYED RELEASE PELLETS ORAL at 14:21

## 2022-09-14 RX ADMIN — OXYCODONE HYDROCHLORIDE 2.5 MG: 5 TABLET ORAL at 04:59

## 2022-09-14 RX ADMIN — ACETAMINOPHEN 975 MG: 325 TABLET ORAL at 08:17

## 2022-09-14 RX ADMIN — ACETAMINOPHEN 975 MG: 325 TABLET ORAL at 20:50

## 2022-09-14 RX ADMIN — PANCRELIPASE 1 CAPSULE: 60000; 12000; 38000 CAPSULE, DELAYED RELEASE PELLETS ORAL at 19:17

## 2022-09-14 ASSESSMENT — ACTIVITIES OF DAILY LIVING (ADL)
ADLS_ACUITY_SCORE: 38
ADLS_ACUITY_SCORE: 35
ADLS_ACUITY_SCORE: 38
ADLS_ACUITY_SCORE: 35
ADLS_ACUITY_SCORE: 38

## 2022-09-14 NOTE — PROGRESS NOTES
PRIMARY DIAGNOSIS: Right Hip Pain and LLE Pain     diagnostic tests and consults completed and resulted : MET    -vital signs normal or at patient baseline : MET  -adequate pain control on oral analgesics: NOT MET  -safe disposition plan has been identified : NOT MET    Nurse to notify provider when observation goals have been met and patient is ready for discharge.

## 2022-09-14 NOTE — PROGRESS NOTES
MRI result is out. Called to inform Dr. Cedeño and get clearance for discontinue. Spoke with Sharita and was told hat Dr. Cedeño is in surgery until the end of day. CN and Care coordinator informed. RN will continue to monitor Pt.

## 2022-09-14 NOTE — PROGRESS NOTES
Care Management Discharge Note    Discharge Date: 09/14/2022       Discharge Disposition: Transitional Care    Discharge Services: None    Discharge DME: None    Discharge Transportation:  [facility]    Private pay costs discussed: Not applicable    PAS Confirmation Code:    Patient/family educated on Medicare website which has current facility and service quality ratings: yes    Education Provided on the Discharge Plan: yes  Persons Notified of Discharge Plans: yes  Patient/Family in Agreement with the Plan: yes    Handoff Referral Completed: Yes    Additional Information:  Hospitalist asked for writer to change time of discharge to later this afternoon as patient still needs to get MRI before discharge. Writer called [facility] and set up the discharge for 1600 today.    CHE Carlos

## 2022-09-14 NOTE — PROGRESS NOTES
Orientation/Cognitive: A+Ox4  Observation Goals (Met/ Not Met): Not Met  Mobility Level/Assist Equipment: Ambulation with x1 assist  Fall Risk (Y/N): Y  Behavior Concerns: No  Pain Management: PRN oxycodone 2.5 mg @0459 with some effect. Pt still reports mild pain.  Tele/VS/O2: VSS. Room air. Temp: 97.7  F (36.5  C) Temp src: Oral BP: 116/73 Pulse: 69   Resp: 18 SpO2: 93 %      O2 Device: None (Room air)    ABNL Lab/BG:  HGB 10.4 ON 9/13 lab draw.   Diet: Regular Diet  Bowel/Bladder: Purwick in place. No BM on shift.  Skin Concerns: Bruising to right buttocks. Turned and repositioned and offloaded.  Drains/Devices: IV S/L RFA - flushing well without any difficulties.  Tests/Procedures for next shift: None.  Anticipated DC date & active delays: TCU ON 9/14 at 1300.  Patient Stated Goal for Today: Pain control.

## 2022-09-14 NOTE — PLAN OF CARE
Physical Therapy Discharge Summary    Reason for therapy discharge:    Discharged to transitional care facility.    Progress towards therapy goal(s). See goals on Care Plan in [facility] electronic health record for goal details.  Goals not met.  Barriers to achieving goals:   discharge from facility.    Therapy recommendation(s):    Continued therapy is recommended.  Rationale/Recommendations:  In TCU to increase strength and functional mobility.

## 2022-09-14 NOTE — CONSULTS
Orthopedic Surgery Consult / History and Physical    Georgette Alatorre MRN# [id number]   Age: 82 year old YOB: 1940     Date of Admission:   9/11/2022  Date of Consult: 09/14/22   Location:    [facility]             Assessment and Plan:   Assessment:  Right L5 radiculopathy due to lateral recess stenosis L4-5 due in part to compression fracture L5     Plan:  1. Discussed the options for the patient's right lower extreme radicular pain.  Options include observation with potential improvement with time, trial of epidural steroid injection, decompression surgery.  2. At present the patient would like to proceed with giving the symptoms time to see if they improve.  She would like to discharge to rehab initially and see how symptoms go, rather than having an epidural steroid injection prior to discharge.  3. I noted that she should contact my office at [facility] orthopedics ([phone]) if she would like to have the injection ordered as an outpatient, we would be happy to facilitate.  4. Follow up in my clinic if symptoms are not improving             Chief Complaint:   Right lower extremity radicular pain           History of Present Illness:   This patient is a 82 year old female with a significant past medical history of paroxysmal SVT who presents with right lower extremity radicular pain.    She reports that approximately 1 week ago she had new onset right buttock and lower extreme radicular pain which progressed.  She was initially hospitalized from 9/7/22 to 9/8/22 before discharging home.  She noted that she was unable to manage her symptoms at home and then readmitted.     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, colitis     Endometrial ca (H)      Gastro-oesophageal reflux disease      History of blood transfusion     no adverse reactions     History of endometrial cancer     no recurrence     Hydronephrosis      Irregular heart beat     PSVT     Kidney stone      Kidney stones      Osteoporosis      Osteoporosis      PSVT (paroxysmal supraventricular tachycardia) (H)      Scoliosis      Small bowel obstruction (H)      Small bowel obstruction (H)     recurrent     SVT (supraventricular tachycardia) (H)             Past Surgical History:     Past Surgical History:   Procedure Laterality Date     APPENDECTOMY       CATARACT IOL, RT/LT  2009    Cataract IOL Bilateral     COLONOSCOPY  03/30/2006    Normal; repeat in 5 years     CYSTOSCOPY  11/14/2007    Cystoscopy, bilateral retrograde pyelograms, dilatation of left ureteral stricture, left ureteroscopy and insertion of left double pigtail stent.      ENDOSCOPY  10/22/2008    Upper GI:  Normal     ENDOSCOPY  1/11/2007    Upper GI     EYE SURGERY  2010    retinal eye surgery     GENITOURINARY SURGERY      cystoscopy     GI SURGERY      exploratory lap x2 for bowel obstructions     HYSTERECTOMY, PAP NO LONGER INDICATED  2000    due to endometrial cancer     LAPAROTOMY EXPLORATORY  08/22/2007    1.  Exploratory laparotomy. 2.  Extensive lysis of intra-abdominal adhesions. Performed by Dr Rad Pierce     LAPAROTOMY EXPLORATORY  04/20/2005    1.  Exploratory laparotomy.  2.  Lysis of adhesions.  Segmental small bowel resection Performed by Dr Rad Pierce.     LASER HOLMIUM LITHOTRIPSY URETER(S), INSERT STENT, COMBINED Left 4/29/2021    Procedure: CYSTOSCOPY LEFT URETEROSCOPY;  Surgeon: Shabnam Lee MD;  Location:  OR            Social History:   Smoking: None      Social History     Tobacco Use      Smoking status: Former Smoker     Packs/day: 0.50     Years: 40.00     Pack years: 20.00     Smokeless tobacco: Never Used   Substance Use Topics     Alcohol use: Yes     Alcohol/week: 0.0 standard drinks     Comment: 1 glass of wine with dinner            Family History:   Denies family history of bleeding or clotting disorders  No family history on file.         Allergies:     Allergies   Allergen Reactions     No Known Allergies             Medications:   Anticoagulants: None  Medications Prior to Admission   Medication Sig Dispense Refill Last Dose     acetaminophen (TYLENOL) 325 MG tablet Take 3 tablets (975 mg) by mouth every 8 hours for 7 days 63 tablet 0 9/11/2022 at am     amylase-lipase-protease (CREON 12) 42927-22380-03846 units CPEP Take 1 capsule by mouth 3 times daily (with meals)   9/11/2022 at 1x     Ascorbic Acid (VITAMIN C PO) Take 500 mg by mouth daily    9/11/2022 at Unknown time     Cholecalciferol (VITAMIN D3 PO) Take 2,000 Units by mouth daily.   9/11/2022 at Unknown time     cyanocobalamin (VITAMIN B-12) 1000 MCG tablet Take 1,000 mcg by mouth daily   9/11/2022 at Unknown time     cyclobenzaprine (FLEXERIL) 5 MG tablet Take 1 tablet (5 mg) by mouth 3 times daily 20 tablet 3 9/11/2022 at 1x AM     diltiazem ER (DILT-XR) 180 MG 24 hr capsule Take 1 capsule by mouth once daily 90 capsule 0 9/11/2022 at am     diphenoxylate-atropine (LOMOTIL) 2.5-0.025 MG tablet Take 1 tablet by mouth 3 times daily as needed for diarrhea    at prn     Lidocaine (LIDOCARE) 4 % Patch Place 2 patches onto the skin every 24 hours To prevent lidocaine toxicity, patient should be patch free for 12 hrs daily. 6 patch 3 9/11/2022 at already removed     oxyCODONE (ROXICODONE) 5 MG tablet Take 0.5-1 tablets (2.5-5 mg) by mouth every 6 hours as needed for moderate to severe pain 15 tablet 0 9/11/2022 at 2.5mg 1500     pantoprazole (PROTONIX) 40 MG EC tablet Take 40 mg by mouth daily   Past Week at Unknown time      potassium chloride ER (KLOR-CON) 8 MEQ CR tablet Take 8 mEq by mouth 2 times daily AM and Noon    at unsure if today     senna-docusate (SENOKOT-S/PERICOLACE) 8.6-50 MG tablet Take 1 tablet by mouth 2 times daily as needed for constipation 12 tablet 0  at PRN             Review of Systems:   A 10 point review of systems was performed, and was negative except as noted in HPI.         Physical Exam:     Patient Vitals for the past 8 hrs:   BP Temp Temp src Pulse Resp SpO2   09/14/22 1637 112/60 98  F (36.7  C) Oral 64 18 96 %   09/14/22 1100 120/71 98.3  F (36.8  C) Oral 90 18 95 %       General: awake, alert, cooperative, no apparent distress, appears stated age  HEENT: normal  Respiratory: breathing non-labored  Cardiovascular: skin warm and well perfused  Skin: no rashes or lesions  Abdomen:  non-distended  Lymph:  No abnormal swelling of uninjured extremities  Heme:  No petechiae  Neurological: CN II-XII grossly intact  Musculoskeletal:       Spine:   Skin: intact over lumbar spine and right hip without evidence of infection    Small palpable mass in the right gluteal muscles, mild tenderness to palpation though does not reproduce the patient's symptoms of radiculopathy down the leg     Sensation:      R       L    L3:   Intact   Intact    L4:   Intact   Intact    L5:   Intact   Intact    S1:   Intact   Intact     Motor:     R L    L3: Psoas    5  5    L4:   Quad    5  5    L4: TA   5 5    L5: EHL    5  5    S1: Eversion/PF  5  5                 Data:   MRI lumbar spine: Likely subacute L5 burst fracture causing lateral recess stenosis at the L4-5 level with compression of the traversing L5 nerve root.  Likely hematoma along the posterior aspect of the L5 vertebral body, does not appear to be a tumor to my interpretation          Kailash Perez MD  Orthopaedic Spine Surgery  [facility]

## 2022-09-14 NOTE — PROGRESS NOTES
Care Management Follow Up    Length of Stay (days): 0    Expected Discharge Date: 09/14/2022     Concerns to be Addressed:       Patient plan of care discussed at interdisciplinary rounds: Yes    Anticipated Discharge Disposition: Transitional Care     Anticipated Discharge Services: None  Anticipated Discharge DME: None    Patient/family educated on Medicare website which has current facility and service quality ratings: yes  Education Provided on the Discharge Plan:    Patient/Family in Agreement with the Plan: yes    Referrals Placed by CM/SW:    Private pay costs discussed: Not applicable    Additional Information:  SW completed the PAS and placed on chart.     PAS-RR    D: Per DHS regulation, CHINTAN completed and submitted PAS-RR to [place] [employer] Direct Connect via the Senior LinkAge Line.  PAS-RR confirmation # is : [id number]      P: Further questions may be directed to [facility] LinkAge Line at #[phone], option #4 for PAS-RR staff.          CHE Spangler

## 2022-09-14 NOTE — PROGRESS NOTES
PRIMARY DIAGNOSIS: Right Hip Pain and LLE Pain     diagnostic tests and consults completed and resulted : MET    -vital signs normal or at patient baseline : MET  -adequate pain control on oral analgesics: NOT MET  -safe disposition plan has been identified : MET    Nurse to notify provider when observation goals have been met and patient is ready for discharge.

## 2022-09-14 NOTE — PROGRESS NOTES
[facility]    Medicine Progress Note - Hospitalist Service    Date of Admission:  9/11/2022    Assessment & Plan        Ms. Georgette Alatorre is a very pleasant 82-year-old female with past medical history of a recent diagnosed acute to subacute L5 burst fracture, history of endometrial cancer, status post radiation, history of radiation colitis, paroxysmal SVT, osteoporosis, scoliosis, normocytic anemia and recent COVID-19 infection, who presented to ER for further evaluation of right buttock and the right lower extremity pain.    Acute/subacute L5 burst fracture.   Right buttock/right lower extremity pain, likely sciatica or soft tissue injury related to the above.   Osteoporosis.  Scoliosis.   The patient had a recent admission to [facility] from 09/07/2022 to 09/08/2022 for evaluation of worsening lower back pain and right buttock pain.  At that time, she was found to have an acute to subacute L5 burst fracture.  She was seen by ortho spine, who recommended no surgical intervention at this time. It was felt that her fracture was stable with expected healing time of 3-4 months.  She was seen by PT, who recommended TCU at that time, but the patient preferred to go home with home care. She was discharged on oxycodone and Flexeril p.r.n. Unfortunately, the patient realized that she is not able to manage the pain at home and she is not able to take care by herself because of severe pain whenever she tries to get up and walk around.    *Patient unsure how her L5 fracture occurred, states that there was no fall or other trauma, but states that she was doing a lot of gardening prior to the incident, which involved a lot of bending over and some lifting.  -- Continue observation status.  -- No low back pain; pain is significant in the right hip/gluteal area and notes 9/14/22 significant radiculopathy down right leg. She denies any trauma or injury as above, but due to concern  for occult fracture, a right hip CT was obtained 9/13 which was negative for fracture.Now also with a 3cm hard, palpable mass underlying bruise right gluteal area.   Pain control:   --Tylenol 975 mg p.o. 3 times daily   --Flexeril 5 mg p.o. 3 times daily.     --Oxycodone 2.5 to 5 mg every 4 hours available p.r.n.   --Added Lidocaine patch daily and atarax 25-50 mg prn for adjuvant pain.   --Low dose IV Dilaudid only if p.o. medications not adequate.     --Ice/heat   --Continue to monitor, adjust medications as needed   -- if evidence of impingement, could consider steroids, though cautious given bone demineralization  --Fall precautions in place.    --Bone demineralization seen on CT. Vitamin D level 46.   --Holding PTA vitamin D supplement while observation status.  -- MRI L-spine and pelvis to further evaluate right hip/gluteal pain, radiculopathy, and hard palpable mass  -- Has placement at [facility] --> awaiting MRI and ortho-spine input prior to discharge    History of paroxysmal SVT.    -- PTA diltiazem and potassium supplement.    History of radiation colitis.    --Continue PTA Creon and Lomotil p.r.n.    Normocytic anemia.    Her hemoglobin 10/13 was 10.4; MCV 96.  Her hemoglobin earlier this month was 10.9.    --We will monitor her hgb periodically.    --Holding her vitamin B12 supplementation given observation status, but this should be resumed after discharge.    Recent Labs   Lab 09/13/22  0621 09/11/22  2101   HGB 10.4* 10.2*        Diet: Regular Diet Adult    DVT Prophylaxis: Encourage ambulation as I anticipate a short hospital stay.  Carrera Catheter: Not present  Central Lines: None  Cardiac Monitoring: None  Code Status: Full Code    Disposition Plan      Expected Discharge Date: 09/14/2022,  4:00 PM  Discharge Delays: Placement - TCU  Imaging Result Pending (enter specific test & time in comments)  Specialist Consult (enter specialist & decision needed in comments)         Obs status.  I anticipate  "patient will be discharged in the next 1-2 days pending pain control and TCU placement.    The patient's care was discussed with the Attending Physician, Dr. Emanuel and Patient.     Conchita Newman PA-C  Hospitalist Service  [facility]  Securely message with the Vocera Web Console (learn more here)  Text page via [employer] Paging/Directory         Clinically Significant Risk Factors Present on Admission                    # Cachexia: Estimated body mass index is 15.69 kg/m  as calculated from the following:    Height as of 9/7/22: 1.6 m (5' 3\").    Weight as of 9/8/22: 40.2 kg (88 lb 9.6 oz).        ______________________________________________________________________    Interval History   Patient lying in bed. Denies back pain - states pain is in right butt/hip area and radiates down right leg - is achy pain - moderate-severe with any attempts at movement, even getting up to the bathroom/commode as well as rolling over in bed. Pain medications are helping but pain still marked with movements.  She denies any bowel or bladder dysfunction. Denies focal weakness or numbness.  No fever, chest pain, shortness of breath, abdominal pain, nausea or vomiting. States she can feel a hard, raised area on her left buttock.      Data reviewed today: I reviewed all medications, new labs and imaging results over the last 24 hours.     Physical Exam   Vital Signs: Temp: 98  F (36.7  C) Temp src: Oral BP: 112/60 Pulse: 64   Resp: 18 SpO2: 96 % O2 Device: None (Room air)      Constitutional: Adult female, lying in bed, alert, oriented to person, place, date, situation.  Cooperative, lying in bed in NAD.    Respiratory:  Lungs CTAB.  No crackles, wheezes, or rhonchi, no labored breathing.  Cardiovascular:  Heart RRR, no murmur, no edema.  GI:  Abdomen soft, NT/ND and with normoactive BS  Skin/Integumen:  Warm, dry, non-diaphoretic. ~3-4 area of ecchymosis to right buttock with underlying hard, " well-circumscribed, non-moveable lesion - states tenderness to pressure there, though mild. Skin in area is intact.   MSK: CMS x4 intact. Tenderness to palpation over lumbar spine, R>L, with pain elicited in the R buttock and posterior right thigh in sciatic region.      Data   Recent Labs   Lab 09/13/22  0621 09/11/22  2101   WBC 4.6 5.1   HGB 10.4* 10.2*   MCV 96 97    237    139   POTASSIUM 3.4 4.1   CHLORIDE 105 104   CO2 30 30   BUN 23 15   CR 0.68 0.71   ANIONGAP 6 5   ILYA 9.1 8.9   GLC 97 134*     Recent Results (from the past 24 hour(s))   MR Lumbar Spine w/o & w Contrast    Narrative    MRI OF THE LUMBAR SPINE WITHOUT AND WITH CONTRAST 9/14/2022 1:44 PM     HISTORY: CT lumbar spine 9/7/2022.    TECHNIQUE: Multiplanar, multisequence MRI images of the lumbar spine  were acquired without and with 4 mL Gadavist IV contrast.    COMPARISON: None.    FINDINGS: There are five lumbar-type vertebrae for the purposes of  this dictation.     Severe left convex curvature of the lumbar spine centered at L3-L4  again noted. Alignment otherwise normal. Compression fracture  deformity of the L5 vertebral body again noted. No definite change in  vertebral body height since the recent comparison CT scan. Bone marrow  edema suggests this is a subacute fracture. Vertebral body heights of  the lumbar spine are otherwise normal. No other fractures.    There is minimal posterior disc bulging of the L1-L2, L2-L3 and L3-L4  intervertebral discs. The L4-L5 and L5-S1 discs appear within normal  limits in contour and height.    The tip of the conus medullaris is at the upper L2 level which is  within normal limits. There is heterogeneously T2 hyperintense,  heterogeneously enhancing soft tissue density in the anterior epidural  space extending from L4-L5 down to mid S1 that could represent a  hematoma related to the fracture although tumor extension in the  setting of a pathologic fracture is not excluded.    No high-grade  spinal canal stenosis of the lumbar spine. Moderate  degenerative neural foraminal stenosis on the right at L1-L2 and L2-L3  and on the left at L2-L3, L3-L4 and L4-L5.         Impression    IMPRESSION:  1. Subacute compression fracture of the L5 vertebral body again noted.  2. Heterogeneously T2 hyperintense, heterogeneously enhancing soft  tissue density in the anterior epidural space extending from L4-L5  down to mid S1 that could represent a hematoma related to the fracture  although tumor extension in the setting of a pathologic fracture is  not excluded.  3. Diffuse degenerative change of the lumbar spine.  4. No spinal canal stenosis.    IAN BUENO MD         SYSTEM ID:  [id number]   MR Pelvis Musclular Tissue wo & w Contrast    Narrative    MR pelvis without and with contrast 9/14/2022 2:21 PM    Techniques: Multiplanar multisequence imaging of the pelvis was  obtained before and after administration of  intravenous contrast  using routing MSK protocol.    Contrast: 4mL Gadavist    History: R gluteal pain radiating down leg; known L5 burst fracture;  palpable hard, SubQ mass underlying skin right gluteus     Comparison: CT 9/13/2022    Findings:     External marker is placed over right buttock.    Underlying the external marker, focal subcutaneous fat stranding with  underlying 17 x 18 x 13 mm rim enhancing centrally T2 intermediate to  hyperintense intramuscular focus within the right gluteus lisa with  perilesional edema and enhancement. There is linear enhancement  extending down to the ischial tuberosity region. Also wider area of  regional asymmetric subcutaneous edema of the right buttock area  extends anterior and cranially over the greater trochanter region.     Osseous structures  Osseous structures: Specifically, no evidence of osteomyelitis at the  right ischial tuberosity. Redemonstration moderate to high-grade  compression deformity of the L5 vertebral body with associated edema,  better  assessed on the same day lumbar spine MRI. Otherwise, no  fracture.    Subchondral edema across intensity at the right sacroiliac joint,  presumably degenerative.    Internal derangement of joints are not well assessed owing to chosen  field of view.    Joint and Periarticular soft tissue:    Sacroiliac joints and pubic symphysis are congruent.    Joint effusion: A physiologic amount of joint fluid in bilateral hip.    Bursal effusion: Mild nonspecific edema over the left greater  trochanter. No substantial iliopsoas or trochanteric bursal effusion.    Muscles and tendons  Muscles and tendons: Bilateral symmetric adductor and piriformis  muscle edema, likely related to muscle strain. Proximal hamstrings,  rectus femoris, sartorius, and iliopsoas tendons intact bilaterally.  The hip abductors are intact bilaterally.     Nerves:  The visualized course of the sciatic nerves are unremarkable  bilaterally.    Other Findings:  Redemonstration large right renal cyst.      Impression    Impression: Underlying the external marker, focal subcutaneous fat  stranding with underlying 17 x 18 x 13 mm rim enhancing centrally T2  intermediate to hyperintense intramuscular focus within the right  gluteus lisa. There is linear enhancement extending down to the  ischial tuberosity region. Differential consideration include  developing sinus track/phlegmon. Correlate clinically for associated  ischial decubitus ulcer. No osteomyelitis. Alternatively, this may be  due to muscle strain/contusion with muscle tear/hematoma. In absence  of trauma history, spontaneous hematoma should be followed up for  resolution to ensure no underlying mass.    JOSEY PATRICIO         SYSTEM ID:  [id number]     Medications       acetaminophen  975 mg Oral TID     amylase-lipase-protease  1 capsule Oral TID w/meals     cyclobenzaprine  5 mg Oral TID     diltiazem ER COATED BEADS  180 mg Oral Daily     lidocaine  1 patch Transdermal Q24H     lidocaine    Transdermal Q8H DELFINA     pantoprazole  40 mg Oral Daily     potassium chloride  10 mEq Oral BID     sodium chloride (PF)  3 mL Intracatheter Q8H

## 2022-09-14 NOTE — PROGRESS NOTES
Care Management Follow Up    Length of Stay (days): 0    Expected Discharge Date: 09/14/2022     Concerns to be Addressed:       Patient plan of care discussed at interdisciplinary rounds: Yes    Anticipated Discharge Disposition: Transitional Care     Anticipated Discharge Services: None  Anticipated Discharge DME: None    Patient/family educated on Medicare website which has current facility and service quality ratings: yes  Education Provided on the Discharge Plan:    Patient/Family in Agreement with the Plan: yes    Referrals Placed by CM/SW:    Private pay costs discussed: Not applicable    Additional Information:  The patient's PA stated the patient isn't ready to discharge d/t not getting the results the MRI and needing to be seen by ortho. The SW called and spoke to Orestes at [facility] and provided an update. Orestes will coordinate a time for the admission on Thursday.     CHINTAN will continue to follow.       CHE Spangler

## 2022-09-15 ENCOUNTER — APPOINTMENT (OUTPATIENT)
Dept: GENERAL RADIOLOGY | Facility: CLINIC | Age: 82
End: 2022-09-15
Attending: ORTHOPAEDIC SURGERY
Payer: COMMERCIAL

## 2022-09-15 LAB
CRP SERPL-MCNC: 7.4 MG/L (ref 0–8)
ERYTHROCYTE [DISTWIDTH] IN BLOOD BY AUTOMATED COUNT: 13.8 % (ref 10–15)
HCT VFR BLD AUTO: 33.2 % (ref 35–47)
HGB BLD-MCNC: 10.7 G/DL (ref 11.7–15.7)
MCH RBC QN AUTO: 31.7 PG (ref 26.5–33)
MCHC RBC AUTO-ENTMCNC: 32.2 G/DL (ref 31.5–36.5)
MCV RBC AUTO: 98 FL (ref 78–100)
PLATELET # BLD AUTO: 334 10E3/UL (ref 150–450)
RBC # BLD AUTO: 3.38 10E6/UL (ref 3.8–5.2)
WBC # BLD AUTO: 4 10E3/UL (ref 4–11)

## 2022-09-15 PROCEDURE — 250N000013 HC RX MED GY IP 250 OP 250 PS 637: Performed by: PHYSICIAN ASSISTANT

## 2022-09-15 PROCEDURE — G0378 HOSPITAL OBSERVATION PER HR: HCPCS

## 2022-09-15 PROCEDURE — 99226 PR SUBSEQUENT OBSERVATION CARE,LEVEL III: CPT | Performed by: PHYSICIAN ASSISTANT

## 2022-09-15 PROCEDURE — 250N000013 HC RX MED GY IP 250 OP 250 PS 637: Performed by: INTERNAL MEDICINE

## 2022-09-15 PROCEDURE — 255N000002 HC RX 255 OP 636: Performed by: PHYSICIAN ASSISTANT

## 2022-09-15 PROCEDURE — 250N000009 HC RX 250: Performed by: PHYSICIAN ASSISTANT

## 2022-09-15 PROCEDURE — 36415 COLL VENOUS BLD VENIPUNCTURE: CPT | Performed by: PHYSICIAN ASSISTANT

## 2022-09-15 PROCEDURE — 85027 COMPLETE CBC AUTOMATED: CPT | Performed by: PHYSICIAN ASSISTANT

## 2022-09-15 PROCEDURE — 250N000011 HC RX IP 250 OP 636: Performed by: PHYSICIAN ASSISTANT

## 2022-09-15 PROCEDURE — 250N000013 HC RX MED GY IP 250 OP 250 PS 637: Performed by: EMERGENCY MEDICINE

## 2022-09-15 PROCEDURE — 86140 C-REACTIVE PROTEIN: CPT | Performed by: PHYSICIAN ASSISTANT

## 2022-09-15 PROCEDURE — 62323 NJX INTERLAMINAR LMBR/SAC: CPT

## 2022-09-15 RX ORDER — ACETAMINOPHEN 325 MG/1
650 TABLET ORAL 3 TIMES DAILY
Status: DISCONTINUED | OUTPATIENT
Start: 2022-09-15 | End: 2022-09-16 | Stop reason: HOSPADM

## 2022-09-15 RX ORDER — ACETAMINOPHEN 325 MG/1
650 TABLET ORAL 3 TIMES DAILY
Status: ON HOLD | DISCHARGE
Start: 2022-09-15 | End: 2023-02-26

## 2022-09-15 RX ORDER — IOPAMIDOL 408 MG/ML
10 INJECTION, SOLUTION INTRATHECAL ONCE
Status: COMPLETED | OUTPATIENT
Start: 2022-09-15 | End: 2022-09-15

## 2022-09-15 RX ORDER — LIDOCAINE 4 G/G
1 PATCH TOPICAL EVERY 24 HOURS
Qty: 6 PATCH | Refills: 3 | DISCHARGE
Start: 2022-09-15 | End: 2023-02-22

## 2022-09-15 RX ORDER — DEXAMETHASONE SODIUM PHOSPHATE 10 MG/ML
20 INJECTION, SOLUTION INTRAMUSCULAR; INTRAVENOUS ONCE
Status: COMPLETED | OUTPATIENT
Start: 2022-09-15 | End: 2022-09-15

## 2022-09-15 RX ORDER — LIDOCAINE HYDROCHLORIDE 10 MG/ML
30 INJECTION, SOLUTION EPIDURAL; INFILTRATION; INTRACAUDAL; PERINEURAL ONCE
Status: COMPLETED | OUTPATIENT
Start: 2022-09-15 | End: 2022-09-15

## 2022-09-15 RX ADMIN — OXYCODONE HYDROCHLORIDE 2.5 MG: 5 TABLET ORAL at 04:34

## 2022-09-15 RX ADMIN — IOPAMIDOL 1 ML: 408 INJECTION, SOLUTION INTRATHECAL at 15:38

## 2022-09-15 RX ADMIN — PANTOPRAZOLE SODIUM 40 MG: 40 TABLET, DELAYED RELEASE ORAL at 07:50

## 2022-09-15 RX ADMIN — CYCLOBENZAPRINE 5 MG: 5 TABLET, FILM COATED ORAL at 07:50

## 2022-09-15 RX ADMIN — ACETAMINOPHEN 650 MG: 325 TABLET, FILM COATED ORAL at 14:08

## 2022-09-15 RX ADMIN — ACETAMINOPHEN 650 MG: 325 TABLET, FILM COATED ORAL at 21:26

## 2022-09-15 RX ADMIN — CYCLOBENZAPRINE 5 MG: 5 TABLET, FILM COATED ORAL at 21:25

## 2022-09-15 RX ADMIN — DEXAMETHASONE SODIUM PHOSPHATE 20 MG: 10 INJECTION, SOLUTION INTRAMUSCULAR; INTRAVENOUS at 15:38

## 2022-09-15 RX ADMIN — POTASSIUM CHLORIDE 10 MEQ: 750 TABLET, EXTENDED RELEASE ORAL at 07:50

## 2022-09-15 RX ADMIN — CYCLOBENZAPRINE 5 MG: 5 TABLET, FILM COATED ORAL at 14:08

## 2022-09-15 RX ADMIN — LIDOCAINE HYDROCHLORIDE 9 ML: 10 INJECTION, SOLUTION EPIDURAL; INFILTRATION; INTRACAUDAL; PERINEURAL at 15:34

## 2022-09-15 RX ADMIN — POTASSIUM CHLORIDE 10 MEQ: 750 TABLET, EXTENDED RELEASE ORAL at 12:44

## 2022-09-15 RX ADMIN — ACETAMINOPHEN 975 MG: 325 TABLET ORAL at 07:49

## 2022-09-15 RX ADMIN — LIDOCAINE 1 PATCH: 560 PATCH PERCUTANEOUS; TOPICAL; TRANSDERMAL at 07:50

## 2022-09-15 RX ADMIN — DILTIAZEM HYDROCHLORIDE 180 MG: 180 CAPSULE, COATED, EXTENDED RELEASE ORAL at 07:50

## 2022-09-15 RX ADMIN — PANCRELIPASE 1 CAPSULE: 60000; 12000; 38000 CAPSULE, DELAYED RELEASE PELLETS ORAL at 07:50

## 2022-09-15 RX ADMIN — PANCRELIPASE 1 CAPSULE: 60000; 12000; 38000 CAPSULE, DELAYED RELEASE PELLETS ORAL at 18:07

## 2022-09-15 RX ADMIN — PANCRELIPASE 1 CAPSULE: 60000; 12000; 38000 CAPSULE, DELAYED RELEASE PELLETS ORAL at 12:44

## 2022-09-15 ASSESSMENT — ACTIVITIES OF DAILY LIVING (ADL)
ADLS_ACUITY_SCORE: 40
ADLS_ACUITY_SCORE: 33
ADLS_ACUITY_SCORE: 33
ADLS_ACUITY_SCORE: 40
ADLS_ACUITY_SCORE: 34
ADLS_ACUITY_SCORE: 35
ADLS_ACUITY_SCORE: 35
ADLS_ACUITY_SCORE: 34
ADLS_ACUITY_SCORE: 35
ADLS_ACUITY_SCORE: 40
ADLS_ACUITY_SCORE: 34
ADLS_ACUITY_SCORE: 35

## 2022-09-15 NOTE — PROGRESS NOTES
Care Management Follow Up    Length of Stay (days): 0    Expected Discharge Date: 09/15/2022     Concerns to be Addressed:       Patient plan of care discussed at interdisciplinary rounds: Yes    Anticipated Discharge Disposition: Transitional Care     Anticipated Discharge Services: None  Anticipated Discharge DME: None    Patient/family educated on Medicare website which has current facility and service quality ratings: yes  Education Provided on the Discharge Plan:    Patient/Family in Agreement with the Plan: yes    Referrals Placed by CM/SW:    Private pay costs discussed: Not applicable    Additional Information:  Patient is getting a steroid shot today. RN asked if patient could go to [facility] or tomorrow for tcu. [facility] said they could take patient tomorrow 9/16 at 1000.     Will continue to follow.    CHE Carlos

## 2022-09-15 NOTE — PROGRESS NOTES
RADIOLOGY PROCEDURE NOTE  Patient name: Georgette Alatorre  MRN: [id number]  : 1940    Pre-procedure diagnosis: Right leg pain  Post-procedure diagnosis: Same    Procedure Date/Time: September 15, 2022  4:04 PM  Procedure: Right L5-S1 TFESI  Estimated blood loss: None  Specimen(s) collected with description: none  The patient tolerated the procedure well with no immediate complications.  Significant findings:none    See imaging dictation for procedural details.    Provider name: Kash Rubi PA-C  Assistant(s):None

## 2022-09-15 NOTE — PROGRESS NOTES
Orientation/Cognitive: A+Ox4  Observation Goals (Met/ Not Met): Not Met  Mobility Level/Assist Equipment: Ambulation with x1 assist with walker  Fall Risk (Y/N): Y  Behavior Concerns: No  Pain Management: PRN oxycodone 2.5 mg @0434 with some effect.   Tele/VS/O2: VSS. Room air. Temp: 97.3  F (36.3  C) Temp src: Oral BP: 113/68 Pulse: 67   Resp: 18 SpO2: 96 %       ABNL Lab/BG:  HGB 10.4 ON 9/13 lab draw.   Diet: Regular Diet  Bowel/Bladder: No BM on shift.  Skin Concerns: Bruising to right buttocks. Turned and repositioned and offloaded.  Drains/Devices: IV S/L RFA - flushing well without any difficulties. Dressing changed.  Tests/Procedures for next shift: None.  Anticipated DC date & active delays: TCU however pt now states that she is not ready for discharge at this time and would like to speak with Ortho/Spine again to revisit options given to her on 9/14. Pt reports that she now wants to try the epidural injection. Will endorse to oncoming nurse to notify MD on AM shift.  Patient Stated Goal for Today: Pain control.

## 2022-09-15 NOTE — PROGRESS NOTES
[facility]    Medicine Progress Note - Hospitalist Service    Date of Admission:  9/11/2022    Assessment & Plan        Ms. Georgette Alatorre is a very pleasant 82-year-old female with past medical history of a recent diagnosed acute to subacute L5 burst fracture, history of endometrial cancer, status post radiation, history of radiation colitis, paroxysmal SVT, osteoporosis, scoliosis, normocytic anemia and recent COVID-19 infection, who presented to ER for further evaluation of right buttock and the right lower extremity pain.    Acute/subacute L5 burst fracture.   Right buttock/right lower extremity pain, likely sciatica or soft tissue injury related to the above.   Osteoporosis.  Scoliosis.   Recent admission to [facility] 09/07/2022-09/08/2022 for evaluation of worsening lower back pain and right buttock pain; noted to have an acute to subacute L5 burst fracture. She was seen by ortho spine, who recommended no surgical intervention at this time. It was felt that her fracture was stable with expected healing time of 3-4 months. She was seen by PT, who recommended TCU at that time, but the patient preferred to go home with home care. She was discharged on oxycodone and Flexeril p.r.n. Unfortunately, the patient realized that she is not able to manage the pain at home and she is not able to take care by herself because of severe pain whenever she tries to get up and walk around.    -- bigger issue than low back pain has become significant right hip/gluteal area pain and right leg radiculopathy. Due to concern for occult fracture, a right hip CT was obtained 9/13 which was negative for fracture. 9/14 noted palpable 3cm hard, palpable mass underlying bruise right gluteal area. Given no improvement and possible worsening of pain and radiculopathy with new palpable area, MRI L--spine and Pelvis done 9/14/22 - results as below. Area of interest with palpable nodule  differential included possible sinus track/phlegmon - no associated skin ulceration or compromise in the area; also possible hematoma. With patient without any infectious symptoms and nodule discomfort slightly better and maturing bruise improved 9/15, anticipate likely hematoma. MRI L-spine did notice soft tissue dentisy that could be hematoma related to fracture vs tumor - ortho felt likely hematoma and not appearing as tumor per his interpretation. They offered patient a steroid injection, which she initially declined, but then AM 9/15 stated felt that would be good, so contacted ortho to coordinate.   Pain control:   --Tylenol 650mg PO TID (dose adjusted for weight)   --Flexeril 5 mg p.o. 3 times daily.     --Oxycodone 2.5-5 mg every 4 hours available p.r.n.   -- Lidocaine patch daily    -- atarax 25-50 mg prn for adjuvant pain.   --Low dose IV Dilaudid only if p.o. medications not adequate - has not been using   --Ice/heat   -- spinal steroid injection given 9/15 by ortho  --Fall precautions in place.      --Bone demineralization seen on CT. Vitamin D level 46.   --Holding PTA vitamin D supplement while observation status.  -- Has placement at [facility] --> planned for discharge today (9/15), but due to timing of injection, pushed to tomorrow morning.     History of paroxysmal SVT.    -- PTA diltiazem and potassium supplement.    History of radiation colitis.    --Continue PTA Creon and Lomotil p.r.n.    Normocytic anemia.    Her hemoglobin 09/15 10.7; hemoglobin earlier this month was 10.9.    --We will monitor her hgb periodically.    --Holding her vitamin B12 supplementation given observation status, but this should be resumed after discharge.    Recent Labs   Lab 09/15/22  0648 09/13/22  0621 09/11/22  2101   HGB 10.7* 10.4* 10.2*        Diet: Regular Diet Adult  Diet    DVT Prophylaxis: Encourage ambulation, though difficult and limited by pain. TEDs.   Carrera Catheter: Not present  Central Lines:  "None  Cardiac Monitoring: None  Code Status: Full Code    Disposition Plan      Expected Discharge Date: 09/16/2022    Discharge Delays: Placement - TCU  Imaging Result Pending (enter specific test & time in comments)  Specialist Consult (enter specialist & decision needed in comments)    Discharge Comments: Lien     The patient's care was discussed with the Attending Physician, Dr. Emanuel and Patient.     Conchita Newman PA-C  Hospitalist Service  [facility]  Securely message with the Vocera Web Console (learn more here)  Text page via [employer] Paging/Directory         Clinically Significant Risk Factors Present on Admission                    # Cachexia: Estimated body mass index is 15.69 kg/m  as calculated from the following:    Height as of 9/7/22: 1.6 m (5' 3\").    Weight as of 9/8/22: 40.2 kg (88 lb 9.6 oz).        ______________________________________________________________________    Interval History   Patient lying in bed. Reports feeling stable, overall today. States maybe pain just very mildly improving. Spoke with ortho yesterday and was offered steroid injection but she declined as this was scary to her. However, reports that after thinking about it overnight, feels this may be a good plan to hopefully help with pain and thus make her better able to tolerate rehab. Denies bleeding. Denies bowel or bladder concerns.     Data reviewed today: I reviewed all medications, new labs and imaging results over the last 24 hours.     Physical Exam   Vital Signs: Temp: 98.2  F (36.8  C) Temp src: Oral BP: 110/66 Pulse: 85   Resp: 18 SpO2: 94 % O2 Device: None (Room air)      Constitutional: Adult female, lying in bed, alert, oriented to person, place, date, situation.  Cooperative, in NAD.    Respiratory:  Lungs CTAB. No crackles, wheezes, or rhonchi, no labored breathing.  Cardiovascular:  Heart RRR, no murmur, no edema.  GI:  Abdomen soft, NT/ND and with normoactive " BS  Skin/Integumen:  Warm, dry, non-diaphoretic. ~3-4 area of maturing bruise right buttock with underlying hard, well-circumscribed, non-moveable lesion - this feels slightly less firm than yesterday. Patient states not really tender to palpation today there. Skin in area is intact.   MSK: Sensation and strength intact and equal bilateral lower extremities.     Data   Recent Labs   Lab 09/15/22  0648 09/13/22  0621 09/11/22  2101   WBC 4.0 4.6 5.1   HGB 10.7* 10.4* 10.2*   MCV 98 96 97    270 237   NA  --  141 139   POTASSIUM  --  3.4 4.1   CHLORIDE  --  105 104   CO2  --  30 30   BUN  --  23 15   CR  --  0.68 0.71   ANIONGAP  --  6 5   ILYA  --  9.1 8.9   GLC  --  97 134*     No results found for this or any previous visit (from the past 24 hour(s)).  Medications       acetaminophen  650 mg Oral TID     amylase-lipase-protease  1 capsule Oral TID w/meals     cyclobenzaprine  5 mg Oral TID     diltiazem ER COATED BEADS  180 mg Oral Daily     lidocaine  1 patch Transdermal Q24H     lidocaine   Transdermal Q8H DELFINA     pantoprazole  40 mg Oral Daily     potassium chloride  10 mEq Oral BID     sodium chloride (PF)  3 mL Intracatheter Q8H

## 2022-09-15 NOTE — PLAN OF CARE
Orientation/Cognitive: A&Ox4  Observation Goals (Met/ Not Met): Partially Met  Mobility Level/Assist Equipment: A x1 assist with walker & GB  Fall Risk (Y/N): Y  Behavior Concerns: No  Pain Management: Lido patch on R Hip and sched tylenol  Tele/VS/O2: VSS. On RA  ABNL Lab/BG:  HGB 10.7(up from 10.4)  Diet: Regular Diet  Bowel/Bladder: X1 BM today  Skin Concerns: Bruising to right buttocks. Turned and repositioned and offloaded.  Drains/Devices: IV S/L RFA - flushing well  Tests/Procedures for next shift: None.  Anticipated DC date & active delays: TCU [facility] 10:00  Patient Stated Goal for Today: Pain contro    Pt had steroid injection in Low back this afternoon. Site CDI. Sensation and movement WNL    PRIMARY DIAGNOSIS: Right Hip Pain and LLE Pain     diagnostic tests and consults completed and resulted : MET   -vital signs normal or at patient baseline : MET  -adequate pain control on oral analgesics: Partially MET (Steriod injection this afternoon)  -safe disposition plan has been identified : MET     Nurse to notify provider when observation goals have been met and patient is ready for discharge.

## 2022-09-16 VITALS
TEMPERATURE: 97.9 F | OXYGEN SATURATION: 96 % | DIASTOLIC BLOOD PRESSURE: 79 MMHG | HEART RATE: 87 BPM | SYSTOLIC BLOOD PRESSURE: 133 MMHG | RESPIRATION RATE: 18 BRPM

## 2022-09-16 PROCEDURE — 99217 PR OBSERVATION CARE DISCHARGE: CPT | Performed by: PHYSICIAN ASSISTANT

## 2022-09-16 PROCEDURE — 250N000013 HC RX MED GY IP 250 OP 250 PS 637: Performed by: PHYSICIAN ASSISTANT

## 2022-09-16 PROCEDURE — 250N000013 HC RX MED GY IP 250 OP 250 PS 637: Performed by: EMERGENCY MEDICINE

## 2022-09-16 PROCEDURE — G0378 HOSPITAL OBSERVATION PER HR: HCPCS

## 2022-09-16 PROCEDURE — 250N000013 HC RX MED GY IP 250 OP 250 PS 637: Performed by: INTERNAL MEDICINE

## 2022-09-16 RX ORDER — DIPHENOXYLATE HCL/ATROPINE 2.5-.025MG
1 TABLET ORAL 3 TIMES DAILY PRN
Qty: 15 TABLET | Refills: 0 | Status: ON HOLD | OUTPATIENT
Start: 2022-09-16 | End: 2023-02-26

## 2022-09-16 RX ORDER — OXYCODONE HYDROCHLORIDE 5 MG/1
2.5-5 TABLET ORAL EVERY 6 HOURS PRN
Qty: 30 TABLET | Refills: 0 | Status: SHIPPED | OUTPATIENT
Start: 2022-09-16 | End: 2022-09-27

## 2022-09-16 RX ADMIN — LIDOCAINE 1 PATCH: 560 PATCH PERCUTANEOUS; TOPICAL; TRANSDERMAL at 07:48

## 2022-09-16 RX ADMIN — ACETAMINOPHEN 650 MG: 325 TABLET, FILM COATED ORAL at 06:36

## 2022-09-16 RX ADMIN — PANCRELIPASE 1 CAPSULE: 60000; 12000; 38000 CAPSULE, DELAYED RELEASE PELLETS ORAL at 07:49

## 2022-09-16 RX ADMIN — POTASSIUM CHLORIDE 10 MEQ: 750 TABLET, EXTENDED RELEASE ORAL at 07:49

## 2022-09-16 RX ADMIN — PANTOPRAZOLE SODIUM 40 MG: 40 TABLET, DELAYED RELEASE ORAL at 06:36

## 2022-09-16 RX ADMIN — DILTIAZEM HYDROCHLORIDE 180 MG: 180 CAPSULE, COATED, EXTENDED RELEASE ORAL at 06:36

## 2022-09-16 RX ADMIN — CYCLOBENZAPRINE 5 MG: 5 TABLET, FILM COATED ORAL at 07:49

## 2022-09-16 ASSESSMENT — ACTIVITIES OF DAILY LIVING (ADL)
ADLS_ACUITY_SCORE: 34

## 2022-09-16 NOTE — DISCHARGE SUMMARY
[facility]  Hospitalist Discharge Summary      Date of Admission:  9/11/2022  Date of Discharge:  9/16/2022  Discharging Provider: Conchita Newman PA-C  Discharge Service: Hospitalist Service    Discharge Diagnoses   L5 burst fracture and associated pain    Follow-ups Needed After Discharge   Follow-up Appointments     Follow Up and recommended labs and tests      Follow up with primary care provider in 2-3 weeks.  No follow up labs or   test are needed.  Follow up with [facility] orthopedics ([phone]) in 1 months.  No   follow up labs or test are needed.               Discharge Disposition   Discharged to rehabilitation facility  Condition at discharge: Stable    Hospital Course          Ms. Georgette Alatorre is a very pleasant 82-year-old woman from [place], [place] with past medical history of a recent diagnosed acute to subacute L5 burst fracture, history of endometrial cancer, status post radiation, history of radiation colitis, paroxysmal SVT, osteoporosis, scoliosis, normocytic anemia and recent COVID-19 infection, who presented to ER for further evaluation of right buttock and the right lower extremity pain.    Acute/subacute L5 burst fracture.   Right buttock/right lower extremity pain  Osteoporosis.  Scoliosis.   Recent admission to [facility] 09/07/2022-09/08/2022 for evaluation of worsening lower back pain and right buttock pain and noted to have an acute to subacute L5 burst fracture. Ortho spine consulted and recommended no surgical intervention at this time; it was felt that her fracture was stable with expected healing time of 3-4 months. PT recommended TCU at that time, but the patient preferred to go home with home care. She was discharged on oxycodone and Flexeril p.r.n. Unfortunately, Ms. Alatorre realized that she was not able to manage the pain at home and she is unable to take care by herself because of severe pain whenever she tries to get  up and walk around.    -- bigger issue than low back pain has become significant right hip/gluteal area pain and right leg radiculopathy. Due to concern for occult fracture, a right hip CT was obtained 9/13 which was negative for fracture. 9/14 noted palpable 3cm hard, palpable mass underlying bruise right gluteal area. Given no improvement and possible worsening of pain and radiculopathy with newly noted palpable area, MRI L-spine and Pelvis done 9/14/22 - results as below. Area of interest with palpable nodule differential included possible sinus track/phlegmon - no associated skin ulceration or compromise in the area; also possible hematoma. With patient without any infectious symptoms and nodule discomfort slightly better and maturing bruise improved 9/15, more likely hematoma. MRI L-spine did notice soft tissue dentisy that could be hematoma related to fracture vs tumor - ortho felt likely hematoma and not appearing as tumor per his interpretation. They offered patient a steroid injection, which she initially declined, but then AM 9/15 stated felt that would be good, so contacted ortho to coordinate and injection given 9/15.  Pain control:   --Tylenol 650mg PO TID (dose adjusted for weight)   -- Flexeril 5 mg p.o. 3 times daily.     -- Oxycodone 2.5-5 mg every 4 hours available p.r.n.   -- Lidocaine patch daily    -- atarax 25-50 mg prn for adjuvant pain.   -- Low dose IV Dilaudid while inpatient if p.o. medications not adequate - has not been using   -- Ice/heat   -- spinal steroid injection given 9/15 by ortho  --Fall precautions    Bone demineralization seen on CT. Vitamin D level 46. Held PTA vitamin D supplement while observation status.    History of paroxysmal SVT.    -- PTA diltiazem and potassium supplement.    History of radiation colitis.    --Continue PTA Creon and Lomotil p.r.n.    Normocytic anemia.    Her hemoglobin 09/15 10.7; hemoglobin earlier this month was 10.9.    --We will monitor her hgb  periodically.    --Held her vitamin B12 supplementation given observation status - resumed at discharge.     Recent Labs   Lab 09/15/22  0648 09/13/22  0621 09/11/22  2101   HGB 10.7* 10.4* 10.2*       Consultations This Hospital Stay   CARE MANAGEMENT / SOCIAL WORK IP CONSULT  PHYSICAL THERAPY ADULT IP CONSULT  SPINE SURGERY ADULT IP CONSULT  PHYSICAL THERAPY ADULT IP CONSULT  OCCUPATIONAL THERAPY ADULT IP CONSULT    Code Status   Full Code    Time Spent on this Encounter   I, Conchita Newman PA-C, personally saw the patient today and spent greater than 30 minutes discharging this patient.       Conchita Newman PA-C  [facility] EXTENDED RECOV[facility]ORT STAY  [address] [address]  Phone: [phone]  ______________________________________________________________________    Physical Exam   Vital Signs: Temp: 97.9  F (36.6  C) Temp src: Oral BP: 133/79 Pulse: 87   Resp: 18 SpO2: 96 % O2 Device: None (Room air)    Weight: 0 lbs 0 oz  Constitutional: WD/WN, though thin, adult female, lying in bed, AAO. Cooperative, in NAD.    Respiratory:  Lungs CTAB. No crackles, wheezes, or rhonchi, no labored breathing.  Cardiovascular:  Heart RRR, no murmur, no edema.  GI:  Abdomen soft, NT/ND  Skin/Integumen:  Warm, dry, non-diaphoretic. ~3-4cm area of maturing bruise right buttock with underlying well-circumscribed, non-moveable lesion. Skin in area is intact.   MSK: Sensation and strength intact and equal bilateral lower extremities. Mildly tender to palpation lower L-spine - patient states this is better than it had been previously.   Psych: pleasant, appropriate, interactive        Primary Care Physician   Merlin Emery Brown    Discharge Orders      Anti-Embolism Stockings    Bilateral below knee length.On in the morning, off at night     General info for SNF    Length of Stay Estimate: Short Term Care: Estimated # of Days <30  Condition at Discharge: Stable  Level of  care:skilled   Rehabilitation Potential: Excellent  Admission H&P remains valid and up-to-date: Yes  Recent Chemotherapy: N/A  Use Nursing Home Standing Orders: Yes     Reason for your hospital stay    You were admitted due to uncontrolled pain related to L5 fracture. During your hospital stay, you were evaluated by orthopedics and they gave you a steroid injection.     Follow Up and recommended labs and tests    Follow up with primary care provider in 2-3 weeks.  No follow up labs or test are needed.  Follow up with [facility] orthopedics ([phone]) in 1 months.  No follow up labs or test are needed.     Activity - Up ad [email or web address]     Physical Therapy Adult Consult    Evaluate and treat as clinically indicated.    Reason:  L5 burst fracture with significant radiculopathy.     Occupational Therapy Adult Consult    Evaluate and treat as clinically indicated.    Reason:  L5 burst fracture with significant RLE radiculopathy.     Fall precautions     Diet    Follow this diet upon discharge: Orders Placed This Encounter      Regular Diet Adult       Significant Results and Procedures   Results for orders placed or performed during the hospital encounter of 09/11/22   CT Pelvis Bone wo Contrast    Narrative    CT PELVIS BONE WITHOUT CONTRAST September 13, 2022 12:47 PM    INDICATION: 82-year-old patient with right-sided hip pain.    COMPARISON: 9/7/2022 CT.    TECHNIQUE: Noncontrast. Axial, sagittal and coronal thin-section  reconstruction. Dose reduction techniques were used.   CONTRAST: None.    FINDINGS:     BONES:  -Moderate superior endplate compression fracture of the L5 vertebral  body. Slightly increased compression since the comparison exam.  -No new or additional fractures.  -Bone demineralization.    JOINTS:  -No joint malalignment.  -Advanced right lower lumbar facet arthrosis.  -Mild bilateral sacroiliac degenerative arthrosis.  -Mild bilateral hip degenerative arthrosis.    MUSCULATURE AND SOFT TISSUES:  -No  soft tissue fluid collection.  -Moderate fatty atrophy of the bilateral gluteus minimus muscles.  -No asymmetric muscle enlargement.    OTHER:  -Moderate-sized cyst arising from the lower pole of the right kidney.  -Nonobstructive calculus in the lower pole of the left kidney.  -Bowel anastomosis in the pelvis.      Impression    IMPRESSION:  1.  Moderate compression fracture of the L5 vertebral body, slightly  increased since 9/7/2022.  2.  No new or additional fractures.  3.  Degenerative changes in the lower lumbar spine and pelvis.  4.  Bone demineralization.      JOLYNN AVALOS MD         SYSTEM ID:  [id number]   MR Lumbar Spine w/o & w Contrast    Narrative    MRI OF THE LUMBAR SPINE WITHOUT AND WITH CONTRAST 9/14/2022 1:44 PM     HISTORY: CT lumbar spine 9/7/2022.    TECHNIQUE: Multiplanar, multisequence MRI images of the lumbar spine  were acquired without and with 4 mL Gadavist IV contrast.    COMPARISON: None.    FINDINGS: There are five lumbar-type vertebrae for the purposes of  this dictation.     Severe left convex curvature of the lumbar spine centered at L3-L4  again noted. Alignment otherwise normal. Compression fracture  deformity of the L5 vertebral body again noted. No definite change in  vertebral body height since the recent comparison CT scan. Bone marrow  edema suggests this is a subacute fracture. Vertebral body heights of  the lumbar spine are otherwise normal. No other fractures.    There is minimal posterior disc bulging of the L1-L2, L2-L3 and L3-L4  intervertebral discs. The L4-L5 and L5-S1 discs appear within normal  limits in contour and height.    The tip of the conus medullaris is at the upper L2 level which is  within normal limits. There is heterogeneously T2 hyperintense,  heterogeneously enhancing soft tissue density in the anterior epidural  space extending from L4-L5 down to mid S1 that could represent a  hematoma related to the fracture although tumor extension in the  setting  of a pathologic fracture is not excluded.    No high-grade spinal canal stenosis of the lumbar spine. Moderate  degenerative neural foraminal stenosis on the right at L1-L2 and L2-L3  and on the left at L2-L3, L3-L4 and L4-L5.         Impression    IMPRESSION:  1. Subacute compression fracture of the L5 vertebral body again noted.  2. Heterogeneously T2 hyperintense, heterogeneously enhancing soft  tissue density in the anterior epidural space extending from L4-L5  down to mid S1 that could represent a hematoma related to the fracture  although tumor extension in the setting of a pathologic fracture is  not excluded.  3. Diffuse degenerative change of the lumbar spine.  4. No spinal canal stenosis.    IAN BUENO MD         SYSTEM ID:  [id number]   MR Pelvis Musclular Tissue wo & w Contrast    Narrative    MR pelvis without and with contrast 9/14/2022 2:21 PM    Techniques: Multiplanar multisequence imaging of the pelvis was  obtained before and after administration of  intravenous contrast  using routing MSK protocol.    Contrast: 4mL Gadavist    History: R gluteal pain radiating down leg; known L5 burst fracture;  palpable hard, SubQ mass underlying skin right gluteus     Comparison: CT 9/13/2022    Findings:     External marker is placed over right buttock.    Underlying the external marker, focal subcutaneous fat stranding with  underlying 17 x 18 x 13 mm rim enhancing centrally T2 intermediate to  hyperintense intramuscular focus within the right gluteus lisa with  perilesional edema and enhancement. There is linear enhancement  extending down to the ischial tuberosity region. Also wider area of  regional asymmetric subcutaneous edema of the right buttock area  extends anterior and cranially over the greater trochanter region.     Osseous structures  Osseous structures: Specifically, no evidence of osteomyelitis at the  right ischial tuberosity. Redemonstration moderate to high-grade  compression deformity  of the L5 vertebral body with associated edema,  better assessed on the same day lumbar spine MRI. Otherwise, no  fracture.    Subchondral edema across intensity at the right sacroiliac joint,  presumably degenerative.    Internal derangement of joints are not well assessed owing to chosen  field of view.    Joint and Periarticular soft tissue:    Sacroiliac joints and pubic symphysis are congruent.    Joint effusion: A physiologic amount of joint fluid in bilateral hip.    Bursal effusion: Mild nonspecific edema over the left greater  trochanter. No substantial iliopsoas or trochanteric bursal effusion.    Muscles and tendons  Muscles and tendons: Bilateral symmetric adductor and piriformis  muscle edema, likely related to muscle strain. Proximal hamstrings,  rectus femoris, sartorius, and iliopsoas tendons intact bilaterally.  The hip abductors are intact bilaterally.     Nerves:  The visualized course of the sciatic nerves are unremarkable  bilaterally.    Other Findings:  Redemonstration large right renal cyst.      Impression    Impression: Underlying the external marker, focal subcutaneous fat  stranding with underlying 17 x 18 x 13 mm rim enhancing centrally T2  intermediate to hyperintense intramuscular focus within the right  gluteus lisa. There is linear enhancement extending down to the  ischial tuberosity region. Differential consideration include  developing sinus track/phlegmon. Correlate clinically for associated  ischial decubitus ulcer. No osteomyelitis. Alternatively, this may be  due to muscle strain/contusion with muscle tear/hematoma. In absence  of trauma history, spontaneous hematoma should be followed up for  resolution to ensure no underlying mass.    [facility]         SYSTEM ID:  [id number]   XR Lumbar Epidural Injection    Narrative    XR LUMBAR EPIDURAL INJECTION INCL IMAGING             9/15/2022 3:53  PM       History:  Right L5 radiculopathy. Request for right  L5-S1  transforaminal epidural steroid injection. Recent compression fracture  of L5 with subsequent intervertebral disc disease and stenosis. Right  leg radiculopathy. Possible hematoma.    Procedure:  The risks (bleeding, infection, reaction to contrast and  medications) and benefits of the procedure were explained to the  patient and consent was obtained.  Using sterile technique and  fluoroscopic guidance a #22 gauge spinal needle was placed into the  right L5-S1 foramen using a posterior- lateral approach.  A small  amount of contrast was injected confirming satisfactory position of  the needle tip. 20 mg of Dexamethasone mixed with 3 mL Lidocaine 1%  were injected.  Estimated blood loss during the procedure was less  than 5 mL. No specimens collected. No initial complication.        Fluoroscopy time: 0.5 minutes  Images Obtained: 3  Meds Used: 6 mL Local Lidocaine 1%, 1 mL Isovue-M 200, 2 mL  Dexamethasone, 3 mL Lidocaine 1%.    The patient's pain levels (0-10 scale) were as follows:                          PRE INJECTION      Low back                7                                              Right leg                 7                                               Left leg                    0                                                  POST INJECTION   Low back               4   Right leg                3   Left leg                   0        Impression    Impression:  Technically successful transforaminal lumbar epidural  steroid injection with partial initial pain relief. Long term results  pending. This is not beneficial, a caudal epidural steroid injection  could be attempted.    JOSUE VELOZ PA-C         SYSTEM ID:  [id number]       Discharge Medications   Current Discharge Medication List      CONTINUE these medications which have CHANGED    Details   acetaminophen (TYLENOL) 325 MG tablet Take 2 tablets (650 mg) by mouth 3 times daily    Associated Diagnoses: Closed burst fracture of lumbar  vertebra, initial encounter (H)      Lidocaine (LIDOCARE) 4 % Patch Place 1 patch onto the skin every 24 hours To prevent lidocaine toxicity, patient should be patch free for 12 hrs daily.  Qty: 6 patch, Refills: 3    Associated Diagnoses: Closed burst fracture of lumbar vertebra, initial encounter (H)         CONTINUE these medications which have NOT CHANGED    Details   amylase-lipase-protease (CREON 12) 01631-91094-23502 units CPEP Take 1 capsule by mouth 3 times daily (with meals)      Ascorbic Acid (VITAMIN C PO) Take 500 mg by mouth daily       Cholecalciferol (VITAMIN D3 PO) Take 2,000 Units by mouth daily.      cyanocobalamin (VITAMIN B-12) 1000 MCG tablet Take 1,000 mcg by mouth daily      cyclobenzaprine (FLEXERIL) 5 MG tablet Take 1 tablet (5 mg) by mouth 3 times daily  Qty: 20 tablet, Refills: 3    Comments: Future refills by PCP Dr. Merlin Emery Brown with phone number [phone].  Associated Diagnoses: Closed burst fracture of lumbar vertebra, initial encounter (H)      diltiazem ER (DILT-XR) 180 MG 24 hr capsule Take 1 capsule by mouth once daily  Qty: 90 capsule, Refills: 0    Associated Diagnoses: Benign essential hypertension      diphenoxylate-atropine (LOMOTIL) 2.5-0.025 MG tablet Take 1 tablet by mouth 3 times daily as needed for diarrhea      oxyCODONE (ROXICODONE) 5 MG tablet Take 0.5-1 tablets (2.5-5 mg) by mouth every 6 hours as needed for moderate to severe pain  Qty: 15 tablet, Refills: 0    Comments: Future refills by PCP Dr. Merlin Emery Brown with phone number [phone].  Associated Diagnoses: Closed burst fracture of lumbar vertebra, initial encounter (H)      pantoprazole (PROTONIX) 40 MG EC tablet Take 40 mg by mouth daily      potassium chloride ER (KLOR-CON) 8 MEQ CR tablet Take 8 mEq by mouth 2 times daily AM and Noon      senna-docusate (SENOKOT-S/PERICOLACE) 8.6-50 MG tablet Take 1 tablet by mouth 2 times daily as needed for constipation  Qty: 12 tablet, Refills: 0     Comments: Future refills by PCP Dr. Merlin Emery Brown with phone number [phone].  Associated Diagnoses: Closed burst fracture of lumbar vertebra, initial encounter (H)           Allergies   Allergies   Allergen Reactions     No Known Allergies

## 2022-09-16 NOTE — PLAN OF CARE
Goal Outcome Evaluation:    Plan of Care Reviewed With: patient     Overall Patient Progress: improving    Patient has been discharged September 16, 2022 10:15 AM. Discharge instructions and education, medication schedule and follow up appts sent to TCU. All belongings sent with pt.

## 2022-09-16 NOTE — PROGRESS NOTES
Observation goals  PRIOR TO DISCHARGE        Comments: -diagnostic tests and consults completed and resulted: MET  -vital signs normal or at patient baseline : MET  -adequate pain control on oral analgesics: Partially met  -safe disposition plan has been identified: MET  Nurse to notify provider when observation goals have been met and patient is ready for discharge.

## 2022-09-16 NOTE — PLAN OF CARE
Orientation/Cognitive: A&Ox4  Observation Goals (Met/ Not Met): Partially Met  Mobility Level/Assist Equipment: A1 GB&W  Fall Risk (Y/N): Y  Behavior Concerns: No  Pain Management: Lido patch (patch free this shift); Scheduled Tylenol.  Tele/VS/O2: VSS RA  ABNL Lab/BG:  WNL  Diet: Regular Diet  Bowel/Bladder: Continent while awake; Purewick HS  Skin Concerns: Bruising RLE hip/buttocks  Drains/Devices: PIV  Tests/Procedures for next shift: None.  Anticipated DC date & active delays: TCU [place] 10:00  Patient Stated Goal for Today: Pain control

## 2022-09-16 NOTE — PROGRESS NOTES
Care Management Discharge Note    Discharge Date: 09/16/2022       Discharge Disposition: Transitional Care    Discharge Services: None    Discharge DME: None    Discharge Transportation:  (Pt unsure at this time.)    Private pay costs discussed: Not applicable    PAS Confirmation Code:  [id number]  Patient/family educated on Medicare website which has current facility and service quality ratings: yes    Education Provided on the Discharge Plan:  yes  Persons Notified of Discharge Plans: patient  Patient/Family in Agreement with the Plan: yes    Handoff Referral Completed: Yes    Additional Information:  Discharge orders received. Bed available at [place] at 1000. [facility] transport from [facility]. Writer faxed orders to [place].    CHE Carlos

## 2022-09-19 VITALS
WEIGHT: 91.4 LBS | DIASTOLIC BLOOD PRESSURE: 64 MMHG | HEIGHT: 63 IN | OXYGEN SATURATION: 95 % | HEART RATE: 61 BPM | BODY MASS INDEX: 16.2 KG/M2 | TEMPERATURE: 98.5 F | SYSTOLIC BLOOD PRESSURE: 99 MMHG | RESPIRATION RATE: 18 BRPM

## 2022-09-19 NOTE — PROGRESS NOTES
[place] [facility]  INITIAL VISIT NOTE  September 20, 2022    PRIMARY CARE PROVIDER AND CLINIC:  Brown, Merlin Emery [facility] [address] / [address]     CHIEF COMPLAINT:  Hospital follow-up/Initial visit    HPI:    Georgette Alatorre is a 82 year old  (1940) female who was [facility] [place] [facility] for an initial visit.     Medical history is notable for hypertension, PSVT, GERD, endometrial cancer, recurrent small bowel obstruction, chronic anemia, radiation colitis, nephrolithiasis, B12 deficiency, severe scoliosis, osteoporosis, osteoarthritis, and recent hospitalization from September 7 through September 8, 2022 for acute/subacute L5 burst fracture and asymptomatic COVID-19 infection.    Summary of hospital course:  Patient was re-hospitalized at [facility] from September 11 through September 16, 2022 for increasing low back/buttock pain with right radiculopathy.  CT pelvis demonstrated moderate compression fracture of the L5 vertebral body, heterogeneously T2 hyperintense, and heterogeneously enhancing soft tissue density in the anterior epidural space extending from L4-L5 down to mid S1, suggestive of possible hematoma related to fracture.  MRI of the pelvis showed focal subcutaneous fat stranding with underlying 17 x 18 x 13 mm rim-enhancing centrally T2 intermediate to hyperdense intramuscular focus within the right gluteus lisa, due to possible muscle strain/contusion with muscle tear/hematoma or developing sinus tract/phlegmon.  She was evaluated by orthopedic service and treatment options were discussed with the patient including observation with potential improvement with time, trial of epidural steroid injection, or decompression surgery.  She elected to proceed with L5-S1 epidural steroid injection which was achieved on September 15.    Patient is admitted to this facility for medical management, nursing  care, and rehab.     Of note, history was obtained from patient, facility RN, and extensive review of the chart.    Today's visit:  Patient was seen in her room, while lying in bed.  She appears comfortable.  The pain in her left right buttock has subsided but she continues to have some right-sided radiculopathy.  She denies fever, chills, chest pain, palpitation, dyspnea, nausea, vomiting, abdominal pain, or difficulty with urination or defecation.  She had a bowel movement yesterday.  She has intermittent loose stools due to radiation colitis.      CODE STATUS:   CPR/Full code     PAST MEDICAL HISTORY:   L5 burst fracture with L4-S1 epidural hematoma and right radiculopathy, s/p L5-S1 LUCIE on September 15, 2022 Right gluteus muscle strain with possible hematoma in September 2022  Hypertension  PSVT  GERD  Endometrial cancer, s/p SALVADOR/BSO in 2000 followed by radiation therapy  Small bowel obstruction, recurrent, s/p SALVADOR/BSO with radium implants in 2000, followed by external beam radiation therapy  Chronic anemia (normochromic normocytic), baseline hemoglobin around 11  B12 deficiency  Radiation colitis/chronic diarrhea  Nephrolithiasis  Hydronephrosis  Severe scoliosis  Osteoporosis  Osteoarthritis  COVID-19 infection in September 2022        Past Medical History:   Diagnosis Date     Arthritis      Chronic back pain     with scoliosis     Chronic diarrhea     secondary to radiation, colitis     Endometrial ca (H)      Gastro-oesophageal reflux disease      History of blood transfusion     no adverse reactions     History of endometrial cancer     no recurrence     Hydronephrosis      Irregular heart beat     PSVT     Kidney stone      Kidney stones      Osteoporosis      Osteoporosis      PSVT (paroxysmal supraventricular tachycardia) (H)      Scoliosis      Small bowel obstruction (H)      Small bowel obstruction (H)     recurrent     SVT (supraventricular tachycardia) (H)        PAST SURGICAL HISTORY:   Past  Surgical History:   Procedure Laterality Date     APPENDECTOMY       CATARACT IOL, RT/LT  2009    Cataract IOL Bilateral     COLONOSCOPY  03/30/2006    Normal; repeat in 5 years     CYSTOSCOPY  11/14/2007    Cystoscopy, bilateral retrograde pyelograms, dilatation of left ureteral stricture, left ureteroscopy and insertion of left double pigtail stent.      ENDOSCOPY  10/22/2008    Upper GI:  Normal     ENDOSCOPY  1/11/2007    Upper GI     EYE SURGERY  2010    retinal eye surgery     GENITOURINARY SURGERY      cystoscopy     GI SURGERY      exploratory lap x2 for bowel obstructions     HYSTERECTOMY, PAP NO LONGER INDICATED  2000    due to endometrial cancer     LAPAROTOMY EXPLORATORY  08/22/2007    1.  Exploratory laparotomy. 2.  Extensive lysis of intra-abdominal adhesions. Performed by Dr Rad Pierce     LAPAROTOMY EXPLORATORY  04/20/2005    1.  Exploratory laparotomy.  2.  Lysis of adhesions.  Segmental small bowel resection Performed by Dr Rad Pierce.     LASER HOLMIUM LITHOTRIPSY URETER(S), INSERT STENT, COMBINED Left 4/29/2021    Procedure: CYSTOSCOPY LEFT URETEROSCOPY;  Surgeon: Shabnam Lee MD;  Location:  OR       FAMILY HISTORY:   Family history is significant for meningitis in her mother and MVA in her father.    SOCIAL HISTORY:  Social History     Tobacco Use     Smoking status: Former Smoker     Packs/day: 0.50     Years: 40.00     Pack years: 20.00     Smokeless tobacco: Never Used   Substance Use Topics     Alcohol use: Yes     Alcohol/week: 0.0 standard drinks     Comment: 1 glass of wine with dinner       MEDICATIONS:  Current Outpatient Medications   Medication Sig Dispense Refill     acetaminophen (TYLENOL) 325 MG tablet Take 2 tablets (650 mg) by mouth 3 times daily       amylase-lipase-protease (CREON 12) 91828-96322-15390 units CPEP Take 1 capsule by mouth 3 times daily (with meals)       Ascorbic Acid (VITAMIN C PO) Take 500 mg by mouth daily        Cholecalciferol (VITAMIN D3  "PO) Take 2,000 Units by mouth daily.       cyanocobalamin (VITAMIN B-12) 1000 MCG tablet Take 1,000 mcg by mouth daily       cyclobenzaprine (FLEXERIL) 5 MG tablet Take 1 tablet (5 mg) by mouth 3 times daily 20 tablet 3     diltiazem ER (DILT-XR) 180 MG 24 hr capsule Take 1 capsule by mouth once daily 90 capsule 0     diphenoxylate-atropine (LOMOTIL) 2.5-0.025 MG tablet Take 1 tablet by mouth 3 times daily as needed for diarrhea 15 tablet 0     Lidocaine (LIDOCARE) 4 % Patch Place 1 patch onto the skin every 24 hours To prevent lidocaine toxicity, patient should be patch free for 12 hrs daily. 6 patch 3     oxyCODONE (ROXICODONE) 5 MG tablet Take 0.5-1 tablets (2.5-5 mg) by mouth every 6 hours as needed for moderate to severe pain 30 tablet 0     pantoprazole (PROTONIX) 40 MG EC tablet Take 40 mg by mouth daily       potassium chloride ER (KLOR-CON) 8 MEQ CR tablet Take 8 mEq by mouth 2 times daily AM and Noon       senna-docusate (SENOKOT-S/PERICOLACE) 8.6-50 MG tablet Take 1 tablet by mouth 2 times daily as needed for constipation 12 tablet 0         Post Medication Reconciliation Status: Reconciled; continue medications without change.      ALLERGIES:  Allergies   Allergen Reactions     No Known Allergies        ROS:  10 point ROS were negative other than the symptoms noted above in the HPI.    PHYSICAL EXAM:  Vital signs were reviewed in the chart.  Vital Signs: BP 99/64   Pulse 61   Temp 98.5  F (36.9  C)   Resp 18   Ht 1.6 m (5' 3\")   Wt 41.5 kg (91 lb 6.4 oz)   SpO2 95%   BMI 16.19 kg/m    General: Comfortable and in no acute distress  HEENT: Mild conjunctival pallor, no scleral icterus or injection, moist oral mucosa  Cardiovascular: Normal S1, S2, RRR  Respiratory: Lungs clear to auscultation bilaterally  GI: Abdomen soft, non-tender, non-distended, +BS  Extremities: No LE edema  Neuro: CX II-XII grossly intact; ROM in all four extremities grossly intact, no focal weakness  Psych: Alert and oriented " x3; normal affect  Skin: No acute rash    LABORATORY/IMAGING DATA:  All relevant labs and imaging data in [facility] and/or Care Everywhere were personally reviewed today.      Most Recent 3 CBC's:Recent Labs   Lab Test 09/15/22  0648 09/13/22 0621 09/11/22 2101   WBC 4.0 4.6 5.1   HGB 10.7* 10.4* 10.2*   MCV 98 96 97    270 237     Most Recent 3 BMP's:Recent Labs   Lab Test 09/13/22 0621 09/11/22 2101 09/07/22  0652    139 141   POTASSIUM 3.4 4.1 3.4   CHLORIDE 105 104 105   CO2 30 30 26   BUN 23 15 12   CR 0.68 0.71 0.73   ANIONGAP 6 5 10   ILYA 9.1 8.9 8.6   GLC 97 134* 100*         ASSESSMENT/PLAN:  L5 burst fracture with L4-S1 epidural hematoma,  Right gluteus muscle strain with possible hematoma,  Right radiculopathy, s/p L5-S1 LUCIE on September 15, 2022,  Severe sclerosis,  Age-related osteoporosis with current pathologic fracture,  Physical deconditioning.  Pain has subsided but she continues to have some right-sided radiculopathy.  Plan:  Fall precautions  Continue pain management with scheduled acetaminophen 650 mg 3 times daily, as needed oxycodone 2.5 mg every 6 hours, lidocaine patch, and cyclobenzaprine 5 mg 3 times daily  Mobilize with PT/OT  Follow-up with Dr. Kailash Perez of Ortho on October 18 as scheduled    Essential hypertension,  History of PSVT.  Stable.  Plan:  Continue diltiazem  mg daily  Monitor blood pressure and heart rate    GERD.  Plan:  Continue pantoprazole 40 mg daily    Chronic anemia (normochromic normocytic).  Baseline hemoglobin around 11.  Last hemoglobin was 10.7 on September 15.  Plan:  Recheck CBC on September 23 due to possible drop in Hgb due to blood loss of hematoma    History of B12 deficiency.    Last B12 level was 606 in January 2019.  Plan:  Continue cyanocobalamin 1000 mcg daily    History of endometrial cancer, s/p SALVADOR/BSO with radium implants in 2000, followed by external beam radiation therapy.  Plan:  Follow-up as outpatient    Radiation colitis and  chronic diarrhea.  Patient has intermittent loose stools.  Plan:  Continue Creon 1 capsule 3 times daily with meals  Continue potassium chloride 8 mEq twice daily  Continue Lomotil as needed    COVID-19 infection.  Tested positive on September 7, 2022.  Recheck COVID-19 PCR test was negative on September 11.  Asymptomatic.  Plan:  Monitor respiratory status        Orders written by provider at facility:  CBC on September 23, DX: Anemia  BMP on September 23, DX: Diarrhea            Disclaimer: This note may contain text created using speech-recognition software and may contain unintended word substitutions.      Electronically signed by:  Angela Lozada MD

## 2022-09-20 ENCOUNTER — TRANSITIONAL CARE UNIT VISIT (OUTPATIENT)
Dept: GERIATRICS | Facility: CLINIC | Age: 82
End: 2022-09-20
Payer: COMMERCIAL

## 2022-09-20 ENCOUNTER — LAB REQUISITION (OUTPATIENT)
Dept: LAB | Facility: CLINIC | Age: 82
End: 2022-09-20

## 2022-09-20 DIAGNOSIS — K52.0 RADIATION COLITIS: ICD-10-CM

## 2022-09-20 DIAGNOSIS — M54.16 LUMBAR RADICULOPATHY: ICD-10-CM

## 2022-09-20 DIAGNOSIS — S32.050S CLOSED COMPRESSION FRACTURE OF L5 LUMBAR VERTEBRA, SEQUELA: Primary | ICD-10-CM

## 2022-09-20 DIAGNOSIS — Z11.1 ENCOUNTER FOR SCREENING FOR RESPIRATORY TUBERCULOSIS: ICD-10-CM

## 2022-09-20 DIAGNOSIS — Z86.39 HISTORY OF NON ANEMIC VITAMIN B12 DEFICIENCY: ICD-10-CM

## 2022-09-20 DIAGNOSIS — D64.9 CHRONIC ANEMIA: ICD-10-CM

## 2022-09-20 DIAGNOSIS — T14.8XXA HEMATOMA: ICD-10-CM

## 2022-09-20 DIAGNOSIS — M80.00XD AGE-RELATED OSTEOPOROSIS WITH CURRENT PATHOLOGICAL FRACTURE WITH ROUTINE HEALING, SUBSEQUENT ENCOUNTER: ICD-10-CM

## 2022-09-20 DIAGNOSIS — Z85.42 HISTORY OF ENDOMETRIAL CANCER: ICD-10-CM

## 2022-09-20 DIAGNOSIS — U07.1 INFECTION DUE TO 2019 NOVEL CORONAVIRUS: ICD-10-CM

## 2022-09-20 DIAGNOSIS — Z86.79 HISTORY OF PSVT (PAROXYSMAL SUPRAVENTRICULAR TACHYCARDIA): ICD-10-CM

## 2022-09-20 DIAGNOSIS — R53.81 PHYSICAL DECONDITIONING: ICD-10-CM

## 2022-09-20 DIAGNOSIS — I10 ESSENTIAL HYPERTENSION: ICD-10-CM

## 2022-09-20 DIAGNOSIS — K52.9 CHRONIC DIARRHEA: ICD-10-CM

## 2022-09-20 PROCEDURE — 99306 1ST NF CARE HIGH MDM 50: CPT | Performed by: INTERNAL MEDICINE

## 2022-09-20 NOTE — LETTER
9/20/2022        RE: Georgette Alatorre  [address]  [facility] [address]        [place] [facility]  INITIAL VISIT NOTE  September 20, 2022    PRIMARY CARE PROVIDER AND CLINIC:  Brown, Merlin Emery [facility] [address] [place]     CHIEF COMPLAINT:  Hospital follow-up/Initial visit    HPI:    Georgette Alatorre is a 82 year old  (1940) female who was [facility] [place] [facility] for an initial visit.     Medical history is notable for hypertension, PSVT, GERD, endometrial cancer, recurrent small bowel obstruction, chronic anemia, radiation colitis, nephrolithiasis, B12 deficiency, severe scoliosis, osteoporosis, osteoarthritis, and recent hospitalization from September 7 through September 8, 2022 for acute/subacute L5 burst fracture and asymptomatic COVID-19 infection.    Summary of hospital course:  Patient was re-hospitalized at [facility] from September 11 through September 16, 2022 for increasing low back/buttock pain with right radiculopathy.  CT pelvis demonstrated moderate compression fracture of the L5 vertebral body, heterogeneously T2 hyperintense, and heterogeneously enhancing soft tissue density in the anterior epidural space extending from L4-L5 down to mid S1, suggestive of possible hematoma related to fracture.  MRI of the pelvis showed focal subcutaneous fat stranding with underlying 17 x 18 x 13 mm rim-enhancing centrally T2 intermediate to hyperdense intramuscular focus within the right gluteus lisa, due to possible muscle strain/contusion with muscle tear/hematoma or developing sinus tract/phlegmon.  She was evaluated by orthopedic service and treatment options were discussed with the patient including observation with potential improvement with time, trial of epidural steroid injection, or decompression surgery.  She elected to proceed with L5-S1 epidural steroid injection which was  achieved on September 15.    Patient is admitted to this facility for medical management, nursing care, and rehab.     Of note, history was obtained from patient, facility RN, and extensive review of the chart.    Today's visit:  Patient was seen in her room, while lying in bed.  She appears comfortable.  The pain in her left right buttock has subsided but she continues to have some right-sided radiculopathy.  She denies fever, chills, chest pain, palpitation, dyspnea, nausea, vomiting, abdominal pain, or difficulty with urination or defecation.  She had a bowel movement yesterday.  She has intermittent loose stools due to radiation colitis.      CODE STATUS:   CPR/Full code     PAST MEDICAL HISTORY:   L5 burst fracture with L4-S1 epidural hematoma and right radiculopathy, s/p L5-S1 LUCIE on September 15, 2022 Right gluteus muscle strain with possible hematoma in September 2022  Hypertension  PSVT  GERD  Endometrial cancer, s/p SALVADOR/BSO in 2000 followed by radiation therapy  Small bowel obstruction, recurrent, s/p SALVADOR/BSO with radium implants in 2000, followed by external beam radiation therapy  Chronic anemia (normochromic normocytic), baseline hemoglobin around 11  B12 deficiency  Radiation colitis/chronic diarrhea  Nephrolithiasis  Hydronephrosis  Severe scoliosis  Osteoporosis  Osteoarthritis  COVID-19 infection in September 2022        Past Medical History:   Diagnosis Date     Arthritis      Chronic back pain     with scoliosis     Chronic diarrhea     secondary to radiation, colitis     Endometrial ca (H)      Gastro-oesophageal reflux disease      History of blood transfusion     no adverse reactions     History of endometrial cancer     no recurrence     Hydronephrosis      Irregular heart beat     PSVT     Kidney stone      Kidney stones      Osteoporosis      Osteoporosis      PSVT (paroxysmal supraventricular tachycardia) (H)      Scoliosis      Small bowel obstruction (H)      Small bowel obstruction (H)      recurrent     SVT (supraventricular tachycardia) (H)        PAST SURGICAL HISTORY:   Past Surgical History:   Procedure Laterality Date     APPENDECTOMY       CATARACT IOL, RT/LT  2009    Cataract IOL Bilateral     COLONOSCOPY  03/30/2006    Normal; repeat in 5 years     CYSTOSCOPY  11/14/2007    Cystoscopy, bilateral retrograde pyelograms, dilatation of left ureteral stricture, left ureteroscopy and insertion of left double pigtail stent.      ENDOSCOPY  10/22/2008    Upper GI:  Normal     ENDOSCOPY  1/11/2007    Upper GI     EYE SURGERY  2010    retinal eye surgery     GENITOURINARY SURGERY      cystoscopy     GI SURGERY      exploratory lap x2 for bowel obstructions     HYSTERECTOMY, PAP NO LONGER INDICATED  2000    due to endometrial cancer     LAPAROTOMY EXPLORATORY  08/22/2007    1.  Exploratory laparotomy. 2.  Extensive lysis of intra-abdominal adhesions. Performed by Dr Rad Pierce     LAPAROTOMY EXPLORATORY  04/20/2005    1.  Exploratory laparotomy.  2.  Lysis of adhesions.  Segmental small bowel resection Performed by Dr Rad Pierce.     LASER HOLMIUM LITHOTRIPSY URETER(S), INSERT STENT, COMBINED Left 4/29/2021    Procedure: CYSTOSCOPY LEFT URETEROSCOPY;  Surgeon: Shabnam Lee MD;  Location:  OR       FAMILY HISTORY:   Family history is significant for meningitis in her mother and MVA in her father.    SOCIAL HISTORY:  Social History     Tobacco Use     Smoking status: Former Smoker     Packs/day: 0.50     Years: 40.00     Pack years: 20.00     Smokeless tobacco: Never Used   Substance Use Topics     Alcohol use: Yes     Alcohol/week: 0.0 standard drinks     Comment: 1 glass of wine with dinner       MEDICATIONS:  Current Outpatient Medications   Medication Sig Dispense Refill     acetaminophen (TYLENOL) 325 MG tablet Take 2 tablets (650 mg) by mouth 3 times daily       amylase-lipase-protease (CREON 12) [id number] units CPEP Take 1 capsule by mouth 3 times daily (with meals)    "    Ascorbic Acid (VITAMIN C PO) Take 500 mg by mouth daily        Cholecalciferol (VITAMIN D3 PO) Take 2,000 Units by mouth daily.       cyanocobalamin (VITAMIN B-12) 1000 MCG tablet Take 1,000 mcg by mouth daily       cyclobenzaprine (FLEXERIL) 5 MG tablet Take 1 tablet (5 mg) by mouth 3 times daily 20 tablet 3     diltiazem ER (DILT-XR) 180 MG 24 hr capsule Take 1 capsule by mouth once daily 90 capsule 0     diphenoxylate-atropine (LOMOTIL) 2.5-0.025 MG tablet Take 1 tablet by mouth 3 times daily as needed for diarrhea 15 tablet 0     Lidocaine (LIDOCARE) 4 % Patch Place 1 patch onto the skin every 24 hours To prevent lidocaine toxicity, patient should be patch free for 12 hrs daily. 6 patch 3     oxyCODONE (ROXICODONE) 5 MG tablet Take 0.5-1 tablets (2.5-5 mg) by mouth every 6 hours as needed for moderate to severe pain 30 tablet 0     pantoprazole (PROTONIX) 40 MG EC tablet Take 40 mg by mouth daily       potassium chloride ER (KLOR-CON) 8 MEQ CR tablet Take 8 mEq by mouth 2 times daily AM and Noon       senna-docusate (SENOKOT-S/PERICOLACE) 8.6-50 MG tablet Take 1 tablet by mouth 2 times daily as needed for constipation 12 tablet 0         Post Medication Reconciliation Status: Reconciled; continue medications without change.      ALLERGIES:  Allergies   Allergen Reactions     No Known Allergies        ROS:  10 point ROS were negative other than the symptoms noted above in the HPI.    PHYSICAL EXAM:  Vital signs were reviewed in the chart.  Vital Signs: BP 99/64   Pulse 61   Temp 98.5  F (36.9  C)   Resp 18   Ht 1.6 m (5' 3\")   Wt 41.5 kg (91 lb 6.4 oz)   SpO2 95%   BMI 16.19 kg/m    General: Comfortable and in no acute distress  HEENT: Mild conjunctival pallor, no scleral icterus or injection, moist oral mucosa  Cardiovascular: Normal S1, S2, RRR  Respiratory: Lungs clear to auscultation bilaterally  GI: Abdomen soft, non-tender, non-distended, +BS  Extremities: No LE edema  Neuro: CX II-XII grossly " intact; ROM in all four extremities grossly intact, no focal weakness  Psych: Alert and oriented x3; normal affect  Skin: No acute rash    LABORATORY/IMAGING DATA:  All relevant labs and imaging data in [facility] and/or Care Everywhere were personally reviewed today.      Most Recent 3 CBC's:Recent Labs   Lab Test 09/15/22  0648 09/13/22  0621 09/11/22  2101   WBC 4.0 4.6 5.1   HGB 10.7* 10.4* 10.2*   MCV 98 96 97    270 237     Most Recent 3 BMP's:Recent Labs   Lab Test 09/13/22  0621 09/11/22  2101 09/07/22  0652    139 141   POTASSIUM 3.4 4.1 3.4   CHLORIDE 105 104 105   CO2 30 30 26   BUN 23 15 12   CR 0.68 0.71 0.73   ANIONGAP 6 5 10   ILYA 9.1 8.9 8.6   GLC 97 134* 100*         ASSESSMENT/PLAN:  L5 burst fracture with L4-S1 epidural hematoma,  Right gluteus muscle strain with possible hematoma,  Right radiculopathy, s/p L5-S1 LUCIE on September 15, 2022,  Severe sclerosis,  Age-related osteoporosis with current pathologic fracture,  Physical deconditioning.  Pain has subsided but she continues to have some right-sided radiculopathy.  Plan:  Fall precautions  Continue pain management with scheduled acetaminophen 650 mg 3 times daily, as needed oxycodone 2.5 mg every 6 hours, lidocaine patch, and cyclobenzaprine 5 mg 3 times daily  Mobilize with PT/OT  Follow-up with Dr. Kailash Perez of Ortho on October 18 as scheduled    Essential hypertension,  History of PSVT.  Stable.  Plan:  Continue diltiazem  mg daily  Monitor blood pressure and heart rate    GERD.  Plan:  Continue pantoprazole 40 mg daily    Chronic anemia (normochromic normocytic).  Baseline hemoglobin around 11.  Last hemoglobin was 10.7 on September 15.  Plan:  Recheck CBC on September 23 due to possible drop in Hgb due to blood loss of hematoma    History of B12 deficiency.    Last B12 level was 606 in January 2019.  Plan:  Continue cyanocobalamin 1000 mcg daily    History of endometrial cancer, s/p SALVADOR/BSO with radium implants in 2000,  followed by external beam radiation therapy.  Plan:  Follow-up as outpatient    Radiation colitis and chronic diarrhea.  Patient has intermittent loose stools.  Plan:  Continue Creon 1 capsule 3 times daily with meals  Continue potassium chloride 8 mEq twice daily  Continue Lomotil as needed    COVID-19 infection.  Tested positive on September 7, 2022.  Recheck COVID-19 PCR test was negative on September 11.  Asymptomatic.  Plan:  Monitor respiratory status        Orders written by provider at facility:  CBC on September 23, DX: Anemia  BMP on September 23, DX: Diarrhea            Disclaimer: This note may contain text created using speech-recognition software and may contain unintended word substitutions.      Electronically signed by:  Angela Lozada MD                          Sincerely,        Angela Lozada MD

## 2022-09-21 PROCEDURE — 86481 TB AG RESPONSE T-CELL SUSP: CPT | Performed by: NURSE PRACTITIONER

## 2022-09-21 PROCEDURE — P9604 ONE-WAY ALLOW PRORATED TRIP: HCPCS | Performed by: NURSE PRACTITIONER

## 2022-09-21 PROCEDURE — 36415 COLL VENOUS BLD VENIPUNCTURE: CPT | Performed by: NURSE PRACTITIONER

## 2022-09-22 ENCOUNTER — LAB REQUISITION (OUTPATIENT)
Dept: LAB | Facility: CLINIC | Age: 82
End: 2022-09-22

## 2022-09-22 DIAGNOSIS — D64.9 ANEMIA, UNSPECIFIED: ICD-10-CM

## 2022-09-22 DIAGNOSIS — R19.7 DIARRHEA, UNSPECIFIED: ICD-10-CM

## 2022-09-22 LAB
GAMMA INTERFERON BACKGROUND BLD IA-ACNC: 0.03 IU/ML
M TB IFN-G BLD-IMP: NEGATIVE
M TB IFN-G CD4+ BCKGRND COR BLD-ACNC: 0.97 IU/ML
MITOGEN IGNF BCKGRD COR BLD-ACNC: 0 IU/ML
MITOGEN IGNF BCKGRD COR BLD-ACNC: 0 IU/ML
QUANTIFERON MITOGEN: 1 IU/ML
QUANTIFERON NIL TUBE: 0.03 IU/ML
QUANTIFERON TB1 TUBE: 0.03 IU/ML
QUANTIFERON TB2 TUBE: 0.03

## 2022-09-23 ENCOUNTER — TRANSITIONAL CARE UNIT VISIT (OUTPATIENT)
Dept: GERIATRICS | Facility: CLINIC | Age: 82
End: 2022-09-23
Payer: COMMERCIAL

## 2022-09-23 VITALS
RESPIRATION RATE: 18 BRPM | HEART RATE: 86 BPM | TEMPERATURE: 98.1 F | OXYGEN SATURATION: 99 % | WEIGHT: 91.4 LBS | BODY MASS INDEX: 16.2 KG/M2 | SYSTOLIC BLOOD PRESSURE: 128 MMHG | HEIGHT: 63 IN | DIASTOLIC BLOOD PRESSURE: 78 MMHG

## 2022-09-23 DIAGNOSIS — K52.0 RADIATION COLITIS: ICD-10-CM

## 2022-09-23 DIAGNOSIS — K52.9 CHRONIC DIARRHEA: ICD-10-CM

## 2022-09-23 DIAGNOSIS — Z86.39 HISTORY OF NON ANEMIC VITAMIN B12 DEFICIENCY: ICD-10-CM

## 2022-09-23 DIAGNOSIS — M54.16 LUMBAR RADICULOPATHY: ICD-10-CM

## 2022-09-23 DIAGNOSIS — K21.00 GASTROESOPHAGEAL REFLUX DISEASE WITH ESOPHAGITIS WITHOUT HEMORRHAGE: ICD-10-CM

## 2022-09-23 DIAGNOSIS — Z86.79 HISTORY OF PSVT (PAROXYSMAL SUPRAVENTRICULAR TACHYCARDIA): ICD-10-CM

## 2022-09-23 DIAGNOSIS — R53.81 PHYSICAL DECONDITIONING: ICD-10-CM

## 2022-09-23 DIAGNOSIS — Z85.42 HISTORY OF ENDOMETRIAL CANCER: ICD-10-CM

## 2022-09-23 DIAGNOSIS — M80.00XD AGE-RELATED OSTEOPOROSIS WITH CURRENT PATHOLOGICAL FRACTURE WITH ROUTINE HEALING, SUBSEQUENT ENCOUNTER: ICD-10-CM

## 2022-09-23 DIAGNOSIS — I10 ESSENTIAL HYPERTENSION: ICD-10-CM

## 2022-09-23 DIAGNOSIS — T14.8XXA HEMATOMA: ICD-10-CM

## 2022-09-23 DIAGNOSIS — D64.9 CHRONIC ANEMIA: ICD-10-CM

## 2022-09-23 DIAGNOSIS — U07.1 INFECTION DUE TO 2019 NOVEL CORONAVIRUS: ICD-10-CM

## 2022-09-23 DIAGNOSIS — S32.050S CLOSED COMPRESSION FRACTURE OF L5 LUMBAR VERTEBRA, SEQUELA: Primary | ICD-10-CM

## 2022-09-23 LAB
ANION GAP SERPL CALCULATED.3IONS-SCNC: 8 MMOL/L (ref 3–14)
BUN SERPL-MCNC: 21 MG/DL (ref 7–30)
CALCIUM SERPL-MCNC: 9.1 MG/DL (ref 8.5–10.1)
CHLORIDE BLD-SCNC: 108 MMOL/L (ref 94–109)
CO2 SERPL-SCNC: 26 MMOL/L (ref 20–32)
CREAT SERPL-MCNC: 0.68 MG/DL (ref 0.52–1.04)
ERYTHROCYTE [DISTWIDTH] IN BLOOD BY AUTOMATED COUNT: 14.1 % (ref 10–15)
GFR SERPL CREATININE-BSD FRML MDRD: 86 ML/MIN/1.73M2
GLUCOSE BLD-MCNC: 112 MG/DL (ref 70–99)
HCT VFR BLD AUTO: 36.1 % (ref 35–47)
HGB BLD-MCNC: 11.4 G/DL (ref 11.7–15.7)
MCH RBC QN AUTO: 31.1 PG (ref 26.5–33)
MCHC RBC AUTO-ENTMCNC: 31.6 G/DL (ref 31.5–36.5)
MCV RBC AUTO: 99 FL (ref 78–100)
PLATELET # BLD AUTO: 419 10E3/UL (ref 150–450)
POTASSIUM BLD-SCNC: 3.4 MMOL/L (ref 3.4–5.3)
RBC # BLD AUTO: 3.66 10E6/UL (ref 3.8–5.2)
SODIUM SERPL-SCNC: 142 MMOL/L (ref 133–144)
WBC # BLD AUTO: 5.3 10E3/UL (ref 4–11)

## 2022-09-23 PROCEDURE — 99310 SBSQ NF CARE HIGH MDM 45: CPT | Performed by: NURSE PRACTITIONER

## 2022-09-23 PROCEDURE — 85014 HEMATOCRIT: CPT | Performed by: NURSE PRACTITIONER

## 2022-09-23 PROCEDURE — 36415 COLL VENOUS BLD VENIPUNCTURE: CPT | Performed by: NURSE PRACTITIONER

## 2022-09-23 PROCEDURE — P9604 ONE-WAY ALLOW PRORATED TRIP: HCPCS | Performed by: NURSE PRACTITIONER

## 2022-09-23 PROCEDURE — 80048 BASIC METABOLIC PNL TOTAL CA: CPT | Performed by: NURSE PRACTITIONER

## 2022-09-23 NOTE — PROGRESS NOTES
[facility] GERIATRICS    Chief Complaint   Patient presents with     RECHECK     HPI:  Georgette Alatorre is a 82 year old  (1940), who is being seen today for an episodic care visit at: [facility] (TCU) [[id number]].     Per recent TCU provider progress notes:   82 year old female PMH HTN, PSVT, GERD, endometrial cancer, recurrent SBO, chronic anemia, radiation colitis, nephrolithiasis, B12 deficiency, severe scoliosis, osteoporosis, osteoarthritis, recent hospitalization 9/7-9/8/22 for acute/subacute L5 burst fracture and asymptomatic COVID-19 infection. Re-hospitalized for increased low back/buttock pain. CT pelvis moderate compression fracture of the L5 vertebral body, heterogeneously T2 hyperintense, and heterogeneously enhancing soft tissue density in the anterior epidural space extending from L4-L5 down to mid S1, suggestive of possible hematoma related to fracture.  MRI of the pelvis showed focal subcutaneous fat stranding with underlying 17 x 18 x 13 mm rim-enhancing centrally T2 intermediate to hyperdense intramuscular focus within the right gluteus lisa, due to possible muscle strain/contusion with muscle tear/hematoma or developing sinus tract/phlegmon. Ortho: treated with L5-S1 epidural steroid injection 9/15. To TCU for rehab.    Today's concern is: patient seen for initial TCU visit with this provider and to follow up with pain, mobility, labs, vs ranges. Reports doing well, pain managed adequately. No headaches, dizziness, chest pain, dyspnea, bowel or bladder issues. Asks to stop laxatives. Up with FWW in room. BP ranges /58-78 and sats 99% room air. Reviewed code status - Full Code confirmed.     Allergies, and PMH/PSH reviewed in [employer] today.    REVIEW OF SYSTEMS:  10 point ROS of systems including Constitutional, Eyes, Respiratory, Cardiovascular, Gastroenterology, Genitourinary, Integumentary, Musculoskeletal, Psychiatric were all negative except for pertinent positives  "noted in my HPI.    Objective:   /78   Pulse 86   Temp 98.1  F (36.7  C)   Resp 18   Ht 1.6 m (5' 3\")   Wt 41.5 kg (91 lb 6.4 oz)   SpO2 99%   BMI 16.19 kg/m    GENERAL APPEARANCE:  Alert, in no distress, pleasant, cooperative, oriented x 4  EYES:  EOM, lids, pupils and irises normal, sclera clear and conjunctiva normal, no discharge or mattering on lids or lashes noted  ENT:  Mouth normal, moist mucous membranes, nose normal without drainage or crusting, external ears without lesions, hearing acuity intact  NECK: supple, symmetrical, trachea midline  RESP:  respiratory effort normal, no chest wall tenderness, no respiratory distress, Lung sounds clear, patient is on room air  CV:  Auscultation of heart done, rate and rhythm controlled and regular, no murmur, no rub or gallop. Edema none bilateral lower extremities. No open areas LEs  ABDOMEN:  normal bowel sounds, soft, nontender, no palpable masses.  M/S:   Gait and station ambulates independently with walker, no tenderness or swelling of the joints; able to move all extremities, digits normal  SKIN:  Inspection and palpation of skin and subcutaneous tissue: skin on extremities warm, dry and intact without rashes  NEURO: cranial nerves 2-12 grossly intact, no facial asymmetry, no speech deficits and able to follow directions, moves all extremities symmetrically  PSYCH:  insight and judgement and memory appear intact, affect and mood normal     9/23/22 WBC 5.3, HGB 11.4,   Most Recent 3 CBC's:Recent Labs   Lab Test 09/15/22  0648 09/13/22 0621 09/11/22 2101   WBC 4.0 4.6 5.1   HGB 10.7* 10.4* 10.2*   MCV 98 96 97    270 237     9/23/22 Na 142, K 3.4, BUN 21, Cr 0.68  Most Recent 3 BMP's:Recent Labs   Lab Test 09/13/22  0621 09/11/22  2101 09/07/22  0652    139 141   POTASSIUM 3.4 4.1 3.4   CHLORIDE 105 104 105   CO2 30 30 26   BUN 23 15 12   CR 0.68 0.71 0.73   ANIONGAP 6 5 10   ILYA 9.1 8.9 8.6   GLC 97 134* 100*     Most Recent " TSH and T4:Recent Labs   Lab Test 10/22/19  1049   TSH 3.61       Assessment/Plan:  L5 burst fracture with L4-S1 epidural hematoma  Right gluteus muscle strain with possible hematoma  Right radiculopathy, s/p L5-S1 LUCIE 9/15/22  Age-related osteoporosis with current pathologic fracture  Physical deconditioning  Acute, ongoing. Continue acetaminophen 650 mg 3 times daily, as needed oxycodone 2.5 mg every 6 hours, lidocaine patch, and cyclobenzaprine 5 mg 3 times daily. Therapies as ordered and f/u with progress. Ortho f/u as planned.     Essential hypertension  History of PSVT  Stable. Continue diltiazem  mg daily. Monitor vs, review ranges next visit.     GERD  Continue pantoprazole 40 mg daily, monitor.     Chronic anemia  Baseline hemoglobin around 11. Up to 11.4 on 9/23. Monitor PRN.     History of B12 deficiency  Continue cyanocobalamin 1000 mcg daily    History of endometrial cancer, s/p SALVADOR/BSO  Follow-up as outpatient per home routine.    Radiation colitis and chronic diarrhea  Asks to stop PRN senna. Continue Creon 1 capsule 3 times daily with meals, potassium chloride 8 mEq twice daily and Lomotil as needed.     COVID-19 infection  Tested positive on 9/7/2022. No symptoms. Monitor.     Post Medication Reconciliation Status: Discharge medications reconciled and changed, see notes/orders    Orders:  1. Full code  2. Stop senna s per patient request  3. CBC and BMP today diagnosis weakness    Total unit/floor time of 36 minutes spent consisted of the following: examination of patient, reviewing the record including pertinent labs and imaging. More than 50% of this time was spent in coordination of care with the patient, nursing staff, and other healthcare providers. This time was spent on discussing the care plan including labs, discharge/safe placement, and specialty follow up. Additional specific discussion included review of code status, pain management, monitoring of anemia and renal function,  expected TCU course/routine/discharge planning and clarification of meds/orders with nursing for a total of over 19 min.    Electronically signed by: JAYJAY King CNP

## 2022-09-26 VITALS
HEIGHT: 63 IN | DIASTOLIC BLOOD PRESSURE: 62 MMHG | SYSTOLIC BLOOD PRESSURE: 100 MMHG | WEIGHT: 89 LBS | OXYGEN SATURATION: 95 % | HEART RATE: 64 BPM | RESPIRATION RATE: 18 BRPM | BODY MASS INDEX: 15.77 KG/M2 | TEMPERATURE: 97.6 F

## 2022-09-27 ENCOUNTER — TRANSITIONAL CARE UNIT VISIT (OUTPATIENT)
Dept: GERIATRICS | Facility: CLINIC | Age: 82
End: 2022-09-27
Payer: COMMERCIAL

## 2022-09-27 DIAGNOSIS — U07.1 INFECTION DUE TO 2019 NOVEL CORONAVIRUS: ICD-10-CM

## 2022-09-27 DIAGNOSIS — R53.81 PHYSICAL DECONDITIONING: ICD-10-CM

## 2022-09-27 DIAGNOSIS — S32.001A CLOSED BURST FRACTURE OF LUMBAR VERTEBRA, INITIAL ENCOUNTER (H): ICD-10-CM

## 2022-09-27 DIAGNOSIS — M54.16 LUMBAR RADICULOPATHY: ICD-10-CM

## 2022-09-27 DIAGNOSIS — D64.9 CHRONIC ANEMIA: ICD-10-CM

## 2022-09-27 DIAGNOSIS — I10 ESSENTIAL HYPERTENSION: ICD-10-CM

## 2022-09-27 DIAGNOSIS — Z86.79 HISTORY OF PSVT (PAROXYSMAL SUPRAVENTRICULAR TACHYCARDIA): ICD-10-CM

## 2022-09-27 DIAGNOSIS — T14.8XXA HEMATOMA: ICD-10-CM

## 2022-09-27 DIAGNOSIS — K52.0 RADIATION COLITIS: ICD-10-CM

## 2022-09-27 DIAGNOSIS — Z85.42 HISTORY OF ENDOMETRIAL CANCER: ICD-10-CM

## 2022-09-27 DIAGNOSIS — M80.00XD AGE-RELATED OSTEOPOROSIS WITH CURRENT PATHOLOGICAL FRACTURE WITH ROUTINE HEALING, SUBSEQUENT ENCOUNTER: ICD-10-CM

## 2022-09-27 DIAGNOSIS — S32.050S CLOSED COMPRESSION FRACTURE OF L5 LUMBAR VERTEBRA, SEQUELA: Primary | ICD-10-CM

## 2022-09-27 DIAGNOSIS — K52.9 CHRONIC DIARRHEA: ICD-10-CM

## 2022-09-27 DIAGNOSIS — Z86.39 HISTORY OF NON ANEMIC VITAMIN B12 DEFICIENCY: ICD-10-CM

## 2022-09-27 DIAGNOSIS — K21.00 GASTROESOPHAGEAL REFLUX DISEASE WITH ESOPHAGITIS WITHOUT HEMORRHAGE: ICD-10-CM

## 2022-09-27 PROCEDURE — 99316 NF DSCHRG MGMT 30 MIN+: CPT | Performed by: NURSE PRACTITIONER

## 2022-09-27 RX ORDER — OXYCODONE HYDROCHLORIDE 5 MG/1
2.5-5 TABLET ORAL EVERY 6 HOURS PRN
Qty: 10 TABLET | Refills: 0 | Status: SHIPPED | OUTPATIENT
Start: 2022-09-27 | End: 2023-02-22

## 2022-09-27 RX ORDER — CYCLOBENZAPRINE HCL 5 MG
5 TABLET ORAL 3 TIMES DAILY
Qty: 15 TABLET | Refills: 0 | Status: SHIPPED | OUTPATIENT
Start: 2022-09-27 | End: 2023-02-22

## 2022-09-27 NOTE — PROGRESS NOTES
[facility] GERIATRICS DISCHARGE SUMMARY  PATIENT'S NAME: Georgtete Alatorre  YOB: 1940  MEDICAL RECORD NUMBER:  [id number]  Place of Service where encounter took place:  [facility] (TCU) [[id number]]    PRIMARY CARE PROVIDER AND CLINIC RESPONSIBLE AFTER TRANSFER:   Merlin Emery Brown, MD, [facility] [address] / [place] [address]   [facility] Provider     Transferring providers: JAYJAY King CNP, Angela Lozada MD  Recent Hospitalization/ED: [facility] stay 09/11/22 through 09/16/22 .  Date of SNF Admission: September 16, 2022  Date of SNF (anticipated) Discharge: 9/28/22  Discharged to: previous independent home  Cognitive Scores: SLUMS: 28/30  Physical Function: Ambulating 750 ft with FWW  DME: No new DME needed    CODE STATUS/ADVANCE DIRECTIVES DISCUSSION:  Prior   ALLERGIES: No known allergies    NURSING FACILITY COURSE   Medication Changes/Rationale:     Stopped senna s due to frequent loose stools.     Per recent TCU provider progress notes:   82 year old female PMH HTN, PSVT, GERD, endometrial cancer, recurrent SBO, chronic anemia, radiation colitis, nephrolithiasis, B12 deficiency, severe scoliosis, osteoporosis, osteoarthritis, recent hospitalization 9/7-9/8/22 for acute/subacute L5 burst fracture and asymptomatic COVID-19 infection. Re-hospitalized for increased low back/buttock pain. CT pelvis moderate compression fracture of the L5 vertebral body, heterogeneously T2 hyperintense, and heterogeneously enhancing soft tissue density in the anterior epidural space extending from L4-L5 down to mid S1, suggestive of possible hematoma related to fracture.  MRI of the pelvis showed focal subcutaneous fat stranding with underlying 17 x 18 x 13 mm rim-enhancing centrally T2 intermediate to hyperdense intramuscular focus within the right gluteus lisa, due to possible muscle strain/contusion with muscle tear/hematoma or developing sinus  tract/phlegmon. Ortho: treated with L5-S1 epidural steroid injection 9/15. To TCU for rehab.    Patient seen for discharge visit. No new concerns, mobility and pain much improved. Denies headaches, dizziness, chest pain, dyspnea, bladder issues. Frequent loose stools are chronic, uses PRN lomotil. Recent BP range 100-129/58-80 and sats 93% room air. Weight down 3 lbs since admission. Home with home care as ordered below.     Summary of nursing facility stay:   L5 burst fracture with L4-S1 epidural hematoma  Right gluteus muscle strain with possible hematoma  Right radiculopathy, s/p L5-S1 LUCIE 9/15/22  Age-related osteoporosis with current pathologic fracture  Physical deconditioning  Acute, improving. Continue acetaminophen 650 mg 3 times daily, as needed oxycodone 2.5-5 mg every 6 hours, lidocaine patch, and cyclobenzaprine 5 mg 3 times daily. Ortho f/u as planned. Home with home care as ordered below.     Essential hypertension  History of PSVT  Stable. Continue diltiazem  mg daily. Monitor vs, review ranges next PCP visit.     GERD  Continue pantoprazole 40 mg daily, monitor.     Chronic anemia  Baseline hemoglobin around 11. Up to 11.4 on 9/23. Monitor PRN.     History of B12 deficiency  Continue cyanocobalamin 1000 mcg daily    History of endometrial cancer, s/p SALVADOR/BSO  Follow-up as outpatient per home routine.    Radiation colitis and chronic diarrhea  Asked to stop PRN senna. Continue Creon 1 capsule 3 times daily with meals, potassium chloride 8 mEq twice daily and Lomotil as needed.     COVID-19 infection  Tested positive on 9/7/2022. No symptoms. Monitor.     Discharge Medications:  Post Medication Reconciliation Status: Discharge medications reconciled, continue medications without change    Current Outpatient Medications   Medication Sig Dispense Refill     acetaminophen (TYLENOL) 325 MG tablet Take 2 tablets (650 mg) by mouth 3 times daily       amylase-lipase-protease (CREON 12)  [id number] units CPEP Take 1 capsule by mouth 3 times daily (with meals)       Ascorbic Acid (VITAMIN C PO) Take 500 mg by mouth daily        Cholecalciferol (VITAMIN D3 PO) Take 2,000 Units by mouth daily.       cyanocobalamin (VITAMIN B-12) 1000 MCG tablet Take 1,000 mcg by mouth daily       diltiazem ER (DILT-XR) 180 MG 24 hr capsule Take 1 capsule by mouth once daily 90 capsule 0     diphenoxylate-atropine (LOMOTIL) 2.5-0.025 MG tablet Take 1 tablet by mouth 3 times daily as needed for diarrhea 15 tablet 0     Lidocaine (LIDOCARE) 4 % Patch Place 1 patch onto the skin every 24 hours To prevent lidocaine toxicity, patient should be patch free for 12 hrs daily. 6 patch 3     pantoprazole (PROTONIX) 40 MG EC tablet Take 40 mg by mouth daily       potassium chloride ER (KLOR-CON) 8 MEQ CR tablet Take 8 mEq by mouth 2 times daily AM and Noon       cyclobenzaprine (FLEXERIL) 5 MG tablet Take 1 tablet (5 mg) by mouth 3 times daily 15 tablet 0     oxyCODONE (ROXICODONE) 5 MG tablet Take 0.5-1 tablets (2.5-5 mg) by mouth every 6 hours as needed for moderate to severe pain 10 tablet 0      Controlled medications:   Oxycodone 5 mg tabs #10 no refills sent to pharmacy     Past Medical History:   Past Medical History:   Diagnosis Date     Arthritis      Chronic back pain     with scoliosis     Chronic diarrhea     secondary to radiation, colitis     Endometrial ca (H)      Gastro-oesophageal reflux disease      History of blood transfusion     no adverse reactions     History of endometrial cancer     no recurrence     Hydronephrosis      Irregular heart beat     PSVT     Kidney stone      Kidney stones      Osteoporosis      Osteoporosis      PSVT (paroxysmal supraventricular tachycardia) (H)      Scoliosis      Small bowel obstruction (H)      Small bowel obstruction (H)     recurrent     SVT (supraventricular tachycardia) (H)      Physical Exam:   Vitals: /62   Pulse 64   Temp 97.6  F (36.4  C)   Resp 18   " Ht 1.6 m (5' 3\")   Wt 40.4 kg (89 lb)   SpO2 95%   BMI 15.77 kg/m    BMI: Body mass index is 15.77 kg/m .  GENERAL APPEARANCE:  Alert, in no distress, pleasant, cooperative, oriented x 4  EYES:  EOM, lids, pupils and irises normal, sclera clear and conjunctiva normal, no discharge or mattering on lids or lashes noted  ENT:  Mouth normal, moist mucous membranes, nose normal without drainage or crusting, external ears without lesions, hearing acuity intact  RESP:  respiratory effort normal, no chest wall tenderness, no respiratory distress, Lung sounds clear, patient is on room air  CV:  Auscultation of heart done, rate and rhythm controlled and regular, no murmur, no rub or gallop. Edema none bilateral lower extremities. No open areas LEs  ABDOMEN:  normal bowel sounds, soft, nontender, no palpable masses.  M/S:   Gait and station ambulates independently with walker, no tenderness or swelling of the joints; able to move all extremities, digits normal  NEURO: cranial nerves 2-12 grossly intact, no facial asymmetry, no speech deficits and able to follow directions, moves all extremities symmetrically  PSYCH:  insight and judgement and memory appear intact, affect and mood normal     SNF labs:   9/23/22 WBC 5.3, HGB 11.4,   9/23/22 Na 142, K 3.4, BUN 21, Cr 0.68    DISCHARGE PLAN:    Follow up labs: No labs orders/due    Medical Follow Up:      Follow up with primary care provider in 3-4 weeks  Follow up with specialist ortho PRN     Current [facility] scheduled appointments:   No future appointments.     Discharge Services: Home Care:  Occupational Therapy, Physical Therapy, Home Health Aide and From:  [facility]    Discharge Instructions Verbalized to Patient at Discharge:     DO NOT DRIVE while taking narcotic pain medications.     TOTAL DISCHARGE TIME:   Greater than 30 minutes  Electronically signed by:  JAYJAY King CNP     Home care Face to Face documentation done in EPIC attached to " Home care orders for [facility].

## 2023-02-22 ENCOUNTER — APPOINTMENT (OUTPATIENT)
Dept: CT IMAGING | Facility: CLINIC | Age: 83
DRG: 389 | End: 2023-02-22
Attending: EMERGENCY MEDICINE
Payer: COMMERCIAL

## 2023-02-22 ENCOUNTER — HOSPITAL ENCOUNTER (INPATIENT)
Facility: CLINIC | Age: 83
LOS: 4 days | Discharge: HOME | DRG: 389 | End: 2023-02-26
Attending: EMERGENCY MEDICINE | Admitting: INTERNAL MEDICINE
Payer: COMMERCIAL

## 2023-02-22 ENCOUNTER — TELEPHONE (OUTPATIENT)
Dept: ONCOLOGY | Facility: CLINIC | Age: 83
End: 2023-02-22

## 2023-02-22 DIAGNOSIS — N28.9 RENAL INSUFFICIENCY: ICD-10-CM

## 2023-02-22 DIAGNOSIS — S32.001A CLOSED BURST FRACTURE OF LUMBAR VERTEBRA, INITIAL ENCOUNTER (H): ICD-10-CM

## 2023-02-22 DIAGNOSIS — K56.609 SBO (SMALL BOWEL OBSTRUCTION) (H): ICD-10-CM

## 2023-02-22 DIAGNOSIS — E86.0 DEHYDRATION: ICD-10-CM

## 2023-02-22 LAB
ALBUMIN SERPL BCG-MCNC: 4.1 G/DL (ref 3.5–5.2)
ALBUMIN UR-MCNC: 10 MG/DL
ALP SERPL-CCNC: 80 U/L (ref 35–104)
ALT SERPL W P-5'-P-CCNC: 10 U/L (ref 10–35)
ANION GAP SERPL CALCULATED.3IONS-SCNC: 19 MMOL/L (ref 7–15)
ANION GAP SERPL CALCULATED.3IONS-SCNC: 20 MMOL/L (ref 7–15)
APPEARANCE UR: CLEAR
AST SERPL W P-5'-P-CCNC: 28 U/L (ref 10–35)
BASOPHILS # BLD AUTO: 0 10E3/UL (ref 0–0.2)
BASOPHILS NFR BLD AUTO: 0 %
BILIRUB SERPL-MCNC: 0.6 MG/DL
BILIRUB UR QL STRIP: NEGATIVE
BUN SERPL-MCNC: 37.5 MG/DL (ref 8–23)
BUN SERPL-MCNC: 39 MG/DL (ref 8–23)
CALCIUM SERPL-MCNC: 9.8 MG/DL (ref 8.8–10.2)
CALCIUM SERPL-MCNC: 9.9 MG/DL (ref 8.8–10.2)
CHLORIDE SERPL-SCNC: 93 MMOL/L (ref 98–107)
CHLORIDE SERPL-SCNC: 95 MMOL/L (ref 98–107)
COLOR UR AUTO: ABNORMAL
CREAT SERPL-MCNC: 1.41 MG/DL (ref 0.51–0.95)
CREAT SERPL-MCNC: 1.46 MG/DL (ref 0.51–0.95)
DEPRECATED HCO3 PLAS-SCNC: 24 MMOL/L (ref 22–29)
DEPRECATED HCO3 PLAS-SCNC: 25 MMOL/L (ref 22–29)
EOSINOPHIL # BLD AUTO: 0 10E3/UL (ref 0–0.7)
EOSINOPHIL NFR BLD AUTO: 0 %
ERYTHROCYTE [DISTWIDTH] IN BLOOD BY AUTOMATED COUNT: 14.5 % (ref 10–15)
GFR SERPL CREATININE-BSD FRML MDRD: 35 ML/MIN/1.73M2
GFR SERPL CREATININE-BSD FRML MDRD: 37 ML/MIN/1.73M2
GLUCOSE SERPL-MCNC: 105 MG/DL (ref 70–99)
GLUCOSE SERPL-MCNC: 108 MG/DL (ref 70–99)
GLUCOSE UR STRIP-MCNC: NEGATIVE MG/DL
HCT VFR BLD AUTO: 34.5 % (ref 35–47)
HGB BLD-MCNC: 11.5 G/DL (ref 11.7–15.7)
HGB UR QL STRIP: ABNORMAL
HOLD SPECIMEN: NORMAL
HOLD SPECIMEN: NORMAL
IMM GRANULOCYTES # BLD: 0 10E3/UL
IMM GRANULOCYTES NFR BLD: 0 %
KETONES UR STRIP-MCNC: 60 MG/DL
LEUKOCYTE ESTERASE UR QL STRIP: NEGATIVE
LYMPHOCYTES # BLD AUTO: 0.7 10E3/UL (ref 0.8–5.3)
LYMPHOCYTES NFR BLD AUTO: 11 %
MCH RBC QN AUTO: 31.8 PG (ref 26.5–33)
MCHC RBC AUTO-ENTMCNC: 33.3 G/DL (ref 31.5–36.5)
MCV RBC AUTO: 95 FL (ref 78–100)
MONOCYTES # BLD AUTO: 1.2 10E3/UL (ref 0–1.3)
MONOCYTES NFR BLD AUTO: 19 %
MUCOUS THREADS #/AREA URNS LPF: PRESENT /LPF
NEUTROPHILS # BLD AUTO: 4.2 10E3/UL (ref 1.6–8.3)
NEUTROPHILS NFR BLD AUTO: 70 %
NITRATE UR QL: NEGATIVE
NRBC # BLD AUTO: 0 10E3/UL
NRBC BLD AUTO-RTO: 0 /100
PH UR STRIP: 5 [PH] (ref 5–7)
PLATELET # BLD AUTO: 244 10E3/UL (ref 150–450)
POTASSIUM SERPL-SCNC: 4.1 MMOL/L (ref 3.4–5.3)
POTASSIUM SERPL-SCNC: 4.2 MMOL/L (ref 3.4–5.3)
PROT SERPL-MCNC: 7.6 G/DL (ref 6.4–8.3)
RBC # BLD AUTO: 3.62 10E6/UL (ref 3.8–5.2)
RBC URINE: 2 /HPF
SODIUM SERPL-SCNC: 137 MMOL/L (ref 136–145)
SODIUM SERPL-SCNC: 139 MMOL/L (ref 136–145)
SP GR UR STRIP: 1.03 (ref 1–1.03)
SQUAMOUS EPITHELIAL: 1 /HPF
UROBILINOGEN UR STRIP-MCNC: NORMAL MG/DL
WBC # BLD AUTO: 6.1 10E3/UL (ref 4–11)
WBC URINE: 2 /HPF

## 2023-02-22 PROCEDURE — 80048 BASIC METABOLIC PNL TOTAL CA: CPT | Performed by: EMERGENCY MEDICINE

## 2023-02-22 PROCEDURE — 120N000001 HC R&B MED SURG/OB

## 2023-02-22 PROCEDURE — 250N000009 HC RX 250: Performed by: EMERGENCY MEDICINE

## 2023-02-22 PROCEDURE — 258N000003 HC RX IP 258 OP 636: Performed by: PHYSICIAN ASSISTANT

## 2023-02-22 PROCEDURE — 85025 COMPLETE CBC W/AUTO DIFF WBC: CPT | Performed by: EMERGENCY MEDICINE

## 2023-02-22 PROCEDURE — 36415 COLL VENOUS BLD VENIPUNCTURE: CPT | Performed by: EMERGENCY MEDICINE

## 2023-02-22 PROCEDURE — 99221 1ST HOSP IP/OBS SF/LOW 40: CPT | Performed by: SURGERY

## 2023-02-22 PROCEDURE — 250N000011 HC RX IP 250 OP 636: Performed by: PHYSICIAN ASSISTANT

## 2023-02-22 PROCEDURE — 74177 CT ABD & PELVIS W/CONTRAST: CPT

## 2023-02-22 PROCEDURE — 99285 EMERGENCY DEPT VISIT HI MDM: CPT | Mod: 25

## 2023-02-22 PROCEDURE — 80053 COMPREHEN METABOLIC PANEL: CPT | Performed by: EMERGENCY MEDICINE

## 2023-02-22 PROCEDURE — 250N000011 HC RX IP 250 OP 636: Performed by: EMERGENCY MEDICINE

## 2023-02-22 PROCEDURE — 99222 1ST HOSP IP/OBS MODERATE 55: CPT | Mod: AI | Performed by: PHYSICIAN ASSISTANT

## 2023-02-22 PROCEDURE — 81001 URINALYSIS AUTO W/SCOPE: CPT | Performed by: PHYSICIAN ASSISTANT

## 2023-02-22 RX ORDER — IOPAMIDOL 755 MG/ML
45 INJECTION, SOLUTION INTRAVASCULAR ONCE
Status: COMPLETED | OUTPATIENT
Start: 2023-02-22 | End: 2023-02-22

## 2023-02-22 RX ORDER — SODIUM CHLORIDE 9 MG/ML
INJECTION, SOLUTION INTRAVENOUS CONTINUOUS
Status: DISCONTINUED | OUTPATIENT
Start: 2023-02-22 | End: 2023-02-22

## 2023-02-22 RX ORDER — SODIUM CHLORIDE 9 MG/ML
INJECTION, SOLUTION INTRAVENOUS CONTINUOUS
Status: DISCONTINUED | OUTPATIENT
Start: 2023-02-22 | End: 2023-02-23

## 2023-02-22 RX ORDER — LIDOCAINE 40 MG/G
CREAM TOPICAL
Status: DISCONTINUED | OUTPATIENT
Start: 2023-02-22 | End: 2023-02-26 | Stop reason: HOSPADM

## 2023-02-22 RX ORDER — HYDROMORPHONE HCL IN WATER/PF 6 MG/30 ML
0.2 PATIENT CONTROLLED ANALGESIA SYRINGE INTRAVENOUS
Status: DISCONTINUED | OUTPATIENT
Start: 2023-02-22 | End: 2023-02-26 | Stop reason: HOSPADM

## 2023-02-22 RX ORDER — PROCHLORPERAZINE 25 MG
12.5 SUPPOSITORY, RECTAL RECTAL EVERY 12 HOURS PRN
Status: DISCONTINUED | OUTPATIENT
Start: 2023-02-22 | End: 2023-02-26 | Stop reason: HOSPADM

## 2023-02-22 RX ORDER — PROCHLORPERAZINE MALEATE 5 MG
5 TABLET ORAL EVERY 6 HOURS PRN
Status: DISCONTINUED | OUTPATIENT
Start: 2023-02-22 | End: 2023-02-26 | Stop reason: HOSPADM

## 2023-02-22 RX ORDER — ZINC GLUCONATE 50 MG
50 TABLET ORAL DAILY
COMMUNITY

## 2023-02-22 RX ORDER — ACETAMINOPHEN 500 MG
1000 TABLET ORAL EVERY 6 HOURS PRN
Status: DISCONTINUED | OUTPATIENT
Start: 2023-02-22 | End: 2023-02-22

## 2023-02-22 RX ORDER — LACTOBACILLUS RHAMNOSUS GG 10B CELL
1 CAPSULE ORAL DAILY
COMMUNITY

## 2023-02-22 RX ORDER — METOPROLOL TARTRATE 1 MG/ML
2.5 INJECTION, SOLUTION INTRAVENOUS EVERY 4 HOURS PRN
Status: DISCONTINUED | OUTPATIENT
Start: 2023-02-22 | End: 2023-02-26 | Stop reason: HOSPADM

## 2023-02-22 RX ORDER — ONDANSETRON 2 MG/ML
4 INJECTION INTRAMUSCULAR; INTRAVENOUS EVERY 30 MIN PRN
Status: DISCONTINUED | OUTPATIENT
Start: 2023-02-22 | End: 2023-02-24

## 2023-02-22 RX ADMIN — SODIUM CHLORIDE 60 ML: 900 INJECTION INTRAVENOUS at 09:57

## 2023-02-22 RX ADMIN — SODIUM CHLORIDE: 9 INJECTION, SOLUTION INTRAVENOUS at 20:00

## 2023-02-22 RX ADMIN — PROCHLORPERAZINE EDISYLATE 5 MG: 5 INJECTION INTRAMUSCULAR; INTRAVENOUS at 22:24

## 2023-02-22 RX ADMIN — HYDROMORPHONE HYDROCHLORIDE 0.2 MG: 0.2 INJECTION, SOLUTION INTRAMUSCULAR; INTRAVENOUS; SUBCUTANEOUS at 22:21

## 2023-02-22 RX ADMIN — IOPAMIDOL 45 ML: 755 INJECTION, SOLUTION INTRAVENOUS at 09:57

## 2023-02-22 RX ADMIN — ONDANSETRON 4 MG: 2 INJECTION INTRAMUSCULAR; INTRAVENOUS at 14:06

## 2023-02-22 RX ADMIN — SODIUM CHLORIDE 1000 ML: 9 INJECTION, SOLUTION INTRAVENOUS at 14:05

## 2023-02-22 RX ADMIN — HYDROMORPHONE HYDROCHLORIDE 0.2 MG: 0.2 INJECTION, SOLUTION INTRAMUSCULAR; INTRAVENOUS; SUBCUTANEOUS at 14:06

## 2023-02-22 ASSESSMENT — ENCOUNTER SYMPTOMS
CONSTIPATION: 1
ABDOMINAL DISTENTION: 1
VOMITING: 1
DIARRHEA: 1

## 2023-02-22 ASSESSMENT — ACTIVITIES OF DAILY LIVING (ADL)
ADLS_ACUITY_SCORE: 35

## 2023-02-22 NOTE — ED NOTES
[facility]  ED Nurse Handoff Report    ED Chief complaint: Abdominal Pain and Nausea & Vomiting (Pt has Hx bowel obstructions.  Last BM Monday.  Nausea and vomiting, SL zofran)      ED Diagnosis:   Final diagnoses:   SBO (small bowel obstruction) (H)   Dehydration   Renal insufficiency       Code Status: Full Code    Allergies:   Allergies   Allergen Reactions    No Known Allergies        Patient Story: abd pain with vomiting;  Focused Assessment:  pt has had a a significant weight lost and the last couple days unable to eat, vomiting and abdominal pain;   Results for orders placed or performed during the hospital encounter of 02/22/23   CT Abdomen Pelvis w Contrast    Impression    IMPRESSION:   1.  Mechanical small bowel obstruction with likely transition point in  the right lower quadrant loops of distal ileum in the pelvis. No bowel  wall thickening or pneumatosis.  2.  New moderate right hydronephrosis without dilatation of the right  ureter. No obstructing mass or stone evident at the ureteropelvic  junction. 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 Follow-up CT  urogram on a nonemergent basis should be considered.  3.  Stable partly visualized collapsed right lower lobe.    JOSH BYRD MD         SYSTEM ID:  [id number]     Labs Ordered and Resulted from Time of ED Arrival to Time of ED Departure   BASIC METABOLIC PANEL - Abnormal       Result Value    Sodium 139      Potassium 4.2      Chloride 95 (*)     Carbon Dioxide (CO2) 25      Anion Gap 19 (*)     Urea Nitrogen 37.5 (*)     Creatinine 1.41 (*)     Calcium 9.9      Glucose 108 (*)     GFR Estimate 37 (*)    CBC WITH PLATELETS AND DIFFERENTIAL - Abnormal    WBC Count 6.1      RBC Count 3.62 (*)     Hemoglobin 11.5 (*)     Hematocrit 34.5 (*)     MCV 95      MCH 31.8      MCHC 33.3      RDW 14.5      Platelet Count 244      % Neutrophils 70      % Lymphocytes 11      % Monocytes 19      % Eosinophils 0      % Basophils 0      %  Immature Granulocytes 0      NRBCs per 100 WBC 0      Absolute Neutrophils 4.2      Absolute Lymphocytes 0.7 (*)     Absolute Monocytes 1.2      Absolute Eosinophils 0.0      Absolute Basophils 0.0      Absolute Immature Granulocytes 0.0      Absolute NRBCs 0.0     COMPREHENSIVE METABOLIC PANEL - Abnormal    Sodium 137      Potassium 4.1      Chloride 93 (*)     Carbon Dioxide (CO2) 24      Anion Gap 20 (*)     Urea Nitrogen 39.0 (*)     Creatinine 1.46 (*)     Calcium 9.8      Glucose 105 (*)     Alkaline Phosphatase 80      AST 28      ALT 10      Protein Total 7.6      Albumin 4.1      Bilirubin Total 0.6      GFR Estimate 35 (*)    ROUTINE UA WITH MICROSCOPIC REFLEX TO CULTURE           Treatments and/or interventions provided: fluids, pain and nausea meds  Patient's response to treatments and/or interventions: well    To be done/followed up on inpatient unit:      Does this patient have any cognitive concerns?:  no     Activity level - Baseline/Home:  Independent  Activity Level - Current:   Independent and Stand with Assist    Patient's Preferred language: English   Needed?: No    Isolation: None  Infection: Not Applicable  Patient tested for COVID 19 prior to admission: NO  Bariatric?: No    Vital Signs:   Vitals:    02/22/23 0655   BP: 112/44   BP Location: Left arm   Pulse: 70   Resp: 14   Temp: 97.5  F (36.4  C)   TempSrc: Oral   SpO2: 97%   Weight: 40.8 kg (90 lb)   Height: 1.524 m (5')       Cardiac Rhythm:     Was the PSS-3 completed:   Yes  What interventions are required if any?               Family Comments: n/a  OBS brochure/video discussed/provided to patient/family: No              Name of person given brochure if not patient:               Relationship to patient:     For the majority of the shift this patient's behavior was Green.   Behavioral interventions performed were .    ED NURSE PHONE NUMBER: [phone]

## 2023-02-22 NOTE — H&P
[facility]    History and Physical - Hospitalist Service       Date of Admission:  2/22/2023    Assessment & Plan    Ms. Georgette Alatorre is a very pleasant 82-year-old woman with PMH significant for acute to subacute L5 burst fracture 9/2022, history of endometrial cancer s/p radiation, history of radiation colitis, paroxysmal SVT, osteoporosis, scoliosis, normocytic anemia, who presented to the ER with N/V/abd pain and found to have small bowel obstruction. Admitted for further management and general surgery consultation.    Small bowel obstruction  Hx exp lap, extensive CRAIG, revision of small bowel anastomosis 2007  Presented with 2 day history of N/V/periumbilical abd pain, last emesis day prior to admission.  Decreased p.o.  Denies hematochezia hematemesis, or hematuria.  Last bowel movement x2 days PTA small hard mehrdad.  Feels weak and tired and has had a mild headache.  Hemodynamically stable and afebrile in the ED.  Work-up in the ED revealed mechanical SBO on CT. No obvious hernia. Baseline has loose stools and takes lomotil. JELENA noted on labs, dehydration.  History of ex lap with lysis of extensive abdominal adhesions and revision of small bowel anastomosis in 2007 with Dr. Rad Pierce.  Patient denies small bowel obstruction since then.  Continues to be maintained on Creon.  - Inpatient admission  - NPO, MIVF  - General surgery consultation  - Pain control, antiemetics  - Last emesis 2/21/23, declines NGT, and no overt need at this time, defer to general surgery  - Resume PTA Creon when taking PO    Acute kidney injury  Right sided hydronephrosis, moderate  Large renal cyst, small left renal stone  Baseline Cr appears 0.6-0.7 range. 1.46 on admission. Likely prerenal in setting of SBO and dehydration. BUN elevated. Cl low, Na WNL. Has followed with [facility] Dr. Lee in the past for nephrolithiasis. Noted moderate right sided hydronephrosis on CT with large renal  cyst, small nonobstructing renal stone on left. No CVA tenderness. Pain largely periumbilical as above.   - MIVF  - Monitor renal function labs  - Avoid nephrotoxins  - Check UA  - Monitor UO  - [facility] consultation, plan for renogram 2/23/23 to evaluate possible UPJ obstruction    Underweight  Weight appears stable. Body mass index is 17.58 kg/m . c/w underweight.   - If prolonged NPO will need RD consultation given low BMI/muscle mass.    Wt Readings from Last 4 Encounters:   02/22/23 40.8 kg (90 lb)   09/26/22 40.4 kg (89 lb)   09/23/22 41.5 kg (91 lb 6.4 oz)   09/19/22 41.5 kg (91 lb 6.4 oz)        History of paroxysmal SVT.    - Resume PTA diltiazem and potassium supplement when able to take PO.   - Monitor HRs  - PRN metoprolol for HR >120     History of radiation colitis.    - Resume PTA Creon when taking PO     Normocytic anemia, chronic  - Monitor    Recent Labs   Lab 02/22/23  0715   HGB 11.5*     Hx L5 burst fracture.   Osteoporosis.  Scoliosis.   Sept admission to [facility] for worsening lower back pain and right buttock pain and noted to have an acute to subacute L5 burst fracture. Ortho spine consulted and recommended no surgical intervention; it was felt that her fracture was stable with expected healing time of 3-4 months. Ultimately discharged to TCU with pain control and follow up.        Diet: NPO for Medical/Clinical Reasons Except for: No Exceptions  DVT Prophylaxis: Pneumatic Compression Devices and Ambulate every shift  Carrera Catheter: Not present  Lines: None     Cardiac Monitoring: None  Code Status: Full Code  -confirmed on admission    Clinically Significant Risk Factors Present on Admission             # Anion Gap Metabolic Acidosis: Highest Anion Gap = 20 mmol/L in last 2 days, will monitor and treat as appropriate           # Cachexia: Estimated body mass index is 17.58 kg/m  as calculated from the following:    Height as of this encounter: 1.524 m (5').    Weight as of this encounter:  40.8 kg (90 lb).           Disposition Plan      Expected Discharge Date: 02/24/2023                The patient's care was discussed with the Attending Physician, Dr. Lyle and Patient.    Emma Manning PA-C  Hospitalist Service  [facility]  Securely message with Eponym (more info)  Text page via [facility] Paging/Directory     ______________________________________________________________________    Chief Complaint   Abdominal pain    History is obtained from the patient, electronic health record and emergency department physician    History of Present Illness   Ms. Georgette Alatorre is a very pleasant 82-year-old woman with PMH significant for acute to subacute L5 burst fracture 9/2022, history of endometrial cancer s/p radiation, history of radiation colitis, paroxysmal SVT, osteoporosis, scoliosis, normocytic anemia, who presented to the ER with N/V/abd pain and found to have small bowel obstruction. Presented with 2 day history of N/V/periumbilical abd pain, last emesis day prior to admission.  Decreased p.o.  Denies hematochezia hematemesis, or hematuria.  Last bowel movement x2 days PTA small hard mehrdad.  Feels weak and tired and has had a mild headache.  Hemodynamically stable and afebrile in the ED.  Work-up in the ED revealed mechanical SBO on CT. No obvious hernia. Baseline has loose stools and takes lomotil. JELENA noted on labs, dehydration.  History of ex lap with lysis of extensive abdominal adhesions and revision of small bowel anastomosis in 2007 with Dr. Rad Pierce.  Patient denies small bowel obstruction since then.  Continues to be maintained on Creon. Labs revealed JELENA, Cr 1.4 range. And moderate hydronephrosis on CT, has followed with [facility] in the past, will ask them to weigh in. Admitted for further management and general surgery consultation.      Past Medical History    Past Medical History:   Diagnosis Date     Arthritis      Chronic back pain     with  scoliosis     Chronic diarrhea     secondary to radiation, colitis     Endometrial ca (H)      Gastro-oesophageal reflux disease      History of blood transfusion     no adverse reactions     History of endometrial cancer     no recurrence     Hydronephrosis      Irregular heart beat     PSVT     Kidney stone      Kidney stones      Osteoporosis      Osteoporosis      PSVT (paroxysmal supraventricular tachycardia) (H)      Scoliosis      Small bowel obstruction (H)      Small bowel obstruction (H)     recurrent     SVT (supraventricular tachycardia) (H)        Past Surgical History   Past Surgical History:   Procedure Laterality Date     APPENDECTOMY       CATARACT IOL, RT/LT  2009    Cataract IOL Bilateral     COLONOSCOPY  03/30/2006    Normal; repeat in 5 years     CYSTOSCOPY  11/14/2007    Cystoscopy, bilateral retrograde pyelograms, dilatation of left ureteral stricture, left ureteroscopy and insertion of left double pigtail stent.      ENDOSCOPY  10/22/2008    Upper GI:  Normal     ENDOSCOPY  1/11/2007    Upper GI     EYE SURGERY  2010    retinal eye surgery     GENITOURINARY SURGERY      cystoscopy     GI SURGERY      exploratory lap x2 for bowel obstructions     HYSTERECTOMY, PAP NO LONGER INDICATED  2000    due to endometrial cancer     LAPAROTOMY EXPLORATORY  08/22/2007    1.  Exploratory laparotomy. 2.  Extensive lysis of intra-abdominal adhesions. Performed by Dr Rad Pierce     LAPAROTOMY EXPLORATORY  04/20/2005    1.  Exploratory laparotomy.  2.  Lysis of adhesions.  Segmental small bowel resection Performed by Dr Rad Pierce.     LASER HOLMIUM LITHOTRIPSY URETER(S), INSERT STENT, COMBINED Left 4/29/2021    Procedure: CYSTOSCOPY LEFT URETEROSCOPY;  Surgeon: Shabnam Lee MD;  Location:  OR       Prior to Admission Medications   Prior to Admission Medications   Prescriptions Last Dose Informant Patient Reported? Taking?   Cholecalciferol (VITAMIN D3 PO) 2/20/2023 at AM Self Yes No   Sig:  Take 2,000 Units by mouth daily.   acetaminophen (TYLENOL) 325 MG tablet PRN Self No Yes   Sig: Take 2 tablets (650 mg) by mouth 3 times daily   Patient taking differently: Take 650 mg by mouth 3 times daily as needed   amylase-lipase-protease (CREON 12) 97661-14699-32389 units CPEP 2/21/2023 at AM Self Yes Yes   Sig: Take 1 capsule by mouth 3 times daily (with meals)   cyanocobalamin (VITAMIN B-12) 1000 MCG tablet 2/20/2023 at AM Self Yes No   Sig: Take 1,000 mcg by mouth daily   diltiazem ER (DILT-XR) 180 MG 24 hr capsule 2/20/2023 at AM Self No No   Sig: Take 1 capsule by mouth once daily   diphenoxylate-atropine (LOMOTIL) 2.5-0.025 MG tablet PRN Self No Yes   Sig: Take 1 tablet by mouth 3 times daily as needed for diarrhea   lactobacillus rhamnosus (GG) (CULTURELL) capsule 2/20/2023 at AM Self Yes Yes   Sig: Take 1 capsule by mouth daily   potassium chloride ER (KLOR-CON) 8 MEQ CR tablet 2/20/2023 at AM Self Yes No   Sig: Take 16 mEq by mouth daily   zinc gluconate 50 MG tablet 2/20/2023 at AM Self Yes Yes   Sig: Take 50 mg by mouth daily      Facility-Administered Medications: None        Review of Systems    The 10 point Review of Systems is negative other than noted in the HPI or here.     Social History   I have reviewed this patient's social history and updated it with pertinent information if needed.  Social History     Tobacco Use     Smoking status: Former     Packs/day: 0.50     Years: 40.00     Pack years: 20.00     Types: Cigarettes     Smokeless tobacco: Never   Substance Use Topics     Alcohol use: Yes     Alcohol/week: 0.0 standard drinks     Comment: 1 glass of wine with dinner     Drug use: No       Allergies   Allergies   Allergen Reactions     No Known Allergies         Physical Exam   Vital Signs: Temp: 97.5  F (36.4  C) Temp src: Oral BP: 112/44 Pulse: 70   Resp: 14 SpO2: 97 % O2 Device: None (Room air)    Weight: 90 lbs 0 oz    Physical Exam    General: Awake, alert, very pleasant thin woman  woman who appears stated age. Looks comfortable sitting up in bed. No acute distress.  HEENT: Normocephalic, atraumatic. Extraocular movements intact.   Respiratory: Clear to auscultation bilaterally, no rales, wheezing, or rhonchi.  Cardiovascular: Regular rate and rhythm, +S1 and S2, no murmur auscultated. No peripheral edema.   Gastrointestinal: Soft, TTP umbilical region. No obvious hernias. No CVA tenderness. Bowel sounds decreased. No peritoneal signs. Mildly distended. No peritoneal signs.   Skin: Warm, dry. No obvious rashes or lesions on exposed skin. Dorsalis pedis pulses palpable bilaterally.  Musculoskeletal: No joint swelling, erythema or tenderness. Moves all extremities equally.  Neurologic: AAO x3.  Psychiatric: Appropriate mood and affect. No obvious anxiety or depression.      Medical Decision Making       50 MINUTES SPENT BY ME on the date of service doing chart review, history, exam, documentation & further activities per the note.      Data     I have personally reviewed the following data over the past 24 hrs:    6.1  \   11.5 (L)   / 244     139; 137 95 (L); 93 (L) 37.5 (H); 39.0 (H) /  108 (H); 105 (H)   4.2; 4.1 25; 24 1.41 (H); 1.46 (H) \       ALT: 10 AST: 28 AP: 80 TBILI: 0.6   ALB: 4.1 TOT PROTEIN: 7.6 LIPASE: N/A       Imaging results reviewed over the past 24 hrs:   Recent Results (from the past 24 hour(s))   CT Abdomen Pelvis w Contrast    Narrative    CT ABDOMEN PELVIS W CONTRAST 2/22/2023 10:05 AM    CLINICAL HISTORY: n/v eval sbo, prior cancer and radiation with sbo in  past due to radiation and surgery    TECHNIQUE: CT scan of the abdomen and pelvis was performed following  injection of IV contrast. Multiplanar reformats were obtained. Dose  reduction techniques were used.  CONTRAST: 45mL Isovue-370    COMPARISON: 9/14/2022, 9/13/2022 and 9/7/2022    FINDINGS:   LOWER CHEST: Stable partly visualized at complete atelectasis of the  right lower lobe. The left lung base is  clear.    HEPATOBILIARY: Stable subcentimeter hepatic cysts. Small amount of  sludge in the gallbladder lumen. No biliary ductal dilatation.    PANCREAS: Stable mild pancreatic parenchymal atrophy in the body and  tail. No pancreatic duct dilatation, surrounding inflammatory changes  or discrete mass.    SPLEEN: Few small calcified granuloma in the spleen.    ADRENAL GLANDS: Normal.    KIDNEYS/BLADDER: New moderate right hydronephrosis. The right ureter  is not dilated. No obstructing calculus or obvious mass at the  ureteral pelvic junction. Stable small nonobstructing left renal  calculus. No left-sided hydronephrosis. Bilateral simple renal cysts  requiring no specific follow-up.    BOWEL: Multiple markedly dilated loops of small bowel throughout the  abdomen and pelvis with fecal contents in the loops of dilated ileum  in the pelvis. Bowel loops are maximally dilated up to 3.7 cm. Small  bowel resection and anastomosis in the pelvis. If definitive  transition point is not visualized, and may be in the terminal ileum  in the right lower quadrant, deep pelvis (series 4, image 122). The  colon is decompressed. No significant bowel wall thickening,  pneumatosis or extraluminal air. No significant free fluid or fluid  collections.    PELVIC ORGANS: Hysterectomy.    ADDITIONAL FINDINGS: No abdominal aortic aneurysm. Major  intra-abdominal vasculature is patent. No lymphadenopathy in the  abdomen and pelvis.    MUSCULOSKELETAL: Thoracolumbar scoliosis. No suspicious lesions in the  bones. Stable chronic wedge compression deformity of L5.      Impression    IMPRESSION:   1.  Mechanical small bowel obstruction with likely transition point in  the right lower quadrant loops of distal ileum in the pelvis. No bowel  wall thickening or pneumatosis.  2.  New moderate right hydronephrosis without dilatation of the right  ureter. No obstructing mass or stone evident at the ureteropelvic  junction. 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 Follow-up CT  urogram on a nonemergent basis should be considered.  3.  Stable partly visualized collapsed right lower lobe.    JOSH BYRD MD         SYSTEM ID:  [id number]

## 2023-02-22 NOTE — ED TRIAGE NOTES
Pt has Hx bowel obstructions.  Last BM Monday.  Nausea and vomiting, SL zofran   Triage Assessment     Row Name 02/22/23 0706       Triage Assessment (Adult)    Airway WDL WDL       Skin Circulation/Temperature WDL    Skin Circulation/Temperature WDL WDL       Cardiac WDL    Cardiac WDL WDL       Peripheral/Neurovascular WDL    Peripheral Neurovascular WDL WDL       Cognitive/Neuro/Behavioral WDL    Cognitive/Neuro/Behavioral WDL WDL

## 2023-02-22 NOTE — PHARMACY-ADMISSION MEDICATION HISTORY
Pharmacy Medication History  Admission medication history interview status for the 2/22/2023  admission is complete. See EPIC admission navigator for prior to admission medications     Location of Interview: Patient room  Medication history sources: Patient and Surescripts    Significant changes made to the medication list:  Removed Vitamin C, Cyclobenzaprine, Lidocaine Patch, Oxycodone and Pantoprazole. Added Probiotic and Zinc. Patient also reports taking her Potassium Chloride 16 mEq (2 tablets) once daily in the morning most of the time, compared to 1 tablet BID.    In the past week, patient estimated taking medication this percent of the time: greater than 90%      Additional medication history information:   None.    Medication reconciliation completed by provider prior to medication history? No    Time spent in this activity: 30    Prior to Admission medications    Medication Sig Last Dose Taking? Auth Provider Long Term End Date   acetaminophen (TYLENOL) 325 MG tablet Take 2 tablets (650 mg) by mouth 3 times daily  Patient taking differently: Take 650 mg by mouth 3 times daily as needed PRN Yes Conchita Newman PA-C     amylase-lipase-protease (CREON 12) 17777-96102-60919 units CPEP Take 1 capsule by mouth 3 times daily (with meals) 2/21/2023 at AM Yes Unknown, Entered By History     diphenoxylate-atropine (LOMOTIL) 2.5-0.025 MG tablet Take 1 tablet by mouth 3 times daily as needed for diarrhea PRN Yes Conchita Newman PA-C     lactobacillus rhamnosus (GG) (CULTURELL) capsule Take 1 capsule by mouth daily 2/20/2023 at AM Yes Unknown, Entered By History No    zinc gluconate 50 MG tablet Take 50 mg by mouth daily 2/20/2023 at AM Yes Unknown, Entered By History     Cholecalciferol (VITAMIN D3 PO) Take 2,000 Units by mouth daily. 2/20/2023 at AM  Unknown, Entered By History Yes    cyanocobalamin (VITAMIN B-12) 1000 MCG tablet Take 1,000 mcg by mouth daily 2/20/2023 at AM  Unknown, Entered By History      diltiazem ER (DILT-XR) 180 MG 24 hr capsule Take 1 capsule by mouth once daily 2/20/2023 at AM  Mario Valdes MD Yes    potassium chloride ER (KLOR-CON) 8 MEQ CR tablet Take 16 mEq by mouth daily 2/20/2023 at AM  Unknown, Entered By History         The information provided in this note is only as accurate as the sources available at the time of update(s)

## 2023-02-22 NOTE — ED PROVIDER NOTES
History     Chief Complaint:  Nausea and Vomiting       HPI   Georegtte Alatorre is a 82 year old female with a history of SBO and endometrial cancer who presents with vomiting and constipation. The patient states that she developed nausea and vomiting two days ago. Yesterday she tried to eat but could not keep anything down and cannot drink water. She is not passing gas and feels very bloated. She normally has chronic diarrhea that she takes Imodium for. Her last stools were 2 days ago and they were loose and had mucus. She has had multiple small bowel obstructions from radiation in the past.      Independent Historian:   None - Patient Only    Review of External Notes: Reviewed the patient's notebook that she had with her including prior hospitalizations and surgical consults dating back to 2005 with general surgery either consulted or doing operations    ROS:  Review of Systems   Gastrointestinal: Positive for abdominal distention, constipation, diarrhea and vomiting.   All other systems reviewed and are negative.      Allergies:  No Known Allergies     Medications:  Cyclobenzaprine  Diltiazem   Diphenoxylate-atropine  Lidocaine patch  Oxycodone  Pantoprazole  Potassium chloride   Senna-docusate     Past Medical History:     Small bowel obstruction  Radiation colitis  Vitamin B12 deficiency  Age-related osteoporosis without current pathological fracture  Gastroesophageal reflux disease without esophagitis   Chronic bilateral low back pain without sciatica  Closed burst fracture of lumbar vertebra  Arthritis  Endometrial cancer  Rectal cancer  Hydronephrosis  Kidney stones  Paroxysmal supraventricular tachycardia  Vitreous floaters  Cataracts, bilateral     Past Surgical History:    Appendectomy  Cataract iol, rt/lt  Hysterectomy   Laparotomy exploratory  Laser holmium lithotripsy ureter(s)  Small bowel obstruction repair    Social History:  The patient presents to the ED alone.    Physical Exam     Patient  Vitals for the past 24 hrs:   BP Temp Temp src Pulse Resp SpO2 Height Weight   02/22/23 0655 112/44 97.5  F (36.4  C) Oral 70 14 97 % 1.524 m (5') 40.8 kg (90 lb)        Physical Exam  GENERAL: well developed, pleasant  HEAD: atraumatic  EYES: pupils reactive, extraocular muscles intact, conjunctivae normal  ENT:  mucus membranes moist  NECK:  trachea midline, normal range of motion  RESPIRATORY: no tachypnea, breath sounds clear to auscultation   CVS: normal S1/S2, no murmurs, intact distal pulses  ABDOMEN: soft, nontender, nondistention  MUSCULOSKELETAL: no deformities  SKIN: warm and dry, no acute rashes or ulceration  NEURO: GCS 15, cranial nerves intact, alert and oriented x3  PSYCH:  Mood/affect normal    Emergency Department Course     Imaging:  CT Abdomen Pelvis w Contrast   Final Result   IMPRESSION:    1.  Mechanical small bowel obstruction with likely transition point in   the right lower quadrant loops of distal ileum in the pelvis. No bowel   wall thickening or pneumatosis.   2.  New moderate right hydronephrosis without dilatation of the right   ureter. No obstructing mass or stone evident at the ureteropelvic   junction. 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 Follow-up CT   urogram on a nonemergent basis should be considered.   3.  Stable partly visualized collapsed right lower lobe.      JOSH BYRD MD            SYSTEM ID:  [id number]         Report per radiology    Laboratory:  Labs Ordered and Resulted from Time of ED Arrival to Time of ED Departure   BASIC METABOLIC PANEL - Abnormal       Result Value    Sodium 139      Potassium 4.2      Chloride 95 (*)     Carbon Dioxide (CO2) 25      Anion Gap 19 (*)     Urea Nitrogen 37.5 (*)     Creatinine 1.41 (*)     Calcium 9.9      Glucose 108 (*)     GFR Estimate 37 (*)    CBC WITH PLATELETS AND DIFFERENTIAL - Abnormal    WBC Count 6.1      RBC Count 3.62 (*)     Hemoglobin 11.5 (*)     Hematocrit 34.5 (*)     MCV 95      MCH 31.8      MCHC 33.3       RDW 14.5      Platelet Count 244      % Neutrophils 70      % Lymphocytes 11      % Monocytes 19      % Eosinophils 0      % Basophils 0      % Immature Granulocytes 0      NRBCs per 100 WBC 0      Absolute Neutrophils 4.2      Absolute Lymphocytes 0.7 (*)     Absolute Monocytes 1.2      Absolute Eosinophils 0.0      Absolute Basophils 0.0      Absolute Immature Granulocytes 0.0      Absolute NRBCs 0.0     COMPREHENSIVE METABOLIC PANEL - Abnormal    Sodium 137      Potassium 4.1      Chloride 93 (*)     Carbon Dioxide (CO2) 24      Anion Gap 20 (*)     Urea Nitrogen 39.0 (*)     Creatinine 1.46 (*)     Calcium 9.8      Glucose 105 (*)     Alkaline Phosphatase 80      AST 28      ALT 10      Protein Total 7.6      Albumin 4.1      Bilirubin Total 0.6      GFR Estimate 35 (*)    ROUTINE UA WITH MICROSCOPIC REFLEX TO CULTURE      Emergency Department Course & Assessments:    Interventions:  Medications   0.9% sodium chloride BOLUS (has no administration in time range)     Followed by   sodium chloride 0.9% infusion (has no administration in time range)   ondansetron (ZOFRAN) injection 4 mg (has no administration in time range)   0.9% sodium chloride BOLUS (has no administration in time range)   iopamidol (ISOVUE-370) solution 45 mL (45 mLs Intravenous $Given 2/22/23 0957)   Saline Flush (60 mLs Intravenous $Given 2/22/23 0957)        Independent Interpretation (X-rays, CTs, rhythm strip):  CT    Consultations/Discussion of Management or Tests:  ED Course as of 02/22/23 1112 Wed Feb 22, 2023   1112 Talked to Dr. Lyle about the patients findings and possible admittance         Social Determinants of Health affecting care:   None    Assessments:  0937 Obtained the patients history and performed initial exam  1100 Rechecked the patient and updated them on findings    Disposition:  The patient was admitted to the hospital under the care of Dr. Lyle.     Impression & Plan      Medical Decision Making:  Patient  presents with nausea vomiting and concerns of bowel obstruction with a history of bowel obstructions in the past.  Patient has had prior radiation due to endometrial cancer and rectal mass.  CT shows bowel obstruction.  Electrolytes show creatinine to be elevated from baseline.  CT also shows hydroureter without obvious source of obstruction.  Patient given IV fluids.  Discussed doing an NG tube with her and at this time she has declined she has not been vomiting and notes that she does not recall requiring them in the past.  She notes that she has been cared for here for her bowel obstructions although I am not seeing it in her immediate records but she has a notebook with her neatly recording all of her hospitalizations dating back to at least 2005 if not earlier and see recorded frequently 2 nights at [facility] but a couple of times she has required surgery and Dr. Rad Pierce was noted in her notes.  Currently waiting speak with the hospitalist regarding admission.    Diagnosis:    ICD-10-CM    1. SBO (small bowel obstruction) (H)  K56.609       2. Dehydration  E86.0       3. Renal insufficiency  N28.9            Scribe Disclosure:  Liban BUSH, am serving as a scribe at 9:38 AM on 2/22/2023 to document services personally performed by Erwin Aguilar MD based on my observations and the provider's statements to me.     2/22/2023   Erwin Aguilar MD Adams, Shaun L, MD  02/22/23 1936

## 2023-02-22 NOTE — CONSULTS
General surgery note    We have been asked to see Mrs. Alatorre as she comes in with 2-day history of nausea, vomiting, decreased stool output and is found to have dilated small bowel on CT scan consistent with small bowel obstruction.  She has had previous episodes of bowel obstruction, previous lysis of adhesions in 2005 and 2007.  This episode is likely related to a larger than usual amount of peanuts she consumes over the weekend.  On exam her abdomen is full but soft, not tender to palpation.  Borborygmus is negative  She is anorexic.    Small bowel obstruction    Discussed with her the rationale for a nasogastric tube to help decompress her stomach and small intestine, she would like to avoid it if possible; explained to her that if any further nausea, vomiting, hiccups, or persistent burping occur is best if the NG tube is placed.    For now continue to manage her with conservative measurements with bowel rest, fluid resuscitation, maybe NG tube and mobilize as much as possible.    She understands and agrees with this plan.    Total encounter time 35 minutes, more than half spent in counseling, review of data, and coordination of care.

## 2023-02-22 NOTE — CONSULTS
[facility] Inpatient Consultation Note    Georgette Alatorre MRN# [id number]   Age: 82 year old YOB: 1940     Date of Admission: 2/22/2023    Reason for consult: Right hydronephrosis        Requesting physician: Dr. Lyle                 History of Present Illness:   Georgette Alatorre is a pleasant 82 year old year old female who presented on 2/22 for evaluation of nausea and vomiting.    Patient developed symptoms of nausea and vomiting about two days ago. She also says hasn't been passing gas and felt bloated. She denies any overt flank pain, though says she is still recovering from a back fracture and does have some back pain. No hematuria or dysuria, voiding well.   Urology consulted due to finding of right hydronephrosis on CT, also SBO for which she is being admitted for.   Work up in ED showed creatinine of 1.46, normal WBC. Urinalysis not obtained yet.     Patient has seen Dr. Lee in the past for her nephrolithiaiss .           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, colitis     Endometrial ca (H)      Gastro-oesophageal reflux disease      History of blood transfusion     no adverse reactions     History of endometrial cancer     no recurrence     Hydronephrosis      Irregular heart beat     PSVT     Kidney stone      Kidney stones      Osteoporosis      Osteoporosis      PSVT (paroxysmal supraventricular tachycardia) (H)      Scoliosis      Small bowel obstruction (H)      Small bowel obstruction (H)     recurrent     SVT (supraventricular tachycardia) (H)              Past Surgical History:     Past Surgical History:   Procedure Laterality Date     APPENDECTOMY       CATARACT IOL, RT/LT  2009    Cataract IOL Bilateral     COLONOSCOPY  03/30/2006    Normal; repeat in 5 years     CYSTOSCOPY  11/14/2007    Cystoscopy, bilateral retrograde  pyelograms, dilatation of left ureteral stricture, left ureteroscopy and insertion of left double pigtail stent.      ENDOSCOPY  10/22/2008    Upper GI:  Normal     ENDOSCOPY  1/11/2007    Upper GI     EYE SURGERY  2010    retinal eye surgery     GENITOURINARY SURGERY      cystoscopy     GI SURGERY      exploratory lap x2 for bowel obstructions     HYSTERECTOMY, PAP NO LONGER INDICATED  2000    due to endometrial cancer     LAPAROTOMY EXPLORATORY  08/22/2007    1.  Exploratory laparotomy. 2.  Extensive lysis of intra-abdominal adhesions. Performed by Dr Rad Pierce     LAPAROTOMY EXPLORATORY  04/20/2005    1.  Exploratory laparotomy.  2.  Lysis of adhesions.  Segmental small bowel resection Performed by Dr Rad Pierce.     LASER HOLMIUM LITHOTRIPSY URETER(S), INSERT STENT, COMBINED Left 4/29/2021    Procedure: CYSTOSCOPY LEFT URETEROSCOPY;  Surgeon: Shabnam Lee MD;  Location:  OR             Social History:     Social History     Socioeconomic History     Marital status: Single     Spouse name: Not on file     Number of children: Not on file     Years of education: Not on file     Highest education level: Not on file   Occupational History     Not on file   Tobacco Use     Smoking status: Former     Packs/day: 0.50     Years: 40.00     Pack years: 20.00     Types: Cigarettes     Smokeless tobacco: Never   Substance and Sexual Activity     Alcohol use: Yes     Alcohol/week: 0.0 standard drinks     Comment: 1 glass of wine with dinner     Drug use: No     Sexual activity: Not Currently     Partners: Male   Other Topics Concern     Parent/sibling w/ CABG, MI or angioplasty before 65F 55M? Not Asked   Social History Narrative     Not on file     Social Determinants of Health     Financial Resource Strain: Not on file   Food Insecurity: Not on file   Transportation Needs: Not on file   Physical Activity: Not on file   Stress: Not on file   Social Connections: Not on file   Intimate Partner Violence: Not  on file   Housing Stability: Not on file             Family History:   No family history on file.          Immunizations:     Immunization History   Administered Date(s) Administered     COVID-19 Vaccine 12+ (Pfizer 2022) 04/30/2022     COVID-19 Vaccine 12+ (Pfizer) 03/11/2021, 04/01/2021     Flu 65+ Years 09/09/2019     [id number] Novel Flu 01/04/2010     Influenza (High Dose) 3 valent vaccine 09/24/2015, 08/30/2016, 09/29/2017, 09/05/2018, 09/09/2019     Influenza (IIV3) PF 10/12/2011, 09/18/2012, 09/16/2013     Pneumo Conj 13-V (2010&after) 02/20/2015     Pneumococcal 23 valent 01/01/2005     TDAP Vaccine (Adacel) 11/02/2017     Zoster vaccine, live 02/24/2012             Allergies:     Allergies   Allergen Reactions     No Known Allergies              Medications:     Current Facility-Administered Medications   Medication     HYDROmorphone (DILAUDID) injection 0.2 mg     lidocaine (LMX4) cream     lidocaine 1 % 0.1-1 mL     ondansetron (ZOFRAN) injection 4 mg     prochlorperazine (COMPAZINE) injection 5 mg    Or     prochlorperazine (COMPAZINE) tablet 5 mg    Or     prochlorperazine (COMPAZINE) suppository 12.5 mg     sodium chloride (PF) 0.9% PF flush 3 mL     sodium chloride (PF) 0.9% PF flush 3 mL     sodium chloride 0.9% infusion     Current Outpatient Medications   Medication Sig     acetaminophen (TYLENOL) 325 MG tablet Take 2 tablets (650 mg) by mouth 3 times daily (Patient taking differently: Take 650 mg by mouth 3 times daily as needed)     amylase-lipase-protease (CREON 12) 19206-23507-80422 units CPEP Take 1 capsule by mouth 3 times daily (with meals)     diphenoxylate-atropine (LOMOTIL) 2.5-0.025 MG tablet Take 1 tablet by mouth 3 times daily as needed for diarrhea     lactobacillus rhamnosus (GG) (CULTURELL) capsule Take 1 capsule by mouth daily     zinc gluconate 50 MG tablet Take 50 mg by mouth daily     Cholecalciferol (VITAMIN D3 PO) Take 2,000 Units by mouth daily.     cyanocobalamin (VITAMIN  B-12) 1000 MCG tablet Take 1,000 mcg by mouth daily     diltiazem ER (DILT-XR) 180 MG 24 hr capsule Take 1 capsule by mouth once daily     potassium chloride ER (KLOR-CON) 8 MEQ CR tablet Take 16 mEq by mouth daily             Review of Systems:   Comprehensive review of systems from the Admission note dated 2/22 at [facility] was reviewed with no changes except per HPI.     Examination:  /44 (BP Location: Left arm)   Pulse 70   Temp 97.5  F (36.4  C) (Oral)   Resp 14   Ht 1.524 m (5')   Wt 40.8 kg (90 lb)   SpO2 97%   BMI 17.58 kg/m    General: Alert and oriented, no distress.  HEENT: Face symmetric, atraumatic.   Eyes: No scleral icterus, EOM intact.   Neck: Symmetric.  Chest wall: Symmetric.  Respiratory: Breathing unlabored, no signs of respiratory distress.   Abdomen: thin, mildly distended, diffuse tenderness   Back: No CVA or flank tenderness  Extremities: No evidence of deformities or trauma.  Neuro: Grossly non focal.  Pysch: Normal mood and affect.  Skin: No evident rashes or lesions.            Data:     Lab Results   Component Value Date    WBC 6.1 02/22/2023    WBC 7.2 12/19/2020     Lab Results   Component Value Date    RBC 3.62 02/22/2023    RBC 3.74 12/19/2020     Lab Results   Component Value Date    HGB 11.5 02/22/2023    HGB 11.8 12/19/2020     Lab Results   Component Value Date    HCT 34.5 02/22/2023    HCT 36.4 12/19/2020     Lab Results   Component Value Date     02/22/2023     12/19/2020     Creatinine   Date Value Ref Range Status   02/22/2023 1.41 (H) 0.51 - 0.95 mg/dL Final   02/22/2023 1.46 (H) 0.51 - 0.95 mg/dL Final   12/19/2020 0.82 0.52 - 1.04 mg/dL Final   ]  Lab Results   Component Value Date    BUN 37.5 02/22/2023    BUN 39.0 02/22/2023    BUN 23 09/13/2022    BUN 19 12/19/2020       Imaging:CT dated 2/22  KIDNEYS/BLADDER: New moderate right hydronephrosis. The right ureter  is not dilated. No obstructing calculus or obvious mass at  the  ureteral pelvic junction. Stable small nonobstructing left renal  calculus. No left-sided hydronephrosis. Bilateral simple renal cysts  requiring no specific follow-up.    Assessment/Plan:  82 year old female being treated for SBO, who was also found to have right sided hydronephrosis on CT scan, creatinine elevated at 1.46, large right renal cyst, small left renal stone.   No obvious stones on the right seen on CT, discussed CT with Dr. Lee.      - Follow creatinine, avoid nephrotoxic agents.    - Monitor urine output.    - Check UA.    - Will plan to obtain a renogram tomorrow to evaluate for possible UPJ obstruction.     I have discussed the patient with attending physician, Dr. Lee, Urology  Criselda Martin PA-C  [facility]

## 2023-02-23 ENCOUNTER — APPOINTMENT (OUTPATIENT)
Dept: NUCLEAR MEDICINE | Facility: CLINIC | Age: 83
DRG: 389 | End: 2023-02-23
Attending: PHYSICIAN ASSISTANT
Payer: COMMERCIAL

## 2023-02-23 LAB
ANION GAP SERPL CALCULATED.3IONS-SCNC: 15 MMOL/L (ref 7–15)
BUN SERPL-MCNC: 24.5 MG/DL (ref 8–23)
CALCIUM SERPL-MCNC: 8.3 MG/DL (ref 8.8–10.2)
CHLORIDE SERPL-SCNC: 107 MMOL/L (ref 98–107)
CREAT SERPL-MCNC: 0.82 MG/DL (ref 0.51–0.95)
DEPRECATED HCO3 PLAS-SCNC: 19 MMOL/L (ref 22–29)
ERYTHROCYTE [DISTWIDTH] IN BLOOD BY AUTOMATED COUNT: 15.1 % (ref 10–15)
GFR SERPL CREATININE-BSD FRML MDRD: 71 ML/MIN/1.73M2
GLUCOSE SERPL-MCNC: 54 MG/DL (ref 70–99)
HCT VFR BLD AUTO: 30.3 % (ref 35–47)
HGB BLD-MCNC: 9.8 G/DL (ref 11.7–15.7)
MAGNESIUM SERPL-MCNC: 1.7 MG/DL (ref 1.7–2.3)
MCH RBC QN AUTO: 31.7 PG (ref 26.5–33)
MCHC RBC AUTO-ENTMCNC: 32.3 G/DL (ref 31.5–36.5)
MCV RBC AUTO: 98 FL (ref 78–100)
PHOSPHATE SERPL-MCNC: 1.9 MG/DL (ref 2.5–4.5)
PHOSPHATE SERPL-MCNC: 2.4 MG/DL (ref 2.5–4.5)
PLATELET # BLD AUTO: 189 10E3/UL (ref 150–450)
POTASSIUM SERPL-SCNC: 3.5 MMOL/L (ref 3.4–5.3)
POTASSIUM SERPL-SCNC: 4 MMOL/L (ref 3.4–5.3)
RBC # BLD AUTO: 3.09 10E6/UL (ref 3.8–5.2)
SODIUM SERPL-SCNC: 141 MMOL/L (ref 136–145)
WBC # BLD AUTO: 4.7 10E3/UL (ref 4–11)

## 2023-02-23 PROCEDURE — 250N000013 HC RX MED GY IP 250 OP 250 PS 637: Performed by: INTERNAL MEDICINE

## 2023-02-23 PROCEDURE — 84100 ASSAY OF PHOSPHORUS: CPT | Performed by: PHYSICIAN ASSISTANT

## 2023-02-23 PROCEDURE — 36415 COLL VENOUS BLD VENIPUNCTURE: CPT | Performed by: INTERNAL MEDICINE

## 2023-02-23 PROCEDURE — 99232 SBSQ HOSP IP/OBS MODERATE 35: CPT | Performed by: INTERNAL MEDICINE

## 2023-02-23 PROCEDURE — 85027 COMPLETE CBC AUTOMATED: CPT | Performed by: PHYSICIAN ASSISTANT

## 2023-02-23 PROCEDURE — 84132 ASSAY OF SERUM POTASSIUM: CPT | Performed by: INTERNAL MEDICINE

## 2023-02-23 PROCEDURE — 36415 COLL VENOUS BLD VENIPUNCTURE: CPT | Performed by: PHYSICIAN ASSISTANT

## 2023-02-23 PROCEDURE — 250N000013 HC RX MED GY IP 250 OP 250 PS 637: Performed by: PHYSICIAN ASSISTANT

## 2023-02-23 PROCEDURE — 343N000001 HC RX 343: Performed by: EMERGENCY MEDICINE

## 2023-02-23 PROCEDURE — 84100 ASSAY OF PHOSPHORUS: CPT | Performed by: INTERNAL MEDICINE

## 2023-02-23 PROCEDURE — G0378 HOSPITAL OBSERVATION PER HR: HCPCS

## 2023-02-23 PROCEDURE — 120N000001 HC R&B MED SURG/OB

## 2023-02-23 PROCEDURE — 250N000009 HC RX 250: Performed by: INTERNAL MEDICINE

## 2023-02-23 PROCEDURE — 78708 K FLOW/FUNCT IMAGE W/DRUG: CPT

## 2023-02-23 PROCEDURE — 258N000003 HC RX IP 258 OP 636: Performed by: PHYSICIAN ASSISTANT

## 2023-02-23 PROCEDURE — 250N000011 HC RX IP 250 OP 636: Performed by: EMERGENCY MEDICINE

## 2023-02-23 PROCEDURE — 250N000011 HC RX IP 250 OP 636: Performed by: PHYSICIAN ASSISTANT

## 2023-02-23 PROCEDURE — 83735 ASSAY OF MAGNESIUM: CPT | Performed by: PHYSICIAN ASSISTANT

## 2023-02-23 PROCEDURE — 80048 BASIC METABOLIC PNL TOTAL CA: CPT | Performed by: PHYSICIAN ASSISTANT

## 2023-02-23 PROCEDURE — A9562 TC99M MERTIATIDE: HCPCS | Performed by: EMERGENCY MEDICINE

## 2023-02-23 PROCEDURE — 258N000003 HC RX IP 258 OP 636: Performed by: INTERNAL MEDICINE

## 2023-02-23 RX ORDER — NALOXONE HYDROCHLORIDE 0.4 MG/ML
0.4 INJECTION, SOLUTION INTRAMUSCULAR; INTRAVENOUS; SUBCUTANEOUS
Status: DISCONTINUED | OUTPATIENT
Start: 2023-02-23 | End: 2023-02-26 | Stop reason: HOSPADM

## 2023-02-23 RX ORDER — NALOXONE HYDROCHLORIDE 0.4 MG/ML
0.2 INJECTION, SOLUTION INTRAMUSCULAR; INTRAVENOUS; SUBCUTANEOUS
Status: DISCONTINUED | OUTPATIENT
Start: 2023-02-23 | End: 2023-02-26 | Stop reason: HOSPADM

## 2023-02-23 RX ORDER — LACTOBACILLUS RHAMNOSUS GG 10B CELL
1 CAPSULE ORAL DAILY
Status: DISCONTINUED | OUTPATIENT
Start: 2023-02-24 | End: 2023-02-26 | Stop reason: HOSPADM

## 2023-02-23 RX ORDER — DILTIAZEM HYDROCHLORIDE 180 MG/1
180 CAPSULE, COATED, EXTENDED RELEASE ORAL DAILY
Status: DISCONTINUED | OUTPATIENT
Start: 2023-02-23 | End: 2023-02-26 | Stop reason: HOSPADM

## 2023-02-23 RX ORDER — FUROSEMIDE 10 MG/ML
40 INJECTION INTRAMUSCULAR; INTRAVENOUS ONCE
Status: COMPLETED | OUTPATIENT
Start: 2023-02-23 | End: 2023-02-23

## 2023-02-23 RX ADMIN — PROCHLORPERAZINE MALEATE 5 MG: 5 TABLET ORAL at 23:14

## 2023-02-23 RX ADMIN — SODIUM PHOSPHATE, MONOBASIC, MONOHYDRATE AND SODIUM PHOSPHATE, DIBASIC, ANHYDROUS 15 MMOL: 142; 276 INJECTION, SOLUTION INTRAVENOUS at 19:59

## 2023-02-23 RX ADMIN — FUROSEMIDE 40 MG: 10 INJECTION, SOLUTION INTRAMUSCULAR; INTRAVENOUS at 09:35

## 2023-02-23 RX ADMIN — TECHNESCAN TC 99M MERTIATIDE 9.5 MILLICURIE: 1 INJECTION, POWDER, LYOPHILIZED, FOR SOLUTION INTRAVENOUS at 09:10

## 2023-02-23 RX ADMIN — SODIUM CHLORIDE: 9 INJECTION, SOLUTION INTRAVENOUS at 03:55

## 2023-02-23 RX ADMIN — ONDANSETRON 4 MG: 2 INJECTION INTRAMUSCULAR; INTRAVENOUS at 21:28

## 2023-02-23 RX ADMIN — PANCRELIPASE 1 CAPSULE: 60000; 12000; 38000 CAPSULE, DELAYED RELEASE PELLETS ORAL at 19:46

## 2023-02-23 RX ADMIN — DILTIAZEM HYDROCHLORIDE 180 MG: 180 CAPSULE, COATED, EXTENDED RELEASE ORAL at 19:46

## 2023-02-23 ASSESSMENT — ACTIVITIES OF DAILY LIVING (ADL)
ADLS_ACUITY_SCORE: 35
ADLS_ACUITY_SCORE: 35
ADLS_ACUITY_SCORE: 31
ADLS_ACUITY_SCORE: 35
ADLS_ACUITY_SCORE: 35
ADLS_ACUITY_SCORE: 31
ADLS_ACUITY_SCORE: 35
ADLS_ACUITY_SCORE: 31
ADLS_ACUITY_SCORE: 35

## 2023-02-23 NOTE — PROGRESS NOTES
RECEIVING UNIT ED HANDOFF REVIEW    ED Nurse Handoff Report was reviewed by: Tran Raya RN on February 23, 2023 at 2:15 PM

## 2023-02-23 NOTE — PROGRESS NOTES
[facility]  GENERAL SURGERY Progress Note    Admission Date: 2023         Assessment and Plan:     Georgette Alatorre is a 82 year old female with past medical history acute to subacute L5 burst fracture 2022, history of endometrial cancer s/p radiation, history of radiation colitis, paroxysmal SVT, osteoporosis, scoliosis, normocytic anemia, appendectomy, ex-lap x2 for CRAIG (, ) who presented to the ER with N/V/abd pain and found to have small bowel obstruction. Now with return of bowel function this AM.    - Advance diet to clear liquid and advance as tolerated  - Surgery will follow peripherally at this time. Please page with questions or concerns.             Interval History:     Abdominal pain much improved. Denies nausea or vomiting. Passing flatus and having BMs.                     Physical Exam:   Blood pressure 102/54, pulse 51, temperature 98.1  F (36.7  C), temperature source Oral, resp. rate 18, height 1.524 m (5'), weight 40.8 kg (90 lb), SpO2 97 %, not currently breastfeeding.  Temperature Temp  Av.1  F (36.7  C)  Min: 98.1  F (36.7  C)  Max: 98.1  F (36.7  C)   I/O last 3 completed shifts:  In: 1000 [IV Piggyback:1000]  Out: -   Constitutional:  Awake and in no apparent distress.   Lungs: No increased work of breathing, good air exchange, clear to auscultation bilaterally, and no crackles or wheezing.   Cardiovascular: Regular rate and rhythm.   Abdomen: Soft, non-distended, mild lower abdominal tenderness without rigidity or guarding. Well healed surgical scars.   Extremities: No edema or calf tenderness. +SCDs          Data:     Recent Labs   Lab Test 23  0719 23  0715 09/15/22  0648   WBC 4.7 6.1 4.0   HGB 9.8* 11.5* 10.7*   HCT 30.3* 34.5* 33.2*    244 334      Recent Labs   Lab Test 23  0719 23  0715 22  0621    137  139 141   POTASSIUM 4.0 4.1  4.2 3.4   CHLORIDE 107 93*  95* 105   CO2 19* 24  25  30   BUN 24.5* 39.0*  37.5* 23   CR 0.82 1.46*  1.41* 0.68     Recent Labs   Lab Test 02/22/23  0715 09/07/22  0652 12/24/19  0904 12/21/19  1845 01/03/19  1221 07/22/18  1739   BILITOTAL 0.6 0.8 0.4 0.4   < > 0.6   ALT 10 21 27 27   < > 26   AST 28 25 36 33   < > 29   ALKPHOS 80 62 59 71   < > 82   LIPASE  --  170  --  156  --  200    < > = values in this interval not displayed.     Recent Labs   Lab Test 02/23/23  0719 02/22/23  0715 09/13/22  0621 01/03/19  1221 09/04/18  1817   ILYA 8.3* 9.8  9.9 9.1   < > 8.9   PHOS 2.4*  --   --   --  3.4   MAG 1.7  --   --   --  2.0    < > = values in this interval not displayed.     Recent Labs   Lab Test 02/23/23 0719 02/22/23 2227 02/22/23 0715 09/13/22  0621 09/11/22  2101 09/07/22  0652 05/28/20  0949 01/07/20  1158 01/07/20  1157   ANIONGAP 15  --  20*  19* 6   < > 10   < >  --  3   PROTEIN  --  10*  --   --   --  Negative  --  Negative  --    ALBUMIN  --   --  4.1  --   --  3.2*  --   --  3.6    < > = values in this interval not displayed.     Gregoria Ritter MD, MPH 02/23/23 9:24 AM   General Surgery Resident - PGY4

## 2023-02-23 NOTE — PROGRESS NOTES
[facility]    Medicine Progress Note - Hospitalist Service    Date of Admission:  2/22/2023    Assessment & Plan   Ms. Georgette Alatorre is a very pleasant 82-year-old woman with PMH significant for acute to subacute L5 burst fracture 9/2022, history of endometrial cancer s/p radiation, history of radiation colitis, paroxysmal SVT, osteoporosis, scoliosis, normocytic anemia, who presented to the ER with N/V/abd pain and found to have small bowel obstruction.     Small bowel obstruction  Hx exp lap, extensive CRAIG, revision of small bowel anastomosis 2007  Presented with 2 day history of N/V/periumbilical abd pain, last emesis day prior to admission.  Decreased p.o.  Denies hematochezia hematemesis, or hematuria.  Last bowel movement x2 days PTA small hard mehrdad.  Feels weak and tired and has had a mild headache.  Hemodynamically stable and afebrile in the ED.  Work-up in the ED revealed mechanical SBO on CT. No obvious hernia. Baseline has loose stools and takes lomotil. JELENA noted on labs, dehydration.  History of ex lap with lysis of extensive abdominal adhesions and revision of small bowel anastomosis in 2007 with Dr. Rad Pierce.  Patient denies small bowel obstruction since then.  Continues to be maintained on Creon.  - started conservative management-  npo, iv fluids, pain management, antiemetivs  - General surgery consultation appreciated  - did not vomited since admission, declined NGT  - passed BM this am  - start clear liquid diet now  - Resume PTA Creon when taking more PO     Acute kidney injury, resolved  Right sided hydronephrosis, moderate  Large renal cyst, small left renal stone  Baseline Cr appears 0.6-0.7 range. 1.46 on admission. Likely prerenal in setting of SBO and dehydration. BUN elevated. Cl low, Na WNL. Has followed with [facility] Dr. Lee in the past for nephrolithiasis. Noted moderate right sided hydronephrosis on CT with large renal cyst, small  nonobstructing renal stone on left. No CVA tenderness. Pain largely periumbilical as above.   - UA negative  - started on iv fluids on admission   - Urology consult appreciated  - NM Renogram- Findings suspicious for a right UPJ obstruction.  - further management as per Urology  - cr improved to 0.82 today  - stop iv fluids     Underweight  Weight appears stable. Body mass index is 17.58 kg/m . c/w underweight.   - If prolonged NPO will need RD consultation given low BMI/muscle mass.         Wt Readings from Last 4 Encounters:   02/22/23 40.8 kg (90 lb)   09/26/22 40.4 kg (89 lb)   09/23/22 41.5 kg (91 lb 6.4 oz)   09/19/22 41.5 kg (91 lb 6.4 oz)         History of paroxysmal SVT.    - Resume PTA diltiazem   - Monitor HRs  - PRN metoprolol for HR >120     History of radiation colitis.    - Resume PTA Creon when taking PO     Normocytic anemia, chronic  - Monitor         Recent Labs   Lab 02/22/23  0715   HGB 11.5*      Hx L5 burst fracture.   Osteoporosis.  Scoliosis.   [facility] pain and right buttock pain and noted to have an acute to subacute L5 burst fracture. Ortho spine consulted and recommended no surgical intervention; it was felt that her fracture was stable with expected healing time of 3-4 months. Ultimately discharged to TCU with pain control and follow up.     Hypophosphatemia  - Phosph 2.4  - replace as per protocol          Diet: NPO for Medical/Clinical Reasons Except for: No Exceptions    DVT Prophylaxis: DOAC  Carrera Catheter: Not present  Lines: None     Cardiac Monitoring: None  Code Status: Full Code      Clinically Significant Risk Factors Present on Admission          # Hypocalcemia: Lowest Ca = 8.3 mg/dL in last 2 days, will monitor and replace as appropriate    # Anion Gap Metabolic Acidosis: Highest Anion Gap = 20 mmol/L in last 2 days, will monitor and treat as appropriate           # Cachexia: Estimated body mass index is 17.58 kg/m  as calculated from  the following:    Height as of this encounter: 1.524 m (5').    Weight as of this encounter: 40.8 kg (90 lb).           Disposition Plan     Expected Discharge Date: 02/24/2023                  Britni Lyle MD  Hospitalist Service  [facility]  Securely message with [employer] (more info)  Text page via [employer] Paging/Directory   ______________________________________________________________________    Interval History   Feeling better, had a BM this am  - abd pain improve, no nausea, no vomiting   - no chest pain, no SOB    Physical Exam   Vital Signs: Temp: 98.1  F (36.7  C) Temp src: Oral BP: 102/54 Pulse: 51   Resp: 18 SpO2: 97 % O2 Device: Nasal cannula Oxygen Delivery: 2 LPM  Weight: 90 lbs 0 oz    General: Awake, alert, pleasant, thin elderly lady; No acute distress.  HEENT: Normocephalic, atraumatic. Extraocular movements intact.   Respiratory: Clear to auscultation bilaterally, no rales, wheezing, or rhonchi.  Cardiovascular: Regular rate and rhythm, +S1 and S2, no murmur auscultated. No peripheral edema.   Gastrointestinal: Soft, TTP umbilical region. No obvious hernias. No CVA tenderness. Bowel sounds decreased. No peritoneal signs.   Skin: Warm, dry. No obvious rashes or lesions on exposed skin. Dorsalis pedis pulses palpable bilaterally.  Musculoskeletal: No joint swelling, erythema or tenderness. Moves all extremities equally, scoliosis noted.  Neurologic: AAO x3.  Psychiatric: Appropriate mood and affect. No obvious anxiety or depression.    Medical Decision Making             Data     I have personally reviewed the following data over the past 24 hrs:    4.7  \   9.8 (L)   / 189     141 107 24.5 (H) /  54 (L)   4.0 19 (L) 0.82 \       Imaging results reviewed over the past 24 hrs:   Recent Results (from the past 24 hour(s))   NM Renogram with Lasix    Narrative    EXAM: NM RENOGRAM WITH LASIX  LOCATION: [facility]  DATE/TIME: 2/23/2023 10:36  AM    INDICATION: Right hydronephrosis. Obstruction evaluation.  COMPARISON: CT the abdomen pelvis dated 02/22/2023.  TECHNIQUE: 9.5 mCi of technetium-99m MAG3, IV followed by abdominal imaging. 40 mg of Lasix, IV.    FINDINGS: Differential renal function of 57% on the left and 43% on the right. Prompt clearance of radiotracer from the left renal collecting system (Lasix T1/2: 15.5 minutes) without evidence of obstruction, however there is progressive accumulation of   radiotracer within the moderately dilated right renal collecting system right renal collecting system without significant urinary excretion suspicious for a right UPJ obstruction.      Impression    IMPRESSION:     Findings suspicious for a right UPJ obstruction.

## 2023-02-23 NOTE — UTILIZATION REVIEW
"  Admission Status; Secondary Review Determination         Under the authority of the Utilization Management Committee, the utilization review process indicated a secondary review on the above patient.  The review outcome is based on review of the medical records, discussions with staff, and applying clinical experience noted on the date of the review.          (x) Observation Status Appropriate - This patient does not meet hospital inpatient criteria and is placed in observation status. If this patient's primary payer is Medicare and was admitted as an inpatient, Condition Code 44 should be used and patient status changed to \"observation\".     RATIONALE FOR DETERMINATION   82-year-old woman with PMH significant for acute to subacute L5 burst fracture 9/2022, history of endometrial cancer s/p radiation, history of radiation colitis, paroxysmal SVT, osteoporosis, scoliosis, normocytic anemia, who presented to the ER with N/V/abd pain and found to have small bowel obstruction. Admitted for further management and general surgery consultation.  Patient had evidence of small bowel obstruction, no acute abdomen, evidence of ischemia, or sepsis.  Clinically seems to be improving had a bowel movement this morning, she did not require NG tube last night.  She did have evidence of mild kidney injury that did improve on follow-up labs this morning.  The severity of illness, intensity of service provided, expected LOS and risk for adverse outcome make the care appropriate for further observation; however, doesn't meet criteria for hospital inpatient admission. Dr Lyle notified of this determination.    This document was produced using voice recognition software.      The information on this document is developed by the utilization review team in order for the business office to ensure compliance.  This only denotes the appropriateness of proper admission status and does not reflect the quality of care rendered.         The " definitions of Inpatient Status and Observation Status used in making the determination above are those provided in the CMS Coverage Manual, Chapter 1 and Chapter 6, section 70.4.      Sincerely,     JEAN CLAUDE CHEUNG MD    System Medical Director  Utilization Management  [facility].

## 2023-02-23 NOTE — ED NOTES
Pt given clear liquid tray- reports she is afraid to eat it as now she is having loose stools because of hx of colitis. RN encouraged pt to just eat slow and eat what she can tolerate

## 2023-02-23 NOTE — ED NOTES
RN at bedside, pt up to commode at this time. Dr. Lyle at bedside. Pt able to drink clear liquids now, voiding well and had a BM earlier today. Pt has no other needs at this time

## 2023-02-24 LAB
MAGNESIUM SERPL-MCNC: 1.5 MG/DL (ref 1.7–2.3)
MAGNESIUM SERPL-MCNC: 1.9 MG/DL (ref 1.7–2.3)
PHOSPHATE SERPL-MCNC: 2 MG/DL (ref 2.5–4.5)
POTASSIUM SERPL-SCNC: 3.1 MMOL/L (ref 3.4–5.3)
POTASSIUM SERPL-SCNC: 3.1 MMOL/L (ref 3.4–5.3)

## 2023-02-24 PROCEDURE — 120N000001 HC R&B MED SURG/OB

## 2023-02-24 PROCEDURE — 99232 SBSQ HOSP IP/OBS MODERATE 35: CPT | Performed by: INTERNAL MEDICINE

## 2023-02-24 PROCEDURE — 36415 COLL VENOUS BLD VENIPUNCTURE: CPT | Performed by: INTERNAL MEDICINE

## 2023-02-24 PROCEDURE — 250N000011 HC RX IP 250 OP 636: Performed by: PHYSICIAN ASSISTANT

## 2023-02-24 PROCEDURE — 250N000013 HC RX MED GY IP 250 OP 250 PS 637: Performed by: INTERNAL MEDICINE

## 2023-02-24 PROCEDURE — 84132 ASSAY OF SERUM POTASSIUM: CPT | Performed by: INTERNAL MEDICINE

## 2023-02-24 PROCEDURE — G0378 HOSPITAL OBSERVATION PER HR: HCPCS

## 2023-02-24 PROCEDURE — 84100 ASSAY OF PHOSPHORUS: CPT | Performed by: INTERNAL MEDICINE

## 2023-02-24 PROCEDURE — 83735 ASSAY OF MAGNESIUM: CPT | Performed by: INTERNAL MEDICINE

## 2023-02-24 PROCEDURE — 250N000011 HC RX IP 250 OP 636: Performed by: INTERNAL MEDICINE

## 2023-02-24 RX ORDER — POTASSIUM CHLORIDE 750 MG/1
20 TABLET, EXTENDED RELEASE ORAL ONCE
Status: COMPLETED | OUTPATIENT
Start: 2023-02-24 | End: 2023-02-24

## 2023-02-24 RX ORDER — ACETAMINOPHEN 500 MG
500 TABLET ORAL EVERY 4 HOURS PRN
Status: DISCONTINUED | OUTPATIENT
Start: 2023-02-24 | End: 2023-02-24

## 2023-02-24 RX ORDER — ACETAMINOPHEN 650 MG/1
650 SUPPOSITORY RECTAL EVERY 4 HOURS PRN
Status: DISCONTINUED | OUTPATIENT
Start: 2023-02-24 | End: 2023-02-24

## 2023-02-24 RX ORDER — ACETAMINOPHEN 650 MG/1
650 SUPPOSITORY RECTAL EVERY 4 HOURS PRN
Status: DISCONTINUED | OUTPATIENT
Start: 2023-02-24 | End: 2023-02-26 | Stop reason: HOSPADM

## 2023-02-24 RX ORDER — MAGNESIUM SULFATE HEPTAHYDRATE 40 MG/ML
2 INJECTION, SOLUTION INTRAVENOUS ONCE
Status: COMPLETED | OUTPATIENT
Start: 2023-02-24 | End: 2023-02-24

## 2023-02-24 RX ORDER — ACETAMINOPHEN 500 MG
500 TABLET ORAL EVERY 4 HOURS PRN
Status: DISCONTINUED | OUTPATIENT
Start: 2023-02-24 | End: 2023-02-26 | Stop reason: HOSPADM

## 2023-02-24 RX ORDER — ACETAMINOPHEN 325 MG/1
650 TABLET ORAL EVERY 4 HOURS PRN
Status: DISCONTINUED | OUTPATIENT
Start: 2023-02-24 | End: 2023-02-24

## 2023-02-24 RX ADMIN — ACETAMINOPHEN 500 MG: 500 TABLET ORAL at 22:03

## 2023-02-24 RX ADMIN — POTASSIUM CHLORIDE 20 MEQ: 750 TABLET, EXTENDED RELEASE ORAL at 22:03

## 2023-02-24 RX ADMIN — POTASSIUM & SODIUM PHOSPHATES POWDER PACK 280-160-250 MG 1 PACKET: 280-160-250 PACK at 14:52

## 2023-02-24 RX ADMIN — POTASSIUM CHLORIDE 20 MEQ: 750 TABLET, EXTENDED RELEASE ORAL at 12:01

## 2023-02-24 RX ADMIN — HYDROMORPHONE HYDROCHLORIDE 0.2 MG: 0.2 INJECTION, SOLUTION INTRAMUSCULAR; INTRAVENOUS; SUBCUTANEOUS at 00:17

## 2023-02-24 RX ADMIN — Medication 1 CAPSULE: at 08:35

## 2023-02-24 RX ADMIN — PANCRELIPASE 1 CAPSULE: 60000; 12000; 38000 CAPSULE, DELAYED RELEASE PELLETS ORAL at 12:01

## 2023-02-24 RX ADMIN — MAGNESIUM SULFATE HEPTAHYDRATE 2 G: 2 INJECTION, SOLUTION INTRAVENOUS at 10:06

## 2023-02-24 RX ADMIN — DILTIAZEM HYDROCHLORIDE 180 MG: 180 CAPSULE, COATED, EXTENDED RELEASE ORAL at 08:35

## 2023-02-24 RX ADMIN — PANCRELIPASE 1 CAPSULE: 60000; 12000; 38000 CAPSULE, DELAYED RELEASE PELLETS ORAL at 08:35

## 2023-02-24 RX ADMIN — HYDROMORPHONE HYDROCHLORIDE 0.2 MG: 0.2 INJECTION, SOLUTION INTRAMUSCULAR; INTRAVENOUS; SUBCUTANEOUS at 03:27

## 2023-02-24 ASSESSMENT — ACTIVITIES OF DAILY LIVING (ADL)
ADLS_ACUITY_SCORE: 31
DEPENDENT_IADLS:: INDEPENDENT
ADLS_ACUITY_SCORE: 31
ADLS_ACUITY_SCORE: 31

## 2023-02-24 NOTE — PROGRESS NOTES
Pt is A & O x . Lungs sound clear, bowel sounds hyperactive, having loose BMs. Up independently. SL. Low phosphorus level, need replacement. On clear liquid but pt doesn't want to eat due to diarrhea. MD was notified of stools, home meds restarted. Will continue to monitor.

## 2023-02-24 NOTE — PROGRESS NOTES
Advanced to full liquid diet this afternoon. Denies nausea, abdominal pain. Having loose stools which pt says is normal for her. A&Ox4. Independent in room. Phosphorus, K+ and Mag all replaced per protocol. Recheck this evening and in AM. General surgery following. Possible discharge home tomorrow pending bowel function, diet tolerance.

## 2023-02-24 NOTE — PROGRESS NOTES
[facility]    Urology Progress Note     Assessment & Plan   Georgette Alatrore is a 82 year old female being treated for SBO, who was also found to have right sided hydronephrosis on CT scan, large right renal cyst, small left renal stone. JELENA, now resolved. Renogram showing right UPJ obstruction.     Plan:   - Renogram discussed with patient. No urologic intervention indicated at this time. Would not recommend surgical correction of UPJO at this time given absence of pain or JELENA.   - Follow up with Dr Lee in 4 months with repeat renogram prior to follow. AVS updated.    D/w Dr Lee. Urology will sign off for now. Please call with any questions or concerns.     Michelle Anaya PA-C  [facility]  Pager: [phone]  Office: [phone]    Interval History   SBO being managed conservatively, pt now passing BMs. Cr has normalized - JELENA upon admission likely prerenal. Renogram yesterday reveals concern for right UPJ obstruction; right kidney with 43% split function. Denies voiding symptoms, utilizing briefs.     Endorses R back pain, she believes is related to L5 fracture, she has follow up with [facility] next week.     OBJECTIVE:          /54 (BP Location: Right arm)   Pulse 58   Temp 97.9  F (36.6  C) (Oral)   Resp 16   Ht 1.524 m (5')   Wt 35.8 kg (78 lb 14.8 oz)   SpO2 96%   BMI 15.41 kg/m             General appearance shows no deformaties and good grooming in no acute distress.  EYES: no icterus  HEAD, EARS, NOSE, MOUTH, AND THROAT: atraumatic, normocephalic  CARDIAC: skin well perfused  RESPIRATORY: breathing unlabored  ABDOMEN: soft, mildly tender, non distended  : Pain over mid back with palpation, not necessarily CVAT  SKIN/HAIR/NAILS: no visible rashes  NEUROLOGIC: no focal deficits  PSYCHIATRIC: Speech, mood, and affect normal    IMAGING:     EXAM: NM RENOGRAM WITH LASIX  LOCATION: [facility]  DATE/TIME: 2/23/2023 10:36  AM     INDICATION: Right hydronephrosis. Obstruction evaluation.  COMPARISON: CT the abdomen pelvis dated 02/22/2023.  TECHNIQUE: 9.5 mCi of technetium-99m MAG3, IV followed by abdominal imaging. 40 mg of Lasix, IV.     FINDINGS: Differential renal function of 57% on the left and 43% on the right. Prompt clearance of radiotracer from the left renal collecting system (Lasix T1/2: 15.5 minutes) without evidence of obstruction, however there is progressive accumulation of   radiotracer within the moderately dilated right renal collecting system right renal collecting system without significant urinary excretion suspicious for a right UPJ obstruction.                                                                      IMPRESSION:      Findings suspicious for a right UPJ obstruction.            Michelle Anaya PA-C  [facility]'

## 2023-02-24 NOTE — PROGRESS NOTES
"  Pt is AxOx4.VS within baseline. Lung/cardiac sounds WDL.hyperactive bowel sounds.abdomen tender with palpation.Clear liquid diet.nauseas,but no vomiting.nausea is controlled with PRN meds. incontinent in bowel.Pt requires constant brief changes and refused to get changed;despite providing teaching to pt and approaching them multiple times still they  refused assistance with changes. Pt states\" will do it later\"Patient will call when needed continue to monitor  "

## 2023-02-24 NOTE — PROGRESS NOTES
[facility]    Medicine Progress Note - Hospitalist Service    Date of Admission:  2/22/2023    Assessment & Plan   Ms. Georgette Alatorre is a very pleasant 82-year-old woman with PMH significant for acute to subacute L5 burst fracture 9/2022, history of endometrial cancer s/p radiation, history of radiation colitis, paroxysmal SVT, osteoporosis, scoliosis, normocytic anemia, who presented to the ER with N/V/abd pain and found to have small bowel obstruction.     Small bowel obstruction, resolving   Hx exp lap, extensive CRAIG, revision of small bowel anastomosis 2007  Presented with 2 day history of N/V/periumbilical abd pain, last emesis day prior to admission.  Decreased p.o.  Denies hematochezia hematemesis, or hematuria.  Last bowel movement x2 days PTA small hard mehrdad.  Feels weak and tired and has had a mild headache.  Hemodynamically stable and afebrile in the ED.  Work-up in the ED revealed mechanical SBO on CT. No obvious hernia. Baseline has loose stools and takes lomotil. JELENA noted on labs, dehydration.  History of ex lap with lysis of extensive abdominal adhesions and revision of small bowel anastomosis in 2007 with Dr. Rad Pierce.  Patient denies small bowel obstruction since then.  Continues to be maintained on Creon.  - started conservative management-  npo, iv fluids, pain management, antiemetivs  - General surgery consultation appreciated  - did not vomited since admission, declined NGT  - started having BM yesterday 2/23; started on clear liquids diet  - then, she had multiple episodes of diarrhea yesterday  - resumed PTA Creon TID and Culturell daily  - no more diarrhea  - no abd pain, no N/V  - advance diet to full liquids today   - off iv fluids    Acute kidney injury, resolved  Right sided hydronephrosis, moderate  Right UPJ obstruction  Large renal cyst, small left renal stone  Baseline Cr appears 0.6-0.7 range. 1.46 on admission. Likely prerenal in setting of  SBO and dehydration. BUN elevated. Cl low, Na WNL. Has followed with [facility] Dr. Lee in the past for nephrolithiasis.   - Noted moderate right sided hydronephrosis on CT with large renal cyst, small nonobstructing renal stone on left.   - No CVA tenderness  - UA negative  - started on iv fluids on admission   - Urology consult appreciated  - NM Renogram- Findings suspicious for a right UPJ obstruction.  - cr improved to 0.82   - iv fluids stopped on 2/23  - As per Urology-  No urologic intervention indicated at this time. Would not recommend surgical correction of UPJO at this time given absence of pain or JELENA.   - Follow up with Dr Lee in 4 months with repeat renogram prior to follow     Underweight  Weight appears stable. Body mass index is 17.58 kg/m . c/w underweight.   - If prolonged NPO will need RD consultation given low BMI/muscle mass.         Wt Readings from Last 4 Encounters:   02/22/23 40.8 kg (90 lb)   09/26/22 40.4 kg (89 lb)   09/23/22 41.5 kg (91 lb 6.4 oz)   09/19/22 41.5 kg (91 lb 6.4 oz)         History of paroxysmal SVT.    - Resume PTA diltiazem   - Monitor HRs  - PRN metoprolol for HR >120     History of radiation colitis.    - Resumed PTA Creon      Normocytic anemia, chronic  - Monitor         Recent Labs   Lab 02/22/23  0715   HGB 11.5*      Hx L5 burst fracture.   Osteoporosis.  Scoliosis.   Sept admission to [facility] for worsening lower back pain and right buttock pain and noted to have an acute to subacute L5 burst fracture. Ortho spine consulted and recommended no surgical intervention; it was felt that her fracture was stable with expected healing time of 3-4 months. Ultimately discharged to TCU with pain control and follow up.     Hypokalemia  Hypomagnesemia  Hypophosphatemia  - K 3.1, Mag 1.5, Phosph 2.  - replace electrolytes as per protocol.          Diet: Full Liquid Diet    DVT Prophylaxis: DOAC  Carrera Catheter: Not present  Lines: None     Cardiac Monitoring:  None  Code Status: Full Code      Clinically Significant Risk Factors        # Hypokalemia: Lowest K = 3.1 mmol/L in last 2 days, will replace as needed     # Hypomagnesemia: Lowest Mg = 1.5 mg/dL in last 2 days, will replace as needed              # Cachexia: Estimated body mass index is 15.41 kg/m  as calculated from the following:    Height as of this encounter: 1.524 m (5').    Weight as of this encounter: 35.8 kg (78 lb 14.8 oz)., PRESENT ON ADMISSION         Disposition Plan      Expected Discharge Date: 02/25/2023, 12:00 PM                Britni Lyle MD  Hospitalist Service  [facility]  Securely message with [employer] (more info)  Text page via [facility] Paging/Directory   ______________________________________________________________________    Interval History   Feeling fine, had multiple episodes of diarrhea yesterday, no BM today  - tolerating clear liquid diet, plan to advance to full liquids   - no nausea, no vomiting, no abd pain.  - no chest pain, no SOB  - discussed with bedside RN.    Physical Exam   Vital Signs: Temp: 97.9  F (36.6  C) Temp src: Oral BP: 104/54 Pulse: 58   Resp: 16 SpO2: 96 % O2 Device: None (Room air)    Weight: 78 lbs 14.79 oz    General: Awake, alert, pleasant, thin elderly lady; No acute distress.  HEENT: Normocephalic, atraumatic. Extraocular movements intact.   Respiratory: Clear to auscultation bilaterally, no rales, wheezing, or rhonchi.  Cardiovascular: Regular rate and rhythm, +S1 and S2, no murmur auscultated. No peripheral edema.   Gastrointestinal: Soft, nontender,nondistended, BS present   Skin: Warm, dry. No obvious rashes or lesions on exposed skin. Dorsalis pedis pulses palpable bilaterally.  Musculoskeletal: No joint swelling, erythema or tenderness. Moves all extremities equally, scoliosis noted.  Neurologic: AAO x3.  Psychiatric: Appropriate mood and affect. No obvious anxiety or depression.    Medical Decision Making             Data      I have personally reviewed the following data over the past 24 hrs:    N/A  \   N/A   / N/A     N/A N/A N/A /  N/A   3.1 (L) N/A N/A \       Imaging results reviewed over the past 24 hrs:   No results found for this or any previous visit (from the past 24 hour(s)).

## 2023-02-24 NOTE — CONSULTS
Care Management Initial Consult    General Information  Assessment completed with: Patient,    Type of CM/SW Visit: Initial Assessment    Primary Care Provider verified and updated as needed: Yes   Readmission within the last 30 days:        Reason for Consult: discharge planning  Advance Care Planning:            Communication Assessment  Patient's communication style: spoken language (English or Bilingual)    Hearing Difficulty or Deaf: no        Cognitive  Cognitive/Neuro/Behavioral: WDL        Orientation: oriented x 4             Living Environment:   People in home: alone     Current living Arrangements: house      Able to return to prior arrangements: yes       Family/Social Support:  Care provided by: self  Provides care for: no one                Description of Support System:           Current Resources:   Patient receiving home care services: No     Community Resources: None  Equipment currently used at home: none  Supplies currently used at home: None    Employment/Financial:  Employment Status: retired        Financial Concerns:             Lifestyle & Psychosocial Needs:  Social Determinants of Health     Tobacco Use: Medium Risk     Smoking Tobacco Use: Former     Smokeless Tobacco Use: Never     Passive Exposure: Not on file   Alcohol Use: Not on file   Financial Resource Strain: Not on file   Food Insecurity: Not on file   Transportation Needs: Not on file   Physical Activity: Not on file   Stress: Not on file   Social Connections: Not on file   Intimate Partner Violence: Not on file   Depression: Not on file   Housing Stability: Not on file       Functional Status:  Prior to admission patient needed assistance:   Dependent ADLs:: Independent  Dependent IADLs:: Independent       Mental Health Status:  Mental Health Status: No Current Concerns       Chemical Dependency Status:                Values/Beliefs:  Spiritual, Cultural Beliefs, [employer] Practices, Values that affect care:                  Additional Information:  Writer met with patient at bedside and introduced self and role  In discharge planning.  No needs identified for  Javad.  Javad shared that she lives alone in a twin home and does not have services.  Patient is independent.  Has adult children but they are  Busy and not able to helpby providing a ride home.  Patient shares that she may need a  Taxi ride home.    Courtney Hathaway RN

## 2023-02-24 NOTE — UTILIZATION REVIEW
Concurrent stay review; Secondary Review Determination      Under the authority of the Utilization Management Committee, the utilization review process indicated a secondary review on the above patient. The review outcome is based on review of the medical records, discussions with staff, and applying clinical experience noted on the date of the review.      (x) Observation Status Appropriate - Concurrent stay review      RATIONALE FOR DETERMINATION:      82-year-old woman with PMH significant for acute to subacute L5 burst fracture 9/2022, history of endometrial cancer s/p radiation, history of radiation colitis, paroxysmal SVT, osteoporosis, scoliosis, normocytic anemia, who presented to the ER with N/V/abd pain and found to have small bowel obstruction.    Clinically SBO appears to have resolved and patient now having multiple BM.  Diet being advanced to full liquid today    VS stable    Labs show mild hypokalemia, hypophosphatemia, and hypomagnesemia today     NM renogram showed possible UPJ obstruction; outpatient follow up planned    Given continued resolution of SBO, recommend to continue observation status     Patient is clinically improving and there is no clear indication to change patient's status to inpatient. The severity of illness, intensity of service provided, expected LOS and risk for adverse outcome make the care appropriate for observation.      The information on this document is developed by the utilization review team in order for the business office to ensure compliance. This only denotes the appropriateness of proper admission status and does not reflect the quality of care rendered.   The definitions of Inpatient Status and Observation Status used in making the determination above are those provided in the CMS Coverage Manual, Chapter 1 and Chapter 6, section 70.4.      Sincerely,      Stevan Melendez MD  Utilization Review   Physician Advisor   [facility].

## 2023-02-25 LAB
ANION GAP SERPL CALCULATED.3IONS-SCNC: 10 MMOL/L (ref 7–15)
BUN SERPL-MCNC: 10 MG/DL (ref 8–23)
CALCIUM SERPL-MCNC: 8.5 MG/DL (ref 8.8–10.2)
CHLORIDE SERPL-SCNC: 96 MMOL/L (ref 98–107)
CREAT SERPL-MCNC: 0.59 MG/DL (ref 0.51–0.95)
DEPRECATED HCO3 PLAS-SCNC: 29 MMOL/L (ref 22–29)
GFR SERPL CREATININE-BSD FRML MDRD: 89 ML/MIN/1.73M2
GLUCOSE BLDC GLUCOMTR-MCNC: 138 MG/DL (ref 70–99)
GLUCOSE SERPL-MCNC: 112 MG/DL (ref 70–99)
MAGNESIUM SERPL-MCNC: 1.8 MG/DL (ref 1.7–2.3)
PHOSPHATE SERPL-MCNC: 1.3 MG/DL (ref 2.5–4.5)
PHOSPHATE SERPL-MCNC: 1.8 MG/DL (ref 2.5–4.5)
POTASSIUM SERPL-SCNC: 3.4 MMOL/L (ref 3.4–5.3)
POTASSIUM SERPL-SCNC: 4 MMOL/L (ref 3.4–5.3)
SODIUM SERPL-SCNC: 135 MMOL/L (ref 136–145)

## 2023-02-25 PROCEDURE — 250N000009 HC RX 250: Performed by: INTERNAL MEDICINE

## 2023-02-25 PROCEDURE — 36415 COLL VENOUS BLD VENIPUNCTURE: CPT | Performed by: INTERNAL MEDICINE

## 2023-02-25 PROCEDURE — 250N000013 HC RX MED GY IP 250 OP 250 PS 637: Performed by: PHYSICIAN ASSISTANT

## 2023-02-25 PROCEDURE — 83735 ASSAY OF MAGNESIUM: CPT | Performed by: INTERNAL MEDICINE

## 2023-02-25 PROCEDURE — 99232 SBSQ HOSP IP/OBS MODERATE 35: CPT | Performed by: INTERNAL MEDICINE

## 2023-02-25 PROCEDURE — 250N000013 HC RX MED GY IP 250 OP 250 PS 637: Performed by: INTERNAL MEDICINE

## 2023-02-25 PROCEDURE — 250N000011 HC RX IP 250 OP 636: Performed by: INTERNAL MEDICINE

## 2023-02-25 PROCEDURE — G0378 HOSPITAL OBSERVATION PER HR: HCPCS

## 2023-02-25 PROCEDURE — 84132 ASSAY OF SERUM POTASSIUM: CPT | Performed by: INTERNAL MEDICINE

## 2023-02-25 PROCEDURE — 80048 BASIC METABOLIC PNL TOTAL CA: CPT | Performed by: INTERNAL MEDICINE

## 2023-02-25 PROCEDURE — 120N000001 HC R&B MED SURG/OB

## 2023-02-25 PROCEDURE — 82962 GLUCOSE BLOOD TEST: CPT

## 2023-02-25 PROCEDURE — 84100 ASSAY OF PHOSPHORUS: CPT | Performed by: INTERNAL MEDICINE

## 2023-02-25 PROCEDURE — 250N000011 HC RX IP 250 OP 636: Performed by: PHYSICIAN ASSISTANT

## 2023-02-25 PROCEDURE — 258N000003 HC RX IP 258 OP 636: Performed by: INTERNAL MEDICINE

## 2023-02-25 RX ORDER — POTASSIUM CHLORIDE 1.5 G/1.58G
20 POWDER, FOR SOLUTION ORAL ONCE
Status: COMPLETED | OUTPATIENT
Start: 2023-02-25 | End: 2023-02-25

## 2023-02-25 RX ORDER — ONDANSETRON 4 MG/1
4 TABLET, ORALLY DISINTEGRATING ORAL EVERY 6 HOURS PRN
Status: DISCONTINUED | OUTPATIENT
Start: 2023-02-25 | End: 2023-02-26 | Stop reason: HOSPADM

## 2023-02-25 RX ORDER — ONDANSETRON 2 MG/ML
4 INJECTION INTRAMUSCULAR; INTRAVENOUS EVERY 6 HOURS PRN
Status: DISCONTINUED | OUTPATIENT
Start: 2023-02-25 | End: 2023-02-26 | Stop reason: HOSPADM

## 2023-02-25 RX ORDER — DEXTROSE MONOHYDRATE 25 G/50ML
25-50 INJECTION, SOLUTION INTRAVENOUS
Status: DISCONTINUED | OUTPATIENT
Start: 2023-02-25 | End: 2023-02-26 | Stop reason: HOSPADM

## 2023-02-25 RX ORDER — POTASSIUM CHLORIDE 7.45 MG/ML
10 INJECTION INTRAVENOUS
Status: COMPLETED | OUTPATIENT
Start: 2023-02-25 | End: 2023-02-25

## 2023-02-25 RX ORDER — NICOTINE POLACRILEX 4 MG
15-30 LOZENGE BUCCAL
Status: DISCONTINUED | OUTPATIENT
Start: 2023-02-25 | End: 2023-02-26 | Stop reason: HOSPADM

## 2023-02-25 RX ADMIN — PANCRELIPASE 1 CAPSULE: 60000; 12000; 38000 CAPSULE, DELAYED RELEASE PELLETS ORAL at 17:42

## 2023-02-25 RX ADMIN — POTASSIUM CHLORIDE 10 MEQ: 7.46 INJECTION, SOLUTION INTRAVENOUS at 03:21

## 2023-02-25 RX ADMIN — PANCRELIPASE 1 CAPSULE: 60000; 12000; 38000 CAPSULE, DELAYED RELEASE PELLETS ORAL at 08:43

## 2023-02-25 RX ADMIN — POTASSIUM, SODIUM PHOSPHATES 280 MG-160 MG-250 MG ORAL POWDER PACKET 2 PACKET: POWDER IN PACKET at 23:44

## 2023-02-25 RX ADMIN — DILTIAZEM HYDROCHLORIDE 180 MG: 180 CAPSULE, COATED, EXTENDED RELEASE ORAL at 08:41

## 2023-02-25 RX ADMIN — PANCRELIPASE 1 CAPSULE: 60000; 12000; 38000 CAPSULE, DELAYED RELEASE PELLETS ORAL at 13:47

## 2023-02-25 RX ADMIN — SODIUM PHOSPHATE, MONOBASIC, MONOHYDRATE AND SODIUM PHOSPHATE, DIBASIC, ANHYDROUS 9 MMOL: 142; 276 INJECTION, SOLUTION INTRAVENOUS at 06:39

## 2023-02-25 RX ADMIN — PROCHLORPERAZINE MALEATE 5 MG: 5 TABLET ORAL at 04:07

## 2023-02-25 RX ADMIN — POTASSIUM CHLORIDE 20 MEQ: 1.5 POWDER, FOR SOLUTION ORAL at 17:11

## 2023-02-25 RX ADMIN — Medication 1 CAPSULE: at 08:41

## 2023-02-25 RX ADMIN — HYDROMORPHONE HYDROCHLORIDE 0.2 MG: 0.2 INJECTION, SOLUTION INTRAMUSCULAR; INTRAVENOUS; SUBCUTANEOUS at 13:47

## 2023-02-25 RX ADMIN — POTASSIUM, SODIUM PHOSPHATES 280 MG-160 MG-250 MG ORAL POWDER PACKET 2 PACKET: POWDER IN PACKET at 17:12

## 2023-02-25 RX ADMIN — POTASSIUM CHLORIDE 10 MEQ: 7.46 INJECTION, SOLUTION INTRAVENOUS at 05:09

## 2023-02-25 RX ADMIN — POTASSIUM, SODIUM PHOSPHATES 280 MG-160 MG-250 MG ORAL POWDER PACKET 2 PACKET: POWDER IN PACKET at 21:13

## 2023-02-25 RX ADMIN — ONDANSETRON 4 MG: 4 TABLET, ORALLY DISINTEGRATING ORAL at 17:11

## 2023-02-25 ASSESSMENT — ACTIVITIES OF DAILY LIVING (ADL)
ADLS_ACUITY_SCORE: 31
ADLS_ACUITY_SCORE: 33
ADLS_ACUITY_SCORE: 31

## 2023-02-25 NOTE — PROGRESS NOTES
Contacted by RN to advance diet as hospitalist is refusing to advance diet and requiring general surgery to advance. Upon reviewing patient's chart, she has slight nausea but tolerated fulls and is still having BMs.     -- diet advance to low fiber diet    Saulo Cesar MD  General Surgery

## 2023-02-25 NOTE — PLAN OF CARE
Goal Outcome Evaluation:           Overall Patient Progress: no change    Patient is AO X 4; bradycardic; on RA; has abdominal pain - dilaudid was administered X 1. Electrolytes were replaced. She has intermittent nausea, without emesis.

## 2023-02-25 NOTE — PROGRESS NOTES
Date/Time: 2/24 3-11:30 pm    Trauma/Ortho/Medical (Choose one) Medical    Diagnosis: Small bowel obstruction  POD#: N/A, admitted 2 days  Mental Status: A&O x4  Activity/dangle: Independent in room  Diet: Full liquid  Pain: Mild   Carrera/Voiding: Bathroom  Tele/Restraints/Iso: No  02/LDA: PIV SL  D/C Date: Estimated 1 day depending on how she does with eating  Other Info: Had large loose incontinent stools. Noted to have irregular radial pulse and stated this is normal for her. Pt was asking about vitamin D- see prior provider notification note, left sticky note.

## 2023-02-25 NOTE — PROGRESS NOTES
[facility]    Internal Medicine Hospitalist Progress Note  02/25/2023  I evaluated patient on the above date.    Jesse Mejia Jr., MD  [phone] (p)  Text Page  Vocera        Assessment & Plan New actions/orders today (02/25/2023) are underlined. All lab results in the assessment and plan were reviewed.    Ms. Georgette Alatorre is a very pleasant 82-year-old woman with PMH significant for PSVT; h/o L5 burst fracture (9/2022); and h/o endometrial cancer with h/o radiation colitis with chronic diarrhea; who presented 2/22/2023 with abdominal pain and found with signs of SBO.    CT abdomen 2/22 showed mechanical small bowel obstruction with likely transition point in the right lower quadrant loops of distal ileum in the pelvis without bowel wall thickening or pneumatosis; new moderate right hydronephrosis without dilatation of the right ureter without obstructing mass or stone evident at the ureteropelvic junction, findings likely secondary to UPJ obstruction; stable partly visualized collapsed right lower lobe.       Small bowel obstruction, resolving   Hx exp lap, extensive CRAIG, revision of small bowel anastomosis 2007  * Presented 2/22 with 2 day history of N/V/periumbilical abd pain, last emesis day prior to admission; last BM 2d PTA. Work-up in the ED revealed mechanical SBO on CT. NG tube not placed on admit as pt declined. Surgery consulted on admit.   * Improved with +BM's and cleared to advance diet as tolerated by Surgery 2/23.  * Advanced to full liquids 2/24.  - Advance to low fiber diet today 2/25.  - Continue PRN ondansetron, PRN prochlorperazine.  - Continue to ambulate frequently as able.    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  * Chronically on lipase-protease-amylase and lactobacillus.  * SBO with improvement as above.  - Continue lipase-protease-amylase with meals.  - Continue lactobacillus.    Acute kidney injury, suspect prerenal, resolved.  * Cr 1.46 on admit.    * Cr normalized with IVF's 2/23.    Right sided hydronephrosis, moderate.  Right UPJ obstruction.  Bilateral renal cysts and small left renal stone (non-obstructing) on CT.  * Followed by Urology, Dr. Lee for nephrolithiasis.  * Initial presentation and imaging as above. CT also showed bilateral renal cysts, small non-obstructing left renal stone. Urology consulted on admit.  * NM renogram 2/23 showed findings suspicious for a right UPJ obstruction.   - Per Urology, no urologic intervention indicated at this time; would not recommend surgical correction of UPJO at this time given absence of pain or JELENA.   - Follow-up with Dr Lee in 4 months with repeat renogram prior to follow     Underweight.  Cachexia.  Body mass index is 15.41 kg/m .   - Continue diet as tolerated.     Paroxysmal SVT.    - Continue diltiazem.    Bradycardia, suspect sinus, asymptomatic.  * On 2/25, pt renuka to 50's, asymptomatic.  - Continue to monitor clinically.    Anemia, suspect chronic component.  * Hgb 11.5 on admit. No overt clinical signs of major bleeding.  Recent Labs   Lab 02/23/23  0719 02/22/23  0715   HGB 9.8* 11.5*   - Continue to monitor CBC - repeat in am.  - Consider prbc transfusion if hgb </= 7.0 or if significant bleeding with hemodynamic instability or if symptomatic.    Hx L5 burst fracture (9/2022).  Osteoporosis.  Scoliosis.   * 9/2022 admission to [facility] for worsening lower back pain and right buttock pain and noted to have an acute to subacute L5 burst fracture. Ortho spine consulted and recommended no surgical intervention; it was felt that her fracture was stable with expected healing time of 3-4 months. Ultimately discharged to TCU with pain control and follow up.   - Continue to monitor clinically.    Hypokalemia, hypomagnesemia, hypophosphatemia.  * Potassium, magnesium and phosphorus low at times this hospitalization. Treated with replacement.  Recent Labs   Lab 02/25/23  1536 02/25/23  0204  02/24/23 2020 02/24/23  0743 02/23/23  1722 02/23/23  0719   POTASSIUM 3.4 3.4  3.4 3.1* 3.1* 3.5 4.0   MAG  --  1.8 1.9 1.5*  --  1.7   PHOS 1.8* 1.3*  --  2.0* 1.9* 2.4*   - Continue PRN K, Mg and phos replacement.        Clinically Significant Risk Factors        # Hypokalemia: Lowest K = 3.1 mmol/L in last 2 days, will replace as needed     # Hypomagnesemia: Lowest Mg = 1.5 mg/dL in last 2 days, will replace as needed             # Cachexia: Estimated body mass index is 15.41 kg/m  as calculated from the following:    Height as of this encounter: 1.524 m (5').    Weight as of this encounter: 35.8 kg (78 lb 14.8 oz)., PRESENT ON ADMISSION           COVID-19 testing.  COVID-19 PCR Results    COVID-19 PCR Results 9/7/22 9/11/22   SARS CoV2 PCR Positive (A) Negative   (A) Abnormal value       Comments are available for some flowsheets but are not being displayed.         COVID-19 Antibody Results, Testing for Immunity    COVID-19 Antibody Results, Testing for Immunity   No data to display.             Diet: Low Fiber Diet    Prophylaxis: PCD's, ambulation.   Carrera Catheter: Not present  Lines: None     Code Status: Full Code    Disposition Plan   Expected discharge: Tomorrow recommended to prior living arrangement pending tolerance of solid diet.  Entered: Jesse Mejia MD 02/25/2023, 5:43 PM         Interval History   Doing better overall.  Tolerating full liquids, wants to try solids.  + BM's (loose).  Is very hungry.  Had hung pains earlier.    -Data reviewed today: I reviewed all new labs and imaging over the last 24 hours. I personally reviewed no images or EKG's today.    Physical Exam    , Blood pressure 118/58, pulse 54, temperature 98.1  F (36.7  C), temperature source Oral, resp. rate 16, height 1.524 m (5'), weight 35.8 kg (78 lb 14.8 oz), SpO2 96 %, not currently breastfeeding. O2 Device: None (Room air)    Vitals:    02/22/23 0655 02/24/23 0612   Weight: 40.8 kg (90 lb) 35.8 kg (78 lb 14.8 oz)      Vital Signs with Ranges  Temp:  [97.8  F (36.6  C)-98.3  F (36.8  C)] 98.1  F (36.7  C)  Pulse:  [48-54] 54  Resp:  [14-16] 16  BP: (105-127)/(47-66) 118/58  SpO2:  [95 %-96 %] 96 %  Patient Vitals for the past 24 hrs:   BP Temp Temp src Pulse Resp SpO2   02/25/23 1618 118/58 98.1  F (36.7  C) Oral 54 16 96 %   02/25/23 0818 127/66 97.8  F (36.6  C) Oral 54 15 96 %   02/25/23 0400 -- -- -- 53 -- --   02/25/23 0024 108/52 98.3  F (36.8  C) Oral (!) 48 14 96 %   02/24/23 2004 105/47 98.2  F (36.8  C) Oral (!) 48 16 95 %     I/O's Last 24 hours  I/O last 3 completed shifts:  In: 440 [P.O.:240; I.V.:200]  Out: -     Constitutional: Awake, alert, pleasant, conversant.  Respiratory: Diminished in bases. No crackles or wheezes.  Cardiovascular: RRR, no m/r/g.  GI: Mild distension, mild tenderness, no r/g, +BS.  Skin/Integumen:   Other:        Data    Labs reviewed.  Recent Labs   Lab 02/25/23  1536 02/25/23  0204 02/25/23  0020 02/24/23 2020 02/23/23  1722 02/23/23  0719 02/22/23  0715   WBC  --   --   --   --   --  4.7 6.1   HGB  --   --   --   --   --  9.8* 11.5*   MCV  --   --   --   --   --  98 95   PLT  --   --   --   --   --  189 244   NA  --  135*  --   --   --  141 137  139   POTASSIUM 3.4 3.4  3.4  --  3.1*   < > 4.0 4.1  4.2   CHLORIDE  --  96*  --   --   --  107 93*  95*   CO2  --  29  --   --   --  19* 24  25   BUN  --  10.0  --   --   --  24.5* 39.0*  37.5*   CR  --  0.59  --   --   --  0.82 1.46*  1.41*   ANIONGAP  --  10  --   --   --  15 20*  19*   ILYA  --  8.5*  --   --   --  8.3* 9.8  9.9   GLC  --  112* 138*  --   --  54* 105*  108*   ALBUMIN  --   --   --   --   --   --  4.1   PROTTOTAL  --   --   --   --   --   --  7.6   BILITOTAL  --   --   --   --   --   --  0.6   ALKPHOS  --   --   --   --   --   --  80   ALT  --   --   --   --   --   --  10   AST  --   --   --   --   --   --  28    < > = values in this interval not displayed.     Recent Labs   Lab Test 02/25/23  0204 02/25/23  0020  02/23/23  0719 02/22/23  0715 09/13/22  0621   * 138* 54* 105*  108* 97     Recent Labs   Lab 02/23/23  0719 02/22/23  0715   WBC 4.7 6.1         No results found for this or any previous visit (from the past 24 hour(s)).    Medications   All medications were reviewed.      diltiazem ER COATED BEADS  180 mg Oral Daily     lactobacillus rhamnosus (GG)  1 capsule Oral Daily     lipase-protease-amylase  1 capsule Oral TID w/meals     potassium & sodium phosphates  2 packet Oral or Feeding Tube Q4H     sodium chloride (PF)  3 mL Intracatheter Q8H     acetaminophen **OR** acetaminophen, glucose **OR** dextrose **OR** glucagon, HYDROmorphone, lidocaine 4%, lidocaine (buffered or not buffered), metoprolol, naloxone **OR** naloxone **OR** naloxone **OR** naloxone, ondansetron, ondansetron, prochlorperazine **OR** prochlorperazine **OR** prochlorperazine, sodium chloride (PF)

## 2023-02-26 VITALS
DIASTOLIC BLOOD PRESSURE: 62 MMHG | SYSTOLIC BLOOD PRESSURE: 118 MMHG | HEART RATE: 71 BPM | BODY MASS INDEX: 14.89 KG/M2 | HEIGHT: 60 IN | WEIGHT: 75.84 LBS | OXYGEN SATURATION: 95 % | TEMPERATURE: 97.2 F | RESPIRATION RATE: 17 BRPM

## 2023-02-26 LAB
MAGNESIUM SERPL-MCNC: 1.6 MG/DL (ref 1.7–2.3)
PHOSPHATE SERPL-MCNC: 2.8 MG/DL (ref 2.5–4.5)
POTASSIUM SERPL-SCNC: 3.8 MMOL/L (ref 3.4–5.3)

## 2023-02-26 PROCEDURE — 83735 ASSAY OF MAGNESIUM: CPT | Performed by: INTERNAL MEDICINE

## 2023-02-26 PROCEDURE — 250N000011 HC RX IP 250 OP 636: Performed by: PHYSICIAN ASSISTANT

## 2023-02-26 PROCEDURE — 99238 HOSP IP/OBS DSCHRG MGMT 30/<: CPT | Performed by: INTERNAL MEDICINE

## 2023-02-26 PROCEDURE — 250N000013 HC RX MED GY IP 250 OP 250 PS 637: Performed by: INTERNAL MEDICINE

## 2023-02-26 PROCEDURE — 36415 COLL VENOUS BLD VENIPUNCTURE: CPT | Performed by: INTERNAL MEDICINE

## 2023-02-26 PROCEDURE — 84100 ASSAY OF PHOSPHORUS: CPT | Performed by: INTERNAL MEDICINE

## 2023-02-26 PROCEDURE — 84132 ASSAY OF SERUM POTASSIUM: CPT | Performed by: INTERNAL MEDICINE

## 2023-02-26 PROCEDURE — G0378 HOSPITAL OBSERVATION PER HR: HCPCS

## 2023-02-26 RX ORDER — ACETAMINOPHEN 325 MG/1
650 TABLET ORAL 3 TIMES DAILY PRN
Start: 2023-02-26

## 2023-02-26 RX ADMIN — DILTIAZEM HYDROCHLORIDE 180 MG: 180 CAPSULE, COATED, EXTENDED RELEASE ORAL at 08:25

## 2023-02-26 RX ADMIN — PANCRELIPASE 1 CAPSULE: 60000; 12000; 38000 CAPSULE, DELAYED RELEASE PELLETS ORAL at 08:25

## 2023-02-26 RX ADMIN — HYDROMORPHONE HYDROCHLORIDE 0.2 MG: 0.2 INJECTION, SOLUTION INTRAMUSCULAR; INTRAVENOUS; SUBCUTANEOUS at 03:45

## 2023-02-26 RX ADMIN — ACETAMINOPHEN 500 MG: 500 TABLET ORAL at 08:25

## 2023-02-26 RX ADMIN — PANCRELIPASE 1 CAPSULE: 60000; 12000; 38000 CAPSULE, DELAYED RELEASE PELLETS ORAL at 14:06

## 2023-02-26 RX ADMIN — Medication 1 CAPSULE: at 08:25

## 2023-02-26 ASSESSMENT — ACTIVITIES OF DAILY LIVING (ADL)
ADLS_ACUITY_SCORE: 33

## 2023-02-26 NOTE — PLAN OF CARE
[id number]  Orientation: A/Ox4   Vitals: VSS on RA   Mobility: SBA w/walker; pt walked in carpio after being in bed all day and did well  Diet: regular diet; appetite poor  GI/: continent BB; voiding adequately; can be slightly incontinent when diarrhea episodes occur; 2 loose BMs w/ small formed BM  Pain: pt denies pain  Drains/Devices: PIV x1 saline locked  Skin: intact

## 2023-02-26 NOTE — DISCHARGE SUMMARY
[facility]  Discharge Summary        Georgette Alatorre MRN# [id number]   YOB: 1940 Age: 82 year old     Date of Admission: 2/22/2023  Date of Discharge: 2/26/2023  Admitting Physician: Britni Lyle MD  Discharge Physician: Jesse Mejia MD     Primary Provider: Brown, Merlin Emery  Primary Care Physician Phone Number: [phone]         Discharge Diagnoses:   1. Small bowel obstruction,  2. Acute kidney injury, suspect prerenal.  3. Right sided hydronephrosis, moderate.  4. Right UPJ obstruction, unclear etiology.  5. Bilateral renal cysts and small left renal stone (non-obstructing) on CT.  6. Bradycardia, suspect sinus, asymptomatic.  7. Anemia, suspect chronic component.  8. Hypokalemia, hypomagnesemia, hypophosphatemia.  9. Underweight.  10. Cachexia.         Other Chronic Medical Problems:      1. Paroxysmal SVT.    2. 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  3. Osteoporosis. History of L5 burst fracture (9/2022).  4. Scoliosis.   5. History of exp lap, extensive CRAIG, revision of small bowel anastomosis 2007  6. History of endometrial cancer with h/o radiation colitis.       Allergies:         Allergies   Allergen Reactions     No Known Allergies            Discharge Medications:        Current Discharge Medication List      CONTINUE these medications which have CHANGED    Details   acetaminophen (TYLENOL) 325 MG tablet Take 2 tablets (650 mg) by mouth 3 times daily as needed for pain or fever    Associated Diagnoses: Closed burst fracture of lumbar vertebra, initial encounter (H)         CONTINUE these medications which have NOT CHANGED    Details   amylase-lipase-protease (CREON 12) [id number] units CPEP Take 1 capsule by mouth 3 times daily (with meals)      lactobacillus rhamnosus (GG) (CULTURELL) capsule Take 1 capsule by mouth daily      zinc gluconate 50 MG tablet Take 50 mg by mouth daily      Cholecalciferol (VITAMIN D3 PO) Take  2,000 Units by mouth daily.      cyanocobalamin (VITAMIN B-12) 1000 MCG tablet Take 1,000 mcg by mouth daily      diltiazem ER (DILT-XR) 180 MG 24 hr capsule Take 1 capsule by mouth once daily  Qty: 90 capsule, Refills: 0    Associated Diagnoses: Benign essential hypertension      potassium chloride ER (KLOR-CON) 8 MEQ CR tablet Take 16 mEq by mouth daily         STOP taking these medications       diphenoxylate-atropine (LOMOTIL) 2.5-0.025 MG tablet Comments:   Reason for Stopping:                   Discharge Instructions and Follow-Up:      Discharge Orders      Follow Up ([facility])    You are being scheduled for a renogram scan and urology follow up in about 4 months. If you have not heard of appointment details from our office within 2 weeks, please call.    [facility] [address]  You may call [phone] with any questions or concerns.   Central Appointment #: [phone]     Activity    Your activity upon discharge: activity as tolerated     Follow-up and recommended labs and tests    Follow-up with primary care provider, Merlin Emery Brown, within 7 days for hospital follow-up.  The following labs/tests are recommended: CBC, BMP.  Follow-up with Dr Lee in 4 months.     Reason for your hospital stay    1. Small bowel obstruction,  2. Acute kidney injury, suspect prerenal.  3. Right sided hydronephrosis, moderate.  4. Right UPJ obstruction, unclear etiology.  5. Bilateral renal cysts and small left renal stone (non-obstructing) on CT.  6. Bradycardia, suspect sinus, asymptomatic.  7. Anemia, suspect chronic component.  8. Hypokalemia, hypomagnesemia, hypophosphatemia.  9. Underweight.  10. Cachexia.     Diet    Follow this diet upon discharge: Orders Placed This Encounter      Low Fiber Diet             Consultations This Hospital Stay:      SURGERY GENERAL IP CONSULT  CARE MANAGEMENT / SOCIAL WORK IP CONSULT  UROLOGY IP CONSULT        Admission  History:      Please see the H&P by Britni Lyle MD on 2/22/2023 for complete details. Briefly, Ms. Georgette Alatorre is a very pleasant 82-year-old woman with PMH significant for PSVT; h/o L5 burst fracture (9/2022); and h/o endometrial cancer with h/o radiation colitis with chronic diarrhea; who presented 2/22/2023 with abdominal pain and found with signs of SBO.    CT abdomen 2/22 showed mechanical small bowel obstruction with likely transition point in the right lower quadrant loops of distal ileum in the pelvis without bowel wall thickening or pneumatosis; new moderate right hydronephrosis without dilatation of the right ureter without obstructing mass or stone evident at the ureteropelvic junction, findings likely secondary to UPJ obstruction; stable partly visualized collapsed right lower lobe.        Problem Oriented Hospital Course:        Small bowel obstruction.   Hx exp lap, extensive CRAIG, revision of small bowel anastomosis 2007.  * Presented 2/22 with 2 day history of N/V/periumbilical abd pain, last emesis day prior to admission; last BM 2d PTA. Work-up in the ED revealed mechanical SBO on CT. NG tube not placed on admit as pt declined. Surgery consulted on admit.   * Improved with +BM's and cleared to advance diet as tolerated by Surgery 2/23.  * Advanced to full liquids 2/24.  * Advanced to low fiber diet 2/25.  * Tolerating diet with +BM's 2/26.  - Continue low fiber diet.    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  * Chronically on lipase-protease-amylase and lactobacillus.  * SBO with improvement as above.  - Continue lipase-protease-amylase with meals.  - Continue lactobacillus.    Acute kidney injury, suspect prerenal.  * Cr 1.46 on admit.   * Cr normalized with IVF's 2/23.    Right sided hydronephrosis related to right UPJ obstruction, unclear etiology.  Bilateral renal cysts and small left renal stone (non-obstructing) on CT.  * Followed by Urology, Dr. Lee for  nephrolithiasis.  * Initial presentation and imaging as above. CT also showed bilateral renal cysts, small non-obstructing left renal stone. Urology consulted on admit.  * NM renogram 2/23 showed findings suspicious for a right UPJ obstruction.   - Per Urology, no urologic intervention indicated at this time; would not recommend surgical correction of UPJO at this time given absence of pain or JELENA.   - Follow-up with Dr Lee in 4 months with for repeat renogram.    Underweight.  Cachexia.  Body mass index is 14.81 kg/m .   - Continue diet as tolerated.    Paroxysmal SVT.    - Continue diltiazem.    Bradycardia, suspect sinus, asymptomatic.  * On 2/25, pt renuka to 50's, asymptomatic.  - Continue to monitor clinically.    Anemia, suspect chronic component.  * Hgb 11.5 on admit. No overt clinical signs of major bleeding.  - Continue to monitor CBC.    Hx L5 burst fracture (9/2022).  Osteoporosis.  Scoliosis.   * 9/2022 admission to [facility] for worsening lower back pain and right buttock pain and noted to have an acute to subacute L5 burst fracture. Ortho spine consulted and recommended no surgical intervention; it was felt that her fracture was stable with expected healing time of 3-4 months. Ultimately discharged to TCU with pain control and follow up.   - Continue to monitor clinically.    Hypokalemia, hypomagnesemia, hypophosphatemia.  * Potassium, magnesium and phosphorus low at times this hospitalization. Treated with replacement.    H/o endometrial cancer with h/o radiation colitis.  - Noted.    Clinically Significant Risk Factors        # Hypokalemia: Lowest K = 3.1 mmol/L in last 2 days, will replace as needed     # Hypomagnesemia: Lowest Mg = 1.6 mg/dL in last 2 days, will replace as needed             # Cachexia: Estimated body mass index is 14.81 kg/m  as calculated from the following:    Height as of this encounter: 1.524 m (5').    Weight as of this encounter: 34.4 kg (75 lb 13.4 oz)., PRESENT ON  ADMISSION           COVID-19 testing.  COVID-19 PCR Results    COVID-19 PCR Results 9/7/22 9/11/22   SARS CoV2 PCR Positive (A) Negative   (A) Abnormal value       Comments are available for some flowsheets but are not being displayed.         COVID-19 Antibody Results, Testing for Immunity    COVID-19 Antibody Results, Testing for Immunity   No data to display.                   Pending Results:        Unresulted Labs Ordered in the Past 30 Days of this Admission     No orders found from 1/23/2023 to 2/23/2023.                Discharge Disposition:      Discharged to home.        Discharge Time:      Less than 30 minutes.          Condition and Physical on Discharge:    See progress note on the same date as this discharge summary.          Key Imaging Studies, Lab Findings and Procedures/Surgeries:        Results for orders placed or performed during the hospital encounter of 02/22/23   CT Abdomen Pelvis w Contrast    Narrative    CT ABDOMEN PELVIS W CONTRAST 2/22/2023 10:05 AM    CLINICAL HISTORY: n/v eval sbo, prior cancer and radiation with sbo in  past due to radiation and surgery    TECHNIQUE: CT scan of the abdomen and pelvis was performed following  injection of IV contrast. Multiplanar reformats were obtained. Dose  reduction techniques were used.  CONTRAST: 45mL Isovue-370    COMPARISON: 9/14/2022, 9/13/2022 and 9/7/2022    FINDINGS:   LOWER CHEST: Stable partly visualized at complete atelectasis of the  right lower lobe. The left lung base is clear.    HEPATOBILIARY: Stable subcentimeter hepatic cysts. Small amount of  sludge in the gallbladder lumen. No biliary ductal dilatation.    PANCREAS: Stable mild pancreatic parenchymal atrophy in the body and  tail. No pancreatic duct dilatation, surrounding inflammatory changes  or discrete mass.    SPLEEN: Few small calcified granuloma in the spleen.    ADRENAL GLANDS: Normal.    KIDNEYS/BLADDER: New moderate right hydronephrosis. The right ureter  is not  dilated. No obstructing calculus or obvious mass at the  ureteral pelvic junction. Stable small nonobstructing left renal  calculus. No left-sided hydronephrosis. Bilateral simple renal cysts  requiring no specific follow-up.    BOWEL: Multiple markedly dilated loops of small bowel throughout the  abdomen and pelvis with fecal contents in the loops of dilated ileum  in the pelvis. Bowel loops are maximally dilated up to 3.7 cm. Small  bowel resection and anastomosis in the pelvis. If definitive  transition point is not visualized, and may be in the terminal ileum  in the right lower quadrant, deep pelvis (series 4, image 122). The  colon is decompressed. No significant bowel wall thickening,  pneumatosis or extraluminal air. No significant free fluid or fluid  collections.    PELVIC ORGANS: Hysterectomy.    ADDITIONAL FINDINGS: No abdominal aortic aneurysm. Major  intra-abdominal vasculature is patent. No lymphadenopathy in the  abdomen and pelvis.    MUSCULOSKELETAL: Thoracolumbar scoliosis. No suspicious lesions in the  bones. Stable chronic wedge compression deformity of L5.      Impression    IMPRESSION:   1.  Mechanical small bowel obstruction with likely transition point in  the right lower quadrant loops of distal ileum in the pelvis. No bowel  wall thickening or pneumatosis.  2.  New moderate right hydronephrosis without dilatation of the right  ureter. No obstructing mass or stone evident at the ureteropelvic  junction. 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 Follow-up CT  urogram on a nonemergent basis should be considered.  3.  Stable partly visualized collapsed right lower lobe.    JOSH BYRD MD         SYSTEM ID:  [id number]   NM Renogram with Lasix    Narrative    EXAM: NM RENOGRAM WITH LASIX  LOCATION: [facility]  DATE/TIME: 2/23/2023 10:36 AM    INDICATION: Right hydronephrosis. Obstruction evaluation.  COMPARISON: CT the abdomen pelvis dated 02/22/2023.  TECHNIQUE:  9.5 mCi of technetium-99m MAG3, IV followed by abdominal imaging. 40 mg of Lasix, IV.    FINDINGS: Differential renal function of 57% on the left and 43% on the right. Prompt clearance of radiotracer from the left renal collecting system (Lasix T1/2: 15.5 minutes) without evidence of obstruction, however there is progressive accumulation of   radiotracer within the moderately dilated right renal collecting system right renal collecting system without significant urinary excretion suspicious for a right UPJ obstruction.      Impression    IMPRESSION:     Findings suspicious for a right UPJ obstruction.

## 2023-02-26 NOTE — PROGRESS NOTES
Patient acknowledged understanding of discharged instructions. All belongings were in hand at times of discharge. She had opportunities to ask questions.

## 2023-02-26 NOTE — PROGRESS NOTES
[facility]    Internal Medicine Hospitalist Progress Note  02/26/2023  I evaluated patient on the above date.    Jesse Mejia Jr., MD  [phone] (p)  Text Page  Vocera        Assessment & Plan New actions/orders today (02/26/2023) are underlined. All lab results in the assessment and plan were reviewed.    Ms. Georgette Alatorre is a very pleasant 82-year-old woman with PMH significant for PSVT; h/o L5 burst fracture (9/2022); and h/o endometrial cancer with h/o radiation colitis with chronic diarrhea; who presented 2/22/2023 with abdominal pain and found with signs of SBO.    CT abdomen 2/22 showed mechanical small bowel obstruction with likely transition point in the right lower quadrant loops of distal ileum in the pelvis without bowel wall thickening or pneumatosis; new moderate right hydronephrosis without dilatation of the right ureter without obstructing mass or stone evident at the ureteropelvic junction, findings likely secondary to UPJ obstruction; stable partly visualized collapsed right lower lobe.       Small bowel obstruction, resolving.  Hx exp lap, extensive CRAIG, revision of small bowel anastomosis 2007.  * Presented 2/22 with 2 day history of N/V/periumbilical abd pain, last emesis day prior to admission; last BM 2d PTA. Work-up in the ED revealed mechanical SBO on CT. NG tube not placed on admit as pt declined. Surgery consulted on admit.   * Improved with +BM's and cleared to advance diet as tolerated by Surgery 2/23.  * Advanced to full liquids 2/24.  * Advanced to low fiber diet 2/25.  - Continue low fiber diet.  - Continue PRN ondansetron, PRN prochlorperazine.  - Continue to ambulate frequently as able.    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  * Chronically on lipase-protease-amylase and lactobacillus.  * SBO with improvement as above.  - Continue lipase-protease-amylase with meals.  - Continue lactobacillus.    Acute kidney injury, suspect prerenal,  resolved.  * Cr 1.46 on admit.   * Cr normalized with IVF's 2/23.    Right sided hydronephrosis related to right UPJ obstruction, unclear etiology.  Bilateral renal cysts and small left renal stone (non-obstructing) on CT.  * Followed by Urology, Dr. Lee for nephrolithiasis.  * Initial presentation and imaging as above. CT also showed bilateral renal cysts, small non-obstructing left renal stone. Urology consulted on admit.  * NM renogram 2/23 showed findings suspicious for a right UPJ obstruction.   - Per Urology, no urologic intervention indicated at this time; would not recommend surgical correction of UPJO at this time given absence of pain or JELENA.   - Follow-up with Dr Lee in 4 months with for repeat renogram.     Underweight.  Cachexia.  Body mass index is 14.81 kg/m .   - Continue diet as tolerated.     Paroxysmal SVT.    - Continue diltiazem.    Bradycardia, suspect sinus, asymptomatic.  * On 2/25, pt renuka to 50's, asymptomatic.  - Continue to monitor clinically.    Anemia, suspect chronic component.  * Hgb 11.5 on admit. No overt clinical signs of major bleeding.  Recent Labs   Lab 02/23/23  0719 02/22/23  0715   HGB 9.8* 11.5*   - Continue to monitor CBC.  - Consider prbc transfusion if hgb </= 7.0 or if significant bleeding with hemodynamic instability or if symptomatic.    Hx L5 burst fracture (9/2022).  Osteoporosis.  Scoliosis.   * 9/2022 admission to [facility] for worsening lower back pain and right buttock pain and noted to have an acute to subacute L5 burst fracture. Ortho spine consulted and recommended no surgical intervention; it was felt that her fracture was stable with expected healing time of 3-4 months. Ultimately discharged to TCU with pain control and follow up.   - Continue to monitor clinically.    Hypokalemia, hypomagnesemia, hypophosphatemia.  * Potassium, magnesium and phosphorus low at times this hospitalization. Treated with replacement.  Recent Labs   Lab 02/26/23  2532  02/25/23  2224 02/25/23  1536 02/25/23  0204 02/24/23 2020 02/24/23  0743 02/23/23  1722 02/23/23  0719   POTASSIUM 3.8 4.0 3.4 3.4  3.4 3.1* 3.1* 3.5 4.0   MAG 1.6*  --   --  1.8 1.9 1.5*  --  1.7   PHOS 2.8  --  1.8* 1.3*  --  2.0* 1.9* 2.4*   - Continue PRN K, Mg and phos replacement.    H/o endometrial cancer with h/o radiation colitis.  - Noted.    Clinically Significant Risk Factors        # Hypokalemia: Lowest K = 3.1 mmol/L in last 2 days, will replace as needed     # Hypomagnesemia: Lowest Mg = 1.6 mg/dL in last 2 days, will replace as needed             # Cachexia: Estimated body mass index is 14.81 kg/m  as calculated from the following:    Height as of this encounter: 1.524 m (5').    Weight as of this encounter: 34.4 kg (75 lb 13.4 oz)., PRESENT ON ADMISSION           COVID-19 testing.  COVID-19 PCR Results    COVID-19 PCR Results 9/7/22 9/11/22   SARS CoV2 PCR Positive (A) Negative   (A) Abnormal value       Comments are available for some flowsheets but are not being displayed.         COVID-19 Antibody Results, Testing for Immunity    COVID-19 Antibody Results, Testing for Immunity   No data to display.             Diet: Low Fiber Diet  Diet    Prophylaxis: PCD's, ambulation.   Carrera Catheter: Not present  Lines: None     Code Status: Full Code    Disposition Plan   Expected discharge: Today 2/26 recommended to prior living arrangement.  Entered: Jesse Mejia MD 02/26/2023, 1:34 PM         Interval History   Doing well  Tolerating solid diet.  Having BM's, less loose now.    -Data reviewed today: I reviewed all new labs and imaging over the last 24 hours. I personally reviewed no images or EKG's today.    Physical Exam    , Blood pressure 128/65, pulse 72, temperature 98.1  F (36.7  C), temperature source Oral, resp. rate 17, height 1.524 m (5'), weight 34.4 kg (75 lb 13.4 oz), SpO2 96 %, not currently breastfeeding. O2 Device: None (Room air)    Vitals:    02/22/23 0655 02/24/23 0612  02/26/23 0624   Weight: 40.8 kg (90 lb) 35.8 kg (78 lb 14.8 oz) 34.4 kg (75 lb 13.4 oz)     Vital Signs with Ranges  Temp:  [98.1  F (36.7  C)-98.4  F (36.9  C)] 98.1  F (36.7  C)  Pulse:  [54-72] 72  Resp:  [16-17] 17  BP: (113-128)/(58-65) 128/65  SpO2:  [95 %-96 %] 96 %  Patient Vitals for the past 24 hrs:   BP Temp Temp src Pulse Resp SpO2 Weight   02/26/23 0753 128/65 98.1  F (36.7  C) Oral 72 17 96 % --   02/26/23 0624 -- -- -- -- -- -- 34.4 kg (75 lb 13.4 oz)   02/25/23 2347 113/59 98.4  F (36.9  C) Oral 58 16 95 % --   02/25/23 1618 118/58 98.1  F (36.7  C) Oral 54 16 96 % --     I/O's Last 24 hours  I/O last 3 completed shifts:  In: 125 [P.O.:125]  Out: -     Constitutional: Awake, alert, pleasant, conversant.  Respiratory: Diminished in bases. No crackles or wheezes.  Cardiovascular: RRR, no m/r/g.  GI: Mild distension, some mild tenderness right side, no r/g, +BS.  Skin/Integumen:   Other:        Data    Labs reviewed.  Recent Labs   Lab 02/26/23  0449 02/25/23  2224 02/25/23  1536 02/25/23  0204 02/25/23  0020 02/23/23  1722 02/23/23  0719 02/22/23  0715   WBC  --   --   --   --   --   --  4.7 6.1   HGB  --   --   --   --   --   --  9.8* 11.5*   MCV  --   --   --   --   --   --  98 95   PLT  --   --   --   --   --   --  189 244   NA  --   --   --  135*  --   --  141 137  139   POTASSIUM 3.8 4.0 3.4 3.4  3.4  --    < > 4.0 4.1  4.2   CHLORIDE  --   --   --  96*  --   --  107 93*  95*   CO2  --   --   --  29  --   --  19* 24  25   BUN  --   --   --  10.0  --   --  24.5* 39.0*  37.5*   CR  --   --   --  0.59  --   --  0.82 1.46*  1.41*   ANIONGAP  --   --   --  10  --   --  15 20*  19*   ILYA  --   --   --  8.5*  --   --  8.3* 9.8  9.9   GLC  --   --   --  112* 138*  --  54* 105*  108*   ALBUMIN  --   --   --   --   --   --   --  4.1   PROTTOTAL  --   --   --   --   --   --   --  7.6   BILITOTAL  --   --   --   --   --   --   --  0.6   ALKPHOS  --   --   --   --   --   --   --  80   ALT  --   --    --   --   --   --   --  10   AST  --   --   --   --   --   --   --  28    < > = values in this interval not displayed.     Recent Labs   Lab Test 02/25/23  0204 02/25/23  0020 02/23/23  0719 02/22/23  0715 09/13/22  0621   * 138* 54* 105*  108* 97     Recent Labs   Lab 02/23/23  0719 02/22/23  0715   WBC 4.7 6.1         No results found for this or any previous visit (from the past 24 hour(s)).    Medications   All medications were reviewed.      diltiazem ER COATED BEADS  180 mg Oral Daily     lactobacillus rhamnosus (GG)  1 capsule Oral Daily     lipase-protease-amylase  1 capsule Oral TID w/meals     sodium chloride (PF)  3 mL Intracatheter Q8H     acetaminophen **OR** acetaminophen, glucose **OR** dextrose **OR** glucagon, HYDROmorphone, lidocaine 4%, lidocaine (buffered or not buffered), metoprolol, naloxone **OR** naloxone **OR** naloxone **OR** naloxone, ondansetron, ondansetron, prochlorperazine **OR** prochlorperazine **OR** prochlorperazine, sodium chloride (PF)

## 2023-02-26 NOTE — PLAN OF CARE
[id number]    Orientation: A/Ox4   Vitals: VSS on RA   Mobility: SBA w/walker  Diet: regular diet; appetite poor  GI/: continent BB; voiding adequately; can be slightly incontinent when diarrhea episodes occur  Pain: 1x IV dilaudid given for Abd pain  Drains/Devices: PIV x1 saline locked  Skin: intact

## 2023-03-16 NOTE — TELEPHONE ENCOUNTER
3/16/23 talked with Kristy about scheduling Prolia.  She would like to check on cost.  I gave her the Cost of Care (infusions) phone # [phone].  Kristy will call us back if she decides to scheduled. [email or web address]

## 2023-03-22 NOTE — TELEPHONE ENCOUNTER
I left a fairly vague message on VM (female voice but no identifying name).  Advised calling to speak with triage nurse regarding her recent phone call and medication concerns.    I would advise office visit today if she has any symptoms of bladder infection.  Inga Gaitan RN     Preston OLIVO

## 2023-03-27 ENCOUNTER — HOSPITAL ENCOUNTER (EMERGENCY)
Facility: CLINIC | Age: 83
Discharge: HOME | End: 2023-03-27
Attending: EMERGENCY MEDICINE | Admitting: EMERGENCY MEDICINE
Payer: COMMERCIAL

## 2023-03-27 VITALS
HEIGHT: 60 IN | RESPIRATION RATE: 16 BRPM | BODY MASS INDEX: 15.71 KG/M2 | HEART RATE: 84 BPM | TEMPERATURE: 97.8 F | OXYGEN SATURATION: 94 % | SYSTOLIC BLOOD PRESSURE: 134 MMHG | DIASTOLIC BLOOD PRESSURE: 67 MMHG | WEIGHT: 80 LBS

## 2023-03-27 DIAGNOSIS — M54.16 RIGHT LUMBAR RADICULOPATHY: ICD-10-CM

## 2023-03-27 DIAGNOSIS — M54.50 LOW BACK PAIN RADIATING TO RIGHT LOWER EXTREMITY: Primary | ICD-10-CM

## 2023-03-27 DIAGNOSIS — M79.604 LOW BACK PAIN RADIATING TO RIGHT LOWER EXTREMITY: Primary | ICD-10-CM

## 2023-03-27 PROCEDURE — 250N000013 HC RX MED GY IP 250 OP 250 PS 637: Performed by: EMERGENCY MEDICINE

## 2023-03-27 PROCEDURE — 99283 EMERGENCY DEPT VISIT LOW MDM: CPT

## 2023-03-27 RX ORDER — METHYLPREDNISOLONE 4 MG
TABLET, DOSE PACK ORAL
Qty: 21 TABLET | Refills: 0 | Status: SHIPPED | OUTPATIENT
Start: 2023-03-27

## 2023-03-27 RX ORDER — OXYCODONE HYDROCHLORIDE 5 MG/1
5 TABLET ORAL ONCE
Status: COMPLETED | OUTPATIENT
Start: 2023-03-27 | End: 2023-03-27

## 2023-03-27 RX ORDER — ACETAMINOPHEN 500 MG
1000 TABLET ORAL ONCE
Status: COMPLETED | OUTPATIENT
Start: 2023-03-27 | End: 2023-03-27

## 2023-03-27 RX ADMIN — OXYCODONE HYDROCHLORIDE 5 MG: 5 TABLET ORAL at 17:36

## 2023-03-27 RX ADMIN — ACETAMINOPHEN 1000 MG: 500 TABLET ORAL at 17:36

## 2023-03-27 ASSESSMENT — ACTIVITIES OF DAILY LIVING (ADL): ADLS_ACUITY_SCORE: 33

## 2023-03-27 ASSESSMENT — ENCOUNTER SYMPTOMS
GASTROINTESTINAL NEGATIVE: 1
FEVER: 0
BACK PAIN: 1

## 2023-03-27 NOTE — ED PROVIDER NOTES
History     Chief Complaint:  Leg Pain    The history is provided by the patient.      Georgette Alatorre is a 82 year old female with a history of chronic back pain, SVT, SBO, osteoporosis, and scoliosis who presents with leg pain. The patient complains of one week of right buttock pain which she describes as nerve pain shoots down her right leg. She also notes a few days of bilateral ankle and calf swelling last week, which seems to have largely resolved on its own. Patient recounts fracturing L5 back in 2022, and attends physical therapy. She denies any fevers, bean bowel or urinary incontinence. Patient denies any recent falls. She has been using Tylenol and leftover oxycodone for her pain.     Independent Historian:   None - Patient Only    Review of External Notes: Reviewed prior discharge summary from September 8, 2022 for acute/subacute L5 burst fracture.  Patient followed by Dr. Jose dan for ongoing cares regarding this fracture.  Patient noted that she had a repeat MRI in the last 1 month    ROS:  Review of Systems   Constitutional: Negative for fever.   Cardiovascular: Positive for leg swelling.   Gastrointestinal: Negative.    Genitourinary: Negative.    Musculoskeletal: Positive for back pain.   All other systems reviewed and are negative.      Allergies:  No Known Allergies     Medications:    Creon  Diltiazem  Culturell  Potassium chloride   Zinc gluconate     Past Medical History:    Arthritis  Chronic back pain  Chronic diarrhea  Endometrial cancer  GERD  SVT  SBO  Scoliosis  Osteoporosis  Kidney stones  Irregular heart beat  Hydronephrosis    Past Surgical History:    Appendectomy  Cataract IOL, bilateral  Colonoscopy  Cystoscopy  Upper endoscopy, multiple   Surgery on retina  Laparoscopic exploratory for SBOs  Hysterectomy  Laser holmium lithotripsy ureters, insert stent, combined    Family History:    family history is not on file.    Social History:   reports that she has quit  smoking. She has a 20.00 pack-year smoking history. She has never used smokeless tobacco. She reports current alcohol use. She reports that she does not use drugs.  PCP: Brown, Merlin Emery   Presents to the ED alone.     Physical Exam     Patient Vitals for the past 24 hrs:   BP Temp Temp src Pulse Resp SpO2 Height Weight   03/27/23 1349 134/67 97.8  F (36.6  C) Oral 84 16 94 % 1.524 m (5') 36.3 kg (80 lb)        Physical Exam  General: The patient appears comfortable  Head:  The scalp, face, and head appear normal  Eyes:  The pupils are equal, round, and reactive to light    There is no nystagmus    Extraocular muscles are intact    Conjunctivae and sclerae are normal  ENT:    The nose is normal    Pinnae are normal    The oropharynx is normal    Uvula is in the midline  Neck:  Normal range of motion    There is no midline cervical spine pain/tenderness  CV:  Regular rate and underlying rhythm     Normal S1 and S2    No S3 or S4    No pathological murmur detected  Resp:  Lungs are clear    There is no tachypnea    Non-labored breathing    No rales    No wheezing   GI:  Abdomen is soft, there is no rigidity    No distension    No tympani    No rebound tenderness     Non-surgical without peritoneal features  MS:  Back:    The cervical and thoracic spine is non-tender in the midline    The lumbar spine is non-tender in the midline    There is right lateral lumbar paraspinous muscular pain to palpation    Legs:    There is normal motor strength of bilateral lower extremities    There is no sensory abnormality/paresthesia    There is no numbness    There is faint RLE radiculopathy to posterior thigh and knee and buttock.     There is normal capillary refill to the toes  Skin:  No rash or acute skin lesions noted.  No shingles noted.  Neuro: Speech is normal and fluent.  Normal gait.   Psych:  Awake. Alert.  Normal affect.  Appropriate interactions.    Emergency Department Course   Emergency Department Course &  Assessments:     Interventions:  Medications   acetaminophen (TYLENOL) tablet 1,000 mg (1,000 mg Oral $Given 3/27/23 1736)   oxyCODONE (ROXICODONE) tablet 5 mg (5 mg Oral $Given 3/27/23 1736)        Assessments:  1704 I obtained history and examined the patient as noted above. Explained findings. Prepared for discharge.        Social Determinants of Health affecting care:   None    Disposition:  The patient was discharged to home.     Impression & Plan      Medical Decision Making:  Georgette Alatorre is a 82 year old female who presents for evaluation of back pain and radicular symptoms. They have a history of back pain in the past.  Her pain has improved with interventions in the emergency department. She did not sustain any new trauma, therefore x-rays are not necessary due to the low likelihood of fracture or subluxation. Advanced imaging with CT/MRI is not indicated at this time, but may be indicated in the future if symptoms fail to resolve.  Nor is there any indication for consultation with neurosurgery or orthopedic spinal surgeon.  There is no clinical evidence of cauda equina syndrome, discitis, spinal/epidural space hematoma or epidural abscess or other emergently worrisome etiology.     The neurological exam is normal, with the exception of the dermatomal symptoms noted.  The patient was advised that radiculopathy often takes significant time to resolve, and that follow up with primary care, neurology and/or neurosurgery will be indicated if symptoms do not improve. She will be discharged with pain medications to use as directed as well as a medrol dose krista to see if this improves her sciatic like symptoms/lumbar radiculopathy to the RLE.  No heavy lifting, bending or twisting. Return if increasing pain, muscular weakness, or bowel or bladder dysfunction.     Diagnosis:    ICD-10-CM    1. Low back pain radiating to right lower extremity  M54.50     M79.604       2. Right lumbar radiculopathy  M54.16             Discharge Medications:  Discharge Medication List as of 3/27/2023  5:38 PM      START taking these medications    Details   methylPREDNISolone (MEDROL DOSEPAK) 4 MG tablet therapy pack Follow Package Directions, Disp-21 tablet, R-0, E-Prescribe           Scribe Disclosure:  I, Jorge L Fan, am serving as a scribe at 4:55 PM on 3/27/2023 to document services personally performed by Stevan Reyes MD based on my observations and the provider's statements to me.   3/27/2023   Stevan Reyes MD White, Scott, MD  03/27/23 1817

## 2023-03-27 NOTE — ED TRIAGE NOTES
"Right leg pain right buttocks pain - \"nerve pain\" - onset last week  Hx of lumbar issues  Denies any recent falls      Triage Assessment       Row Name 03/27/23 1346       Triage Assessment (Adult)    Airway WDL WDL       Respiratory WDL    Respiratory WDL WDL       Cardiac WDL    Cardiac WDL WDL       Peripheral/Neurovascular WDL    Peripheral Neurovascular WDL WDL       Cognitive/Neuro/Behavioral WDL    Cognitive/Neuro/Behavioral WDL WDL                  "

## 2023-04-28 ENCOUNTER — HOSPITAL ENCOUNTER (OUTPATIENT)
Dept: MAMMOGRAPHY | Facility: CLINIC | Age: 83
Discharge: HOME | End: 2023-04-28
Attending: INTERNAL MEDICINE
Payer: COMMERCIAL

## 2023-04-28 DIAGNOSIS — N64.4 BREAST PAIN: ICD-10-CM

## 2023-04-28 PROCEDURE — 77062 BREAST TOMOSYNTHESIS BI: CPT

## 2023-07-03 ENCOUNTER — TRANSCRIBE ORDERS (OUTPATIENT)
Dept: OTHER | Age: 83
End: 2023-07-03

## 2023-07-03 DIAGNOSIS — R26.89 ABNORMALITY OF GAIT DUE TO IMPAIRMENT OF BALANCE: Primary | ICD-10-CM

## 2023-12-12 ENCOUNTER — TRANSFERRED RECORDS (OUTPATIENT)
Dept: HEALTH INFORMATION MANAGEMENT | Facility: CLINIC | Age: 83
End: 2023-12-12
Payer: COMMERCIAL

## 2023-12-13 ENCOUNTER — HOSPITAL ENCOUNTER (OUTPATIENT)
Dept: BONE DENSITY | Facility: CLINIC | Age: 83
Discharge: HOME | End: 2023-12-13
Attending: INTERNAL MEDICINE
Payer: COMMERCIAL

## 2023-12-13 DIAGNOSIS — M81.0 OSTEOPOROSIS: ICD-10-CM

## 2023-12-13 PROCEDURE — 77081 DXA BONE DENSITY APPENDICULR: CPT | Mod: XU

## 2023-12-13 PROCEDURE — 77080 DXA BONE DENSITY AXIAL: CPT

## 2023-12-22 ENCOUNTER — TRANSFERRED RECORDS (OUTPATIENT)
Dept: HEALTH INFORMATION MANAGEMENT | Facility: CLINIC | Age: 83
End: 2023-12-22
Payer: COMMERCIAL

## 2024-01-03 ENCOUNTER — TRANSFERRED RECORDS (OUTPATIENT)
Dept: HEALTH INFORMATION MANAGEMENT | Facility: CLINIC | Age: 84
End: 2024-01-03
Payer: COMMERCIAL

## 2024-01-09 ENCOUNTER — TRANSFERRED RECORDS (OUTPATIENT)
Dept: HEALTH INFORMATION MANAGEMENT | Facility: CLINIC | Age: 84
End: 2024-01-09
Payer: COMMERCIAL

## 2024-02-26 ENCOUNTER — TELEPHONE (OUTPATIENT)
Dept: DERMATOLOGY | Facility: CLINIC | Age: 84
End: 2024-02-26
Payer: COMMERCIAL

## 2024-02-26 DIAGNOSIS — C44.221: Primary | ICD-10-CM

## 2024-02-26 NOTE — TELEPHONE ENCOUNTER
Spoke to patient and she would like to go to the [place] office.     I scheduled her for MOHS Surgery at the [facility] location as she prefers that location.     Helena Hart RN

## 2024-02-26 NOTE — TELEPHONE ENCOUNTER
----- Message from Jhonathan Diaz MD sent at 2/26/2024  7:49 AM CST -----  Regarding: RE: Ear canal SCC  Hi Dr. Berger    Yes I can have our team schedule her if you would like.    Let me know    Juan   ----- Message -----  From: Delvin Berger MD  Sent: 2/23/2024  12:45 PM CST  To: Ally Jones PA-C; #  Subject: Ear canal SCC                                    Juan,    Take at look at my misc note on this lady. She had an odd appearing nodule in the left ear canal that she would pick at. I biopsied it an it came back at least SCC in situ. Is this something that you could MOHS?    Let me know.     Delvin

## 2024-02-26 NOTE — TELEPHONE ENCOUNTER
Patient Contact    Attempt # 1    Was call answered?  No    Called patient. No answer. Left message to call back. Clinic number was provided.     Yesenia Henriquez LPN   [facility]   [phone]

## 2024-03-11 ENCOUNTER — TELEPHONE (OUTPATIENT)
Dept: DERMATOLOGY | Facility: CLINIC | Age: 84
End: 2024-03-11
Payer: COMMERCIAL

## 2024-03-11 NOTE — TELEPHONE ENCOUNTER
[facility] Call Center    Phone Message    May a detailed message be left on voicemail: yes     Reason for Call: Patient called to cancel 04/11 MOHS procedure and states she is having it taken care of in [place].     Action Taken: Other: OX DERM    Travel Screening: Not Applicable

## 2024-05-29 ENCOUNTER — APPOINTMENT (OUTPATIENT)
Dept: GENERAL RADIOLOGY | Facility: CLINIC | Age: 84
End: 2024-05-29
Attending: EMERGENCY MEDICINE
Payer: COMMERCIAL

## 2024-05-29 ENCOUNTER — APPOINTMENT (OUTPATIENT)
Dept: CT IMAGING | Facility: CLINIC | Age: 84
End: 2024-05-29
Attending: EMERGENCY MEDICINE
Payer: COMMERCIAL

## 2024-05-29 ENCOUNTER — HOSPITAL ENCOUNTER (EMERGENCY)
Facility: CLINIC | Age: 84
Discharge: HOME | End: 2024-05-29
Attending: EMERGENCY MEDICINE | Admitting: EMERGENCY MEDICINE
Payer: COMMERCIAL

## 2024-05-29 VITALS
RESPIRATION RATE: 18 BRPM | HEART RATE: 92 BPM | DIASTOLIC BLOOD PRESSURE: 49 MMHG | BODY MASS INDEX: 16.01 KG/M2 | SYSTOLIC BLOOD PRESSURE: 124 MMHG | TEMPERATURE: 97 F | OXYGEN SATURATION: 96 % | WEIGHT: 82 LBS

## 2024-05-29 DIAGNOSIS — S01.01XA LACERATION OF SCALP, INITIAL ENCOUNTER: ICD-10-CM

## 2024-05-29 DIAGNOSIS — Z78.9 ALCOHOL USE: ICD-10-CM
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
## 2024-05-29 LAB
ALBUMIN SERPL BCG-MCNC: 4 G/DL (ref 3.5–5.2)
ALP SERPL-CCNC: 62 U/L (ref 40–150)
ALT SERPL W P-5'-P-CCNC: 19 U/L (ref 0–50)
ANION GAP SERPL CALCULATED.3IONS-SCNC: 14 MMOL/L (ref 7–15)
AST SERPL W P-5'-P-CCNC: 33 U/L (ref 0–45)
ATRIAL RATE - MUSE: 78 BPM
BASOPHILS # BLD AUTO: 0 10E3/UL (ref 0–0.2)
BASOPHILS NFR BLD AUTO: 1 %
BILIRUB SERPL-MCNC: 0.4 MG/DL
BUN SERPL-MCNC: 24.5 MG/DL (ref 8–23)
CALCIUM SERPL-MCNC: 9.1 MG/DL (ref 8.8–10.2)
CHLORIDE SERPL-SCNC: 110 MMOL/L (ref 98–107)
CK SERPL-CCNC: 158 U/L (ref 26–192)
CREAT SERPL-MCNC: 0.96 MG/DL (ref 0.51–0.95)
DEPRECATED HCO3 PLAS-SCNC: 22 MMOL/L (ref 22–29)
DIASTOLIC BLOOD PRESSURE - MUSE: NORMAL MMHG
EGFRCR SERPLBLD CKD-EPI 2021: 58 ML/MIN/1.73M2
EOSINOPHIL # BLD AUTO: 0 10E3/UL (ref 0–0.7)
EOSINOPHIL NFR BLD AUTO: 0 %
ERYTHROCYTE [DISTWIDTH] IN BLOOD BY AUTOMATED COUNT: 13.8 % (ref 10–15)
ETHANOL SERPL-MCNC: <0.01 G/DL
GLUCOSE SERPL-MCNC: 137 MG/DL (ref 70–99)
HCT VFR BLD AUTO: 31 % (ref 35–47)
HGB BLD-MCNC: 10.3 G/DL (ref 11.7–15.7)
HOLD SPECIMEN: NORMAL
HOLD SPECIMEN: NORMAL
IMM GRANULOCYTES # BLD: 0 10E3/UL
IMM GRANULOCYTES NFR BLD: 0 %
INTERPRETATION ECG - MUSE: NORMAL
LYMPHOCYTES # BLD AUTO: 0.7 10E3/UL (ref 0.8–5.3)
LYMPHOCYTES NFR BLD AUTO: 11 %
MCH RBC QN AUTO: 32.8 PG (ref 26.5–33)
MCHC RBC AUTO-ENTMCNC: 33.2 G/DL (ref 31.5–36.5)
MCV RBC AUTO: 99 FL (ref 78–100)
MONOCYTES # BLD AUTO: 0.9 10E3/UL (ref 0–1.3)
MONOCYTES NFR BLD AUTO: 12 %
NEUTROPHILS # BLD AUTO: 5.2 10E3/UL (ref 1.6–8.3)
NEUTROPHILS NFR BLD AUTO: 76 %
NRBC # BLD AUTO: 0 10E3/UL
NRBC BLD AUTO-RTO: 0 /100
P AXIS - MUSE: 41 DEGREES
PLATELET # BLD AUTO: 205 10E3/UL (ref 150–450)
POTASSIUM SERPL-SCNC: 3.7 MMOL/L (ref 3.4–5.3)
PR INTERVAL - MUSE: 242 MS
PROT SERPL-MCNC: 7.1 G/DL (ref 6.4–8.3)
QRS DURATION - MUSE: 74 MS
QT - MUSE: 386 MS
QTC - MUSE: 440 MS
R AXIS - MUSE: 47 DEGREES
RBC # BLD AUTO: 3.14 10E6/UL (ref 3.8–5.2)
SODIUM SERPL-SCNC: 146 MMOL/L (ref 135–145)
SYSTOLIC BLOOD PRESSURE - MUSE: NORMAL MMHG
T AXIS - MUSE: 55 DEGREES
VENTRICULAR RATE- MUSE: 78 BPM
WBC # BLD AUTO: 6.9 10E3/UL (ref 4–11)

## 2024-05-29 PROCEDURE — 93005 ELECTROCARDIOGRAM TRACING: CPT | Mod: XU

## 2024-05-29 PROCEDURE — 12001 RPR S/N/AX/GEN/TRNK 2.5CM/<: CPT

## 2024-05-29 PROCEDURE — 85025 COMPLETE CBC W/AUTO DIFF WBC: CPT | Performed by: EMERGENCY MEDICINE

## 2024-05-29 PROCEDURE — 82077 ASSAY SPEC XCP UR&BREATH IA: CPT | Performed by: EMERGENCY MEDICINE

## 2024-05-29 PROCEDURE — 72220 X-RAY EXAM SACRUM TAILBONE: CPT

## 2024-05-29 PROCEDURE — 36415 COLL VENOUS BLD VENIPUNCTURE: CPT | Performed by: EMERGENCY MEDICINE

## 2024-05-29 PROCEDURE — 70450 CT HEAD/BRAIN W/O DYE: CPT

## 2024-05-29 PROCEDURE — 99285 EMERGENCY DEPT VISIT HI MDM: CPT | Mod: 25

## 2024-05-29 PROCEDURE — 82550 ASSAY OF CK (CPK): CPT | Performed by: EMERGENCY MEDICINE

## 2024-05-29 PROCEDURE — 82040 ASSAY OF SERUM ALBUMIN: CPT | Performed by: EMERGENCY MEDICINE

## 2024-05-29 ASSESSMENT — COLUMBIA-SUICIDE SEVERITY RATING SCALE - C-SSRS
2. HAVE YOU ACTUALLY HAD ANY THOUGHTS OF KILLING YOURSELF IN THE PAST MONTH?: NO
1. IN THE PAST MONTH, HAVE YOU WISHED YOU WERE DEAD OR WISHED YOU COULD GO TO SLEEP AND NOT WAKE UP?: NO
6. HAVE YOU EVER DONE ANYTHING, STARTED TO DO ANYTHING, OR PREPARED TO DO ANYTHING TO END YOUR LIFE?: NO

## 2024-05-29 ASSESSMENT — ACTIVITIES OF DAILY LIVING (ADL)
ADLS_ACUITY_SCORE: 38
ADLS_ACUITY_SCORE: 38

## 2024-05-29 NOTE — DISCHARGE INSTRUCTIONS
Your kidney function was mildly elevated here, you should drink plenty of fluids to stay well-hydrated.  You should have this rechecked with your primary doctor sometime next week.    I recommend transitioning from sitting to standing to walking slowly.  You should return the emergency room if you have chest pain, chest pressure or passout.  I would cut back on your alcohol use especially over the next few days while you recover.  It is okay to shower, just let the water run over your cut do not put directly on the showerhead and do not scrub your cut.  You should watch for signs of infection which include increasing pain, redness, swelling or pus coming from the wound if these develop, you should return here to the emergency department.  Otherwise, you should follow-up with your primary doctor for staple removal in 7 days.

## 2024-05-29 NOTE — ED PROVIDER NOTES
Emergency Department Note      History of Present Illness     Chief Complaint  Fall    ARIN Alatorre is a 83 year old female presenting to the ED for evaluation after a fall. The patient reports that after drinking a [place] at her happy hour last night, and she fell and hit the right side of her head. She believes she was able to get up right away and denies loss of consciousness, but she does not remember why she fell. The patient remembers cleaning the blood of the floor, and washing her hair prior to going to bed. Since then, she has been having pain in the tailbone and right leg, but not enough to prevent her from ambulating. She denies any chest pain or shortness of breath prior to falling. The patient is not anticoagulated.    Independent Historian  None    Review of External Notes  None    Past Medical History   Medical History and Problem List  Arthritis  Chronic back pain  Chronic diarrhea  Endometrial cancer  GERD  Hydronephrosis  Irregular heart beat  Kidney stone  Osteoporosis  PSVT  Small bowel obstruction  SVT  Radiation colitis  Vitamin B12 deficiency  Scoliosis  Renal insufficiency    Medications  Tylenol  Creon  Vitamin D3  Vitamin B12  Dilt-XR  Culturell  Medrol dosepak  Klor-con  Zinc gluconate    Surgical History   Appendectomy  Cataract  Colonoscopy  Cystoscopy x2  Endoscopy x2  Retinal eye surgery  Exploratory laparotomy x2  Hysterectomy  Laser holmium lithotripsy    Physical Exam   Patient Vitals for the past 24 hrs:   BP Temp Pulse Resp SpO2 Weight   05/29/24 1250 124/49 97  F (36.1  C) 92 18 96 % 37.2 kg (82 lb)     Physical Exam  Constitutional: Alert, attentive, GCS 15   HENT: scabbed over right lateral head laceration   Eyes: EOM are normal, anicteric, conjugate gaze  CV: distal extremities warm, well perfused  Chest: Non-labored breathing on RA  GI:  non tender. No distension. No guarding or rebound.    MSK: Neck no midline tenderness  Neurological: Alert, attentive,  moving all extremities equally.   Skin: Skin is warm and dry.    Diagnostics   Lab Results   Labs Ordered and Resulted from Time of ED Arrival to Time of ED Departure   COMPREHENSIVE METABOLIC PANEL - Abnormal       Result Value    Sodium 146 (*)     Potassium 3.7      Carbon Dioxide (CO2) 22      Anion Gap 14      Urea Nitrogen 24.5 (*)     Creatinine 0.96 (*)     GFR Estimate 58 (*)     Calcium 9.1      Chloride 110 (*)     Glucose 137 (*)     Alkaline Phosphatase 62      AST 33      ALT 19      Protein Total 7.1      Albumin 4.0      Bilirubin Total 0.4     CBC WITH PLATELETS AND DIFFERENTIAL - Abnormal    WBC Count 6.9      RBC Count 3.14 (*)     Hemoglobin 10.3 (*)     Hematocrit 31.0 (*)     MCV 99      MCH 32.8      MCHC 33.2      RDW 13.8      Platelet Count 205      % Neutrophils 76      % Lymphocytes 11      % Monocytes 12      % Eosinophils 0      % Basophils 1      % Immature Granulocytes 0      NRBCs per 100 WBC 0      Absolute Neutrophils 5.2      Absolute Lymphocytes 0.7 (*)     Absolute Monocytes 0.9      Absolute Eosinophils 0.0      Absolute Basophils 0.0      Absolute Immature Granulocytes 0.0      Absolute NRBCs 0.0     CK TOTAL - Normal         ETHYL ALCOHOL LEVEL - Normal    Alcohol ethyl <0.01       Imaging  XR Sacrum and Coccyx 2 Views   Final Result   IMPRESSION: Alignment of the sacral and coccygeal segments of the   spine is unchanged from previous and within normal limits. No recent   fractures identified. Chronic compression fracture deformity of the L5   vertebral body again noted.      IAN BUENO MD            SYSTEM ID:  [id number]      Head CT w/o contrast    (Results Pending)   Report per radiology.        EKG   ECG taken at 1324, ECG read at 1332  Sinus rhythm with 1st degree AV block with premature atrial complexes with aberrant conduction   No significant change as compared to prior, dated 10/14/10.  Rate 78 bpm. WV interval 242 ms. QRS duration 74 ms. QT/QTc 386/440 ms.  P-R-T axes 41 47 55.    Independent Interpretation  I personally reviewed her head CT, see no evidence of intracranial hemorrhage.    ED Course    Medications Administered  Medications - No data to display    Procedures  [facility]    -Laceration Repair    Date/Time: 5/29/2024 3:10 PM    Performed by: Juliocesar Hernandez MD  Authorized by: Juliocesar Hernandez MD    Risks, benefits and alternatives discussed.      ANESTHESIA (see MAR for exact dosages):     Anesthesia method:  Local infiltration    Local anesthetic:  Bupivacaine 0.5% w/o epi  LACERATION DETAILS     Location:  Scalp    Scalp location:  R parietal    Length (cm):  1    Depth (mm):  5    REPAIR TYPE:     Repair type:  Simple    EXPLORATION:     Contaminated: no      TREATMENT:     Irrigation solution:  Sterile water    Irrigation method:  Pressure wash    SKIN REPAIR     Repair method:  Staples    Number of staples:  3    APPROXIMATION     Approximation:  Close    POST-PROCEDURE DETAILS     Dressing:  Open (no dressing)         Discussion of Management  None    Social Determinants of Health adding to complexity of care  None    ED Course  ED Course as of 05/29/24 1519   Wed May 29, 2024   1314 I obtained history and examined the patient as noted above.       Medical Decision Making / Diagnosis     MDM  Georgette Alatorre is a 83 year old female past medical history seen for SVT, daily EtOH use presenting after what sounds like a mechanical fall the evening prior found to have a small laceration to the right side of her head.  She has no neck pain, imaging deferred.  CT head is negative.  She is having some mild tailbone pain but is able to ambulate, x-ray here shows no clear fracture.  Screening labs show mildly elevated creatinine above baseline at 0.96, though not checked for the last year, sodium is 146.  EKG is without evidence of ischemia.  I have lower suspicion for syncope based on history and she reports falling  after having her usual evening cocktail though does seem to be minimizing her use to some extent.  Ethanol here Is negative.  I did review with her slight increased risk of infection risk with delayed closure she was amenable to the staple.  Her Tdap is up-to-date.  She was discharged home with a friend.    Disposition  The patient was discharged.     ICD-10 Codes:    ICD-10-CM    1. Laceration of scalp, initial encounter  S01.01XA       2. Alcohol use  Z78.9       3. Fall, initial encounter  W19.XXXA            Juliocesar Hernandez MD  [employer]  3:11 PM 05/29/24       Scribe Disclosure:  I, Peggy Melissa, am serving as a scribe at 1:22 PM on 5/29/2024 to document services personally performed by Juliocesar Hernandez MD based on my observations and the provider's statements to me.        Juliocesar Hernandez MD  05/29/24 4184

## 2024-05-29 NOTE — ED TRIAGE NOTES
Pt had a fall last night and had LOC, woke up on kitchen floor with blood to back of head.  No thinners.  States she did have a manhattan drink last night, which she says is not unusual.     Triage Assessment (Adult)       Row Name 05/29/24 1249          Triage Assessment    Airway WDL WDL        Respiratory WDL    Respiratory WDL WDL        Skin Circulation/Temperature WDL    Skin Circulation/Temperature WDL --  lac to back of head        Cardiac WDL    Cardiac WDL WDL        Peripheral/Neurovascular WDL    Peripheral Neurovascular WDL WDL        Cognitive/Neuro/Behavioral WDL    Cognitive/Neuro/Behavioral WDL WDL

## 2024-11-07 ENCOUNTER — APPOINTMENT (OUTPATIENT)
Dept: GENERAL RADIOLOGY | Facility: CLINIC | Age: 84
End: 2024-11-07
Attending: EMERGENCY MEDICINE
Payer: COMMERCIAL

## 2024-11-07 ENCOUNTER — APPOINTMENT (OUTPATIENT)
Dept: MRI IMAGING | Facility: CLINIC | Age: 84
End: 2024-11-07
Attending: EMERGENCY MEDICINE
Payer: COMMERCIAL

## 2024-11-07 ENCOUNTER — HOSPITAL ENCOUNTER (EMERGENCY)
Facility: CLINIC | Age: 84
Discharge: HOME | End: 2024-11-07
Attending: EMERGENCY MEDICINE | Admitting: EMERGENCY MEDICINE
Payer: COMMERCIAL

## 2024-11-07 VITALS
RESPIRATION RATE: 16 BRPM | HEART RATE: 66 BPM | BODY MASS INDEX: 14.35 KG/M2 | HEIGHT: 62 IN | TEMPERATURE: 98.2 F | OXYGEN SATURATION: 97 % | WEIGHT: 78 LBS | SYSTOLIC BLOOD PRESSURE: 119 MMHG | DIASTOLIC BLOOD PRESSURE: 69 MMHG

## 2024-11-07 DIAGNOSIS — M25.532 PAIN AND SWELLING OF LEFT WRIST: ICD-10-CM

## 2024-11-07 DIAGNOSIS — M25.432 PAIN AND SWELLING OF LEFT WRIST: ICD-10-CM

## 2024-11-07 LAB
ANION GAP SERPL CALCULATED.3IONS-SCNC: 12 MMOL/L (ref 7–15)
BASOPHILS # BLD AUTO: 0 10E3/UL (ref 0–0.2)
BASOPHILS NFR BLD AUTO: 1 %
BUN SERPL-MCNC: 12.2 MG/DL (ref 8–23)
CALCIUM SERPL-MCNC: 9.1 MG/DL (ref 8.8–10.4)
CHLORIDE SERPL-SCNC: 106 MMOL/L (ref 98–107)
CREAT SERPL-MCNC: 0.77 MG/DL (ref 0.51–0.95)
CRP SERPL-MCNC: 45.62 MG/L
EGFRCR SERPLBLD CKD-EPI 2021: 75 ML/MIN/1.73M2
EOSINOPHIL # BLD AUTO: 0 10E3/UL (ref 0–0.7)
EOSINOPHIL NFR BLD AUTO: 0 %
ERYTHROCYTE [DISTWIDTH] IN BLOOD BY AUTOMATED COUNT: 14.2 % (ref 10–15)
ERYTHROCYTE [SEDIMENTATION RATE] IN BLOOD BY WESTERGREN METHOD: 70 MM/HR (ref 0–30)
GLUCOSE SERPL-MCNC: 144 MG/DL (ref 70–99)
HCO3 SERPL-SCNC: 26 MMOL/L (ref 22–29)
HCT VFR BLD AUTO: 32.6 % (ref 35–47)
HGB BLD-MCNC: 10.8 G/DL (ref 11.7–15.7)
IMM GRANULOCYTES # BLD: 0 10E3/UL
IMM GRANULOCYTES NFR BLD: 0 %
LYMPHOCYTES # BLD AUTO: 0.7 10E3/UL (ref 0.8–5.3)
LYMPHOCYTES NFR BLD AUTO: 12 %
MCH RBC QN AUTO: 32.6 PG (ref 26.5–33)
MCHC RBC AUTO-ENTMCNC: 33.1 G/DL (ref 31.5–36.5)
MCV RBC AUTO: 99 FL (ref 78–100)
MONOCYTES # BLD AUTO: 0.7 10E3/UL (ref 0–1.3)
MONOCYTES NFR BLD AUTO: 11 %
NEUTROPHILS # BLD AUTO: 4.5 10E3/UL (ref 1.6–8.3)
NEUTROPHILS NFR BLD AUTO: 76 %
NRBC # BLD AUTO: 0 10E3/UL
NRBC BLD AUTO-RTO: 0 /100
PLATELET # BLD AUTO: 234 10E3/UL (ref 150–450)
POTASSIUM SERPL-SCNC: 3.4 MMOL/L (ref 3.4–5.3)
RBC # BLD AUTO: 3.31 10E6/UL (ref 3.8–5.2)
SODIUM SERPL-SCNC: 144 MMOL/L (ref 135–145)
URATE SERPL-MCNC: 3.6 MG/DL (ref 2.4–5.7)
WBC # BLD AUTO: 5.9 10E3/UL (ref 4–11)

## 2024-11-07 PROCEDURE — 80048 BASIC METABOLIC PNL TOTAL CA: CPT | Performed by: EMERGENCY MEDICINE

## 2024-11-07 PROCEDURE — 250N000012 HC RX MED GY IP 250 OP 636 PS 637: Performed by: EMERGENCY MEDICINE

## 2024-11-07 PROCEDURE — A9585 GADOBUTROL INJECTION: HCPCS | Performed by: EMERGENCY MEDICINE

## 2024-11-07 PROCEDURE — 36415 COLL VENOUS BLD VENIPUNCTURE: CPT | Performed by: EMERGENCY MEDICINE

## 2024-11-07 PROCEDURE — 99285 EMERGENCY DEPT VISIT HI MDM: CPT | Mod: 25

## 2024-11-07 PROCEDURE — 86140 C-REACTIVE PROTEIN: CPT | Performed by: EMERGENCY MEDICINE

## 2024-11-07 PROCEDURE — 250N000013 HC RX MED GY IP 250 OP 250 PS 637: Performed by: EMERGENCY MEDICINE

## 2024-11-07 PROCEDURE — 85652 RBC SED RATE AUTOMATED: CPT | Performed by: EMERGENCY MEDICINE

## 2024-11-07 PROCEDURE — 255N000002 HC RX 255 OP 636: Performed by: EMERGENCY MEDICINE

## 2024-11-07 PROCEDURE — 73223 MRI JOINT UPR EXTR W/O&W/DYE: CPT | Mod: LT

## 2024-11-07 PROCEDURE — 84550 ASSAY OF BLOOD/URIC ACID: CPT | Performed by: EMERGENCY MEDICINE

## 2024-11-07 PROCEDURE — 85004 AUTOMATED DIFF WBC COUNT: CPT | Performed by: EMERGENCY MEDICINE

## 2024-11-07 PROCEDURE — 73110 X-RAY EXAM OF WRIST: CPT | Mod: LT

## 2024-11-07 RX ORDER — ACETAMINOPHEN 500 MG
1000 TABLET ORAL ONCE
Status: COMPLETED | OUTPATIENT
Start: 2024-11-07 | End: 2024-11-07

## 2024-11-07 RX ORDER — PREDNISONE 20 MG/1
TABLET ORAL
Qty: 11 TABLET | Refills: 0 | Status: SHIPPED | OUTPATIENT
Start: 2024-11-07 | End: 2024-11-17

## 2024-11-07 RX ORDER — OXYCODONE HYDROCHLORIDE 5 MG/1
5 TABLET ORAL ONCE
Status: COMPLETED | OUTPATIENT
Start: 2024-11-07 | End: 2024-11-07

## 2024-11-07 RX ORDER — GADOBUTROL 604.72 MG/ML
4 INJECTION INTRAVENOUS ONCE
Status: COMPLETED | OUTPATIENT
Start: 2024-11-07 | End: 2024-11-07

## 2024-11-07 RX ORDER — PREDNISONE 20 MG/1
40 TABLET ORAL ONCE
Status: COMPLETED | OUTPATIENT
Start: 2024-11-07 | End: 2024-11-07

## 2024-11-07 RX ADMIN — GADOBUTROL 4 ML: 604.72 INJECTION INTRAVENOUS at 20:28

## 2024-11-07 RX ADMIN — PREDNISONE 40 MG: 20 TABLET ORAL at 21:55

## 2024-11-07 RX ADMIN — OXYCODONE HYDROCHLORIDE 5 MG: 5 TABLET ORAL at 13:00

## 2024-11-07 RX ADMIN — ACETAMINOPHEN 1000 MG: 500 TABLET ORAL at 13:00

## 2024-11-07 ASSESSMENT — ACTIVITIES OF DAILY LIVING (ADL)
ADLS_ACUITY_SCORE: 0

## 2024-11-07 ASSESSMENT — COLUMBIA-SUICIDE SEVERITY RATING SCALE - C-SSRS
1. IN THE PAST MONTH, HAVE YOU WISHED YOU WERE DEAD OR WISHED YOU COULD GO TO SLEEP AND NOT WAKE UP?: NO
6. HAVE YOU EVER DONE ANYTHING, STARTED TO DO ANYTHING, OR PREPARED TO DO ANYTHING TO END YOUR LIFE?: NO
2. HAVE YOU ACTUALLY HAD ANY THOUGHTS OF KILLING YOURSELF IN THE PAST MONTH?: NO

## 2024-11-07 NOTE — ED TRIAGE NOTES
Patient woke up on Tuesday and had left wrist pain. The pain has gotten worse and redness/bruising to left hand and wrist. Patient denies any falls, injury or trauma.

## 2024-11-07 NOTE — ED PROVIDER NOTES
Emergency Department Note      History of Present Illness     Chief Complaint   Hand Pain    HPI   Georgette Alatorre is a 84 year old female who presents with left wrist pain. Pt awoke at 0400 2 days ago with the pain. The wrist was bothering her slightly the night prior. She does recall trying to flip a mattress over the weekend but cannot recall an injury. Pt denies h/o gout or joint swelling issues. Pt took oxycodone for pain which she has for a previous back fracture and scoliosis. No known fever. 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       Past Medical History     Medical History and Problem List   Past Medical History:   Diagnosis Date    Arthritis     Chronic back pain     Chronic diarrhea     Endometrial ca (H)     Gastro-oesophageal reflux disease     History of blood transfusion     History of endometrial cancer     Hydronephrosis     Irregular heart beat     Kidney stone     Kidney stones     Osteoporosis     Osteoporosis     PSVT (paroxysmal supraventricular tachycardia) (H)     Scoliosis     Small bowel obstruction (H)     Small bowel obstruction (H)     SVT (supraventricular tachycardia) (H)        Medications   acetaminophen (TYLENOL) 325 MG tablet  amylase-lipase-protease (CREON 12) 39422-62673-74777 units CPEP  Cholecalciferol (VITAMIN D3 PO)  cyanocobalamin (VITAMIN B-12) 1000 MCG tablet  diltiazem ER (DILT-XR) 180 MG 24 hr capsule  lactobacillus rhamnosus (GG) (CULTURELL) capsule  methylPREDNISolone (MEDROL DOSEPAK) 4 MG tablet therapy pack  potassium chloride ER (KLOR-CON) 8 MEQ CR tablet  zinc gluconate 50 MG tablet        Surgical History   Past Surgical History:   Procedure Laterality Date    APPENDECTOMY      CATARACT IOL, RT/LT  2009    Cataract IOL Bilateral    COLONOSCOPY  03/30/2006    Normal; repeat in 5 years    CYSTOSCOPY  11/14/2007    Cystoscopy, bilateral retrograde pyelograms, dilatation of left ureteral stricture, left ureteroscopy and insertion of left double pigtail  "stent.     ENDOSCOPY  10/22/2008    Upper GI:  Normal    ENDOSCOPY  1/11/2007    Upper GI    EYE SURGERY  2010    retinal eye surgery    GENITOURINARY SURGERY      cystoscopy    GI SURGERY      exploratory lap x2 for bowel obstructions    HYSTERECTOMY, PAP NO LONGER INDICATED  2000    due to endometrial cancer    LAPAROTOMY EXPLORATORY  08/22/2007    1.  Exploratory laparotomy. 2.  Extensive lysis of intra-abdominal adhesions. Performed by Dr Rad Pierec    LAPAROTOMY EXPLORATORY  04/20/2005    1.  Exploratory laparotomy.  2.  Lysis of adhesions.  Segmental small bowel resection Performed by Dr Rad Pierce.    LASER HOLMIUM LITHOTRIPSY URETER(S), INSERT STENT, COMBINED Left 4/29/2021    Procedure: CYSTOSCOPY LEFT URETEROSCOPY;  Surgeon: Shabnam Lee MD;  Location: [facility] OR       Physical Exam     Patient Vitals for the past 24 hrs:   BP Temp Temp src Pulse Resp SpO2 Height Weight   11/07/24 1146 -- -- -- -- -- -- 1.575 m (5' 2\") 35.4 kg (78 lb)   11/07/24 1142 119/69 98.2  F (36.8  C) Oral 66 16 97 % -- --     Physical Exam  VS: Reviewed per above  HENT: Mucous membranes moist  EYES: sclera anicteric  CV: Rate as noted,  regular rhythm.   RESP: Effort normal. Breath sounds are normal bilaterally.  NEURO: Alert, moving all extremities  MSK: No deformity of the extremities. Left wrist redness and swelling. Intact left radial pulse at the wrist.  SKIN: Warm and dry      Diagnostics     Lab Results   Labs Ordered and Resulted from Time of ED Arrival to Time of ED Departure   BASIC METABOLIC PANEL - Abnormal       Result Value    Sodium 144      Potassium 3.4      Chloride 106      Carbon Dioxide (CO2) 26      Anion Gap 12      Urea Nitrogen 12.2      Creatinine 0.77      GFR Estimate 75      Calcium 9.1      Glucose 144 (*)    ERYTHROCYTE SEDIMENTATION RATE AUTO - Abnormal    Erythrocyte Sedimentation Rate 70 (*)    CRP INFLAMMATION - Abnormal    CRP Inflammation 45.62 (*)    CBC WITH PLATELETS AND " DIFFERENTIAL - Abnormal    WBC Count 5.9      RBC Count 3.31 (*)     Hemoglobin 10.8 (*)     Hematocrit 32.6 (*)     MCV 99      MCH 32.6      MCHC 33.1      RDW 14.2      Platelet Count 234      % Neutrophils 76      % Lymphocytes 12      % Monocytes 11      % Eosinophils 0      % Basophils 1      % Immature Granulocytes 0      NRBCs per 100 WBC 0      Absolute Neutrophils 4.5      Absolute Lymphocytes 0.7 (*)     Absolute Monocytes 0.7      Absolute Eosinophils 0.0      Absolute Basophils 0.0      Absolute Immature Granulocytes 0.0      Absolute NRBCs 0.0     URIC ACID - Normal    Uric Acid 3.6         Imaging   XR Wrist Left G/E 3 Views   Final Result   IMPRESSION: Anatomic alignment of left wrist. No acute displaced left   wrist fracture. 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 Mild to moderate left wrist   soft tissue swelling.          SHUN FONSECA MD            SYSTEM ID:  [id number]      MR Wrist Left w/o & w Contrast    (Results Pending)         ED Course      Medications Administered   Medications   gadobutrol (GADAVIST) injection 4 mL (has no administration in time range)   acetaminophen (TYLENOL) tablet 1,000 mg (1,000 mg Oral $Given 11/7/24 1300)   oxyCODONE (ROXICODONE) tablet 5 mg (5 mg Oral $Given 11/7/24 1300)       Procedures   Procedures     ED Course   ED Course as of 11/07/24 2022   Thu Nov 07, 2024 1924 I spoke with Dr Angelo [facility] hand surgery   1947 I left a message with ortho hand trauma referral line           Medical Decision Making / Diagnosis       GULSHAN Alatorre is a 84 year old female who presents to the ER for evaluation of a few days of left wrist pain and swelling.  Vital signs reassuring.  On exam there are no signs of neurovascular compromise.  No wounds to suggest obvious cellulitis or subsequent septic arthritis.  Bedside ultrasound reveals a low bit of fluid around the tendon structures but no pocket of fluid amenable  to arthrocentesis.  X-ray does not show acute bony pathology.  Labs reveal nonspecific inflammatory marker elevation.  MRI was pursued.  Due to prolonged wait time in the ER, patient did not wish to wait for the MRI results prior to ultimate discharge. She declined any splint or ace wrap or pain medication scripts. Thus I spoke with orthopedic hand surgery on call provider to facilitate follow-up.  They recommended placing a referral to the orthopedic hand answering service and giving the patient the orthopedic clinic phone number to facilitate prompt follow-up.  At signout to my colleague, patient was in MRI.  Ideally patient would wait for the results of MRI but if she desires to leave before MRI results, as previously expressed, follow-up plan was relayed to her son at the bedside.    Disposition   Care of the patient was transferred to my colleague Dr. Andino pending MRI.     Diagnosis     ICD-10-CM    1. Pain and swelling of left wrist  M25.532     M25.432            Discharge Medications   New Prescriptions    No medications on file        Tom Frazier MD  11/07/24 2023

## 2024-11-08 NOTE — DISCHARGE INSTRUCTIONS
Return for fevers, worsening pain/swelling, new concerns. Please follow up with ortho hand clinic (they may call tomorrow).     Start steroids for treatment of possible inflammatory arthritis (gout or pseudogout).

## 2024-11-09 NOTE — ED PROVIDER NOTES
Patient signed out to me by Dr. Lindsay pending MRI results.  In short, patient presented with left wrist pain, erythema, and swelling.  On my exam, wrist is erythematous and warm, but patient has very mild pain with range of motion.  She states that before my evaluation, she had more pain and more swelling.  Imaging results as below.    MR Wrist Left w/o & w Contrast   Final Result   IMPRESSION:   1.  No evidence for fracture.   2.  Tendinopathy within the abductor pollicis longus.   3.  Ganglion or synovial cyst along the volar radial corner of the distal radius.   4.  Fluid within the pisotriquetral recess.   5.  No significant wrist joint effusion but there is some synovial thickening and enhancement. This is unlikely to represent a septic arthropathy but this cannot be completely excluded. An inflammatory arthropathy could also have this appearance.   6.  Edema or cellulitis seen circumferentially about the wrist.      XR Wrist Left G/E 3 Views   Final Result   IMPRESSION: Anatomic alignment of left wrist. No acute displaced left   wrist fracture. 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 Mild to moderate left wrist   soft tissue swelling.          SHUN FONSECA MD            SYSTEM ID:  [id number]        I discussed case with on-call orthopedic surgery.  They state that they have low concern for infection based on MRI read.  They also report that chondrocalcinosis noted on x-ray is common with pseudogout which would certainly explain the findings on MRI.  He recommended initiating treatment for gout and close follow-up.  I discussed with patient and son at bedside and answered all their questions.  They are in agreement with this plan.  Prednisone initiated.  Patient discharged in stable condition.  Red flags that should merit ED return were discussed.             Johnnie Gaviria MD  11/09/24 0318

## 2025-05-13 ENCOUNTER — HOSPITAL ENCOUNTER (EMERGENCY)
Facility: CLINIC | Age: 85
Discharge: HOME | End: 2025-05-13
Attending: EMERGENCY MEDICINE | Admitting: EMERGENCY MEDICINE
Payer: COMMERCIAL

## 2025-05-13 ENCOUNTER — APPOINTMENT (OUTPATIENT)
Dept: CT IMAGING | Facility: CLINIC | Age: 85
End: 2025-05-13
Attending: EMERGENCY MEDICINE
Payer: COMMERCIAL

## 2025-05-13 ENCOUNTER — APPOINTMENT (OUTPATIENT)
Dept: GENERAL RADIOLOGY | Facility: CLINIC | Age: 85
End: 2025-05-13
Attending: EMERGENCY MEDICINE
Payer: COMMERCIAL

## 2025-05-13 VITALS
SYSTOLIC BLOOD PRESSURE: 160 MMHG | HEIGHT: 60 IN | TEMPERATURE: 98.9 F | BODY MASS INDEX: 15.31 KG/M2 | RESPIRATION RATE: 16 BRPM | OXYGEN SATURATION: 100 % | HEART RATE: 83 BPM | WEIGHT: 78 LBS | DIASTOLIC BLOOD PRESSURE: 83 MMHG
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
## 2025-05-13 DIAGNOSIS — T07.XXXA MULTIPLE CONTUSIONS: ICD-10-CM

## 2025-05-13 DIAGNOSIS — S09.90XA INJURY OF HEAD, INITIAL ENCOUNTER: Primary | ICD-10-CM

## 2025-05-13 DIAGNOSIS — M25.511 ACUTE PAIN OF RIGHT SHOULDER: ICD-10-CM

## 2025-05-13 DIAGNOSIS — S01.01XA SCALP LACERATION, INITIAL ENCOUNTER: ICD-10-CM

## 2025-05-13 LAB
ATRIAL RATE - MUSE: 93 BPM
DIASTOLIC BLOOD PRESSURE - MUSE: NORMAL MMHG
INTERPRETATION ECG - MUSE: NORMAL
P AXIS - MUSE: 79 DEGREES
PR INTERVAL - MUSE: 228 MS
QRS DURATION - MUSE: 72 MS
QT - MUSE: 370 MS
QTC - MUSE: 460 MS
R AXIS - MUSE: 85 DEGREES
SYSTOLIC BLOOD PRESSURE - MUSE: NORMAL MMHG
T AXIS - MUSE: 50 DEGREES
VENTRICULAR RATE- MUSE: 93 BPM

## 2025-05-13 PROCEDURE — 250N000009 HC RX 250: Performed by: EMERGENCY MEDICINE

## 2025-05-13 PROCEDURE — 99284 EMERGENCY DEPT VISIT MOD MDM: CPT | Mod: 25 | Performed by: EMERGENCY MEDICINE

## 2025-05-13 PROCEDURE — 73030 X-RAY EXAM OF SHOULDER: CPT | Mod: RT

## 2025-05-13 PROCEDURE — 12001 RPR S/N/AX/GEN/TRNK 2.5CM/<: CPT | Performed by: EMERGENCY MEDICINE

## 2025-05-13 PROCEDURE — 93005 ELECTROCARDIOGRAM TRACING: CPT | Performed by: EMERGENCY MEDICINE

## 2025-05-13 PROCEDURE — 70450 CT HEAD/BRAIN W/O DYE: CPT

## 2025-05-13 RX ADMIN — Medication: at 14:13

## 2025-05-13 ASSESSMENT — ACTIVITIES OF DAILY LIVING (ADL)
ADLS_ACUITY_SCORE: 55

## 2025-05-13 NOTE — ED TRIAGE NOTES
Patient at home last night making dinner and passed out hitting right side of head. Matted dried blood noted. Patient doesn't remember what happened. Has had this happen in the past.

## 2025-05-13 NOTE — ED PROVIDER NOTES
Emergency Department Note      History of Present Illness     Chief Complaint   Fall, head injury    HPI   Georgette Alatorre is a 84 year old female who presents with fall and associated head injury.  Patient reports that last night she was making dinner and fell in her kitchen.  Patient does not recall the fall but believes she did lose consciousness.  She was in her usual state of health both prior to and following the fall.  She is not anticoagulated.  In addition to scalp injury/hematoma and mild associated bleeding, patient also reports pain in her right shoulder as well as mild left knee pain.  She has been able to ambulate at baseline today with use of a cane and notes baseline gait instability.  Denies chest pain, shortness of breath, neck pain, back pain, abdominal pain, or other concern.    Independent Historian   None    Review of External Notes   N/A    Past Medical History     Medical History and Problem List   Past Medical History:   Diagnosis Date    Arthritis     Chronic back pain     Chronic diarrhea     Endometrial ca (H)     Gastro-oesophageal reflux disease     History of blood transfusion     History of endometrial cancer     Hydronephrosis     Irregular heart beat     Kidney stone     Kidney stones     Osteoporosis     Osteoporosis     PSVT (paroxysmal supraventricular tachycardia)     Scoliosis     Small bowel obstruction (H)     Small bowel obstruction (H)     SVT (supraventricular tachycardia)        Medications   acetaminophen (TYLENOL) 325 MG tablet  amylase-lipase-protease (CREON 12) 13844-81056-03839 units CPEP  Cholecalciferol (VITAMIN D3 PO)  cyanocobalamin (VITAMIN B-12) 1000 MCG tablet  diltiazem ER (DILT-XR) 180 MG 24 hr capsule  lactobacillus rhamnosus (GG) (CULTURELL) capsule  methylPREDNISolone (MEDROL DOSEPAK) 4 MG tablet therapy pack  potassium chloride ER (KLOR-CON) 8 MEQ CR tablet  zinc gluconate 50 MG tablet        Surgical History   Past Surgical History:   Procedure  Laterality Date    APPENDECTOMY      CATARACT IOL, RT/LT  2009    Cataract IOL Bilateral    COLONOSCOPY  03/30/2006    Normal; repeat in 5 years    CYSTOSCOPY  11/14/2007    Cystoscopy, bilateral retrograde pyelograms, dilatation of left ureteral stricture, left ureteroscopy and insertion of left double pigtail stent.     ENDOSCOPY  10/22/2008    Upper GI:  Normal    ENDOSCOPY  1/11/2007    Upper GI    EYE SURGERY  2010    retinal eye surgery    GENITOURINARY SURGERY      cystoscopy    GI SURGERY      exploratory lap x2 for bowel obstructions    HYSTERECTOMY, PAP NO LONGER INDICATED  2000    due to endometrial cancer    LAPAROTOMY EXPLORATORY  08/22/2007    1.  Exploratory laparotomy. 2.  Extensive lysis of intra-abdominal adhesions. Performed by Dr Rad Pierce    LAPAROTOMY EXPLORATORY  04/20/2005    1.  Exploratory laparotomy.  2.  Lysis of adhesions.  Segmental small bowel resection Performed by Dr Rad Pierce.    LASER HOLMIUM LITHOTRIPSY URETER(S), INSERT STENT, COMBINED Left 4/29/2021    Procedure: CYSTOSCOPY LEFT URETEROSCOPY;  Surgeon: Shabnam Lee MD;  Location:  OR       Physical Exam     Patient Vitals for the past 24 hrs:   BP Temp Temp src Pulse Resp SpO2 Height Weight   05/13/25 1639 (!) 160/83 -- -- 83 -- 100 % -- --   05/13/25 1220 (!) 150/79 98.9  F (37.2  C) Temporal 79 16 95 % 1.524 m (5') 35.4 kg (78 lb)     Physical Exam  General: Alert and cooperative with exam. Patient in mild distress. Normal mentation.  Head:  Small right sided superior scalp hematoma with 1 cm scalp laceration  Eyes:  No scleral icterus, PERRL, EOMI  ENT:  The external nose and ears are normal. The oropharynx is normal and without erythema; mucus membranes are moist. Uvula midline, no evidence of deep space infection.  Neck:  Normal range of motion without rigidity.  CV:  Regular rate and rhythm  Resp:  Breath sounds are clear bilaterally    Non-labored, no retractions or accessory muscle use  GI:  Abdomen  is soft, no distension, no tenderness. No peritoneal signs  MS:  No evident lower extremity edema.  Mild tenderness to the anterior right shoulder without evidence of deformity or dislocation on exam.  CMS intact to all extremities.  Mild bruising to left anterior knee.  Knee joint stable without evidence of tendon injury.  Skin:  Warm and dry, No rash or lesions noted.  Neuro: Oriented x 3. No gross motor deficits.     Diagnostics     Lab Results   Labs Ordered and Resulted from Time of ED Arrival to Time of ED Departure - No data to display    Imaging   XR Shoulder Right G/E 3 Views   Final Result   IMPRESSION: Demineralization without displaced fracture. Chronic fracture deformity of the proximal humerus with angulation of the surgical neck, unchanged from radiograph 08/14/2023. Mild acromioclavicular joint arthrosis. Thoracic scoliosis with    associated degenerative disc disease.      Head CT w/o contrast   Final Result   IMPRESSION:   1.  No acute intracranial process.   2.  Mild diffuse parenchymal volume loss and white matter changes likely due to chronic microvascular ischemic disease.             EKG   ECG results from 05/13/25   EKG 12-lead, tracing only     Value    Systolic Blood Pressure     Diastolic Blood Pressure     Ventricular Rate 93    Atrial Rate 93    RI Interval 228    QRS Duration 72        QTc 460    P Axis 79    R AXIS 85    T Axis 50    Interpretation ECG      Sinus rhythm with 1st degree A-V block  Possible Anterior infarct , age undetermined  Abnormal ECG  When compared with ECG of 29-May-2024 13:24,  Aberrant conduction is no longer Present  Confirmed by GENERATED REPORT, COMPUTER ([id number]),  Altagracia Garcia ([id number]) on 5/13/2025 4:22:14 PM           Independent Interpretation   Right shoulder x-ray: No acute fracture or dislocation  CT head: No intracranial bleeding    ED Course      Medications Administered   Medications   lido-EPINEPHrine-tetracaine (LET) topical gel GEL (  Topical $Given 5/13/25 8542)       Procedures   Procedures     Laceration Repair      Procedure: Laceration Repair    Indication: Laceration    Consent: Verbal    Tetanus status reviewed    Location: Right parietal scalp    Length: 1 cm    Preparation: Irrigation with Sterile Saline.    Anesthesia/Sedation: Topical -LET      Treatment/Exploration: Wound explored, no foreign bodies found     Closure: The wound was closed with 1 staple.    Patient Status: The patient tolerated the procedure well: Yes. There were no complications.      Discussion of Management   None    ED Course        Additional Documentation  None    Medical Decision Making / Diagnosis     CMS Diagnoses: None    MIPS     None    [facility]   Georgette Alatorre is a 84 year old female who presents for evaluation after fall with trauma to the head.  Patient is well-appearing with normal neurologic exam.  Her exam is notable for contusion to the head with small associated laceration which was repaired per procedure note above; staple removal in 1 week.  The differential diagnosis includes skull fracture, epidural hematoma, subdural hematoma, intracerebral hemorrhage, and traumatic subarachnoid hemorrhage; all of these are highly unlikely in this clinical setting given negative CT.  This patient has a history and clinical exam consistent with contusion to head; discussed possibility of mild concussion.  CT done given age and clinical concern.  Additionally patient reported pain to her right shoulder and x-rays without evidence of acute fracture or dislocation; chronic deformity from previous fracture noted.  Shoulder pain consistent with mild soft tissue injury.  Also noted to have multiple small contusions to extremities.  Supportive care and return precautions were discussed.  Patient to follow-up with PCP in 1 week for reassessment.  Discharged home with her son.      Disposition   The patient was discharged.     Diagnosis     ICD-10-CM    1. Injury of  head, initial encounter  S09.90XA       2. Fall, initial encounter  W19.XXXA       3. Scalp laceration, initial encounter  S01.01XA       4. Acute pain of right shoulder  M25.511       5. Multiple contusions  T07.XXXA               Oscar Conway, DO  05/13/25 0981

## 2025-05-13 NOTE — DISCHARGE INSTRUCTIONS
Tylenol and/or ibuprofen as needed for pain relief  Ice area of pain 20 minutes, 4 times per day over the next several days.    Staple should be removed in 1 week.    Discharge Instructions  Laceration (Cut)    You were seen today for a laceration (cut).  Your provider examined your laceration for any problems such a buried foreign body (like glass, a splinter, or gravel), or injury to blood vessels, tendons, and nerves.  Your provider may have also rinsed and/or scrubbed your laceration to help prevent an infection. It may not be possible to find all problems with your laceration on the first visit; occasionally foreign bodies or a tendon injury can go undetected.    Your laceration may have been closed in one of several ways:  No closure: many wounds will heal just fine without closure.  Stitches: regular stitches that require removal.  Staples: skin staples are often used in the scalp/head.  Wound adhesive (glue): skin glue can be used for certain lacerations and doesn t require removal.  Wound strips (aka Butterfly bandages or steri-strips): these are bandages that help to close a wound.  Absorbable stitches:  dissolving  stitches that go away on their own and usually don t require removal.    A small percentage of wounds will develop an infection regardless of how well the wound is cared for. Antibiotics are generally not indicated to prevent an infection so are only given for a small number of high-risk wounds. Some lacerations are too high risk to close, and are left open to heal because closure can increase the likelihood that an infection will develop.    Remember that all lacerations, no matter how expertly repaired, will cause scarring. We consider many factors, techniques, and materials, in our efforts to provide the best possible cosmetic outcome.    Generally, every Emergency Department visit should have a follow-up clinic visit with either a primary or a specialty clinic/provider. Please follow-up as  instructed by your emergency provider today.     Return to the Emergency Department right away if:  You have more redness, swelling, pain, drainage (pus), a bad smell, or red streaking from your laceration as these symptoms could indicate an infection.  You have a fever of 100.4 F or more.  You have bleeding that you cannot stop at home. If your cut starts to bleed, hold pressure on the bleeding area with a clean cloth or put pressure over the bandage.  If the bleeding does not stop after using constant pressure for 30 minutes, you should return to the Emergency Department for further treatment.  An area past the laceration is cool, pale, or blue compared with the other side, or has a slower return of color when squeezed.  Your dressing seems too tight or starts to get uncomfortable or painful. For children, signs of a problem might be irritability or restlessness.  You have loss of normal function or use of an area, such as being unable to straighten or bend a finger normally.  You have a numb area past the laceration.    Return to the Emergency Department or see your regular provider if:  The laceration starts to come open.   You have something coming out of the cut or a feeling that there is something in the laceration.  Your wound will not heal, or keeps breaking open. There can always be glass, wood, dirt or other things in any wound.  They will not always show up, even on x-rays.  If a wound does not heal, this may be why, and it is important to follow-up with your regular provider.    Home Care:  Take your dressing off in 12-24 hours, or as instructed by your provider, to check your laceration. Remove the dressing sooner if it seems too tight or painful, or if it is getting numb, tingly, or pale past the dressing.  Gently wash your laceration 1-2 times daily with clean water and mild soap. It is okay to shower or run clean water over the laceration, but do not let the laceration soak in water (no  swimming).  If your laceration was closed with wound adhesive or strips: pat it dry and leave it open to the air. For all other repairs: after you wash your laceration, or at least 2 times a day, apply antibiotic ointment (such as Neosporin  or Bacitracin ) to the laceration, then cover it with a Band-Aid  or gauze.  Keep the laceration clean. Wear gloves or other protective clothing if you are around dirt.    Follow-up for removal:  If your wound was closed with staples or regular stitches, they need to be removed according to the instructions and timeline specified by your provider today.  If your wound was closed with absorbable ( dissolving ) sutures, they should fall out, dissolve, or not be visible in about one week. If they are still visible, then they should be removed according to the instructions and timeline specified by your provider today.    Scars:  To help minimize scarring:  Wear sunscreen over the healed laceration when out in the sun.  Massage the area regularly once healed.  You may apply Vitamin E to the healed wound.  Wait. Scars improve in appearance over months and years.    If you were given a prescription for medicine here today, be sure to read all of the information (including the package insert) that comes with your prescription.  This will include important information about the medicine, its side effects, and any warnings that you need to know about.  The pharmacist who fills the prescription can provide more information and answer questions you may have about the medicine.  If you have questions or concerns that the pharmacist cannot address, please call or return to the Emergency Department.       Remember that you can always come back to the Emergency Department if you are not able to see your regular provider in the amount of time listed above, if you get any new symptoms, or if there is anything that worries you.

## (undated) DEVICE — GLOVE PROTEXIS W/NEU-THERA 6.5  2D73TE65

## (undated) DEVICE — PAD CHUX UNDERPAD 23X24" 7136

## (undated) DEVICE — RAD RX ISOVUE 300 (50ML) 61% IOPAMIDOL CHARGE PER ML

## (undated) DEVICE — LINEN TOWEL PACK X5 5464

## (undated) DEVICE — MANIFOLD NEPTUNE 4 PORT 700-20

## (undated) DEVICE — PACK CYSTOSCOPY SBA15CYFSI

## (undated) DEVICE — CATH URETERAL OPEN END 6FR AXXCESS

## (undated) DEVICE — SOL WATER IRRIG 1000ML BOTTLE 2F7114

## (undated) DEVICE — GUIDEWIRE ZIPWIRE ANG .035"X150CM M006630206B0

## (undated) DEVICE — BAG CYSTO TABLE DRAIN

## (undated) RX ORDER — ACETAMINOPHEN 325 MG/1
TABLET ORAL
Status: DISPENSED
Start: 2021-04-29

## (undated) RX ORDER — FENTANYL CITRATE 50 UG/ML
INJECTION, SOLUTION INTRAMUSCULAR; INTRAVENOUS
Status: DISPENSED
Start: 2021-04-29

## (undated) RX ORDER — PROPOFOL 10 MG/ML
INJECTION, EMULSION INTRAVENOUS
Status: DISPENSED
Start: 2021-04-29

## (undated) RX ORDER — CIPROFLOXACIN 2 MG/ML
INJECTION, SOLUTION INTRAVENOUS
Status: DISPENSED
Start: 2021-04-29

## (undated) RX ORDER — DEXAMETHASONE SODIUM PHOSPHATE 10 MG/ML
INJECTION, SOLUTION INTRAMUSCULAR; INTRAVENOUS
Status: DISPENSED
Start: 2022-09-15

## (undated) RX ORDER — LIDOCAINE HYDROCHLORIDE 20 MG/ML
INJECTION, SOLUTION EPIDURAL; INFILTRATION; INTRACAUDAL; PERINEURAL
Status: DISPENSED
Start: 2021-04-29

## (undated) RX ORDER — FUROSEMIDE 10 MG/ML
INJECTION INTRAMUSCULAR; INTRAVENOUS
Status: DISPENSED
Start: 2023-02-23

## (undated) RX ORDER — DEXAMETHASONE SODIUM PHOSPHATE 4 MG/ML
INJECTION, SOLUTION INTRA-ARTICULAR; INTRALESIONAL; INTRAMUSCULAR; INTRAVENOUS; SOFT TISSUE
Status: DISPENSED
Start: 2021-04-29

## (undated) RX ORDER — ONDANSETRON 2 MG/ML
INJECTION INTRAMUSCULAR; INTRAVENOUS
Status: DISPENSED
Start: 2021-04-29

## (undated) RX ORDER — LIDOCAINE HYDROCHLORIDE 10 MG/ML
INJECTION, SOLUTION EPIDURAL; INFILTRATION; INTRACAUDAL; PERINEURAL
Status: DISPENSED
Start: 2022-09-15